# Patient Record
Sex: MALE | Race: WHITE | NOT HISPANIC OR LATINO | Employment: OTHER | URBAN - METROPOLITAN AREA
[De-identification: names, ages, dates, MRNs, and addresses within clinical notes are randomized per-mention and may not be internally consistent; named-entity substitution may affect disease eponyms.]

---

## 2017-01-03 ENCOUNTER — GENERIC CONVERSION - ENCOUNTER (OUTPATIENT)
Dept: FAMILY MEDICINE CLINIC | Facility: CLINIC | Age: 57
End: 2017-01-03

## 2017-02-15 ENCOUNTER — GENERIC CONVERSION - ENCOUNTER (OUTPATIENT)
Dept: OTHER | Facility: OTHER | Age: 57
End: 2017-02-15

## 2017-02-27 ENCOUNTER — ALLSCRIPTS OFFICE VISIT (OUTPATIENT)
Dept: OTHER | Facility: OTHER | Age: 57
End: 2017-02-27

## 2017-03-20 ENCOUNTER — ALLSCRIPTS OFFICE VISIT (OUTPATIENT)
Dept: OTHER | Facility: OTHER | Age: 57
End: 2017-03-20

## 2017-04-07 ENCOUNTER — GENERIC CONVERSION - ENCOUNTER (OUTPATIENT)
Dept: OTHER | Facility: OTHER | Age: 57
End: 2017-04-07

## 2017-05-10 ENCOUNTER — GENERIC CONVERSION - ENCOUNTER (OUTPATIENT)
Dept: OTHER | Facility: OTHER | Age: 57
End: 2017-05-10

## 2017-06-07 ENCOUNTER — GENERIC CONVERSION - ENCOUNTER (OUTPATIENT)
Dept: OTHER | Facility: OTHER | Age: 57
End: 2017-06-07

## 2017-07-24 ENCOUNTER — ALLSCRIPTS OFFICE VISIT (OUTPATIENT)
Dept: OTHER | Facility: OTHER | Age: 57
End: 2017-07-24

## 2017-07-24 ENCOUNTER — GENERIC CONVERSION - ENCOUNTER (OUTPATIENT)
Dept: OTHER | Facility: OTHER | Age: 57
End: 2017-07-24

## 2017-07-27 ENCOUNTER — GENERIC CONVERSION - ENCOUNTER (OUTPATIENT)
Dept: OTHER | Facility: OTHER | Age: 57
End: 2017-07-27

## 2017-08-02 ENCOUNTER — GENERIC CONVERSION - ENCOUNTER (OUTPATIENT)
Dept: OTHER | Facility: OTHER | Age: 57
End: 2017-08-02

## 2017-08-30 ENCOUNTER — GENERIC CONVERSION - ENCOUNTER (OUTPATIENT)
Dept: OTHER | Facility: OTHER | Age: 57
End: 2017-08-30

## 2017-09-29 ENCOUNTER — GENERIC CONVERSION - ENCOUNTER (OUTPATIENT)
Dept: OTHER | Facility: OTHER | Age: 57
End: 2017-09-29

## 2017-10-05 DIAGNOSIS — M10.9 GOUT: ICD-10-CM

## 2017-10-05 DIAGNOSIS — I10 ESSENTIAL (PRIMARY) HYPERTENSION: ICD-10-CM

## 2017-10-12 ENCOUNTER — TRANSCRIBE ORDERS (OUTPATIENT)
Dept: ADMINISTRATIVE | Facility: HOSPITAL | Age: 57
End: 2017-10-12

## 2017-10-12 DIAGNOSIS — D36.10 LHERMITTE-DUCLOS SYNDROME (HCC): Primary | ICD-10-CM

## 2017-10-12 DIAGNOSIS — Q04.8 LHERMITTE-DUCLOS SYNDROME (HCC): Primary | ICD-10-CM

## 2017-10-12 DIAGNOSIS — M77.42 METATARSALGIA OF LEFT FOOT: ICD-10-CM

## 2017-10-18 ENCOUNTER — LAB (OUTPATIENT)
Dept: LAB | Facility: MEDICAL CENTER | Age: 57
End: 2017-10-18
Payer: COMMERCIAL

## 2017-10-18 ENCOUNTER — GENERIC CONVERSION - ENCOUNTER (OUTPATIENT)
Dept: OTHER | Facility: OTHER | Age: 57
End: 2017-10-18

## 2017-10-18 ENCOUNTER — TRANSCRIBE ORDERS (OUTPATIENT)
Dept: ADMINISTRATIVE | Facility: HOSPITAL | Age: 57
End: 2017-10-18

## 2017-10-18 DIAGNOSIS — M10.9 GOUT: ICD-10-CM

## 2017-10-18 DIAGNOSIS — I10 ESSENTIAL (PRIMARY) HYPERTENSION: ICD-10-CM

## 2017-10-18 LAB
ALBUMIN SERPL BCP-MCNC: 3.6 G/DL (ref 3.5–5)
ALP SERPL-CCNC: 88 U/L (ref 46–116)
ALT SERPL W P-5'-P-CCNC: 48 U/L (ref 12–78)
ANION GAP SERPL CALCULATED.3IONS-SCNC: 8 MMOL/L (ref 4–13)
AST SERPL W P-5'-P-CCNC: 29 U/L (ref 5–45)
BASOPHILS # BLD AUTO: 0.02 THOUSANDS/ΜL (ref 0–0.1)
BASOPHILS NFR BLD AUTO: 0 % (ref 0–1)
BILIRUB SERPL-MCNC: 0.74 MG/DL (ref 0.2–1)
BILIRUB UR QL STRIP: NEGATIVE
BUN SERPL-MCNC: 16 MG/DL (ref 5–25)
CALCIUM SERPL-MCNC: 8.9 MG/DL (ref 8.3–10.1)
CHLORIDE SERPL-SCNC: 107 MMOL/L (ref 100–108)
CHOLEST SERPL-MCNC: 168 MG/DL (ref 50–200)
CLARITY UR: CLEAR
CO2 SERPL-SCNC: 26 MMOL/L (ref 21–32)
COLOR UR: YELLOW
CREAT SERPL-MCNC: 1.13 MG/DL (ref 0.6–1.3)
EOSINOPHIL # BLD AUTO: 0.43 THOUSAND/ΜL (ref 0–0.61)
EOSINOPHIL NFR BLD AUTO: 7 % (ref 0–6)
ERYTHROCYTE [DISTWIDTH] IN BLOOD BY AUTOMATED COUNT: 14 % (ref 11.6–15.1)
GFR SERPL CREATININE-BSD FRML MDRD: 71 ML/MIN/1.73SQ M
GLUCOSE P FAST SERPL-MCNC: 106 MG/DL (ref 65–99)
GLUCOSE UR STRIP-MCNC: NEGATIVE MG/DL
HCT VFR BLD AUTO: 44.2 % (ref 36.5–49.3)
HDLC SERPL-MCNC: 72 MG/DL (ref 40–60)
HGB BLD-MCNC: 14.4 G/DL (ref 12–17)
HGB UR QL STRIP.AUTO: NEGATIVE
KETONES UR STRIP-MCNC: NEGATIVE MG/DL
LDLC SERPL CALC-MCNC: 84 MG/DL (ref 0–100)
LEUKOCYTE ESTERASE UR QL STRIP: NEGATIVE
LYMPHOCYTES # BLD AUTO: 1.74 THOUSANDS/ΜL (ref 0.6–4.47)
LYMPHOCYTES NFR BLD AUTO: 30 % (ref 14–44)
MCH RBC QN AUTO: 28.7 PG (ref 26.8–34.3)
MCHC RBC AUTO-ENTMCNC: 32.6 G/DL (ref 31.4–37.4)
MCV RBC AUTO: 88 FL (ref 82–98)
MONOCYTES # BLD AUTO: 0.58 THOUSAND/ΜL (ref 0.17–1.22)
MONOCYTES NFR BLD AUTO: 10 % (ref 4–12)
NEUTROPHILS # BLD AUTO: 3.02 THOUSANDS/ΜL (ref 1.85–7.62)
NEUTS SEG NFR BLD AUTO: 53 % (ref 43–75)
NITRITE UR QL STRIP: NEGATIVE
NRBC BLD AUTO-RTO: 0 /100 WBCS
PH UR STRIP.AUTO: 6 [PH] (ref 4.5–8)
PLATELET # BLD AUTO: 239 THOUSANDS/UL (ref 149–390)
PMV BLD AUTO: 11.4 FL (ref 8.9–12.7)
POTASSIUM SERPL-SCNC: 4.2 MMOL/L (ref 3.5–5.3)
PROT SERPL-MCNC: 7.6 G/DL (ref 6.4–8.2)
PROT UR STRIP-MCNC: NEGATIVE MG/DL
PSA SERPL-MCNC: 8.2 NG/ML (ref 0–4)
RBC # BLD AUTO: 5.02 MILLION/UL (ref 3.88–5.62)
SODIUM SERPL-SCNC: 141 MMOL/L (ref 136–145)
SP GR UR STRIP.AUTO: 1.02 (ref 1–1.03)
TRIGL SERPL-MCNC: 59 MG/DL
TSH SERPL DL<=0.05 MIU/L-ACNC: 2.09 UIU/ML (ref 0.36–3.74)
URATE SERPL-MCNC: 6.8 MG/DL (ref 4.2–8)
UROBILINOGEN UR QL STRIP.AUTO: 0.2 E.U./DL
WBC # BLD AUTO: 5.81 THOUSAND/UL (ref 4.31–10.16)

## 2017-10-18 PROCEDURE — 36415 COLL VENOUS BLD VENIPUNCTURE: CPT

## 2017-10-18 PROCEDURE — 85025 COMPLETE CBC W/AUTO DIFF WBC: CPT

## 2017-10-18 PROCEDURE — G0103 PSA SCREENING: HCPCS

## 2017-10-18 PROCEDURE — 84550 ASSAY OF BLOOD/URIC ACID: CPT

## 2017-10-18 PROCEDURE — 80053 COMPREHEN METABOLIC PANEL: CPT

## 2017-10-18 PROCEDURE — 84443 ASSAY THYROID STIM HORMONE: CPT

## 2017-10-18 PROCEDURE — 80061 LIPID PANEL: CPT

## 2017-10-18 PROCEDURE — 81003 URINALYSIS AUTO W/O SCOPE: CPT

## 2017-10-22 ENCOUNTER — HOSPITAL ENCOUNTER (OUTPATIENT)
Dept: MRI IMAGING | Facility: HOSPITAL | Age: 57
Discharge: HOME | End: 2017-10-22
Attending: PODIATRIST
Payer: COMMERCIAL

## 2017-10-22 DIAGNOSIS — Q04.8 LHERMITTE-DUCLOS SYNDROME (HCC): ICD-10-CM

## 2017-10-22 DIAGNOSIS — M77.42 METATARSALGIA OF LEFT FOOT: ICD-10-CM

## 2017-10-22 DIAGNOSIS — D36.10 LHERMITTE-DUCLOS SYNDROME (HCC): ICD-10-CM

## 2017-10-22 PROCEDURE — A9585 GADOBUTROL INJECTION: HCPCS | Performed by: PODIATRIST

## 2017-10-22 PROCEDURE — 73720 MRI LWR EXTREMITY W/O&W/DYE: CPT

## 2017-10-22 RX ADMIN — GADOBUTROL 9 ML: 604.72 INJECTION INTRAVENOUS at 10:49

## 2017-10-24 ENCOUNTER — TRANSCRIBE ORDERS (OUTPATIENT)
Dept: ADMINISTRATIVE | Facility: HOSPITAL | Age: 57
End: 2017-10-24

## 2017-10-24 ENCOUNTER — LAB (OUTPATIENT)
Dept: LAB | Facility: MEDICAL CENTER | Age: 57
End: 2017-10-24
Payer: COMMERCIAL

## 2017-10-24 DIAGNOSIS — N40.1 ENLARGED PROSTATE WITH URINARY OBSTRUCTION: Primary | ICD-10-CM

## 2017-10-24 DIAGNOSIS — N13.8 ENLARGED PROSTATE WITH URINARY OBSTRUCTION: Primary | ICD-10-CM

## 2017-10-24 DIAGNOSIS — N13.8 ENLARGED PROSTATE WITH URINARY OBSTRUCTION: ICD-10-CM

## 2017-10-24 DIAGNOSIS — N40.1 ENLARGED PROSTATE WITH URINARY OBSTRUCTION: ICD-10-CM

## 2017-10-24 LAB — PSA SERPL-MCNC: 7.1 NG/ML (ref 0–4)

## 2017-10-24 PROCEDURE — 36415 COLL VENOUS BLD VENIPUNCTURE: CPT

## 2017-10-24 PROCEDURE — G0103 PSA SCREENING: HCPCS

## 2017-10-25 ENCOUNTER — ALLSCRIPTS OFFICE VISIT (OUTPATIENT)
Dept: OTHER | Facility: OTHER | Age: 57
End: 2017-10-25

## 2017-10-25 LAB
BILIRUB UR QL STRIP: NORMAL
CLARITY UR: NORMAL
COLOR UR: YELLOW
GLUCOSE (HISTORICAL): NORMAL
HGB UR QL STRIP.AUTO: NORMAL
KETONES UR STRIP-MCNC: NORMAL MG/DL
LEUKOCYTE ESTERASE UR QL STRIP: NORMAL
NITRITE UR QL STRIP: NORMAL
PH UR STRIP.AUTO: 6 [PH]
PROT UR STRIP-MCNC: NORMAL MG/DL
SP GR UR STRIP.AUTO: 1.02
UROBILINOGEN UR QL STRIP.AUTO: 0.2

## 2017-10-26 NOTE — PROGRESS NOTES
Assessment  1  Hypertension (401 9) (I10)    Plan   · EKG/ECG- POC; Status:Complete;   Done: 70GSM2917 09:01AM   Perform: In Office; Last Updated Francine San; 10/25/2017 9:03:48 AM;Ordered;For:Hypertension; Ordered By:Deonte Whitley;   · Follow-up visit in 1 year Evaluation and Treatment  Follow-up  Status: Hold For -  Scheduling  Requested for: 25Oct2017   Ordered; For: Hypertension; Ordered By: Forest Carlisle Performed:  Due: 40GWC2480    Discussion/Summary    Asymptomatic  Functional class I  Normal stress test in 2015  Blood pressure acceptable on lisinopril 2 5 mg daily  Was recently a fasting lipid profile showed a total cholesterol of 168 with HDL of 72 with an LDL of 84  I will see him again in one year  Chief Complaint  New Patient Pt request      History of Present Illness  HPI: Relocated to this area and presents for yearly cardiac eval  Denies chest pain shortness of breath orthopnea paroxysmal nocturnal dyspnea oOr syncope  Tolerates all activity  Review of Systems      Cardiac: No complaints of chest pain, no palpitations, no fainiting  Skin: No complaints of nonhealing sores or skin rash  Genitourinary: No complaints of recurrent urinary tract infections, frequent urination at night, difficult urination, blood in urine, kidney stones, loss of bladder control, no kidney or prostate problems, no erectile dysfunction  Psychological: No complaints of feeling depressed, anxiety, panic attacks, or difficulty concentrating  General: changes in weight lbs-- and-- lack of energy/fatigue, but-- No complaints of trouble sleeping, lack of energy, fatigue, appetite changes, weight changes, fever, frequent infections, or night sweats  Respiratory: No complaints of shortness of breath, cough with sputum, or wheezing  HEENT: serious eye problems, but-- No complaints of serious problems, hearing problems, nose problems, throat problems, or snoring -- cataracts     Gastrointestinal: No complaints of liver problems, nausea, vomiting, heartburn, constipation, bloody stools, diarrhea, problems swallowing, adbominal pain, or rectal bleeding  Neurological: daytime sleepiness, but-- No complaints of numbness, tingling, dizziness, weakness, seizures, headaches, syncope or fainting, AM fatigue, daytime sleepiness, no witnessed apnea episodes  Musculoskeletal: No complaints of arthritis, back pain, or painfull swelling  ROS reviewed  Active Problems  1  Cataract (366 9) (H26 9)   2  Elevated PSA (790 93) (R97 20)   3  Encounter for prostate cancer screening (V76 44) (Z12 5)   4  Encounter for screening colonoscopy (V76 51) (Z12 11)   5  Gout (274 9) (M10 9)   6  Hypertension (401 9) (I10)   7  Need for tuberculosis vaccination (V03 2) (Z23)   8  Needs flu shot (V04 81) (Z23)   9  Right knee pain (719 46) (M25 561)   10  Skin lesion (709 9) (L98 9)    Surgical History   · History of Back Surgery   · History of Eye Surgery   · History of Surgery Vas Deferens Vasectomy   · History of Tonsillectomy    The surgical history was reviewed and updated today  Family History  Mother    · Family history of Hypertension (401 9) (I10)    The family history was reviewed and updated today  Social History   · Alcohol drinker (V49 89) (Z78 9)   · 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   · Part-time employment   ·  (V61 03) (Z63 5)   · Two children  The social history was reviewed and updated today  The social history was reviewed and is unchanged  Current Meds   1  Lisinopril 2 5 MG Oral Tablet; TAKE 1 TABLET DAILY; Therapy: (Recorded:25Oct2017) to Recorded    The medication list was reviewed and updated today  Allergies  1   No Known Drug Allergies    Vitals  Signs   Heart Rate: 76, Apical  Respiration: 16  Systolic: 514, RUE  Diastolic: 88, RUE  Height: 5 ft 3 in  Weight: 214 lb 2 oz  BMI Calculated: 37 93  BSA Calculated: 1 99    Physical Exam    Constitutional - General appearance: No acute distress, well appearing and well nourished  Eyes - Conjunctiva and Sclera examination: Conjunctiva pink, sclera anicteric  Neck - Normal, no JVD   Pulmonary - Respiratory effort: No signs of respiratory distress  -- Auscultation of lungs: Clear to auscultation  Cardiovascular - Auscultation of heart: Normal rate and rhythm, normal S1 and S2, no murmurs  -- Pedal pulses: Normal, 2+ bilaterally  -- Examination of extremities for edema and/or varicosities: Normal     Abdomen - Soft  Musculoskeletal - Gait and station: Normal gait  Skin - Skin: Normal without rashes  Skin is warm and well perfused  Neurologic - Speech normal  No focal deficits     Psychiatric - Orientation to person, place, and time: Normal       Signatures   Electronically signed by : MANGO Vidal ; Oct 25 2017  3:08PM EST                       (Author)

## 2017-11-10 ENCOUNTER — GENERIC CONVERSION - ENCOUNTER (OUTPATIENT)
Dept: OTHER | Facility: OTHER | Age: 57
End: 2017-11-10

## 2017-11-10 DIAGNOSIS — R97.20 ELEVATED PROSTATE SPECIFIC ANTIGEN (PSA): ICD-10-CM

## 2017-11-10 PROCEDURE — G0416 PROSTATE BIOPSY, ANY MTHD: HCPCS | Performed by: UROLOGY

## 2017-11-10 PROCEDURE — 88344 IMHCHEM/IMCYTCHM EA MLT ANTB: CPT | Performed by: UROLOGY

## 2017-11-13 ENCOUNTER — LAB REQUISITION (OUTPATIENT)
Dept: LAB | Facility: HOSPITAL | Age: 57
End: 2017-11-13
Payer: COMMERCIAL

## 2017-11-13 DIAGNOSIS — R97.20 ELEVATED PROSTATE SPECIFIC ANTIGEN (PSA): ICD-10-CM

## 2017-11-20 ENCOUNTER — GENERIC CONVERSION - ENCOUNTER (OUTPATIENT)
Dept: UROLOGY | Facility: MEDICAL CENTER | Age: 57
End: 2017-11-20

## 2017-11-29 ENCOUNTER — GENERIC CONVERSION - ENCOUNTER (OUTPATIENT)
Dept: OTHER | Facility: OTHER | Age: 57
End: 2017-11-29

## 2017-11-29 ENCOUNTER — ALLSCRIPTS OFFICE VISIT (OUTPATIENT)
Dept: OTHER | Facility: OTHER | Age: 57
End: 2017-11-29

## 2018-01-04 LAB — SCAN RESULT: NORMAL

## 2018-01-10 NOTE — RESULT NOTES
Verified Results  (1) LYME ANTIBODY PROFILE [facility] TO [facility] [id number] 10:30AM Huey Hydeor Order Number: [id number]     Test Name Result Flag Reference   LYME IGG 0 18  0 00-0 79   NEGATIVE(0 00-0 79)-Absence of detectable Borrelia IgG Antibodies  A negative result does not exclude the possibility of Borrelia infection  If early Lyme disease is suspected,a second sample should be collected & tested 4 weeks after initial testing  LYME IGM 0 32  0 00-0 79   NEGATIVE (0 00-0 79)-Absence of detectable Borrelia IgM antibodies  A negative result does not exclude the possibility of Borrelia infection  If early lyme disease is suspected, a second sample should be collected & tested 4 weeks after initial testing

## 2018-01-11 NOTE — RESULT NOTES
Verified Results  (1) CBC/PLT/DIFF 18Oct2017 07:30AM Jay Back    Order Number: [id number]     Test Name Result Flag Reference   WBC COUNT 5 81 Thousand/uL  4 31-10 16   RBC COUNT 5 02 Million/uL  3 88-5 62   HEMOGLOBIN 14 4 g/dL  12 0-17 0   HEMATOCRIT 44 2 %  36 5-49 3   MCV 88 fL  82-98   MCH 28 7 pg  26 8-34 3   MCHC 32 6 g/dL  31 4-37 4   RDW 14 0 %  11 6-15 1   MPV 11 4 fL  8 9-12 7   PLATELET COUNT 467 Thousands/uL  149-390   nRBC AUTOMATED 0 /100 WBCs     NEUTROPHILS RELATIVE PERCENT 53 %  43-75   LYMPHOCYTES RELATIVE PERCENT 30 %  14-44   MONOCYTES RELATIVE PERCENT 10 %  4-12   EOSINOPHILS RELATIVE PERCENT 7 % H 0-6   BASOPHILS RELATIVE PERCENT 0 %  0-1   NEUTROPHILS ABSOLUTE COUNT 3 02 Thousands/? ??L  1 85-7 62   LYMPHOCYTES ABSOLUTE COUNT 1 74 Thousands/? ??L  0 60-4 47   MONOCYTES ABSOLUTE COUNT 0 58 Thousand/? ??L  0 17-1 22   EOSINOPHILS ABSOLUTE COUNT 0 43 Thousand/? ??L  0 00-0 61   BASOPHILS ABSOLUTE COUNT 0 02 Thousands/? ??L  0 00-0 10     (1) COMPREHENSIVE METABOLIC PANEL [id number] 16:73HE Jay Back    Order Number: [id number]     Test Name Result Flag Reference   SODIUM 141 mmol/L  136-145   POTASSIUM 4 2 mmol/L  3 5-5 3   CHLORIDE 107 mmol/L  100-108   CARBON DIOXIDE 26 mmol/L  21-32   ANION GAP (CALC) 8 mmol/L  4-13   BLOOD UREA NITROGEN 16 mg/dL  5-25   CREATININE 1 13 mg/dL  0 60-1 30   Standardized to IDMS reference method   CALCIUM 8 9 mg/dL  8 3-10 1   BILI, TOTAL 0 74 mg/dL  0 20-1 00   ALK PHOSPHATAS 88 U/L     ALT (SGPT) 48 U/L  12-78   Specimen collection should occur prior to Sulfasalazine and/or Sulfapyridine administration due to the potential for falsely depressed results  AST(SGOT) 29 U/L  5-45   Specimen collection should occur prior to Sulfasalazine administration due to the potential for falsely depressed results     ALBUMIN 3 6 g/dL  3 5-5 0   TOTAL PROTEIN 7 6 g/dL  6 4-8 2   eGFR 71 ml/min/1 73sq m     National Kidney Disease Education Program recommendations are as follows:  GFR calculation is accurate only with a steady state creatinine  Chronic Kidney disease less than 60 ml/min/1 73 sq  meters  Kidney failure less than 15 ml/min/1 73 sq  meters  GLUCOSE FASTING 106 mg/dL H 65-99   Specimen collection should occur prior to Sulfasalazine administration due to the potential for falsely depressed results  Specimen collection should occur prior to Sulfapyridine administration due to the potential for falsely elevated results  (1) LIPID PANEL FASTING W DIRECT LDL REFLEX 18Oct2017 07:30AM Jaqueline Benitez Order Number: [id number]     Test Name Result Flag Reference   CHOLESTEROL 168 mg/dL     LDL CHOLESTEROL CALCULATED 84 mg/dL  0-100   Triglyceride:        Normal <150 mg/dl   Borderline High 150-199 mg/dl   High 200-499 mg/dl   Very High >499 mg/dl      Cholesterol:       Desirable <200 mg/dl    Borderline High 200-239 mg/dl    High >239 mg/dl      HDL Cholesterol:       High>59 mg/dL    Low <41 mg/dL      HDL Cholesterol:       High>59 mg/dL    Low <41 mg/dL      This screening LDL is a calculated result  It does not have the accuracy of the Direct Measured LDL in the monitoring of patients with hyperlipidemia and/or statin therapy  Direct Measure LDL (BLI545) must be ordered separately in these patients  TRIGLYCERIDES 59 mg/dL  <=150   Specimen collection should occur prior to N-Acetylcysteine or Metamizole administration due to the potential for falsely depressed results  HDL,DIRECT 72 mg/dL H 40-60   Specimen collection should occur prior to Metamizole administration due to the potential for falsley depressed results  (1) TSH 18Oct2017 07:30AM Hany TAM Order Number: [id number]     Test Name Result Flag Reference   TSH 2 090 uIU/mL  0 358-3 740   Patients undergoing fluorescein dye angiography may retain small amounts of fluorescein in the body for 48-72 hours post procedure   Samples containing fluorescein can produce falsely depressed TSH values  If the patient had this procedure,a specimen should be resubmitted post fluorescein clearance  (1) URINALYSIS (will reflex a microscopy if leukocytes, occult blood, protein or nitrites are not within normal limits) 18Oct2017 07:30AM Huey  Order Number: [id number]     Test Name Result Flag Reference   COLOR Yellow     CLARITY Clear     SPECIFIC GRAVITY UA 1 021  1 003-1 030   PH UA 6 0  4 5-8 0   LEUKOCYTE ESTERASE UA Negative  Negative   NITRITE UA Negative  Negative   PROTEIN UA Negative mg/dl  Negative   GLUCOSE UA Negative mg/dl  Negative   KETONES UA Negative mg/dl  Negative   UROBILINOGEN UA 0 2 E U /dl  0 2, 1 0 E U /dl   BILIRUBIN UA Negative  Negative   BLOOD UA Negative  Negative     (1) URIC ACID 18Oct2017 07:30AM Markus Combs    Order Number: [id number]     Test Name Result Flag Reference   URIC ACID 6 8 mg/dL  4 2-8 0   Specimen collection should occur prior to Metamizole administration due to the potential for falsely depressed results  (1) PSA (SCREEN) (Dx V76 44 Screen for Prostate Cancer) [id number] 07:30AM T[employer]or Order Number: [id number]     Test Name Result Flag Reference   PROSTATE SPECIFIC ANTIGEN 8 2 ng/mL H 0 0-4 0   American Urological Association Guidelines define biochemical recurrence of prostate cancer as a detectable or rising PSA value post-radical prostatectomy that is greater than or equal to 0 2 ng/mL with a second confirmatory level of greater than or equal to 0 2 ng/mL         Plan  Elevated PSA    · *1 -  [facility]  *  Status: Active  Requested for:  07XLG3308  Care Summary provided  : Yes

## 2018-01-11 NOTE — RESULT NOTES
Verified Results  (Q) LIPID PANEL WITH DIRECT LDL 33LXN2382 08:14AM [employer]     Test Name Result Flag Reference   CHOLESTEROL, TOTAL 181 mg/dL  125-200   HDL CHOLESTEROL 57 mg/dL  > OR = 40   TRIGLICERIDES 689 mg/dL  <150   DIRECT  mg/dL  <130   Desirable range <100 mg/dL for patients with CHD or  diabetes and <70 mg/dL for diabetic patients with  known heart disease  CHOL/HDLC RATIO 3 2 (calc)  < OR = 5 0   NON HDL CHOLESTEROL 124 mg/dL (calc)     Target for non-HDL cholesterol is 30 mg/dL higher than   LDL cholesterol target  (Q) COMPREHENSIVE METABOLIC PNL W/ADJUSTED CALCIUM 20Jan2016 08:14AM [employer]     Test Name Result Flag Reference   GLUCOSE 94 mg/dL  65-99   Fasting reference interval   UREA NITROGEN (BUN) 15 mg/dL  7-25   CREATININE 1 12 mg/dL  0 70-1 33   For patients >52years of age, the reference limit  for Creatinine is approximately 13% higher for people  identified as -American  eGFR NON-AFR   AMERICAN 73 mL/min/1 73m2  > OR = 60   eGFR AFRICAN AMERICAN 85 mL/min/1 73m2  > OR = 60   BUN/CREATININE RATIO   5-94   NOT APPLICABLE (calc)   SODIUM 138 mmol/L  135-146   POTASSIUM 4 1 mmol/L  3 5-5 3   CHLORIDE 102 mmol/L     CARBON DIOXIDE 25 mmol/L  19-30   CALCIUM 9 4 mg/dL  8 6-10 3   CALCIUM (ADJUSTED FOR$ALBUMIN) 9 3 mg/dL (calc)  8 6-10 2   PROTEIN, TOTAL 7 5 g/dL  6 1-8 1   ALBUMIN 4 5 g/dL  3 6-5 1   GLOBULIN 3 0 g/dL (calc)  1 9-3 7   ALBUMIN/GLOBULIN RATIO 1 5 (calc)  1 0-2 5   BILIRUBIN, TOTAL 0 9 mg/dL  0 2-1 2   ALKALINE PHOSPHATASE 77 U/L     AST 24 U/L  10-35   ALT 28 U/L  9-46     (Q) URINALYSIS MICROSCOPIC 20Jan2016 08:14AM [employer]     Test Name Result Flag Reference   WBC NONE SEEN /HPF  < OR = 5   RBC NONE SEEN /HPF  < OR = 2   SQUAMOUS EPITHELIAL CELLS NONE SEEN /HPF  < OR = 5   BACTERIA NONE SEEN /HPF  NONE SEEN   HYALINE CAST NONE SEEN /LPF  NONE SEEN     (Q) CBC (INCLUDES DIFF/PLT) (REFL) 65LJG2053 08:14AM Kristen Merritt     Test Name Result Flag Reference   WHITE BLOOD CELL COUNT 5 1 Thousand/uL  3 8-10 8   RED BLOOD CELL COUNT 5 28 Million/uL  4 20-5 80   HEMOGLOBIN 14 6 g/dL  13 2-17 1   HEMATOCRIT 46 4 %  38 5-50 0   MCV 87 8 fL  80 0-100 0   MCH 27 6 pg  27 0-33 0   MCHC 31 4 g/dL L 32 0-36 0   RDW 14 3 %  11 0-15 0   PLATELET COUNT 795 Thousand/uL  140-400   MPV 9 7 fL  7 5-11 5   ABSOLUTE NEUTROPHILS 2576 cells/uL  1462-5228   ABSOLUTE LYMPHOCYTES 1811 cells/uL  850-3900   ABSOLUTE MONOCYTES 479 cells/uL  200-950   ABSOLUTE EOSINOPHILS 204 cells/uL     ABSOLUTE BASOPHILS 31 cells/uL  0-200   NEUTROPHILS 50 5 %     LYMPHOCYTES 35 5 %     MONOCYTES 9 4 %     EOSINOPHILS 4 0 %     BASOPHILS 0 6 %       (Q) HEPATITIS B IMMUNITY PANEL 20Jan2016 08:14AM Shaan Diane     Test Name Result Flag Reference   HEPATITIS B CORE AB TOTAL NON-REACTIVE  NON-REACTIVE   HEPATITIS B SURFACE$ANTIBODY QL REACTIVE A NON-REACTIVE     (Q) TSH, 3RD GENERATION 20Jan2016 08:14AM Shaan Diane     Test Name Result Flag Reference   TSH 2 32 mIU/L  0 40-4 50     (1) PSA, DIAGNOSTIC (FOLLOW-UP) 20Jan2016 08:14AM Shaan Diane   REPORT COMMENT:  FASTING:YES     Test Name Result Flag Reference   PSA, TOTAL 1 9 ng/mL  < OR = 4 0   This test was performed using the Siemens  chemiluminescent method  Values obtained from  different assay methods cannot be used  interchangeably  PSA levels, regardless of  value, should not be interpreted as absolute  evidence of the presence or absence of disease

## 2018-01-12 VITALS
HEIGHT: 75 IN | DIASTOLIC BLOOD PRESSURE: 90 MMHG | WEIGHT: 222.6 LBS | HEART RATE: 69 BPM | BODY MASS INDEX: 27.68 KG/M2 | SYSTOLIC BLOOD PRESSURE: 140 MMHG

## 2018-01-12 VITALS
HEIGHT: 63 IN | HEART RATE: 76 BPM | BODY MASS INDEX: 37.94 KG/M2 | SYSTOLIC BLOOD PRESSURE: 138 MMHG | DIASTOLIC BLOOD PRESSURE: 88 MMHG | WEIGHT: 214.13 LBS | RESPIRATION RATE: 16 BRPM

## 2018-01-13 VITALS
WEIGHT: 217 LBS | DIASTOLIC BLOOD PRESSURE: 80 MMHG | HEIGHT: 74 IN | SYSTOLIC BLOOD PRESSURE: 130 MMHG | BODY MASS INDEX: 27.85 KG/M2

## 2018-01-13 VITALS
TEMPERATURE: 97.6 F | HEART RATE: 78 BPM | DIASTOLIC BLOOD PRESSURE: 84 MMHG | HEIGHT: 75 IN | WEIGHT: 224.8 LBS | OXYGEN SATURATION: 95 % | BODY MASS INDEX: 27.95 KG/M2 | SYSTOLIC BLOOD PRESSURE: 120 MMHG

## 2018-01-22 VITALS
SYSTOLIC BLOOD PRESSURE: 120 MMHG | HEIGHT: 74 IN | DIASTOLIC BLOOD PRESSURE: 78 MMHG | TEMPERATURE: 96.9 F | RESPIRATION RATE: 16 BRPM | WEIGHT: 217 LBS | BODY MASS INDEX: 27.85 KG/M2

## 2018-01-22 VITALS — BODY MASS INDEX: 27.85 KG/M2 | HEIGHT: 74 IN | WEIGHT: 217 LBS

## 2018-01-22 VITALS
WEIGHT: 219.01 LBS | SYSTOLIC BLOOD PRESSURE: 138 MMHG | TEMPERATURE: 97.6 F | BODY MASS INDEX: 27.23 KG/M2 | DIASTOLIC BLOOD PRESSURE: 88 MMHG | HEIGHT: 75 IN | HEART RATE: 63 BPM | RESPIRATION RATE: 16 BRPM
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
## 2018-01-23 NOTE — PROGRESS NOTES
Assessment    1  Skin lesion (709 9) (L98 9)    Plan  Skin lesion    · Dermatology Referral Other Co-Management  *  Status: Hold For - Scheduling   Requested for: 58LQF3177  are Referring to a non- Preferred Provider : Services not provided in network  Care Summary provided  : Yes    Discussion/Summary    Will get derm eval    The treatment plan was reviewed with the patient/guardian  The patient/guardian understands and agrees with the treatment plan      Chief Complaint  Pt here for skin moles  Pt states moles get scabby      History of Present Illness  Skin Lesions (Brief): The patient is being seen for an initial evaluation of an existing diagnosis of skin lesion(s)  Symptoms:  single skin lesion and scar from previous lesion  The patient is not currently being treated for this problem  Review of Systems    Constitutional: not feeling poorly  Cardiovascular: no chest pain  Respiratory: no shortness of breath  Integumentary: skin lesion  Neurological: no headache  Active Problems    1  Cataract (366 9) (H26 9)   2  Encounter for prostate cancer screening (V76 44) (Z12 5)   3  Encounter for screening colonoscopy (V76 51) (Z12 11)   4  Gout (274 9) (M10 9)   5  Hypertension (401 9) (I10)   6  Laceration of left hand, subsequent encounter   7  Need for tuberculosis vaccination (V03 2) (Z23)   8  Right knee pain (719 46) (M25 561)    Past Medical History  Active Problems And Past Medical History Reviewed: The active problems and past medical history were reviewed and updated today  Family History  Mother    1  Family history of Hypertension (401 9) (I10)  Family History Reviewed: The family history was reviewed and updated today  Social History    · Alcohol drinker (V49 89) (Z78 9)   · 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   · Part-time employment   ·  (V61 03) (Z63 5)   · Two children  The social history was reviewed and updated today        Surgical History 1  History of Back Surgery   2  History of Eye Surgery   3  History of Surgery Vas Deferens Vasectomy   4  History of Tonsillectomy  Surgical History Reviewed: The surgical history was reviewed and updated today  Current Meds   1  Indomethacin 25 MG Oral Capsule; TAKE 1 CAPSULE BY MOUTH 2 TO 3 TIMES DAILY   WITH FOOD; Therapy: 65HIJ8101 to (Evaluate:21Jun2017)  Requested for: 68SEQ9473; Last   Rx:13Jun2017 Ordered   2  Lisinopril 5 MG Oral Tablet; Take 1 tablet daily Recorded    The medication list was reviewed and updated today  Allergies    1  No Known Drug Allergies    Vitals   Recorded: 63Osz0801 08:11AM   Temperature 97 6 F   Heart Rate 63   Respiration 16   Systolic 113   Diastolic 88   Height 6 ft 3 in   Weight 219 lb 0 16 oz   BMI Calculated 27 37   BSA Calculated 2 29     Physical Exam    Constitutional   General appearance: Abnormal   appears healthy and overweight  Pulmonary   Auscultation of lungs: Clear to auscultation, equal breath sounds bilaterally, no wheezes, no rales, no rhonci  Cardiovascular   Auscultation of heart: Normal rate and rhythm, normal S1 and S2, without murmurs  Carotid pulses: Normal     Skin   Examination of the skin for lesions: Abnormal   A single, variegated, brown papule(s)  on the back  A 1 cm scar was noted  on the back  Signatures   Electronically signed by :  Darian Rojas MD; Jul 24 2017  8:32AM EST                       (Author)

## 2018-01-24 ENCOUNTER — OFFICE VISIT (OUTPATIENT)
Dept: FAMILY MEDICINE CLINIC | Facility: CLINIC | Age: 58
End: 2018-01-24
Payer: COMMERCIAL

## 2018-01-24 VITALS
DIASTOLIC BLOOD PRESSURE: 80 MMHG | BODY MASS INDEX: 29.12 KG/M2 | TEMPERATURE: 98.4 F | HEART RATE: 78 BPM | SYSTOLIC BLOOD PRESSURE: 132 MMHG | WEIGHT: 215 LBS | HEIGHT: 72 IN

## 2018-01-24 DIAGNOSIS — D36.10 NEUROMA: ICD-10-CM

## 2018-01-24 DIAGNOSIS — Z00.00 HEALTH MAINTENANCE EXAMINATION: Primary | ICD-10-CM

## 2018-01-24 PROBLEM — C61 PROSTATE CARCINOMA (HCC): Status: ACTIVE | Noted: 2018-01-24

## 2018-01-24 PROBLEM — I10 ESSENTIAL HYPERTENSION: Status: ACTIVE | Noted: 2018-01-24

## 2018-01-24 PROCEDURE — 99396 PREV VISIT EST AGE 40-64: CPT | Performed by: FAMILY MEDICINE

## 2018-01-24 RX ORDER — IBUPROFEN 600 MG/1
600 TABLET ORAL EVERY 6 HOURS PRN
Qty: 60 TABLET | Refills: 1 | Status: SHIPPED | OUTPATIENT
Start: 2018-01-24 | End: 2018-06-23 | Stop reason: ALTCHOICE

## 2018-01-24 RX ORDER — LISINOPRIL 2.5 MG/1
2.5 TABLET ORAL DAILY
Refills: 0 | COMMUNITY
Start: 2017-12-29 | End: 2018-06-23 | Stop reason: ALTCHOICE

## 2018-01-24 NOTE — PROGRESS NOTES
Assessment/Plan:    Health maintenance examination  utd on lab and health screenings-concerned about elevated PSA-may need 2nd opinion       Diagnoses and all orders for this visit:    Health maintenance examination    Neuroma  -     ibuprofen (MOTRIN) 600 mg tablet; Take 1 tablet by mouth every 6 (six) hours as needed for mild pain    Other orders  -     lisinopril (ZESTRIL) 2 5 mg tablet; Take 2 5 mg by mouth daily          Subjective:      Patient ID: Mary Day is a 61 y o  male  HPI     Assessment/Plan     Healthy male exam  -only concern is prostate    1   2  Patient Counseling:  --Nutrition: Stressed importance of moderation in sodium/caffeine intake, saturated fat and cholesterol, caloric balance, sufficient intake of fresh fruits, vegetables, fiber, calcium  --Discussed the issue of  calcium supplement, and the daily use of baby aspirin  --Exercise: Stressed the importance of regular exercise  --Substance Abuse: Discussed cessation/primary prevention of tobacco, alcohol, or other drug use; driving or other dangerous activities under the influence; availability of treatment for abuse  --Sexuality: Discussed sexually transmitted diseases, partner selection, use of condoms,   --Injury prevention: Discussed safety belts, safety helmets, smoke detector  --Dental health: Discussed importance of regular tooth brushing, flossing, and dental visits  --Immunizations reviewed  --Discussed benefits of screening colonoscopy  --After hours service discussed with patient    3  Discussed the patient's BMI with him  The BMI is above average; BMI management plan is completed  4  Follow up in one year  Radha Tang is a 61 y o  male and is here for a comprehensive physical exam  The patient reports problems - prostate  Do you take any herbs or supplements that were not prescribed by a doctor? no  Are you taking calcium supplements? no  Are you taking aspirin daily?  no     History:  Patient receives prostate care outside our clinic  Date last prostate exam: 2017  Date last PSA: 2017    ros    Review of Systems  Do you have pain that bothers you in your daily life? no  Pertinent items are noted in HPI       Objective     /80 (BP Location: Left arm)   Pulse 78   Temp 98 4 °F (36 9 °C)   Ht 6' (1 829 m)   Wt 97 5 kg (215 lb)   BMI 29 16 kg/m²     General Appearance:    Alert, cooperative, no distress, appears stated age   Head:    Normocephalic, without obvious abnormality, atraumatic   Eyes:    PERRL, conjunctiva/corneas clear, EOM's intact, fundi     benign, both eyes        Ears:    Normal TM's and external ear canals, both ears   Nose:   Nares normal, septum midline, mucosa normal, no drainage    or sinus tenderness   Throat:   Lips, mucosa, and tongue normal; teeth and gums normal   Neck:   Supple, symmetrical, trachea midline, no adenopathy;        thyroid:  No enlargement/tenderness/nodules; no carotid    bruit or JVD   Back:     Symmetric, no curvature, ROM normal, no CVA tenderness   Lungs:     Clear to auscultation bilaterally, respirations unlabored   Chest wall:    No tenderness or deformity   Heart:    Regular rate and rhythm, S1 and S2 normal, no murmur, rub   or gallop   Abdomen:     Soft, non-tender, bowel sounds active all four quadrants,     no masses, no organomegaly   Genitalia:    Normal male without lesion, discharge or tenderness   Rectal:    Normal tone, normal prostate, no masses or tenderness;    guaiac negative stool   Extremities:   Extremities normal, atraumatic, no cyanosis or edema   Pulses:   2+ and symmetric all extremities   Skin:   Skin color, texture, turgor normal, no rashes or lesions   Lymph nodes:   Cervical, supraclavicular, and axillary nodes normal   Neurologic:   CNII-XII intact  Normal strength, sensation and reflexes       throughout           The following portions of the patient's history were reviewed and updated as appropriate: allergies, current medications, past family history, past medical history, past social history, past surgical history and problem list     Review of Systems   Constitutional: Negative for activity change, appetite change and unexpected weight change  HENT: Negative for ear pain and sore throat  Eyes: Positive for visual disturbance  Still blurry in r eye   Respiratory: Negative for shortness of breath  Cardiovascular: Negative for chest pain  Gastrointestinal: Negative for abdominal pain  Umbilical hernia   Genitourinary: Negative for difficulty urinating, dysuria and frequency  Musculoskeletal: Positive for arthralgias  Foot pain   Neurological: Negative for headaches  Objective:     Physical Exam   Constitutional: He is oriented to person, place, and time  He appears well-developed and well-nourished  HENT:   Mouth/Throat: Oropharynx is clear and moist  No oropharyngeal exudate  Eyes: Right eye exhibits no discharge  Cardiovascular: Normal rate and regular rhythm  Pulmonary/Chest: Breath sounds normal  He has no wheezes  Abdominal: Soft  Bowel sounds are normal  He exhibits mass  A hernia is present  Musculoskeletal: He exhibits no edema  Lymphadenopathy:     He has no cervical adenopathy  Neurological: He is alert and oriented to person, place, and time

## 2018-01-29 PROBLEM — Z00.00 HEALTH MAINTENANCE EXAMINATION: Status: ACTIVE | Noted: 2018-01-29

## 2018-02-25 ENCOUNTER — OFFICE VISIT (OUTPATIENT)
Dept: URGENT CARE | Facility: MEDICAL CENTER | Age: 58
End: 2018-02-25
Payer: COMMERCIAL

## 2018-02-25 VITALS
HEART RATE: 96 BPM | TEMPERATURE: 98.7 F | HEIGHT: 74 IN | SYSTOLIC BLOOD PRESSURE: 144 MMHG | OXYGEN SATURATION: 98 % | RESPIRATION RATE: 20 BRPM | BODY MASS INDEX: 27.98 KG/M2 | WEIGHT: 218 LBS | DIASTOLIC BLOOD PRESSURE: 78 MMHG

## 2018-02-25 DIAGNOSIS — L71.0 PERIORAL DERMATITIS: Primary | ICD-10-CM

## 2018-02-25 PROCEDURE — 99214 OFFICE O/P EST MOD 30 MIN: CPT | Performed by: PHYSICIAN ASSISTANT

## 2018-02-25 RX ORDER — CEPHALEXIN 500 MG/1
500 CAPSULE ORAL EVERY 8 HOURS SCHEDULED
Qty: 21 CAPSULE | Refills: 0 | Status: SHIPPED | OUTPATIENT
Start: 2018-02-25 | End: 2018-03-04

## 2018-02-25 NOTE — PROGRESS NOTES
[facility]        NAME: Rigoberto Valdez is a 61 y o  male  : 1960    MRN: [id number]  DATE: 2018  TIME: 2:53 PM    Assessment and Plan   Perioral dermatitis [L71 0]  1  Perioral dermatitis  cephalexin (KEFLEX) 500 mg capsule         Patient Instructions     Follow up with PCP in 3-5 days  Proceed to  ER if symptoms worsen  Chief Complaint     Chief Complaint   Patient presents with    Rash     Patient c/o a rash on his face x 3 days          History of Present Illness   Rigoberto Valdez presents to the clinic c/o      58-year-old male presents for evaluation of a rash on his chin  He states that approximately 2 days ago started to notice red bumps on his chin and on his neck and his cheeks  He denies any fevers, chills, shortness of breath, difficulty breathing, chest pain  He does shave with a razor blade  He denies any allergies to antibiotics  He has been trying a pot on a gold Bond ointment at the site  Denies any rash anywhere else on his body  He denies any drainage from the site  Rash         Review of Systems   Review of Systems   Constitutional: Negative  Respiratory: Negative  Cardiovascular: Negative  Skin: Positive for rash           Current Medications     Long-Term Prescriptions   Medication Sig Dispense Refill    ibuprofen (MOTRIN) 600 mg tablet Take 1 tablet by mouth every 6 (six) hours as needed for mild pain 60 tablet 1    lisinopril (ZESTRIL) 2 5 mg tablet Take 2 5 mg by mouth daily  0       Current Allergies     Allergies as of 2018    (No Known Allergies)            The following portions of the patient's history were reviewed and updated as appropriate: allergies, current medications, past family history, past medical history, past social history, past surgical history and problem list     Objective   /78   Pulse 96   Temp 98 7 °F (37 1 °C)   Resp 20   Ht 6' 2" (1 88 m)   Wt 98 9 kg (218 lb)   SpO2 98%   BMI 27 99 kg/m² Physical Exam     Physical Exam   Constitutional: He appears well-developed and well-nourished  No distress  Cardiovascular: Normal rate, regular rhythm and normal heart sounds  Pulmonary/Chest: Effort normal and breath sounds normal  No respiratory distress  He has no wheezes  He has no rales  Skin: Rash noted  He is not diaphoretic  Nursing note and vitals reviewed

## 2018-02-25 NOTE — PATIENT INSTRUCTIONS
Folliculitis   WHAT YOU NEED TO KNOW:   Folliculitis is inflammation of your hair follicles  A hair follicle is a sac under your skin  Your hair grows out of the follicle  Folliculitis is caused by bacteria or fungus, most commonly a germ called Staph  Folliculitis can occur anywhere you have hair  DISCHARGE INSTRUCTIONS:   Medicines:   · Antibiotics: This medicine is given to fight or prevent an infection caused by bacteria  It may be given as an ointment that you apply to your skin or as a pill  Always take your antibiotics exactly as ordered by your healthcare provider  Never save antibiotics or take leftover antibiotics that were given to you for another illness  · Antifungal medicine: This medicine helps kill fungus that may be causing your folliculitis  It may be given as an cream that you apply to your skin or as a pill  · Steroids: This medicine may be given to decrease inflammation  · NSAIDs , such as ibuprofen, help decrease swelling, pain, and fever  This medicine is available with or without a doctor's order  NSAIDs can cause stomach bleeding or kidney problems in certain people  If you take blood thinner medicine, always ask if NSAIDs are safe for you  Always read the medicine label and follow directions  Do not give these medicines to children under 10months of age without direction from your child's healthcare provider  · Antihistamines: This medicine may be given to help decrease itching  · Take your medicine as directed  Contact your healthcare provider if you think your medicine is not helping or if you have side effects  Tell him or her if you are allergic to any medicine  Keep a list of the medicines, vitamins, and herbs you take  Include the amounts, and when and why you take them  Bring the list or the pill bottles to follow-up visits  Carry your medicine list with you in case of an emergency    Follow up with your healthcare provider or dermatologist as directed:  Write down your questions so you remember to ask them during your visits  Manage folliculitis:   · Use warm compresses:  Wet a washcloth with warm water and apply it to the infected skin area to help decrease pain and swelling  Warm compresses may also help drain pus and improve healing  · Clean the area:  Use antibacterial soap to wash the affected area  Change your washcloths and towels every day  · Avoid shaving the area: If possible, do not shave areas that have folliculitis  If you must shave, use an electric razor or new blade every time you shave  Prevent folliculitis:   · Do not share personal items:  Do not share towels, soap, or any personal items with other people  · Do not wear tight clothing:  Do not wear tight-fitting clothes that rub against and irritate your skin  · Treat skin injuries right away:  Treat injuries such as cuts and scrapes right away  Wash them with warm, soapy water, and cover the area to prevent infection  Contact your healthcare provider or dermatologist if:   · You have a fever  · You have foul-smelling pus coming from the bumps on your skin  · Your rash is spreading  · You have questions or concerns about your condition or care  Seek care immediately if:  · You develop large areas of red, warm, tender skin around the folliculitis  · You develop boils  © 2017 [address] Information is for End User's use only and may not be sold, redistributed or otherwise used for commercial purposes  All illustrations and images included in CareNotes® are the copyrighted property of [employer] M [employer]  or [address]  The above information is an  only  It is not intended as medical advice for individual conditions or treatments  Talk to your doctor, nurse or pharmacist before following any medical regimen to see if it is safe and effective for you

## 2018-05-27 ENCOUNTER — OFFICE VISIT (OUTPATIENT)
Dept: URGENT CARE | Facility: MEDICAL CENTER | Age: 58
End: 2018-05-27
Payer: COMMERCIAL

## 2018-05-27 VITALS
HEART RATE: 68 BPM | HEIGHT: 74 IN | RESPIRATION RATE: 20 BRPM | DIASTOLIC BLOOD PRESSURE: 87 MMHG | OXYGEN SATURATION: 97 % | BODY MASS INDEX: 28.23 KG/M2 | WEIGHT: 220 LBS | SYSTOLIC BLOOD PRESSURE: 152 MMHG | TEMPERATURE: 97.7 F

## 2018-05-27 DIAGNOSIS — S39.012A STRAIN OF MUSCLE, FASCIA AND TENDON OF LOWER BACK, INITIAL ENCOUNTER: Primary | ICD-10-CM

## 2018-05-27 PROCEDURE — 99213 OFFICE O/P EST LOW 20 MIN: CPT | Performed by: PHYSICIAN ASSISTANT

## 2018-05-27 RX ORDER — MELOXICAM 15 MG/1
15 TABLET ORAL DAILY
Qty: 14 TABLET | Refills: 0 | Status: SHIPPED | OUTPATIENT
Start: 2018-05-27 | End: 2018-06-23 | Stop reason: ALTCHOICE

## 2018-05-27 RX ORDER — CYCLOBENZAPRINE HCL 10 MG
10 TABLET ORAL 3 TIMES DAILY PRN
Qty: 30 TABLET | Refills: 0 | Status: SHIPPED | OUTPATIENT
Start: 2018-05-27 | End: 2018-06-23 | Stop reason: ALTCHOICE

## 2018-05-27 NOTE — PROGRESS NOTES
[facility]        NAME: Taima Mcdonnell is a 61 y o  male  : 1960    MRN: [id number]  DATE: May 27, 2018  TIME: 9:25 AM    Assessment and Plan   Strain of muscle, fascia and tendon of lower back, initial encounter [R45 997A]  1  Strain of muscle, fascia and tendon of lower back, initial encounter  cyclobenzaprine (FLEXERIL) 10 mg tablet    meloxicam (MOBIC) 15 mg tablet         Patient Instructions     Patient Instructions     Acute Low Back Pain   AMBULATORY CARE:   Acute low back pain  is sudden discomfort in your lower back area that lasts for up to 6 weeks  The discomfort makes it difficult to tolerate activity  Common symptoms include the following:   · Back stiffness or spasms    · Pain down the back or side of one leg    · Holding yourself in an unusual position or posture to decrease your back pain    · Not being able to find a sitting position that is comfortable    · Slow increase in your pain for 24 to 48 hours after you stress your back    · Tenderness on your lower back or severe pain when you move your back  Seek immediate care for the following symptoms:   · Severe pain    · Sudden stiffness and heaviness in both buttocks down to both legs    · Numbness or weakness in one leg, or pain in both legs    · Numbness in your genital area or across your lower back    · Unable to control your urine or bowel movements  Contact your healthcare provider if:   · You have a fever  · You have pain at night or when you rest     · Your pain does not get better with treatment  · You have pain that worsens when you cough or sneeze  · You suddenly feel something pop or snap in your back  · You have questions or concerns about your condition or care  The goal of treatment for acute low back pain  is to relieve your pain and help you tolerate activity  Most people with acute lower back pain get better within 4 to 6 weeks   You may need any of the following:  · Acetaminophen  decreases pain  It is available without a doctor's order  Ask how much to take and how often to take it  Follow directions  Acetaminophen can cause liver damage if not taken correctly  · NSAIDs  help decrease swelling and pain  This medicine is available with or without a doctor's order  NSAIDs can cause stomach bleeding or kidney problems in certain people  If you take blood thinner medicine, always ask your healthcare provider if NSAIDs are safe for you  Always read the medicine label and follow directions  · Prescription pain medicine  may be given  Ask your healthcare provider how to take this medicine safely  · Muscle relaxers  decrease pain by relaxing the muscles in your lower spine  Manage your symptoms:   · Stay active  as much as you can without causing more pain  Bed rest could make your back pain worse  Start with some light exercises such as walking  Avoid heavy lifting until your pain is gone  Ask for more information about the activities or exercises that are right for you  · Ice  helps decrease swelling, pain, and muscle spams  Put crushed ice in a plastic bag  Cover it with a towel  Place it on your lower back for 20 to 30 minutes every 2 hours  Do this for about 2 to 3 days after your pain starts, or as directed  · Heat  helps decrease pain and muscle spasms  Start to use heat after treatment with ice has stopped  Use a small towel dampened with warm water or a heating pad, or sit in a warm bath  Apply heat on the area for 20 to 30 minutes every 2 hours for as many days as directed  Alternate heat and ice  Prevent acute low back pain:   · Use proper body mechanics  ¨ Bend at the hips and knees when you  objects  Do not bend from the waist  Use your leg muscles as you lift the load  Do not use your back  Keep the object close to your chest as you lift it  Try not to twist or lift anything above your waist     ¨ Change your position often when you stand for long periods of time  Rest one foot on a small box or footrest, and then switch to the other foot often  ¨ Try not to sit for long periods of time  When you do, sit in a straight-backed chair with your feet flat on the floor  Never reach, pull, or push while you are sitting  · Do exercises that strengthen your back muscles  Warm up before you exercise  Ask your healthcare provider the best exercises for you  · Maintain a healthy weight  Ask your healthcare provider how much you should weigh  Ask him to help you create a weight loss plan if you are overweight  Follow up with your healthcare provider as directed:  Return for a follow-up visit if you still have pain after 1 to 3 weeks of treatment  You may need to visit an orthopedist if your back pain lasts more than 12 weeks  Write down your questions so you remember to ask them during your visits  © 2017 [address] Information is for End User's use only and may not be sold, redistributed or otherwise used for commercial purposes  All illustrations and images included in CareNotes® are the copyrighted property of [employer] Inc  or [employer]  The above information is an  only  It is not intended as medical advice for individual conditions or treatments  Talk to your doctor, nurse or pharmacist before following any medical regimen to see if it is safe and effective for you  Chief Complaint     Chief Complaint   Patient presents with    Back Pain     Patient c/o lower back pain x 1 days          History of Present Illness   [facility] presents to the clinic c/o      62-year-old male presents for evaluation of low back pain, that started yesterday  Patient states he was moving on, felt sharp pain in his lower back  He did have a laminectomy, 2008 did not have any symptoms since then  Denies any paresthesias in his lower extremities  Denies any saddle paresthesias, bowel or bladder incontinence, fever    States his back feels very stiff  He denies any history of kidney stones, urinary complaints  Denies any history of diabetes  Denies any history of gastritis, reflux, gastric bleeding episodes  Review of Systems   Review of Systems   Constitutional: Negative  Respiratory: Negative  Cardiovascular: Negative  Musculoskeletal: Positive for back pain  Current Medications     Long-Term Prescriptions   Medication Sig Dispense Refill    cyclobenzaprine (FLEXERIL) 10 mg tablet Take 1 tablet (10 mg total) by mouth 3 (three) times a day as needed for muscle spasms 30 tablet 0    ibuprofen (MOTRIN) 600 mg tablet Take 1 tablet by mouth every 6 (six) hours as needed for mild pain 60 tablet 1    lisinopril (ZESTRIL) 2 5 mg tablet Take 2 5 mg by mouth daily  0    meloxicam (MOBIC) 15 mg tablet Take 1 tablet (15 mg total) by mouth daily 14 tablet 0       Current Allergies     Allergies as of 05/27/2018    (No Known Allergies)            The following portions of the patient's history were reviewed and updated as appropriate: allergies, current medications, past family history, past medical history, past social history, past surgical history and problem list     Objective   /87   Pulse 68   Temp 97 7 °F (36 5 °C)   Resp 20   Ht 6' 2" (1 88 m)   Wt 99 8 kg (220 lb)   SpO2 97%   BMI 28 25 kg/m²        Physical Exam     Physical Exam   Constitutional: He appears well-developed and well-nourished  No distress  Cardiovascular: Normal rate, regular rhythm and normal heart sounds  Exam reveals no gallop and no friction rub  No murmur heard  Pulmonary/Chest: Effort normal and breath sounds normal  No respiratory distress  He has no wheezes  He has no rales  Musculoskeletal:        Lumbar back: He exhibits decreased range of motion and spasm  He exhibits no tenderness, no bony tenderness and normal pulse  No CVA tenderness bilaterally  Skin: He is not diaphoretic     Nursing note and vitals reviewed

## 2018-05-27 NOTE — PATIENT INSTRUCTIONS
Acute Low Back Pain   AMBULATORY CARE:   Acute low back pain  is sudden discomfort in your lower back area that lasts for up to 6 weeks  The discomfort makes it difficult to tolerate activity  Common symptoms include the following:   · Back stiffness or spasms    · Pain down the back or side of one leg    · Holding yourself in an unusual position or posture to decrease your back pain    · Not being able to find a sitting position that is comfortable    · Slow increase in your pain for 24 to 48 hours after you stress your back    · Tenderness on your lower back or severe pain when you move your back  Seek immediate care for the following symptoms:   · Severe pain    · Sudden stiffness and heaviness in both buttocks down to both legs    · Numbness or weakness in one leg, or pain in both legs    · Numbness in your genital area or across your lower back    · Unable to control your urine or bowel movements  Contact your healthcare provider if:   · You have a fever  · You have pain at night or when you rest     · Your pain does not get better with treatment  · You have pain that worsens when you cough or sneeze  · You suddenly feel something pop or snap in your back  · You have questions or concerns about your condition or care  The goal of treatment for acute low back pain  is to relieve your pain and help you tolerate activity  Most people with acute lower back pain get better within 4 to 6 weeks  You may need any of the following:  · Acetaminophen  decreases pain  It is available without a doctor's order  Ask how much to take and how often to take it  Follow directions  Acetaminophen can cause liver damage if not taken correctly  · NSAIDs  help decrease swelling and pain  This medicine is available with or without a doctor's order  NSAIDs can cause stomach bleeding or kidney problems in certain people   If you take blood thinner medicine, always ask your healthcare provider if NSAIDs are safe for you  Always read the medicine label and follow directions  · Prescription pain medicine  may be given  Ask your healthcare provider how to take this medicine safely  · Muscle relaxers  decrease pain by relaxing the muscles in your lower spine  Manage your symptoms:   · Stay active  as much as you can without causing more pain  Bed rest could make your back pain worse  Start with some light exercises such as walking  Avoid heavy lifting until your pain is gone  Ask for more information about the activities or exercises that are right for you  · Ice  helps decrease swelling, pain, and muscle spams  Put crushed ice in a plastic bag  Cover it with a towel  Place it on your lower back for 20 to 30 minutes every 2 hours  Do this for about 2 to 3 days after your pain starts, or as directed  · Heat  helps decrease pain and muscle spasms  Start to use heat after treatment with ice has stopped  Use a small towel dampened with warm water or a heating pad, or sit in a warm bath  Apply heat on the area for 20 to 30 minutes every 2 hours for as many days as directed  Alternate heat and ice  Prevent acute low back pain:   · Use proper body mechanics  ¨ Bend at the hips and knees when you  objects  Do not bend from the waist  Use your leg muscles as you lift the load  Do not use your back  Keep the object close to your chest as you lift it  Try not to twist or lift anything above your waist     ¨ Change your position often when you stand for long periods of time  Rest one foot on a small box or footrest, and then switch to the other foot often  ¨ Try not to sit for long periods of time  When you do, sit in a straight-backed chair with your feet flat on the floor  Never reach, pull, or push while you are sitting  · Do exercises that strengthen your back muscles  Warm up before you exercise  Ask your healthcare provider the best exercises for you  · Maintain a healthy weight    Ask your healthcare provider how much you should weigh  Ask him to help you create a weight loss plan if you are overweight  Follow up with your healthcare provider as directed:  Return for a follow-up visit if you still have pain after 1 to 3 weeks of treatment  You may need to visit an orthopedist if your back pain lasts more than 12 weeks  Write down your questions so you remember to ask them during your visits  © 2017 [address] Information is for End User's use only and may not be sold, redistributed or otherwise used for commercial purposes  All illustrations and images included in CareNotes® are the copyrighted property of [employer]  or [employer]  The above information is an  only  It is not intended as medical advice for individual conditions or treatments  Talk to your doctor, nurse or pharmacist before following any medical regimen to see if it is safe and effective for you

## 2018-06-22 DIAGNOSIS — S39.012A STRAIN OF MUSCLE, FASCIA AND TENDON OF LOWER BACK, INITIAL ENCOUNTER: ICD-10-CM

## 2018-06-23 ENCOUNTER — OFFICE VISIT (OUTPATIENT)
Dept: URGENT CARE | Facility: MEDICAL CENTER | Age: 58
End: 2018-06-23
Payer: COMMERCIAL

## 2018-06-23 VITALS
HEART RATE: 71 BPM | RESPIRATION RATE: 16 BRPM | DIASTOLIC BLOOD PRESSURE: 84 MMHG | TEMPERATURE: 96.7 F | OXYGEN SATURATION: 97 % | SYSTOLIC BLOOD PRESSURE: 148 MMHG

## 2018-06-23 DIAGNOSIS — S86.912A MUSCLE STRAIN OF LEFT LOWER LEG, INITIAL ENCOUNTER: Primary | ICD-10-CM

## 2018-06-23 PROCEDURE — 99202 OFFICE O/P NEW SF 15 MIN: CPT | Performed by: PHYSICIAN ASSISTANT

## 2018-06-23 RX ORDER — METHOCARBAMOL 500 MG/1
500 TABLET, FILM COATED ORAL 4 TIMES DAILY
Qty: 40 TABLET | Refills: 0 | Status: SHIPPED | OUTPATIENT
Start: 2018-06-23 | End: 2018-08-08 | Stop reason: CLARIF

## 2018-06-23 RX ORDER — MELOXICAM 15 MG/1
15 TABLET ORAL DAILY
Qty: 30 TABLET | Refills: 0 | Status: SHIPPED | OUTPATIENT
Start: 2018-06-23 | End: 2018-08-08 | Stop reason: CLARIF

## 2018-06-23 NOTE — PATIENT INSTRUCTIONS
Muscle Strain   WHAT YOU NEED TO KNOW:   A muscle strain is a twist, pull, or tear of a muscle or tendon  A tendon is a strong elastic tissue that connects a muscle to a bone  Signs of a strained muscle include bruising and swelling over the area, pain with movement, and loss of strength  DISCHARGE INSTRUCTIONS:   Return to the emergency department if:   · You suddenly cannot feel or move your injured muscle  Contact your healthcare provider if:   · Your pain and swelling worsen or do not go away  · You have questions or concerns about your condition or care  Medicines:   · NSAIDs  help decrease swelling and pain or fever  This medicine is available with or without a doctor's order  NSAIDs can cause stomach bleeding or kidney problems in certain people  If you take blood thinner medicine, always ask your healthcare provider if NSAIDs are safe for you  Always read the medicine label and follow directions  · Muscle relaxers  help decrease pain and muscle spasms  · Take your medicine as directed  Contact your healthcare provider if you think your medicine is not helping or if you have side effects  Tell him of her if you are allergic to any medicine  Keep a list of the medicines, vitamins, and herbs you take  Include the amounts, and when and why you take them  Bring the list or the pill bottles to follow-up visits  Carry your medicine list with you in case of an emergency  Follow up with your healthcare provider as directed: Your healthcare provider may suggest that you have a follow-up visit before you go back to your usual activity  Write down your questions so you remember to ask them during your visits  Self-care:   · 3 to 7 days after the injury:  Use Rest, Ice, Compression, and Elevation (RICE) to help stop bruising and decrease pain and swelling  ¨ Rest:  Rest your muscle to allow your injury to heal  When the pain decreases, begin normal, slow movements   For mild and moderate muscle strains, you should rest your muscles for about 2 days  However, if you have a severe muscle strain, you should rest for 10 to 14 days  You may need to use crutches to walk if your muscle strain is in your legs or lower body  ¨ Ice:  Put an ice pack on the injured area  Put a towel between the ice pack and your skin  Do not put the ice pack directly on your skin  You can use a package of frozen peas instead of an ice pack  ¨ Compression:  You may need to wrap an elastic bandage around the area to decrease swelling  It should be tight enough for you to feel support  Do not wrap it too tightly  ¨ Elevation:  Keep the injured muscle raised above your heart if possible  For example if you have a strain of your lower leg muscle, lie down and prop your leg up on pillows  This helps decrease pain and swelling  · 3 to 21 days after the injury:  Start to slowly and regularly exercise your muscle  This will help it heal  If you feel pain, decrease how hard you are exercising  · 1 to 6 weeks after the injury:  Stretch the injured muscle  Hold the stretch for about 30 seconds  Do this 4 times a day  You may stretch the muscle until you feel a slight pull  Stop stretching if you feel pain  · 2 weeks to 6 months after the injury:  The goal of this phase is to return to the activity you were doing before the injury happened, without hurting the muscle again  · 3 weeks to 6 months after the injury:  Keep stretching and strengthening your muscles to avoid injury  Slowly increase the time and distance that you exercise  You may have signs and symptoms of muscle strain 6 months after the injury, even if you do things to help it heal  In this case, you may need surgery on the muscle  © 2017 [employer] Information is for End User's use only and may not be sold, redistributed or otherwise used for commercial purposes   All illustrations and images included in CareNotes® are the copyrighted property of [employer]  or [employer]  The above information is an  only  It is not intended as medical advice for individual conditions or treatments  Talk to your doctor, nurse or pharmacist before following any medical regimen to see if it is safe and effective for you

## 2018-06-23 NOTE — PROGRESS NOTES
[facility]        NAME: Cony Fitzgerald is a 61 y o  male  : 1960    MRN: [id number]  DATE: 2018  TIME: 4:47 PM    Assessment and Plan   Muscle strain of left lower leg, initial encounter [S86 912A]  1  Muscle strain of left lower leg, initial encounter  meloxicam (MOBIC) 15 mg tablet    methocarbamol (ROBAXIN) 500 mg tablet         Patient Instructions       Follow up with PCP in 3-5 days  Proceed to  ER if symptoms worsen  Chief Complaint     Chief Complaint   Patient presents with    Leg Pain     Pt states he "pulled muscle right inner thigh" 2 days ago while lifting   Pain right inner thigh, worse with weight bearing   History of Present Illness       Leg Pain    The incident occurred 2 days ago  The incident occurred at work  The injury mechanism was a twisting injury  The pain is present in the right thigh  The quality of the pain is described as aching and stabbing  The pain is at a severity of 5/10  Pertinent negatives include no inability to bear weight, loss of motion, loss of sensation, muscle weakness, numbness or tingling  He reports no foreign bodies present  The symptoms are aggravated by movement, weight bearing and palpation  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 [place]  He twisted his right leg and stated that his right thigh has been bothering him since that  He did have some meloxicam left that he took and this seemed to help though Flexeril did not  Review of Systems   Review of Systems   Neurological: Negative for tingling and numbness  All other systems reviewed and are negative          Current Medications       Current Outpatient Prescriptions:     meloxicam (MOBIC) 15 mg tablet, Take 1 tablet (15 mg total) by mouth daily, Disp: 30 tablet, Rfl: 0    methocarbamol (ROBAXIN) 500 mg tablet, Take 1 tablet (500 mg total) by mouth 4 (four) times a day, Disp: 40 tablet, Rfl: 0    Current Allergies     Allergies as of 06/23/2018    (No Known Allergies)            The following portions of the patient's history were reviewed and updated as appropriate: allergies, current medications, past family history, past medical history, past social history, past surgical history and problem list      Past Medical History:   Diagnosis Date    BPH with urinary obstruction     AND OTHER LOWER URINARY TRACT SYMPTOMS     Detached retina, right 2015    Elevated prostate specific antigen (PSA) 2017    Feeling of incomplete bladder emptying     History of hypertension     History of nocturia     Hypertension     Neuroma of foot 2017    Prostate cancer ([address] ) 2017    Weak urinary stream        Past Surgical History:   Procedure Laterality Date    BACK SURGERY  2008    EYE SURGERY      REPAIR DETACHED RETINA     EYE SURGERY Right     PROSTATE BIOPSY Bilateral 2017    TONSILLECTOMY      VASECTOMY      SURGERY VAS DEFERENS        Family History   Problem Relation Age of Onset    Hypertension Mother     Peripheral vascular disease Mother     Dementia Father          Medications have been verified  Objective   /84 (BP Location: Left arm, Patient Position: Sitting, Cuff Size: Standard)   Pulse 71   Temp (!) 96 7 °F (35 9 °C) (Tympanic)   Resp 16   SpO2 97%        Physical Exam     Physical Exam   Constitutional: He appears well-developed and well-nourished  Cardiovascular: Normal rate and regular rhythm  Pulmonary/Chest: Effort normal    Nursing note and vitals reviewed  Right leg strength motor 5/5, no atrophy no spasm  Mild tenderness along the medial aspect of the thigh

## 2018-06-27 ENCOUNTER — OFFICE VISIT (OUTPATIENT)
Dept: URGENT CARE | Facility: MEDICAL CENTER | Age: 58
End: 2018-06-27
Payer: COMMERCIAL

## 2018-06-27 VITALS
OXYGEN SATURATION: 97 % | HEART RATE: 72 BPM | RESPIRATION RATE: 18 BRPM | TEMPERATURE: 96.9 F | BODY MASS INDEX: 27.59 KG/M2 | SYSTOLIC BLOOD PRESSURE: 152 MMHG | WEIGHT: 215 LBS | DIASTOLIC BLOOD PRESSURE: 104 MMHG | HEIGHT: 74 IN

## 2018-06-27 DIAGNOSIS — S86.911D MUSCLE STRAIN OF RIGHT LOWER EXTREMITY, SUBSEQUENT ENCOUNTER: Primary | ICD-10-CM

## 2018-06-27 PROCEDURE — 99213 OFFICE O/P EST LOW 20 MIN: CPT | Performed by: NURSE PRACTITIONER

## 2018-06-27 RX ORDER — LISINOPRIL 10 MG/1
10 TABLET ORAL DAILY
COMMUNITY
End: 2018-12-05 | Stop reason: SDUPTHER

## 2018-06-27 RX ORDER — NAPROXEN 500 MG/1
500 TABLET ORAL 2 TIMES DAILY WITH MEALS
Qty: 30 TABLET | Refills: 0 | Status: SHIPPED | OUTPATIENT
Start: 2018-06-27 | End: 2018-08-08 | Stop reason: CLARIF

## 2018-06-27 RX ORDER — LIDOCAINE 50 MG/G
1 PATCH TOPICAL DAILY
Qty: 30 PATCH | Refills: 0 | Status: SHIPPED | OUTPATIENT
Start: 2018-06-27 | End: 2018-08-08 | Stop reason: CLARIF

## 2018-06-27 RX ORDER — PREDNISONE 10 MG/1
10 TABLET ORAL DAILY
Qty: 21 TABLET | Refills: 0 | Status: SHIPPED | OUTPATIENT
Start: 2018-06-27 | End: 2018-08-08 | Stop reason: CLARIF

## 2018-06-27 NOTE — PATIENT INSTRUCTIONS
1  Prednisone tape as directed-   2  While on prednisone do not take Mobic or Naprosyn   3  Once prednisone is complete you can start Naprosyn   4  Heat to inner leg  5  Lidoderm patch as directed  6  Follow up with your PCP if pain does not resolve  Groin Strain   WHAT YOU NEED TO KNOW:   A groin strain occurs when a muscle or tendon is stretched or torn  The groin is the area where your abdomen meets your upper leg  Tendons are cords of tissue that attach muscle to bone  DISCHARGE INSTRUCTIONS:   Rest your groin area: You will need to rest your groin area from activities that may cause you pain  This will help decrease the risk of more damage to your groin  Use crutches or a cane as directed  Ice your groin area: Ice your groin area to help decrease swelling and pain  Put crushed ice in a plastic bag and cover it with a towel  Put the ice on your groin area for 15 to 20 minutes every hour  Do this for as many days as directed  Wrap your groin:  Your healthcare provider will instruct you on how to wrap your groin with an elastic bandage or tape  When you wrap your groin, it becomes more stable  Wrapping your groin can help decrease your pain  Elevate the injured area:  Keep the leg on your injured side raised to help decrease pain and swelling in your groin area  Use pillows, blankets, or rolled towels to elevate your leg as often as you can  Medicine:   · Pain medicine: You may be given medicine such as aspirin or ibuprofen to decrease or take away pain  Do not wait until the pain is severe before you take your medicine  · Take your medicine as directed:  Call your healthcare provider if you think your medicine is not helping or if you have side effects  Tell him if you take any vitamins, herbs, or other medicines  Keep a list of the medicines you take  Include the amounts, and when and why you take them  Bring the list or the pill bottles to follow-up visits    Activity:  You may need to exercise the injured area after your pain and swelling have decreased  Exercises will help prevent stiffness in the injured area and increase strength  Return to your normal activities slowly  You could injure yourself again if you try to return to normal activity too soon  Return to your normal level of activity when:  · You do not have pain when you walk, run, or jump  · Your injured leg moves like your uninjured leg  · Your injured leg feels as strong as your uninjured leg  Prevent another injury:   · Warm up and stretch before you exercise  · Wear shoes that fit well  · Wear equipment to protect yourself when you play sports  · Do exercises as directed to build muscle strength  Stop if you feel pain or tightness in your groin  Follow up with your healthcare provider as directed:  Write down any questions you have so you remember to ask them in your follow-up visits  Contact your healthcare provider if:   · You have increased swelling and pain in your injured area  · You have increased groin pain when standing or with movement  · You have questions or concerns about your injury or treatment  © 2017 [facility] [facility] Information is for End User's use only and may not be sold, redistributed or otherwise used for commercial purposes  All illustrations and images included in CareNotes® are the copyrighted property of [employer]  or [employer]ill[employer]  The above information is an  only  It is not intended as medical advice for individual conditions or treatments  Talk to your doctor, nurse or pharmacist before following any medical regimen to see if it is safe and effective for you

## 2018-06-28 NOTE — PROGRESS NOTES
[facility]        NAME: Cecil Romero is a 61 y o  male  : 1960    MRN: [id number]  DATE: 2018  TIME: 8:34 PM    Assessment and Plan   Muscle strain of right lower extremity, subsequent encounter [G15 [id number]]  1  Muscle strain of right lower extremity, subsequent encounter  predniSONE 10 mg tablet    naproxen (NAPROSYN) 500 mg tablet    lidocaine (LIDODERM) 5 %         Patient Instructions     1  Prednisone tape as directed-   2  While on prednisone do not take Mobic or Naprosyn   3  Once prednisone is complete you can start Naprosyn   4  Heat to inner leg  5  Lidoderm patch as directed  6  Follow up with your PCP if pain does not resolve  Chief Complaint     Chief Complaint   Patient presents with    Leg Pain     Seen and Tx'd at [facility] on 18 for LLE pain: "not better," leaving for a cruise  History of Present Illness       60 y/o male complains of right inner thigh pain  He was seen here 4 days ago for the same  He explain that he works for a  home and 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 when he felt a pain in his right inner thigh  He was prescribed Mobic and Robaxin that he has been taking without relief  He reports that he has been unable to rest it due to travel  He has applied heat without relief  He is concerned because he is leaving for a cruise on Friday and is unable to see his PCP  He denies weakness, numbness, or tingling  He is having difficulty sleeping due to the pain  Leg Pain    Pertinent negatives include no numbness  Review of Systems   Review of Systems   Constitutional: Negative for activity change, chills and fever  Musculoskeletal: Positive for gait problem and myalgias  Negative for back pain  Skin: Negative for color change  Neurological: Negative for weakness and numbness           Current Medications       Current Outpatient Prescriptions:     lisinopril (ZESTRIL) 10 mg tablet, Take 10 mg by mouth daily, Disp: , Rfl:     lidocaine (LIDODERM) 5 %, Place 1 patch on the skin daily Remove & Discard patch within 12 hours or as directed by MD, Disp: 30 patch, Rfl: 0    meloxicam (MOBIC) 15 mg tablet, Take 1 tablet (15 mg total) by mouth daily, Disp: 30 tablet, Rfl: 0    methocarbamol (ROBAXIN) 500 mg tablet, Take 1 tablet (500 mg total) by mouth 4 (four) times a day, Disp: 40 tablet, Rfl: 0    naproxen (NAPROSYN) 500 mg tablet, Take 1 tablet (500 mg total) by mouth 2 (two) times a day with meals, Disp: 30 tablet, Rfl: 0    predniSONE 10 mg tablet, Take 1 tablet (10 mg total) by mouth daily 60mg days 1, 50mg day 2, 40mg day 3, 30mg day 4, 20 mg day 5, 10mg day 6 , Disp: 21 tablet, Rfl: 0    Current Allergies     Allergies as of 06/27/2018    (No Known Allergies)            The following portions of the patient's history were reviewed and updated as appropriate: allergies, current medications, past family history, past medical history, past social history, past surgical history and problem list      Past Medical History:   Diagnosis Date    BPH with urinary obstruction     AND OTHER LOWER URINARY TRACT SYMPTOMS     Detached retina, right 2015    Elevated prostate specific antigen (PSA) 2017    Feeling of incomplete bladder emptying     History of hypertension     History of nocturia     Hypertension     Neuroma of foot 2017    Prostate cancer ([facility] [address] ) 2017    Weak urinary stream        Past Surgical History:   Procedure Laterality Date    BACK SURGERY  2008    EYE SURGERY      REPAIR DETACHED RETINA     EYE SURGERY Right     PROSTATE BIOPSY Bilateral 2017    TONSILLECTOMY      VASECTOMY      SURGERY VAS DEFERENS        Family History   Problem Relation Age of Onset    Hypertension Mother     Peripheral vascular disease Mother     Dementia Father          Medications have been verified          Objective   BP (!) 152/104 (BP Location: Right arm, Patient Position: Sitting, Cuff Size: Large)   Pulse 72   Temp (!) 96 9 °F (36 1 °C)   Resp 18   Ht 6' 2" (1 88 m)   Wt 97 5 kg (215 lb)   SpO2 97%   BMI 27 60 kg/m²        Physical Exam     Physical Exam   Constitutional: He is oriented to person, place, and time  He appears well-developed and well-nourished  No distress  HENT:   Head: Normocephalic  Cardiovascular: Normal rate, regular rhythm, S1 normal, S2 normal and normal heart sounds  Pulmonary/Chest: Effort normal and breath sounds normal  No respiratory distress  He has no wheezes  He has no rales  Musculoskeletal: He exhibits tenderness  He exhibits no edema or deformity  Legs:  Neurological: He is alert and oriented to person, place, and time  He has normal strength  No sensory deficit  GCS eye subscore is 4  GCS verbal subscore is 5  GCS motor subscore is 6

## 2018-06-29 RX ORDER — MELOXICAM 15 MG/1
TABLET ORAL
Qty: 14 TABLET | Refills: 0 | OUTPATIENT
Start: 2018-06-29

## 2018-08-08 ENCOUNTER — OFFICE VISIT (OUTPATIENT)
Dept: FAMILY MEDICINE CLINIC | Facility: CLINIC | Age: 58
End: 2018-08-08
Payer: COMMERCIAL

## 2018-08-08 VITALS
RESPIRATION RATE: 18 BRPM | HEIGHT: 74 IN | DIASTOLIC BLOOD PRESSURE: 80 MMHG | SYSTOLIC BLOOD PRESSURE: 136 MMHG | WEIGHT: 219.25 LBS | BODY MASS INDEX: 28.14 KG/M2 | HEART RATE: 64 BPM

## 2018-08-08 DIAGNOSIS — R73.02 IMPAIRED GLUCOSE TOLERANCE: ICD-10-CM

## 2018-08-08 DIAGNOSIS — K42.9 UMBILICAL HERNIA WITHOUT OBSTRUCTION AND WITHOUT GANGRENE: Primary | ICD-10-CM

## 2018-08-08 DIAGNOSIS — M25.50 ARTHRALGIA, UNSPECIFIED JOINT: Primary | ICD-10-CM

## 2018-08-08 DIAGNOSIS — I10 ESSENTIAL HYPERTENSION: ICD-10-CM

## 2018-08-08 PROBLEM — M10.9 GOUT: Status: ACTIVE | Noted: 2017-02-27

## 2018-08-08 PROBLEM — R97.20 ELEVATED PSA: Status: ACTIVE | Noted: 2017-10-18

## 2018-08-08 LAB — SL AMB POCT HEMOGLOBIN AIC: 6.1

## 2018-08-08 PROCEDURE — 3075F SYST BP GE 130 - 139MM HG: CPT | Performed by: FAMILY MEDICINE

## 2018-08-08 PROCEDURE — 3008F BODY MASS INDEX DOCD: CPT | Performed by: FAMILY MEDICINE

## 2018-08-08 PROCEDURE — 83036 HEMOGLOBIN GLYCOSYLATED A1C: CPT | Performed by: FAMILY MEDICINE

## 2018-08-08 PROCEDURE — 99214 OFFICE O/P EST MOD 30 MIN: CPT | Performed by: FAMILY MEDICINE

## 2018-08-08 PROCEDURE — 1036F TOBACCO NON-USER: CPT | Performed by: FAMILY MEDICINE

## 2018-08-08 PROCEDURE — 3079F DIAST BP 80-89 MM HG: CPT | Performed by: FAMILY MEDICINE

## 2018-08-08 RX ORDER — IBUPROFEN 800 MG/1
800 TABLET ORAL EVERY 8 HOURS PRN
Qty: 30 TABLET | Refills: 2 | Status: SHIPPED | OUTPATIENT
Start: 2018-08-08 | End: 2018-12-05 | Stop reason: SDUPTHER

## 2018-08-08 NOTE — PROGRESS NOTES
Subjective:     Moises Davis is a 60 y o  male who presents to the office today for a preoperative consultation at the request of surgeon Dr Bhavana Jung who plans on performing umbilical hernia repair on August 16  Planned anesthesia: general  The patient has the following known anesthesia issues: none  Patients bleeding risk: no recent abnormal bleeding and no remote history of abnormal bleeding  Patient does not have objections to receiving blood products if needed  The following portions of the patient's history were reviewed and updated as appropriate: allergies, current medications, past family history, past medical history, past social history, past surgical history and problem list       Exercise Capacity:   able to walk 4 blocks without symptoms and able to walk two flights of stairs without symptoms  Alcohol use: Yes Minimal   Tobacco use: The patient denies current or previous tobacco use    Illicit drugs: no history of illicit drug use    Past Medical History:   Diagnosis Date    BPH with urinary obstruction     AND OTHER LOWER URINARY TRACT SYMPTOMS     Detached retina, right 2015    Elevated prostate specific antigen (PSA) 2017    Feeling of incomplete bladder emptying     History of hypertension     History of nocturia     Hypertension     Neuroma of foot 2017    Prostate cancer ([facility] [address] ) 2017    Weak urinary stream      Past Surgical History:   Procedure Laterality Date    BACK SURGERY  2008    EYE SURGERY      REPAIR DETACHED RETINA     EYE SURGERY Right     PROSTATE BIOPSY Bilateral 2017    TONSILLECTOMY      VASECTOMY      SURGERY VAS DEFERENS      Family History   Problem Relation Age of Onset    Hypertension Mother     Peripheral vascular disease Mother     Dementia Father      Current Outpatient Prescriptions on File Prior to Visit   Medication Sig Dispense Refill    lisinopril (ZESTRIL) 10 mg tablet Take 10 mg by mouth daily      lidocaine (LIDODERM) 5 % Place 1 patch on the skin daily Remove & Discard patch within 12 hours or as directed by MD 30 patch 0    meloxicam (MOBIC) 15 mg tablet Take 1 tablet (15 mg total) by mouth daily 30 tablet 0    methocarbamol (ROBAXIN) 500 mg tablet Take 1 tablet (500 mg total) by mouth 4 (four) times a day 40 tablet 0    naproxen (NAPROSYN) 500 mg tablet Take 1 tablet (500 mg total) by mouth 2 (two) times a day with meals 30 tablet 0    predniSONE 10 mg tablet Take 1 tablet (10 mg total) by mouth daily 60mg days 1, 50mg day 2, 40mg day 3, 30mg day 4, 20 mg day 5, 10mg day 6  21 tablet 0     No current facility-administered medications on file prior to visit  Patient has no known allergies        Review of Systems    Gastrointestinal: positive for occ pain with palpation of umbilical hernia     Objective:     /96 (BP Location: Left arm, Patient Position: Sitting, Cuff Size: Standard)   Pulse 64   Resp 18   Ht 6' 2" (1 88 m)   Wt 99 5 kg (219 lb 4 oz)   BMI 28 15 kg/m²     General Appearance:    Alert, cooperative, no distress, appears stated age   Head:    Normocephalic, without obvious abnormality, atraumatic   Eyes:    PERRL, conjunctiva/corneas clear, EOM's intact, fundi     benign, both eyes        Ears:    Normal TM's and external ear canals, both ears   Nose:   Nares normal, septum midline, mucosa normal, no drainage    or sinus tenderness   Throat:   Lips, mucosa, and tongue normal; teeth and gums normal   Neck:   Supple, symmetrical, trachea midline, no adenopathy;        thyroid:  No enlargement/tenderness/nodules; no carotid    bruit or JVD   Back:     Symmetric, no curvature, ROM normal, no CVA tenderness   Lungs:     Clear to auscultation bilaterally, respirations unlabored   Chest wall:    No tenderness or deformity   Heart:    Regular rate and rhythm, S1 and S2 normal, no murmur, rub   or gallop   Abdomen:     Soft, non-tender, bowel sounds active all four quadrants,     no masses, no organomegaly   Genitalia: Rectal:     Extremities:   Extremities normal, atraumatic, no cyanosis or edema   Pulses:   2+ and symmetric all extremities   Skin:   Skin color, texture, turgor normal, no rashes or lesions   Lymph nodes:   Cervical, supraclavicular, and axillary nodes normal   Neurologic:   CNII-XII intact  Normal strength, sensation and reflexes       throughout         Cardiographics  ECG: no change since previous ECG dated 2015  Echocardiogram: not done      Lab Review   No visits with results within 2 Month(s) from this visit     Latest known visit with results is:   Lab Requisition on 11/10/2017   Component Date Value    Case Report 11/10/2017                      Value:Surgical Pathology Report                         Case: [id number]                                   Authorizing Provider:  Madelin Rodas MD        Collected:           11/10/2017                   Pathologist:           Chas Lynch MD      Received:            11/13/2017 0932              Specimens:   A) - Prostate, Left lateral base                                                                    B) - Prostate, Left base                                                                            C) - Prostate, Right base                                                                           D) - Prostate, Right lateral base                                                                   E) - Prostate, Left lateral medium                                                                  F) - Prostate, Left medium                                                                          G) - Prostate, Right medium                                                                                                   H) - Prostate, Right lateral medium                                                                 I) - Prostate, Left lateral apex                                                                    J) - Prostate, Left apex K) - Prostate, Right apex                                                                           L) - Prostate, Right lateral apex                                                          Addendum 11/10/2017                      Value: This result contains rich text formatting which cannot be displayed here   Final Diagnosis 11/10/2017                      Value: This result contains rich text formatting which cannot be displayed here   Note 11/10/2017                      Value: This result contains rich text formatting which cannot be displayed here   Additional Information 11/10/2017                      Value: This result contains rich text formatting which cannot be displayed here  [place] Gross Description 11/10/2017                      Value: This result contains rich text formatting which cannot be displayed here   Scan Result 11/10/2017 PROLARIS         Assessment:     60 y o  male with planned surgery as above  Known risk factors for perioperative complications: None        Cardiac Risk Estimation: low      Plan:    No diagnosis found  1  Preoperative workup as follows HBA1C   2  Change in medication regimen before surgery: none, continue medication regimen including morning of surgery, with sip of water

## 2018-10-17 DIAGNOSIS — I10 ESSENTIAL HYPERTENSION: Primary | ICD-10-CM

## 2018-10-18 ENCOUNTER — TELEPHONE (OUTPATIENT)
Dept: FAMILY MEDICINE CLINIC | Facility: CLINIC | Age: 58
End: 2018-10-18

## 2018-10-19 DIAGNOSIS — I10 ESSENTIAL HYPERTENSION: Primary | ICD-10-CM

## 2018-10-21 RX ORDER — LISINOPRIL 2.5 MG/1
TABLET ORAL
Qty: 90 TABLET | Refills: 2 | Status: SHIPPED | OUTPATIENT
Start: 2018-10-21 | End: 2019-02-13 | Stop reason: SINTOL

## 2018-10-25 ENCOUNTER — APPOINTMENT (OUTPATIENT)
Dept: LAB | Facility: MEDICAL CENTER | Age: 58
End: 2018-10-25
Payer: COMMERCIAL

## 2018-10-25 ENCOUNTER — TELEPHONE (OUTPATIENT)
Dept: FAMILY MEDICINE CLINIC | Facility: CLINIC | Age: 58
End: 2018-10-25

## 2018-10-25 ENCOUNTER — TRANSCRIBE ORDERS (OUTPATIENT)
Dept: ADMINISTRATIVE | Facility: HOSPITAL | Age: 58
End: 2018-10-25

## 2018-10-25 DIAGNOSIS — I10 ESSENTIAL HYPERTENSION: ICD-10-CM

## 2018-10-25 DIAGNOSIS — C61 PROSTATIC CANCER (HCC): ICD-10-CM

## 2018-10-25 DIAGNOSIS — C61 PROSTATIC CANCER (HCC): Primary | ICD-10-CM

## 2018-10-25 LAB
ALBUMIN SERPL BCP-MCNC: 3.7 G/DL (ref 3.5–5)
ALP SERPL-CCNC: 102 U/L (ref 46–116)
ALT SERPL W P-5'-P-CCNC: 54 U/L (ref 12–78)
ANION GAP SERPL CALCULATED.3IONS-SCNC: 6 MMOL/L (ref 4–13)
AST SERPL W P-5'-P-CCNC: 35 U/L (ref 5–45)
BASOPHILS # BLD AUTO: 0.07 THOUSANDS/ΜL (ref 0–0.1)
BASOPHILS NFR BLD AUTO: 1 % (ref 0–1)
BILIRUB SERPL-MCNC: 0.5 MG/DL (ref 0.2–1)
BUN SERPL-MCNC: 23 MG/DL (ref 5–25)
CALCIUM SERPL-MCNC: 9.2 MG/DL (ref 8.3–10.1)
CHLORIDE SERPL-SCNC: 109 MMOL/L (ref 100–108)
CHOLEST SERPL-MCNC: 176 MG/DL (ref 50–200)
CO2 SERPL-SCNC: 26 MMOL/L (ref 21–32)
CREAT SERPL-MCNC: 1.13 MG/DL (ref 0.6–1.3)
EOSINOPHIL # BLD AUTO: 0.25 THOUSAND/ΜL (ref 0–0.61)
EOSINOPHIL NFR BLD AUTO: 4 % (ref 0–6)
ERYTHROCYTE [DISTWIDTH] IN BLOOD BY AUTOMATED COUNT: 13.1 % (ref 11.6–15.1)
GFR SERPL CREATININE-BSD FRML MDRD: 71 ML/MIN/1.73SQ M
GLUCOSE P FAST SERPL-MCNC: 100 MG/DL (ref 65–99)
HCT VFR BLD AUTO: 45.4 % (ref 36.5–49.3)
HDLC SERPL-MCNC: 57 MG/DL (ref 40–60)
HGB BLD-MCNC: 14.2 G/DL (ref 12–17)
IMM GRANULOCYTES # BLD AUTO: 0.02 THOUSAND/UL (ref 0–0.2)
IMM GRANULOCYTES NFR BLD AUTO: 0 % (ref 0–2)
LDLC SERPL CALC-MCNC: 106 MG/DL (ref 0–100)
LYMPHOCYTES # BLD AUTO: 1.85 THOUSANDS/ΜL (ref 0.6–4.47)
LYMPHOCYTES NFR BLD AUTO: 32 % (ref 14–44)
MCH RBC QN AUTO: 28.3 PG (ref 26.8–34.3)
MCHC RBC AUTO-ENTMCNC: 31.3 G/DL (ref 31.4–37.4)
MCV RBC AUTO: 91 FL (ref 82–98)
MONOCYTES # BLD AUTO: 0.55 THOUSAND/ΜL (ref 0.17–1.22)
MONOCYTES NFR BLD AUTO: 9 % (ref 4–12)
NEUTROPHILS # BLD AUTO: 3.11 THOUSANDS/ΜL (ref 1.85–7.62)
NEUTS SEG NFR BLD AUTO: 54 % (ref 43–75)
NRBC BLD AUTO-RTO: 0 /100 WBCS
PLATELET # BLD AUTO: 240 THOUSANDS/UL (ref 149–390)
PMV BLD AUTO: 11.5 FL (ref 8.9–12.7)
POTASSIUM SERPL-SCNC: 4.2 MMOL/L (ref 3.5–5.3)
PROT SERPL-MCNC: 7.4 G/DL (ref 6.4–8.2)
PSA SERPL-MCNC: 1.4 NG/ML (ref 0–4)
RBC # BLD AUTO: 5.01 MILLION/UL (ref 3.88–5.62)
SODIUM SERPL-SCNC: 141 MMOL/L (ref 136–145)
TRIGL SERPL-MCNC: 64 MG/DL
TSH SERPL DL<=0.05 MIU/L-ACNC: 2.63 UIU/ML (ref 0.36–3.74)
WBC # BLD AUTO: 5.85 THOUSAND/UL (ref 4.31–10.16)

## 2018-10-25 PROCEDURE — 84153 ASSAY OF PSA TOTAL: CPT

## 2018-10-25 PROCEDURE — 80061 LIPID PANEL: CPT

## 2018-10-25 PROCEDURE — 84443 ASSAY THYROID STIM HORMONE: CPT

## 2018-10-25 PROCEDURE — 85025 COMPLETE CBC W/AUTO DIFF WBC: CPT

## 2018-10-25 PROCEDURE — 36415 COLL VENOUS BLD VENIPUNCTURE: CPT

## 2018-10-25 PROCEDURE — 80053 COMPREHEN METABOLIC PANEL: CPT

## 2018-12-05 ENCOUNTER — OFFICE VISIT (OUTPATIENT)
Dept: CARDIOLOGY CLINIC | Facility: CLINIC | Age: 58
End: 2018-12-05
Payer: COMMERCIAL

## 2018-12-05 VITALS
DIASTOLIC BLOOD PRESSURE: 84 MMHG | HEIGHT: 74 IN | BODY MASS INDEX: 28.23 KG/M2 | HEART RATE: 72 BPM | SYSTOLIC BLOOD PRESSURE: 136 MMHG | WEIGHT: 220 LBS

## 2018-12-05 DIAGNOSIS — I10 HYPERTENSION, UNSPECIFIED TYPE: Primary | ICD-10-CM

## 2018-12-05 PROCEDURE — 93000 ELECTROCARDIOGRAM COMPLETE: CPT | Performed by: INTERNAL MEDICINE

## 2018-12-05 PROCEDURE — 99213 OFFICE O/P EST LOW 20 MIN: CPT | Performed by: INTERNAL MEDICINE

## 2018-12-05 NOTE — PROGRESS NOTES
Cardiology Follow Up    Laura Osei  1960  [id number]  [address] [facility]  [address] [place] [address] [address] [place] [address]  [phone]    1  Hypertension, unspecified type  POCT ECG       Interval History:  No complaints  Tolerates all activity  Denies chest pain shortness of breath orthopnea paroxysmal nocturnal dyspnea or syncope  Patient Active Problem List   Diagnosis    Hypertension    Prostate carcinoma ([facility] [address] )    Health maintenance examination    Elevated PSA    Gout    Umbilical hernia without obstruction and without gangrene    Impaired glucose tolerance     Past Medical History:   Diagnosis Date    BPH with urinary obstruction     AND OTHER LOWER URINARY TRACT SYMPTOMS     Detached retina, right 2015    Elevated prostate specific antigen (PSA) 2017    Feeling of incomplete bladder emptying     History of hypertension     History of nocturia     Hypertension     Neuroma of foot 2017    Prostate cancer ([facility] [address] ) 2017    Weak urinary stream      Social History     Social History    Marital status:      Spouse name: N/A    Number of children: 2    Years of education: N/A     Occupational History    Not on file       Social History Main Topics    Smoking status: Never Smoker    Smokeless tobacco: Never Used    Alcohol use 1 8 oz/week     3 Cans of beer per week    Drug use: No    Sexual activity: Yes     Partners: Female     Birth control/ protection: Male Sterilization     Other Topics Concern    Not on file     Social History Narrative    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          TWO CHILDREN       Family History   Problem Relation Age of Onset    Hypertension Mother     Peripheral vascular disease Mother     Dementia Father      Past Surgical History:   Procedure Laterality Date    BACK SURGERY  2008    EYE SURGERY      REPAIR DETACHED RETINA     EYE SURGERY Right     PROSTATE BIOPSY Bilateral 2017    TONSILLECTOMY      VASECTOMY      SURGERY VAS DEFERENS        Current Outpatient Prescriptions:     lisinopril (ZESTRIL) 2 5 mg tablet, take 1 tablet by mouth daily, Disp: 90 tablet, Rfl: 2  No Known Allergies    Labs:  Appointment on 10/25/2018   Component Date Value    Sodium 10/25/2018 141     Potassium 10/25/2018 4 2     Chloride 10/25/2018 109*    CO2 10/25/2018 26     ANION GAP 10/25/2018 6     BUN 10/25/2018 23     Creatinine 10/25/2018 1 13     Glucose, Fasting 10/25/2018 100*    Calcium 10/25/2018 9 2     AST 10/25/2018 35     ALT 10/25/2018 54     Alkaline Phosphatase 10/25/2018 102     Total Protein 10/25/2018 7 4     Albumin 10/25/2018 3 7     Total Bilirubin 10/25/2018 0 50     eGFR 10/25/2018 71     Cholesterol 10/25/2018 176     Triglycerides 10/25/2018 64     HDL, Direct 10/25/2018 57     LDL Calculated 10/25/2018 106*    TSH 3RD GENERATON 10/25/2018 2 630     WBC 10/25/2018 5 85     RBC 10/25/2018 5 01     Hemoglobin 10/25/2018 14 2     Hematocrit 10/25/2018 45 4     MCV 10/25/2018 91     MCH 10/25/2018 28 3     MCHC 10/25/2018 31 3*    RDW 10/25/2018 13 1     MPV 10/25/2018 11 5     Platelets 70/63/8771 240     nRBC 10/25/2018 0     Neutrophils Relative 10/25/2018 54     Immat GRANS % 10/25/2018 0     Lymphocytes Relative 10/25/2018 32     Monocytes Relative 10/25/2018 9     Eosinophils Relative 10/25/2018 4     Basophils Relative 10/25/2018 1     Neutrophils Absolute 10/25/2018 3 11     Immature Grans Absolute 10/25/2018 0 02     Lymphocytes Absolute 10/25/2018 1 85     Monocytes Absolute 10/25/2018 0 55     Eosinophils Absolute 10/25/2018 0 25     Basophils Absolute 10/25/2018 0 07     PSA, Diagnostic 10/25/2018 1 4      Imaging: No results found  Review of Systems:  Review of Systems   Constitutional: Negative for fatigue  HENT: Negative for nosebleeds  Eyes: Negative for redness  Respiratory: Negative for chest tightness and shortness of breath  Cardiovascular: Negative for chest pain, palpitations and leg swelling  Gastrointestinal: Negative for abdominal pain  Endocrine: Negative for polyuria  Genitourinary: Negative for urgency  Musculoskeletal: Negative for arthralgias  Skin: Negative for rash  Neurological: Negative for dizziness and syncope  Psychiatric/Behavioral: Negative for confusion and sleep disturbance  The patient is not nervous/anxious  Physical Exam:  Physical Exam   Constitutional: He is oriented to person, place, and time  He appears well-developed and well-nourished  HENT:   Head: Normocephalic and atraumatic  Nose: Nose normal    Mouth/Throat: Oropharynx is clear and moist    Eyes: Pupils are equal, round, and reactive to light  Neck: Neck supple  Cardiovascular: Normal rate, regular rhythm, normal heart sounds and intact distal pulses  Pulmonary/Chest: Effort normal and breath sounds normal    Abdominal: Soft  Bowel sounds are normal    Musculoskeletal: Normal range of motion  Neurological: He is alert and oriented to person, place, and time  Skin: Skin is warm and dry  Psychiatric: He has a normal mood and affect  His behavior is normal  Judgment and thought content normal        Discussion/Summary:  He is asymptomatic in functional class 1  Twelve lead EKG today shows normal sinus rhythm leftward axis otherwise unremarkable  Blood pressure is controlled on a small dose of lisinopril  Recent fasting lipid profile shows a total cholesterol of 176 with an HDL of 57 and LDL of 106  I have made no changes and I will see him again in 1 year

## 2019-01-28 ENCOUNTER — APPOINTMENT (OUTPATIENT)
Dept: LAB | Facility: MEDICAL CENTER | Age: 59
End: 2019-01-28
Payer: COMMERCIAL

## 2019-01-28 ENCOUNTER — TRANSCRIBE ORDERS (OUTPATIENT)
Dept: ADMINISTRATIVE | Facility: HOSPITAL | Age: 59
End: 2019-01-28

## 2019-01-28 DIAGNOSIS — C61 PROSTATIC CANCER (HCC): Primary | ICD-10-CM

## 2019-01-28 DIAGNOSIS — C61 PROSTATIC CANCER (HCC): ICD-10-CM

## 2019-01-28 LAB — PSA SERPL-MCNC: 1.6 NG/ML (ref 0–4)

## 2019-01-28 PROCEDURE — 84153 ASSAY OF PSA TOTAL: CPT

## 2019-02-13 ENCOUNTER — TELEPHONE (OUTPATIENT)
Dept: FAMILY MEDICINE CLINIC | Facility: CLINIC | Age: 59
End: 2019-02-13

## 2019-02-13 ENCOUNTER — OFFICE VISIT (OUTPATIENT)
Dept: FAMILY MEDICINE CLINIC | Facility: CLINIC | Age: 59
End: 2019-02-13
Payer: COMMERCIAL

## 2019-02-13 VITALS
BODY MASS INDEX: 29.52 KG/M2 | OXYGEN SATURATION: 98 % | HEART RATE: 75 BPM | HEIGHT: 74 IN | WEIGHT: 230 LBS | TEMPERATURE: 97.3 F | SYSTOLIC BLOOD PRESSURE: 150 MMHG | DIASTOLIC BLOOD PRESSURE: 90 MMHG

## 2019-02-13 DIAGNOSIS — M25.562 CHRONIC PAIN OF BOTH KNEES: ICD-10-CM

## 2019-02-13 DIAGNOSIS — E66.3 OVERWEIGHT (BMI 25.0-29.9): ICD-10-CM

## 2019-02-13 DIAGNOSIS — M25.561 CHRONIC PAIN OF BOTH KNEES: ICD-10-CM

## 2019-02-13 DIAGNOSIS — G89.29 CHRONIC PAIN OF BOTH KNEES: ICD-10-CM

## 2019-02-13 DIAGNOSIS — I10 ESSENTIAL HYPERTENSION: ICD-10-CM

## 2019-02-13 DIAGNOSIS — Z00.00 HEALTH MAINTENANCE EXAMINATION: Primary | ICD-10-CM

## 2019-02-13 DIAGNOSIS — M65.30 TRIGGER FINGER OF LEFT HAND, UNSPECIFIED FINGER: Primary | ICD-10-CM

## 2019-02-13 DIAGNOSIS — I83.893 VARICOSE VEINS OF BILATERAL LOWER EXTREMITIES WITH OTHER COMPLICATIONS: ICD-10-CM

## 2019-02-13 PROBLEM — R73.02 IMPAIRED GLUCOSE TOLERANCE: Status: RESOLVED | Noted: 2018-08-08 | Resolved: 2019-02-13

## 2019-02-13 PROBLEM — M17.10 OSTEOARTHRITIS OF KNEE: Status: ACTIVE | Noted: 2019-02-13

## 2019-02-13 PROBLEM — M17.9 OSTEOARTHRITIS OF KNEE: Status: ACTIVE | Noted: 2019-02-13

## 2019-02-13 PROCEDURE — 99214 OFFICE O/P EST MOD 30 MIN: CPT | Performed by: FAMILY MEDICINE

## 2019-02-13 RX ORDER — AMLODIPINE BESYLATE 2.5 MG/1
2.5 TABLET ORAL DAILY
Qty: 30 TABLET | Refills: 5 | Status: SHIPPED | OUTPATIENT
Start: 2019-02-13 | End: 2019-05-08 | Stop reason: DRUGHIGH

## 2019-02-13 NOTE — TELEPHONE ENCOUNTER
Pt was in for an appt today would like the information of the hand doctor you suggested for his L index finger

## 2019-02-13 NOTE — PROGRESS NOTES
Assessment/Plan     Healthy male exam      1 here for physical  2  Patient Counseling:  --Nutrition: Stressed importance of moderation in sodium/caffeine intake, saturated fat and cholesterol, caloric balance, sufficient intake of fresh fruits, vegetables, fiber  --  --Exercise: Stressed the importance of regular exercise  --no concerns   --Sexuality: no concerns  --Injury prevention: Discussed safety belts, safety helmets, smoke detector  --Dental health: Discussed importance of regular tooth brushing, flossing, and dental visits  --Immunizations reviewed  --Discussed benefits of screening colonoscopy  --After hours service discussed with patient    3  Discussed the patient's BMI with him  The BMI is above average; BMI management plan is completed  4  Follow up in one year  Baltazar Bull is a 60 y o  male and is here for a comprehensive physical exam  The patient reports problems - fatigue, l index trigger finger,salt cravings,itchy veins r leg, knee pain  Do you take any herbs or supplements that were not prescribed by a doctor? no  Are you taking calcium supplements? no  Are you taking aspirin daily? no     History:  Patient receives prostate care outside our clinic  Date last prostate exam: 2018  Date last PSA: 2018    The following portions of the patient's history were reviewed and updated as appropriate: allergies, current medications, past family history, past medical history, past social history, past surgical history and problem list   no concerns    Review of Systems  Do you have pain that bothers you in your daily life? yes  Musculoskeletal:positive for knee pain      Objective     /90   Pulse 75   Temp (!) 97 3 °F (36 3 °C) (Temporal)   Ht 6' 2" (1 88 m)   Wt 104 kg (230 lb)   SpO2 98%   BMI 29 53 kg/m²     General Appearance:    Alert, cooperative, no distress, appears stated age   Head:    Normocephalic, without obvious abnormality, atraumatic   Eyes: PERRL, conjunctiva/corneas clear, EOM's intact, fundi     benign, both eyes        Ears:    Normal TM's and external ear canals, both ears   Nose:   Nares normal, septum midline, mucosa normal, no drainage    or sinus tenderness   Throat:   Lips, mucosa, and tongue normal; teeth and gums normal   Neck:   Supple, symmetrical, trachea midline, no adenopathy;        thyroid:  No enlargement/tenderness/nodules; no carotid    bruit or JVD   Back:     Symmetric, no curvature, ROM normal, no CVA tenderness   Lungs:     Clear to auscultation bilaterally, respirations unlabored   Chest wall:    No tenderness or deformity   Heart:    Regular rate and rhythm, S1 and S2 normal, no murmur, rub   or gallop   Abdomen:     Soft, non-tender, bowel sounds active all four quadrants,     no masses, no organomegaly   Genitalia:     Rectal:     Extremities:   Extremities normal, atraumatic, no cyanosis or edema   Pulses:   2+ and symmetric all extremities   Skin:   Skin color, texture, turgor normal, no rashes or lesions   Lymph nodes:   Cervical, supraclavicular, and axillary nodes normal   Neurologic:   Bilateral varicose veins  Knees with crepitus bilat     BMI Counseling: Body mass index is 29 53 kg/m²  Discussed the patient's BMI with him  The BMI is above average  BMI counseling and education was provided to the patient  Nutrition recommendations include reducing portion sizes, decreasing overall calorie intake, 3-5 servings of fruits/vegetables daily, reducing fast food intake and consuming healthier snacks  Exercise recommendations include exercising 3-5 times per week

## 2019-02-13 NOTE — PATIENT INSTRUCTIONS
See how fatigue is with change of BP med, last lab ok, await xrays  Obesity   AMBULATORY CARE:   Obesity  is when your body mass index (BMI) is greater than 30  Your healthcare provider will use your height and weight to measure your BMI  The risks of obesity include  many health problems, such as injuries or physical disability  You may need tests to check for the following:  · Diabetes     · High blood pressure or high cholesterol     · Heart disease     · Gallbladder or liver disease     · Cancer of the colon, breast, prostate, liver, or kidney     · Sleep apnea     · Arthritis or gout  Seek care immediately if:   · You have a severe headache, confusion, or difficulty speaking  · You have weakness on one side of your body  · You have chest pain, sweating, or shortness of breath  Contact your healthcare provider if:   · You have symptoms of gallbladder or liver disease, such as pain in your upper abdomen  · You have knee or hip pain and discomfort while walking  · You have symptoms of diabetes, such as intense hunger and thirst, and frequent urination  · You have symptoms of sleep apnea, such as snoring or daytime sleepiness  · You have questions or concerns about your condition or care  Treatment for obesity  focuses on helping you lose weight to improve your health  Even a small decrease in BMI can reduce the risk for many health problems  Your healthcare provider will help you set a weight-loss goal   · Lifestyle changes  are the first step in treating obesity  These include making healthy food choices and getting regular physical activity  Your healthcare provider may suggest a weight-loss program that involves coaching, education, and therapy  · Medicine  may help you lose weight when it is used with a healthy diet and physical activity  · Surgery  can help you lose weight if you are very obese and have other health problems  There are several types of weight-loss surgery  Ask your healthcare provider for more information  Be successful losing weight:   · Set small, realistic goals  An example of a small goal is to walk for 20 minutes 5 days a week  Anther goal is to lose 5% of your body weight  · Tell friends, family members, and coworkers about your goals  and ask for their support  Ask a friend to lose weight with you, or join a weight-loss support group  · Identify foods or triggers that may cause you to overeat , and find ways to avoid them  Remove tempting high-calorie foods from your home and workplace  Place a bowl of fresh fruit on your kitchen counter  If stress causes you to eat, then find other ways to cope with stress  · Keep a diary to track what you eat and drink  Also write down how many minutes of physical activity you do each day  Weigh yourself once a week and record it in your diary  Eating changes: You will need to eat 500 to 1,000 fewer calories each day than you currently eat to lose 1 to 2 pounds a week  The following changes will help you cut calories:  · Eat smaller portions  Use small plates, no larger than 9 inches in diameter  Fill your plate half full of fruits and vegetables  Measure your food using measuring cups until you know what a serving size looks like  · Eat 3 meals and 1 or 2 snacks each day  Plan your meals in advance  Knute [place] and eat at home most of the time  Eat slowly  · Eat fruits and vegetables at every meal   They are low in calories and high in fiber, which makes you feel full  Do not add butter, margarine, or cream sauce to vegetables  Use herbs to season steamed vegetables  · Eat less fat and fewer fried foods  Eat more baked or grilled chicken and fish  These protein sources are lower in calories and fat than red meat  Limit fast food  Dress your salads with olive oil and vinegar instead of bottled dressing  · Limit the amount of sugar you eat  Do not drink sugary beverages  Limit alcohol    Activity changes:  Physical activity is good for your body in many ways  It helps you burn calories and build strong muscles  It decreases stress and depression, and improves your mood  It can also help you sleep better  Talk to your healthcare provider before you begin an exercise program   · Exercise for at least 30 minutes 5 days a week  Start slowly  Set aside time each day for physical activity that you enjoy and that is convenient for you  It is best to do both weight training and an activity that increases your heart rate, such as walking, bicycling, or swimming  · Find ways to be more active  Do yard work and housecleaning  Walk up the stairs instead of using elevators  Spend your leisure time going to events that require walking, such as outdoor festivals or fairs  This extra physical activity can help you lose weight and keep it off  Follow up with your healthcare provider as directed: You may need to meet with a dietitian  Write down your questions so you remember to ask them during your visits  © 2017 [address] Information is for End User's use only and may not be sold, redistributed or otherwise used for commercial purposes  All illustrations and images included in CareNotes® are the copyrighted property of [employer] M [employer]  or [employer]  The above information is an  only  It is not intended as medical advice for individual conditions or treatments  Talk to your doctor, nurse or pharmacist before following any medical regimen to see if it is safe and effective for you  Weight Management   AMBULATORY CARE:   Why it is important to manage your weight:  Being overweight increases your risk of health conditions such as heart disease, high blood pressure, type 2 diabetes, and certain types of cancer  It can also increase your risk for osteoarthritis, sleep apnea, and other respiratory problems  Aim for a slow, steady weight loss   Even a small amount of weight loss can lower your risk of health problems  How to lose weight safely:  A safe and healthy way to lose weight is to eat fewer calories and get regular exercise  You can lose up about 1 pound a week by decreasing the number of calories you eat by 500 calories each day  You can decrease calories by eating smaller portion sizes or by cutting out high-calorie foods  Read labels to find out how many calories are in the foods you eat  You can also burn calories with exercise such as walking, swimming, or biking  You will be more likely to keep weight off if you make these changes part of your lifestyle  Healthy meal plan for weight management:  A healthy meal plan includes a variety of foods, contains fewer calories, and helps you stay healthy  A healthy meal plan includes the following:  · Eat whole-grain foods more often  A healthy meal plan should contain fiber  Fiber is the part of grains, fruits, and vegetables that is not broken down by your body  Whole-grain foods are healthy and provide extra fiber in your diet  Some examples of whole-grain foods are whole-wheat breads and pastas, oatmeal, brown rice, and bulgur  · Eat a variety of vegetables every day  Include dark, leafy greens such as spinach, kale, dax greens, and mustard greens  Eat yellow and orange vegetables such as carrots, sweet potatoes, and winter squash  · Eat a variety of fruits every day  Choose fresh or canned fruit (canned in its own juice or light syrup) instead of juice  Fruit juice has very little or no fiber  · Eat low-fat dairy foods  Drink fat-free (skim) milk or 1% milk  Eat fat-free yogurt and low-fat cottage cheese  Try low-fat cheeses such as mozzarella and other reduced-fat cheeses  · Choose meat and other protein foods that are low in fat  Choose beans or other legumes such as split peas or lentils  Choose fish, skinless poultry (chicken or turkey), or lean cuts of red meat (beef or pork)   Before you cook meat or poultry, cut off any visible fat  · Use less fat and oil  Try baking foods instead of frying them  Add less fat, such as margarine, sour cream, regular salad dressing and mayonnaise to foods  Eat fewer high-fat foods  Some examples of high-fat foods include french fries, doughnuts, ice cream, and cakes  · Eat fewer sweets  Limit foods and drinks that are high in sugar  This includes candy, cookies, regular soda, and sweetened drinks  Ways to decrease calories:   · Eat smaller portions  ¨ Use a small plate with smaller servings  ¨ Do not eat second helpings  ¨ When you eat at a restaurant, ask for a box and place half of your meal in the box before you eat  ¨ Share an entrée with someone else  · Replace high-calorie snacks with healthy, low-calorie snacks  ¨ Choose fresh fruit, vegetables, fat-free rice cakes, or air-popped popcorn instead of potato chips, nuts, or chocolate  ¨ Choose water or calorie-free drinks instead of soda or sweetened drinks  · Eat regular meals  Skipping meals can lead to overeating later in the day  Eat a healthy snack in place of a meal if you do not have time to eat a regular meal      · Do not shop for groceries when you are hungry  You may be more likely to make unhealthy food choices  Take a grocery list of healthy foods and shop after you have eaten  Exercise:  Exercise at least 30 minutes per day on most days of the week  Some examples of exercise include walking, biking, dancing, and swimming  You can also fit in more physical activity by taking the stairs instead of the elevator or parking farther away from stores  Ask your healthcare provider about the best exercise plan for you  Other things to consider as you try to lose weight:   · Be aware of situations that may give you the urge to overeat, such as eating while watching television  Find ways to avoid these situations  For example, read a book, go for a walk, or do crafts      · Meet with a weight loss support group or friends who are also trying to lose weight  This may help you stay motivated to continue working on your weight loss goals  © 2017 [address] Information is for End User's use only and may not be sold, redistributed or otherwise used for commercial purposes  All illustrations and images included in CareNotes® are the copyrighted property of [employer]  or [employer]  The above information is an  only  It is not intended as medical advice for individual conditions or treatments  Talk to your doctor, nurse or pharmacist before following any medical regimen to see if it is safe and effective for you

## 2019-02-14 ENCOUNTER — APPOINTMENT (OUTPATIENT)
Dept: RADIOLOGY | Facility: MEDICAL CENTER | Age: 59
End: 2019-02-14
Payer: COMMERCIAL

## 2019-02-14 DIAGNOSIS — M25.562 CHRONIC PAIN OF BOTH KNEES: ICD-10-CM

## 2019-02-14 DIAGNOSIS — M25.561 CHRONIC PAIN OF BOTH KNEES: ICD-10-CM

## 2019-02-14 DIAGNOSIS — G89.29 CHRONIC PAIN OF BOTH KNEES: ICD-10-CM

## 2019-02-14 PROCEDURE — 73562 X-RAY EXAM OF KNEE 3: CPT

## 2019-02-18 ENCOUNTER — TELEPHONE (OUTPATIENT)
Dept: FAMILY MEDICINE CLINIC | Facility: CLINIC | Age: 59
End: 2019-02-18

## 2019-02-18 DIAGNOSIS — M25.562 PAIN IN BOTH KNEES, UNSPECIFIED CHRONICITY: Primary | ICD-10-CM

## 2019-02-18 DIAGNOSIS — M25.561 PAIN IN BOTH KNEES, UNSPECIFIED CHRONICITY: Primary | ICD-10-CM

## 2019-03-04 ENCOUNTER — OFFICE VISIT (OUTPATIENT)
Dept: VASCULAR SURGERY | Facility: CLINIC | Age: 59
End: 2019-03-04
Payer: COMMERCIAL

## 2019-03-04 VITALS
HEIGHT: 74 IN | WEIGHT: 228 LBS | DIASTOLIC BLOOD PRESSURE: 92 MMHG | SYSTOLIC BLOOD PRESSURE: 138 MMHG | HEART RATE: 74 BPM | TEMPERATURE: 97.2 F | BODY MASS INDEX: 29.26 KG/M2

## 2019-03-04 DIAGNOSIS — I83.893 VARICOSE VEINS OF BILATERAL LOWER EXTREMITIES WITH OTHER COMPLICATIONS: ICD-10-CM

## 2019-03-04 DIAGNOSIS — I83.891 BLEEDING FROM VARICOSE VEINS OF LOWER EXTREMITY, RIGHT: ICD-10-CM

## 2019-03-04 DIAGNOSIS — I83.893 VARICOSE VEINS OF LEG WITH SWELLING, BILATERAL: Primary | ICD-10-CM

## 2019-03-04 PROCEDURE — 99243 OFF/OP CNSLTJ NEW/EST LOW 30: CPT | Performed by: NURSE PRACTITIONER

## 2019-03-04 NOTE — PROGRESS NOTES
Assessment/Plan:    Varicose veins of leg with swelling, bilateral  Symptomatic varicose veins with intermittent edema  Patient denies any pain, fatigue or heaviness  Trace edema present on exam; patient denies any worsening edema  Isolated bleeding episode to varicose vein at proximal calf in December 2018  Advised daily use of compression stockings and periodic elevation  Due to bleeding episode discussed further workup for venous insufficiency and possible surgical treatment  Due to minimal symptoms patient would like to pursue conservative therapy at this time  Should he develop any recurrent bleeding episodes or worsening edema he will notify the office, otherwise we will be happy to see him on an as needed basis  Diagnoses and all orders for this visit:    Varicose veins of leg with swelling, bilateral    Varicose veins of bilateral lower extremities with other complications  -     Ambulatory referral to Vascular Surgery  -     Compression Stocking    Bleeding from varicose veins of lower extremity, right          Subjective:      Patient ID: Peggy Hagan is a 60 y o  male  Patient seen for evaluation of varicose veins  He has bilateral lower extremity varicose and spider veins  He presents to office to be checked after an isolated bleeding episode in December 2018  He states that he was scratching his knee/calf when he developed bleeding from varicose vein at proximal knee  He states that he controlled the bleeding with his finger and denies any further bleeding episodes  He denies any pain to the legs  Denies chronic swelling, sock narda present on exam in mid afternoon, but he denies any worsening edema throughout day  Reports bilateral knee pain/stiffness, but denies overall leg fatigue, heaviness, pain  No currently wearing compression  States he elevates in recliner in evening time        Patient is new to the practice and presents today for evaluation of bilateral varicose veins  Patient complains of bulging veins and ruptured vein of the right leg in December  He has not had any prior surgery and has not had any testing done  The following portions of the patient's history were reviewed and updated as appropriate: allergies, current medications, past family history, past medical history, past social history, past surgical history and problem list     Review of Systems   Constitutional: Negative  HENT: Negative  Eyes: Negative  Respiratory: Negative  Cardiovascular: Negative  Gastrointestinal: Negative  Endocrine: Negative  Genitourinary: Negative  Musculoskeletal: Negative  Skin: Negative  Allergic/Immunologic: Negative  Neurological: Negative  Hematological: Negative  Psychiatric/Behavioral: Negative  Objective:       Physical Exam   Constitutional: He is oriented to person, place, and time  No distress  HENT:   Head: Normocephalic and atraumatic  Eyes: No scleral icterus  Neck: Neck supple  No JVD present  Cardiovascular: Normal rate and regular rhythm  Pulses:       Dorsalis pedis pulses are 2+ on the right side, and 2+ on the left side  Bilateral lower extremity spider and varicose veins   Pulmonary/Chest: Effort normal  No respiratory distress  Abdominal: Soft  He exhibits no distension  There is no tenderness  Musculoskeletal: He exhibits edema (Trace edema BLE)  Neurological: He is alert and oriented to person, place, and time  Skin: Skin is warm and dry  Psychiatric: He has a normal mood and affect  I have reviewed and made appropriate changes to the review of systems input by the medical assistant      Vitals:    03/04/19 1407   BP: 138/92   Pulse: 74   Temp: (!) 97 2 °F (36 2 °C)   TempSrc: Tympanic   Weight: 103 kg (228 lb)   Height: 6' 2" (1 88 m)       Patient Active Problem List   Diagnosis    Hypertension    Prostate carcinoma ([address] )    Health maintenance examination    Elevated PSA  Gout    Umbilical hernia without obstruction and without gangrene    Osteoarthritis of knee    Varicose veins of leg with swelling, bilateral    Bleeding from varicose veins of lower extremity, right       Past Surgical History:   Procedure Laterality Date    BACK SURGERY  2008    EYE SURGERY      REPAIR DETACHED RETINA     EYE SURGERY Right     PROSTATE BIOPSY Bilateral 2017    TONSILLECTOMY      VASECTOMY      SURGERY VAS DEFERENS        Family History   Problem Relation Age of Onset    Hypertension Mother     Peripheral vascular disease Mother     Dementia Father        Social History     Socioeconomic History    Marital status: /Civil Union     Spouse name: Not on file    Number of children: 2    Years of education: Not on file    Highest education level: Not on file   Occupational History    Not on file   Social Needs    Financial resource strain: Not on file    Food insecurity:     Worry: Not on file     Inability: Not on file    Transportation needs:     Medical: Not on file     Non-medical: Not on file   Tobacco Use    Smoking status: Never Smoker    Smokeless tobacco: Never Used   Substance and Sexual Activity    Alcohol use:  Yes     Alcohol/week: 1 8 oz     Types: 3 Cans of beer per week    Drug use: No    Sexual activity: Yes     Partners: Female     Birth control/protection: Male Sterilization   Lifestyle    Physical activity:     Days per week: Not on file     Minutes per session: Not on file    Stress: Not on file   Relationships    Social connections:     Talks on phone: Not on file     Gets together: Not on file     Attends Orthodox service: Not on file     Active member of club or organization: Not on file     Attends meetings of clubs or organizations: Not on file     Relationship status: Not on file    Intimate partner violence:     Fear of current or ex partner: Not on file     Emotionally abused: Not on file     Physically abused: Not on file     Forced sexual activity: Not on file   Other Topics Concern    Not on file   Social History Narrative    COLLEGE STUDENT -- [address]        No Known Allergies      Current Outpatient Medications:     amLODIPine (NORVASC) 2 5 mg tablet, Take 1 tablet (2 5 mg total) by mouth daily, Disp: 30 tablet, Rfl: 5

## 2019-03-04 NOTE — ASSESSMENT & PLAN NOTE
Symptomatic varicose veins with intermittent edema  Patient denies any pain, fatigue or heaviness  Trace edema present on exam; patient denies any worsening edema  Isolated bleeding episode to varicose vein at proximal calf in December 2018  Advised daily use of compression stockings and periodic elevation  Due to bleeding episode discussed further workup for venous insufficiency and possible surgical treatment  Due to minimal symptoms patient would like to pursue conservative therapy at this time  Should he develop any recurrent bleeding episodes or worsening edema he will notify the office, otherwise we will be happy to see him on an as needed basis

## 2019-03-04 NOTE — PATIENT INSTRUCTIONS
Chronic venous insufficiency to bilateral lower extremities with intermittent edema, history of bleeding episode x1 to the right proximal calf in December 2018  -at this time you are largely asymptomatic in regards to your varicose veins/venous insufficiency, only with intermittent mild edema   -I would recommend the daily use of compression stockings  At this time being that you have minimal symptoms you can start with over-the-counter compression    If you were to develop any chronic pain or worsening swelling start wearing 20-30 mmHg knee-high compression stockings (Rx given today)  -other things we discussed that can help manage venous insufficiency:  Periodic elevation, regular physical activity and maintenance of a healthy weight  -if you have any recurrent bleeding episodes or develop any chronic daily symptoms notify the office, otherwise will be happy to see you on as needed basis

## 2019-03-13 ENCOUNTER — HOSPITAL ENCOUNTER (OUTPATIENT)
Dept: RADIOLOGY | Facility: HOSPITAL | Age: 59
Discharge: HOME | End: 2019-03-13
Attending: SURGERY
Payer: COMMERCIAL

## 2019-03-13 ENCOUNTER — OFFICE VISIT (OUTPATIENT)
Dept: OBGYN CLINIC | Facility: HOSPITAL | Age: 59
End: 2019-03-13
Payer: COMMERCIAL

## 2019-03-13 VITALS
DIASTOLIC BLOOD PRESSURE: 103 MMHG | BODY MASS INDEX: 28.23 KG/M2 | WEIGHT: 220 LBS | SYSTOLIC BLOOD PRESSURE: 175 MMHG | HEIGHT: 74 IN | HEART RATE: 71 BPM

## 2019-03-13 DIAGNOSIS — M79.641 PAIN IN BOTH HANDS: ICD-10-CM

## 2019-03-13 DIAGNOSIS — M18.11 ARTHRITIS OF CARPOMETACARPAL (CMC) JOINT OF RIGHT THUMB: ICD-10-CM

## 2019-03-13 DIAGNOSIS — M65.30 TRIGGER FINGER OF LEFT HAND, UNSPECIFIED FINGER: Primary | ICD-10-CM

## 2019-03-13 DIAGNOSIS — M79.642 PAIN IN BOTH HANDS: ICD-10-CM

## 2019-03-13 PROCEDURE — 20550 NJX 1 TENDON SHEATH/LIGAMENT: CPT | Performed by: SURGERY

## 2019-03-13 PROCEDURE — 73130 X-RAY EXAM OF HAND: CPT

## 2019-03-13 PROCEDURE — 99243 OFF/OP CNSLTJ NEW/EST LOW 30: CPT | Performed by: SURGERY

## 2019-03-13 RX ORDER — LIDOCAINE HYDROCHLORIDE 20 MG/ML
1 INJECTION, SOLUTION EPIDURAL; INFILTRATION; INTRACAUDAL; PERINEURAL
Status: COMPLETED | OUTPATIENT
Start: 2019-03-13 | End: 2019-03-13

## 2019-03-13 RX ORDER — DEXAMETHASONE SODIUM PHOSPHATE 100 MG/10ML
40 INJECTION INTRAMUSCULAR; INTRAVENOUS
Status: COMPLETED | OUTPATIENT
Start: 2019-03-13 | End: 2019-03-13

## 2019-03-13 RX ADMIN — DEXAMETHASONE SODIUM PHOSPHATE 40 MG: 100 INJECTION INTRAMUSCULAR; INTRAVENOUS at 16:46

## 2019-03-13 RX ADMIN — LIDOCAINE HYDROCHLORIDE 1 ML: 20 INJECTION, SOLUTION EPIDURAL; INFILTRATION; INTRACAUDAL; PERINEURAL at 16:46

## 2019-03-13 NOTE — PROGRESS NOTES
Sonia RAYA  Attending, Orthopaedic Surgery  Hand, Wrist, and Elbow Surgery  [facility]      ORTHOPAEDIC HAND, WRIST, AND ELBOW OFFICE  VISIT       ASSESSMENT/PLAN:      60 y o  male with left index trigger finger and right thumb CMC joint arthritis  Left index finger CSI was performed in the office today, without complication  If her benefits from the 1st CSI a second CSI may be performed in 6 weeks time    If he gets no relief following the 1st CSI a 2nd injection is not warranted and surgical intervention will be discussed at that time  Fidel Ray was fit with a comfort cool brace for his right thumb CMC joint arthritis, to be worn during the day  OT was ordered for a rigid thumb spica brace, to be worn at night  Patient declined bracing today due to insurance coverage  A right thumb CMC joint CSI may be performed in the future if bracing does not relieve his symptoms  Follow up in 6 weeks time  The patient verbalized understanding of exam findings and treatment plan  We engaged in the shared decision-making process and treatment options were discussed at length with the patient  Surgical and conservative management discussed today along with risks and benefits  Diagnoses and all orders for this visit:    Trigger finger of left hand, unspecified finger  -     Ambulatory referral to Orthopedic Surgery  -     Hand/upper extremity injection: L index A1    Pain in both hands  -     XR hand 3+ vw right; Future    Arthritis of carpometacarpal (CMC) joint of right thumb  -     Ambulatory referral to PT/OT hand therapy; Future        Follow Up:  Return in about 6 weeks (around 4/24/2019)  To Do Next Visit:  Re-evaluation of current issue      General Discussions:  [facility] Arthritis: The anatomy and physiology of carpometacarpal joint arthritis was discussed with the patient today in the office    Deterioration of the articular cartilage eventually leads to hypermobility at the thumb [facility] joint, resulting in joint subluxation, osteophyte formation, cystic changes within the trapezium and base of the first metacarpal, as well as subchondral sclerosis  Eventually, pain, limited mobility, and compensatory hyperextension at the metacarpophalangeal joint may develop  While normal activity and usage of the thumb joint may provide a painful experience to the patient, this typically does not result in damage to the thumb or hand  Treatment options include resting thumb spica splints to decreased joint edema, pain, and inflammation  Therapy exercises to strengthen the thenar musculature may relieve pain, but do not alter the overall continued development of osteoarthritis  Oral medications, topical medications, corticosteroid injections may decrease pain and increase overall function  Eventually, approximately 5% of patients may require surgical intervention  Trigger Finger: The anatomy and physiology of trigger finger was discussed with the patient today in the office  Edema and increased contact pressure within the flexor tendons at the A1 pulley can cause pain, crepitation, and triggering or locking of the digit resulting in limitation of function  Symptoms can occur at anytime but are typically worse in the morning or after a brief rest from repetitive activity  Treatment options include resting/nighttime MP blocking splints to decrease edema, oral anti-inflammatory medications, home or formal therapy exercises, up to 2 steroid injections within the tendon sheath, or surgical release  While majority of patients do respond to conservative treatment, up to 20% may require surgical release  Operative Discussions:  Trigger Finger Release:  The anatomy and physiology of trigger finger was discussed with the patient today in the office  Edema and increased contact pressure within the flexor tendons at the A1 pulley can cause pain, crepitation, and limitation of function  Treatment options include resting MP blocking splints to decrease edema, oral anti-inflammatory medications, home or formal therapy exercises, up to 2 steroid injections or surgical release  While majority of patients do respond to conservative treatment, up to 20% may require surgical release  The patient has elected release of the trigger finger  The patient has elected to undergo a release of the A1 pulley (trigger finger)  A small incision will be made over the palmar aspect of the hand, the tendon sheath holding the flexor tendons will be released  In the postoperative period, light activities are allowed immediately, driving is allowed when narcotic medication has stopped, and the incision may get wet after 2 days  Heavy activities (lifting more than approximately 10 pounds) will be allowed after the follow up appointment in 1-2 weeks  While the pain and discomfort within the wrist typically improves rapidly, some residual discomfort may be present for up to 6 weeks  The nodule that is typically palpable in the palmar aspect of the hand will not be removed, as this would necessitate removal of a portion of the flexor tendon, however the catching, clicking, and locking should resolve  Approximate success rate is 98%  The risks and benefits of the procedure were explained to the patient, which include, but are not limited to: Bleeding, infection, recurrence, pain, scar, damage to tendons, damage to nerves, and damage to blood vessels, need for future surgery and complications related to anesthesia  If bony work is done, risks also include malunion and nonunion  These risks, along with alternative conservative treatment options, and postoperative protocols were voiced back and understood by the patient    All questions were answered to the patient's satisfaction  The patient agrees to comply with a standard postoperative protocol, and is willing to proceed  Education was provided via written and auditory forms  There were no barriers to learning  Written handouts regarding wound care, incision and scar care, and general preoperative information, as well as risks and benefits were provided to the patient       ____________________________________________________________________________________________________________________________________________      CHIEF COMPLAINT:  Chief Complaint   Patient presents with    Left Hand - Pain, Locking    Right Hand - Pain, Locking       SUBJECTIVE:  Julieta Lanza is a 60y o  year old RHD male who presents to the office today for left long finger clicking catching and locking and right thumb pain  He was referred to the office by his PCP  Olimpia Grimm states that his left long finger started clicking catching and locking in November  Olimpia Grimm states that he bent his left long finger backwards which seemed to improved the clicking catching and locking but has increased his pain  He states that he is unsure how long he has been experiencing base of the thumb pain on the right side  Olimpia Grimm states that he has difficulty opening jars as well as pinching objects  He is taking ibuprofen as needed for pain control            Pain/symptom timing:  Worse during the day when active  Pain/symptom context:  Worse with activites and work  Pain/symptom modifying factors:  Rest makes better, activities make worse  Pain/symptom associated signs/symptoms: none    Prior treatment   · NSAIDsYes   · Injections No   · Bracing/Orthotics No    Physical Therapy No     I have personally reviewed all the relevant PMH, PSH, SH, FH, Medications and allergies      PAST MEDICAL HISTORY:  Past Medical History:   Diagnosis Date    BPH with urinary obstruction     AND OTHER LOWER URINARY TRACT SYMPTOMS     Detached retina, right 2015    Elevated prostate specific antigen (PSA) 2017    Feeling of incomplete bladder emptying     History of hypertension     History of nocturia     Hypertension     Neuroma of foot 2017    Prostate cancer ([facility] [address] ) 2017    Weak urinary stream        PAST SURGICAL HISTORY:  Past Surgical History:   Procedure Laterality Date    BACK SURGERY  2008    EYE SURGERY      REPAIR DETACHED RETINA     EYE SURGERY Right     PROSTATE BIOPSY Bilateral 2017    TONSILLECTOMY      VASECTOMY      SURGERY VAS DEFERENS        FAMILY HISTORY:  Family History   Problem Relation Age of Onset    Hypertension Mother     Peripheral vascular disease Mother     Dementia Father        SOCIAL HISTORY:  Social History     Tobacco Use    Smoking status: Never Smoker    Smokeless tobacco: Never Used   Substance Use Topics    Alcohol use: Yes     Alcohol/week: 1 8 oz     Types: 3 Cans of beer per week    Drug use: No       MEDICATIONS:    Current Outpatient Medications:     amLODIPine (NORVASC) 2 5 mg tablet, Take 1 tablet (2 5 mg total) by mouth daily, Disp: 30 tablet, Rfl: 5    ALLERGIES:  No Known Allergies        REVIEW OF SYSTEMS:  Review of Systems   Constitutional: Negative for chills, fever and unexpected weight change  HENT: Negative for hearing loss, nosebleeds and sore throat  Eyes: Negative for pain, redness and visual disturbance  Respiratory: Negative for cough, shortness of breath and wheezing  Cardiovascular: Negative for chest pain, palpitations and leg swelling  Gastrointestinal: Negative for abdominal pain, nausea and vomiting  Endocrine: Negative for polydipsia and polyuria  Genitourinary: Negative for difficulty urinating and hematuria  Musculoskeletal: Negative for arthralgias, joint swelling and myalgias  Skin: Negative for rash and wound  Neurological: Negative for dizziness, numbness and headaches  Psychiatric/Behavioral: Negative for decreased concentration, dysphoric mood and suicidal ideas   The patient is not nervous/anxious  VITALS:  Vitals:    03/13/19 1551   BP: (!) 175/103   Pulse: 71       LABS:  HgA1c:   Lab Results   Component Value Date    HGBA1C 6 1 08/08/2018     BMP:   Lab Results   Component Value Date    CALCIUM 9 2 10/25/2018     01/20/2016    K 4 2 10/25/2018    CO2 26 10/25/2018     (H) 10/25/2018    BUN 23 10/25/2018    CREATININE 1 13 10/25/2018       _____________________________________________________  PHYSICAL EXAMINATION:  General: well developed and well nourished, alert, oriented times 3 and appears comfortable  Psychiatric: Normal  HEENT: Normocephalic, Atraumatic Trachea Midline, No torticollis  Pulmonary: No audible wheezing or respiratory distress   Cardiovascular: No pitting edema, 2+ radial pulse   Skin: No masses, erythema, lacerations, fluctation, ulcerations  Neurovascular: Sensation Intact to the Median, Ulnar, Radial Nerve, Motor Intact to the Median, Ulnar, Radial Nerve and Pulses Intact  Musculoskeletal: Normal, except as noted in detailed exam and in HPI  MUSCULOSKELETAL EXAMINATION:    Right CMC Exam:  No adduction contracture  No hyperextension deformity of MCP joint  Positive localized tenderness over radial and dorsal aspect of thumb (CMC joint)  Grind test is Positive for pain and Negative for crepitus  Metacarpal load shift test   No triggering or tenderness over the A1 pulley    left index finger:  Positive palpable nodule over the A1 pulley  Positive tenderness to palpation over A1 pulley  Negative catching  Negative clicking       Pucker located on the left hand in line with long finger      ___________________________________________________  STUDIES REVIEWED:  I have personally reviewed AP lateral and oblique radiographs of right thumb demonstrates a stage II [place] [facility] arthrosis          PROCEDURES PERFORMED:  Hand/upper extremity injection: L index A1  Date/Time: 3/13/2019 4:46 PM  Consent given by: patient  Site marked: site marked  Timeout: Immediately prior to procedure a time out was called to verify the correct patient, procedure, equipment, support staff and site/side marked as required   Supporting Documentation  Indications: pain   Procedure Details  Condition:trigger finger Location: index finger - L index A1   Preparation: Patient was prepped and draped in the usual sterile fashion  Needle size: 25 G  Ultrasound guidance: no  Medications administered: 1 mL lidocaine (PF) 2 %; 40 mg dexamethasone 100 mg/10 mL    Patient tolerance: patient tolerated the procedure well with no immediate complications  Dressing:  Sterile dressing applied            _____________________________________________________      Scribe Attestation    I,:   Silke Newby am acting as a scribe while in the presence of the attending physician :        I,:   Lio Adair MD personally performed the services described in this documentation    as scribed in my presence :

## 2019-03-28 ENCOUNTER — OFFICE VISIT (OUTPATIENT)
Dept: OBGYN CLINIC | Facility: HOSPITAL | Age: 59
End: 2019-03-28
Payer: COMMERCIAL

## 2019-03-28 VITALS
DIASTOLIC BLOOD PRESSURE: 96 MMHG | WEIGHT: 220 LBS | HEIGHT: 74 IN | BODY MASS INDEX: 28.23 KG/M2 | SYSTOLIC BLOOD PRESSURE: 158 MMHG | HEART RATE: 84 BPM

## 2019-03-28 DIAGNOSIS — M17.0 PRIMARY OSTEOARTHRITIS OF BOTH KNEES: Primary | ICD-10-CM

## 2019-03-28 DIAGNOSIS — M11.20 CHONDROCALCINOSIS: ICD-10-CM

## 2019-03-28 DIAGNOSIS — M25.561 PAIN IN BOTH KNEES, UNSPECIFIED CHRONICITY: ICD-10-CM

## 2019-03-28 DIAGNOSIS — M25.562 PAIN IN BOTH KNEES, UNSPECIFIED CHRONICITY: ICD-10-CM

## 2019-03-28 PROCEDURE — 20610 DRAIN/INJ JOINT/BURSA W/O US: CPT | Performed by: ORTHOPAEDIC SURGERY

## 2019-03-28 PROCEDURE — 99213 OFFICE O/P EST LOW 20 MIN: CPT | Performed by: ORTHOPAEDIC SURGERY

## 2019-03-28 RX ORDER — LIDOCAINE HYDROCHLORIDE 10 MG/ML
2 INJECTION, SOLUTION INFILTRATION; PERINEURAL
Status: COMPLETED | OUTPATIENT
Start: 2019-03-28 | End: 2019-03-28

## 2019-03-28 RX ORDER — BETAMETHASONE SODIUM PHOSPHATE AND BETAMETHASONE ACETATE 3; 3 MG/ML; MG/ML
12 INJECTION, SUSPENSION INTRA-ARTICULAR; INTRALESIONAL; INTRAMUSCULAR; SOFT TISSUE
Status: COMPLETED | OUTPATIENT
Start: 2019-03-28 | End: 2019-03-28

## 2019-03-28 RX ORDER — BUPIVACAINE HYDROCHLORIDE 2.5 MG/ML
2 INJECTION, SOLUTION INFILTRATION; PERINEURAL
Status: COMPLETED | OUTPATIENT
Start: 2019-03-28 | End: 2019-03-28

## 2019-03-28 RX ADMIN — BUPIVACAINE HYDROCHLORIDE 2 ML: 2.5 INJECTION, SOLUTION INFILTRATION; PERINEURAL at 15:30

## 2019-03-28 RX ADMIN — BETAMETHASONE SODIUM PHOSPHATE AND BETAMETHASONE ACETATE 12 MG: 3; 3 INJECTION, SUSPENSION INTRA-ARTICULAR; INTRALESIONAL; INTRAMUSCULAR; SOFT TISSUE at 15:30

## 2019-03-28 RX ADMIN — LIDOCAINE HYDROCHLORIDE 2 ML: 10 INJECTION, SOLUTION INFILTRATION; PERINEURAL at 15:30

## 2019-03-28 NOTE — PROGRESS NOTES
Subjective; 69-year-old male who presents secondary to bilateral knee discomfort  He is employed by a  home, and finds that in his personal life as well as vocational E he has difficulty with propulsion from the seated position to standing pointing to the anterior aspect of his knees in the patellofemoral joints  The symptoms occur and an individual though who has no difficulty with prolonged standing no difficulty with walking  He denies catching he denies locking he denies buckling or falling  Does have a past history of a right knee hamstring and possibly collateral injury which he [employer] did self treated with a hinged knee brace and it resolved  Self treatment has consisted of altering his ADLs, of the administration of p o  analgesics and anti-inflammatories, and the use of that knee brace as previously described    X-rays of the knees are available for review  Past Medical History:   Diagnosis Date    BPH with urinary obstruction     AND OTHER LOWER URINARY TRACT SYMPTOMS     Detached retina, right     Elevated prostate specific antigen (PSA) 2017    Feeling of incomplete bladder emptying     History of hypertension     History of nocturia     Hypertension     Neuroma of foot 2017    Prostate cancer ([facility] [address] ) 2017    Weak urinary stream        Past Surgical History:   Procedure Laterality Date    BACK SURGERY  2008    EYE SURGERY      REPAIR DETACHED RETINA     EYE SURGERY Right     PROSTATE BIOPSY Bilateral 2017    TONSILLECTOMY      VASECTOMY      SURGERY VAS DEFERENS        Family History   Problem Relation Age of Onset    Hypertension Mother     Peripheral vascular disease Mother     Dementia Father        Social History     Tobacco Use    Smoking status: Never Smoker    Smokeless tobacco: Never Used   Substance Use Topics    Alcohol use:  Yes     Alcohol/week: 1 8 oz     Types: 3 Cans of beer per week    Drug use: No     Exam;    His left knee exhibits genu varum his right knee has suspicion of slight valgus inclination  Also the right knee medially has significant varicosities and enlargement in the area of the medial hamstring posteriorly and medially  He is able to extend both knees fully he can flex the knees greater than 95°  He has a negative Lachman's and negative drawers test  Has a negative Daryn's and negative Apley's grind test of both knees  He has no significant fluid collection or effusion of either knee    X-rays;   bilateral knee series shows evidence of preserved medial and lateral compartmental space  He has chondrocalcinosis by the appearance of the menisci  He has changes in the patellofemoral joint that are arthritic in nature  Large joint arthrocentesis: R knee  Date/Time: 3/28/2019 3:30 PM  Consent given by: patient  Supporting Documentation  Indications: pain   Procedure Details  Location: knee - R knee  Needle size: 22 G  Ultrasound guidance: no  Medications administered: 2 mL bupivacaine 0 25 %; 2 mL lidocaine 1 %; 12 mg betamethasone acetate-betamethasone sodium phosphate 6 (3-3) mg/mL      Large joint arthrocentesis: L knee  Date/Time: 3/28/2019 3:30 PM  Consent given by: patient  Supporting Documentation  Indications: pain   Procedure Details  Location: knee - L knee  Needle size: 22 G  Medications administered: 2 mL bupivacaine 0 25 %; 2 mL lidocaine 1 %; 12 mg betamethasone acetate-betamethasone sodium phosphate 6 (3-3) mg/mL         Impression;    Bilateral knee pain  Chondrocalcinosis  Bilateral knee primary osteoarthritis    Plan;     He was offered and received 2 well placed injections 1 for each knee  Treatment algorithm regarding his condition was reviewed with the patient  He may continue to practice alteration in his ADLs in hopes for non operative treatment of his knee pain  He would like to see us p r n  in this will be respected as well  His exam was supervised by and plan formulated by the attending surgeon it was my privilege to assist him in its delivery

## 2019-04-24 ENCOUNTER — APPOINTMENT (OUTPATIENT)
Dept: LAB | Facility: HOSPITAL | Age: 59
End: 2019-04-24

## 2019-04-24 ENCOUNTER — TRANSCRIBE ORDERS (OUTPATIENT)
Dept: LAB | Facility: HOSPITAL | Age: 59
End: 2019-04-24

## 2019-04-24 ENCOUNTER — OFFICE VISIT (OUTPATIENT)
Dept: OBGYN CLINIC | Facility: HOSPITAL | Age: 59
End: 2019-04-24
Payer: COMMERCIAL

## 2019-04-24 ENCOUNTER — TELEPHONE (OUTPATIENT)
Dept: OBGYN CLINIC | Facility: HOSPITAL | Age: 59
End: 2019-04-24

## 2019-04-24 VITALS
BODY MASS INDEX: 26.95 KG/M2 | WEIGHT: 210 LBS | HEART RATE: 74 BPM | DIASTOLIC BLOOD PRESSURE: 99 MMHG | HEIGHT: 74 IN | SYSTOLIC BLOOD PRESSURE: 158 MMHG

## 2019-04-24 DIAGNOSIS — Z20.828 EXPOSURE TO SARS-ASSOCIATED CORONAVIRUS: Primary | ICD-10-CM

## 2019-04-24 DIAGNOSIS — M65.322 TRIGGER FINGER, LEFT INDEX FINGER: Primary | ICD-10-CM

## 2019-04-24 DIAGNOSIS — Z20.828 EXPOSURE TO SARS-ASSOCIATED CORONAVIRUS: ICD-10-CM

## 2019-04-24 DIAGNOSIS — M18.11 ARTHRITIS OF CARPOMETACARPAL (CMC) JOINT OF RIGHT THUMB: ICD-10-CM

## 2019-04-24 DIAGNOSIS — C61 PROSTATE CANCER (HCC): ICD-10-CM

## 2019-04-24 LAB
HBV SURFACE AG SER QL: NORMAL
HCV AB SER QL: NORMAL

## 2019-04-24 PROCEDURE — 87389 HIV-1 AG W/HIV-1&-2 AB AG IA: CPT

## 2019-04-24 PROCEDURE — 87340 HEPATITIS B SURFACE AG IA: CPT

## 2019-04-24 PROCEDURE — 20550 NJX 1 TENDON SHEATH/LIGAMENT: CPT | Performed by: SURGERY

## 2019-04-24 PROCEDURE — 20600 DRAIN/INJ JOINT/BURSA W/O US: CPT | Performed by: SURGERY

## 2019-04-24 PROCEDURE — 99213 OFFICE O/P EST LOW 20 MIN: CPT | Performed by: SURGERY

## 2019-04-24 PROCEDURE — 36415 COLL VENOUS BLD VENIPUNCTURE: CPT

## 2019-04-24 PROCEDURE — 86803 HEPATITIS C AB TEST: CPT

## 2019-04-24 RX ORDER — BUPIVACAINE HYDROCHLORIDE 2.5 MG/ML
0.5 INJECTION, SOLUTION INFILTRATION; PERINEURAL
Status: COMPLETED | OUTPATIENT
Start: 2019-04-24 | End: 2019-04-24

## 2019-04-24 RX ORDER — LIDOCAINE HYDROCHLORIDE 10 MG/ML
1 INJECTION, SOLUTION INFILTRATION; PERINEURAL
Status: COMPLETED | OUTPATIENT
Start: 2019-04-24 | End: 2019-04-24

## 2019-04-24 RX ORDER — TRIAMCINOLONE ACETONIDE 40 MG/ML
40 INJECTION, SUSPENSION INTRA-ARTICULAR; INTRAMUSCULAR
Status: COMPLETED | OUTPATIENT
Start: 2019-04-24 | End: 2019-04-24

## 2019-04-24 RX ORDER — DEXAMETHASONE SODIUM PHOSPHATE 100 MG/10ML
40 INJECTION INTRAMUSCULAR; INTRAVENOUS
Status: COMPLETED | OUTPATIENT
Start: 2019-04-24 | End: 2019-04-24

## 2019-04-24 RX ADMIN — BUPIVACAINE HYDROCHLORIDE 0.5 ML: 2.5 INJECTION, SOLUTION INFILTRATION; PERINEURAL at 10:02

## 2019-04-24 RX ADMIN — LIDOCAINE HYDROCHLORIDE 1 ML: 10 INJECTION, SOLUTION INFILTRATION; PERINEURAL at 10:02

## 2019-04-24 RX ADMIN — TRIAMCINOLONE ACETONIDE 40 MG: 40 INJECTION, SUSPENSION INTRA-ARTICULAR; INTRAMUSCULAR at 10:02

## 2019-04-24 RX ADMIN — DEXAMETHASONE SODIUM PHOSPHATE 40 MG: 100 INJECTION INTRAMUSCULAR; INTRAVENOUS at 10:02

## 2019-04-26 LAB — HIV 1+2 AB+HIV1 P24 AG SERPL QL IA: NORMAL

## 2019-04-29 ENCOUNTER — APPOINTMENT (OUTPATIENT)
Dept: LAB | Facility: MEDICAL CENTER | Age: 59
End: 2019-04-29
Payer: COMMERCIAL

## 2019-04-29 DIAGNOSIS — C61 PROSTATE CANCER (HCC): ICD-10-CM

## 2019-04-29 DIAGNOSIS — Z20.828 EXPOSURE TO SARS-ASSOCIATED CORONAVIRUS: ICD-10-CM

## 2019-04-29 LAB — PSA SERPL-MCNC: 2.4 NG/ML (ref 0–4)

## 2019-04-29 PROCEDURE — 84153 ASSAY OF PSA TOTAL: CPT

## 2019-05-08 ENCOUNTER — OFFICE VISIT (OUTPATIENT)
Dept: FAMILY MEDICINE CLINIC | Facility: CLINIC | Age: 59
End: 2019-05-08
Payer: COMMERCIAL

## 2019-05-08 VITALS
WEIGHT: 227.4 LBS | DIASTOLIC BLOOD PRESSURE: 102 MMHG | TEMPERATURE: 98.1 F | RESPIRATION RATE: 16 BRPM | BODY MASS INDEX: 29.18 KG/M2 | OXYGEN SATURATION: 97 % | HEIGHT: 74 IN | HEART RATE: 74 BPM | SYSTOLIC BLOOD PRESSURE: 176 MMHG

## 2019-05-08 DIAGNOSIS — I10 ESSENTIAL HYPERTENSION: Primary | ICD-10-CM

## 2019-05-08 DIAGNOSIS — E66.3 OVERWEIGHT (BMI 25.0-29.9): ICD-10-CM

## 2019-05-08 PROCEDURE — 99213 OFFICE O/P EST LOW 20 MIN: CPT | Performed by: FAMILY MEDICINE

## 2019-05-08 RX ORDER — AMLODIPINE BESYLATE 5 MG/1
5 TABLET ORAL DAILY
Qty: 30 TABLET | Refills: 5 | Status: SHIPPED | OUTPATIENT
Start: 2019-05-08 | End: 2019-07-03 | Stop reason: CLARIF

## 2019-05-15 ENCOUNTER — TELEPHONE (OUTPATIENT)
Dept: FAMILY MEDICINE CLINIC | Facility: CLINIC | Age: 59
End: 2019-05-15

## 2019-07-03 ENCOUNTER — OFFICE VISIT (OUTPATIENT)
Dept: FAMILY MEDICINE CLINIC | Facility: CLINIC | Age: 59
End: 2019-07-03
Payer: COMMERCIAL

## 2019-07-03 VITALS
HEART RATE: 76 BPM | TEMPERATURE: 97.9 F | RESPIRATION RATE: 16 BRPM | SYSTOLIC BLOOD PRESSURE: 146 MMHG | DIASTOLIC BLOOD PRESSURE: 90 MMHG | BODY MASS INDEX: 28.93 KG/M2 | WEIGHT: 225.4 LBS | HEIGHT: 74 IN

## 2019-07-03 DIAGNOSIS — J40 BRONCHITIS: ICD-10-CM

## 2019-07-03 DIAGNOSIS — I10 ESSENTIAL HYPERTENSION: Primary | ICD-10-CM

## 2019-07-03 PROCEDURE — 1036F TOBACCO NON-USER: CPT | Performed by: FAMILY MEDICINE

## 2019-07-03 PROCEDURE — 3008F BODY MASS INDEX DOCD: CPT | Performed by: FAMILY MEDICINE

## 2019-07-03 PROCEDURE — 99213 OFFICE O/P EST LOW 20 MIN: CPT | Performed by: FAMILY MEDICINE

## 2019-07-03 RX ORDER — VALSARTAN 160 MG/1
160 TABLET ORAL DAILY
Qty: 30 TABLET | Refills: 5 | Status: SHIPPED | OUTPATIENT
Start: 2019-07-03 | End: 2020-01-05

## 2019-07-03 RX ORDER — AZITHROMYCIN 250 MG/1
TABLET, FILM COATED ORAL
Qty: 6 TABLET | Refills: 0 | Status: SHIPPED | OUTPATIENT
Start: 2019-07-03 | End: 2019-07-07

## 2019-07-03 NOTE — PROGRESS NOTES
Assessment/Plan:    Hypertension  Having edema on amlodipine, will change to valsartan       Diagnoses and all orders for this visit:    Essential hypertension  -     valsartan (DIOVAN) 160 mg tablet; Take 1 tablet (160 mg total) by mouth daily          Subjective:      Patient ID: Cony Fitzgerald is a 60 y o  male  Hypertension   This is a chronic problem  The current episode started more than 1 year ago  The problem is unchanged  The problem is controlled  Associated symptoms include peripheral edema and shortness of breath  Pertinent negatives include no chest pain, headaches or palpitations  (With exertion) There are no associated agents to hypertension  Risk factors for coronary artery disease include male gender  Past treatments include ACE inhibitors and calcium channel blockers  The current treatment provides mild improvement  There are no compliance problems  The following portions of the patient's history were reviewed and updated as appropriate: allergies, current medications, past family history, past medical history, past social history, past surgical history and problem list       Review of Systems   Constitutional: Negative for activity change, appetite change and unexpected weight change  Respiratory: Positive for cough, shortness of breath and wheezing  Cardiovascular: Negative for chest pain and palpitations  Neurological: Negative for headaches  Psychiatric/Behavioral: The patient is not nervous/anxious  Objective:      /90 (BP Location: Left arm, Patient Position: Sitting, Cuff Size: Adult)   Pulse 76   Temp 97 9 °F (36 6 °C) (Temporal)   Resp 16   Ht 6' 2" (1 88 m)   Wt 102 kg (225 lb 6 4 oz)   BMI 28 94 kg/m²          Physical Exam   Constitutional: He appears well-developed and well-nourished  HENT:   Right Ear: Tympanic membrane normal    Left Ear: Tympanic membrane normal    Nose: No mucosal edema or rhinorrhea   Right sinus exhibits no maxillary sinus tenderness  Left sinus exhibits no maxillary sinus tenderness  Mouth/Throat: No oropharyngeal exudate, posterior oropharyngeal edema or posterior oropharyngeal erythema  Cardiovascular: Normal rate, regular rhythm and normal heart sounds  Pulmonary/Chest: Effort normal  He has wheezes  Musculoskeletal: He exhibits edema  Trace edema   Lymphadenopathy:     He has no cervical adenopathy  Vitals reviewed

## 2019-07-31 ENCOUNTER — OFFICE VISIT (OUTPATIENT)
Dept: FAMILY MEDICINE CLINIC | Facility: CLINIC | Age: 59
End: 2019-07-31
Payer: COMMERCIAL

## 2019-07-31 VITALS
SYSTOLIC BLOOD PRESSURE: 124 MMHG | BODY MASS INDEX: 28.98 KG/M2 | WEIGHT: 225.8 LBS | HEART RATE: 100 BPM | DIASTOLIC BLOOD PRESSURE: 84 MMHG | HEIGHT: 74 IN | RESPIRATION RATE: 16 BRPM | TEMPERATURE: 98.3 F

## 2019-07-31 DIAGNOSIS — I10 ESSENTIAL HYPERTENSION: Primary | ICD-10-CM

## 2019-07-31 PROCEDURE — 99213 OFFICE O/P EST LOW 20 MIN: CPT | Performed by: FAMILY MEDICINE

## 2019-07-31 PROCEDURE — 1036F TOBACCO NON-USER: CPT | Performed by: FAMILY MEDICINE

## 2019-07-31 PROCEDURE — 3008F BODY MASS INDEX DOCD: CPT | Performed by: FAMILY MEDICINE

## 2019-07-31 PROCEDURE — 3074F SYST BP LT 130 MM HG: CPT | Performed by: FAMILY MEDICINE

## 2019-07-31 NOTE — PROGRESS NOTES
Assessment/Plan:    No problem-specific Assessment & Plan notes found for this encounter  There are no diagnoses linked to this encounter  Subjective:      Patient ID: Rayray Sánchez is a 60 y o  male  Hypertension   This is a chronic problem  The current episode started more than 1 year ago  The problem has been waxing and waning since onset  The problem is controlled  Pertinent negatives include no anxiety, blurred vision, chest pain, headaches, peripheral edema or shortness of breath  There are no associated agents to hypertension  Risk factors for coronary artery disease include male gender  Past treatments include angiotensin blockers  The current treatment provides moderate improvement  There are no compliance problems  The following portions of the patient's history were reviewed and updated as appropriate: allergies, current medications, past family history, past medical history, past social history, past surgical history and problem list       Review of Systems   Constitutional: Negative for activity change, appetite change and unexpected weight change  Eyes: Negative for blurred vision  Respiratory: Negative for shortness of breath  Cardiovascular: Negative for chest pain  Neurological: Negative for headaches  Psychiatric/Behavioral: The patient is not nervous/anxious  Objective:      /84 (BP Location: Right arm, Patient Position: Sitting, Cuff Size: Adult)   Pulse 100   Temp 98 3 °F (36 8 °C) (Temporal)   Resp 16   Ht 6' 2" (1 88 m)   Wt 102 kg (225 lb 12 8 oz)   BMI 28 99 kg/m²          Physical Exam   Constitutional: He appears well-developed and well-nourished  Neck: No thyromegaly present  Cardiovascular: Normal rate, regular rhythm and normal heart sounds  Pulmonary/Chest: Effort normal and breath sounds normal    Musculoskeletal: He exhibits no edema  Lymphadenopathy:     He has no cervical adenopathy  Vitals reviewed

## 2019-09-25 ENCOUNTER — APPOINTMENT (OUTPATIENT)
Dept: RADIOLOGY | Facility: AMBULARY SURGERY CENTER | Age: 59
End: 2019-09-25
Attending: SURGERY
Payer: COMMERCIAL

## 2019-09-25 ENCOUNTER — OFFICE VISIT (OUTPATIENT)
Dept: OBGYN CLINIC | Facility: CLINIC | Age: 59
End: 2019-09-25
Payer: COMMERCIAL

## 2019-09-25 VITALS
DIASTOLIC BLOOD PRESSURE: 85 MMHG | SYSTOLIC BLOOD PRESSURE: 136 MMHG | WEIGHT: 229 LBS | HEART RATE: 66 BPM | HEIGHT: 74 IN | BODY MASS INDEX: 29.39 KG/M2

## 2019-09-25 DIAGNOSIS — M18.12 ARTHRITIS OF CARPOMETACARPAL (CMC) JOINT OF LEFT THUMB: ICD-10-CM

## 2019-09-25 DIAGNOSIS — M79.642 LEFT HAND PAIN: ICD-10-CM

## 2019-09-25 DIAGNOSIS — M18.11 ARTHRITIS OF CARPOMETACARPAL (CMC) JOINT OF RIGHT THUMB: Primary | ICD-10-CM

## 2019-09-25 PROCEDURE — 20600 DRAIN/INJ JOINT/BURSA W/O US: CPT | Performed by: SURGERY

## 2019-09-25 PROCEDURE — 73130 X-RAY EXAM OF HAND: CPT

## 2019-09-25 PROCEDURE — 99213 OFFICE O/P EST LOW 20 MIN: CPT | Performed by: SURGERY

## 2019-09-25 RX ORDER — BUPIVACAINE HYDROCHLORIDE 2.5 MG/ML
1 INJECTION, SOLUTION EPIDURAL; INFILTRATION; INTRACAUDAL
Status: COMPLETED | OUTPATIENT
Start: 2019-09-25 | End: 2019-09-25

## 2019-09-25 RX ORDER — TRIAMCINOLONE ACETONIDE 40 MG/ML
40 INJECTION, SUSPENSION INTRA-ARTICULAR; INTRAMUSCULAR
Status: COMPLETED | OUTPATIENT
Start: 2019-09-25 | End: 2019-09-25

## 2019-09-25 RX ADMIN — TRIAMCINOLONE ACETONIDE 40 MG: 40 INJECTION, SUSPENSION INTRA-ARTICULAR; INTRAMUSCULAR at 16:10

## 2019-09-25 RX ADMIN — BUPIVACAINE HYDROCHLORIDE 1 ML: 2.5 INJECTION, SOLUTION EPIDURAL; INFILTRATION; INTRACAUDAL at 16:10

## 2019-09-25 NOTE — PROGRESS NOTES
ASSESSMENT/PLAN:      61 yo male with BL cmc arthritis and resolved left index TF    BL CMC CSI given today, the right had been injected previously and he got several months of relief, the left is newly bothersome  TF has resolved after 2 injections, if it begins bothering him again we may need to discuss surgical intervention  Regarding the intra-articular injections we may continue doing this every 3 months as needed  We will see him back as needed for further injections  The patient verbalized understanding of exam findings and treatment plan  We engaged in the shared decision-making process and treatment options were discussed at length with the patient  Surgical and conservative management discussed today along with risks and benefits  Diagnoses and all orders for this visit:    Arthritis of carpometacarpal [place]) joint of right thumb  -     Small joint arthrocentesis    Arthritis of carpometacarpal (CMC) joint of left thumb  -     Small joint arthrocentesis    Left hand pain  -     XR hand 3+ vw left; Future        Follow Up:  Return in about 3 months (around 12/25/2019), or if symptoms worsen or fail to improve  To Do Next Visit:  Re-evaluation of current issue    ____________________________________________________________________________________________________________________________________________      CHIEF COMPLAINT:  Chief Complaint   Patient presents with    Right Hand - Follow-up    Left Hand - Pain       SUBJECTIVE:  Jody Morris is a 60y o  year old RHD male who presents for follow-up evaluation of right CMC arthritis and left index trigger finger  Patient notes that his right [facility] joint is beginning to bother him again particularly with putting on as high and doing any grasping activities  He notes the trigger finger is resolved and not bothering him anymore  He also notes pain on the left [facility] joint that is just beginning    He has tried wearing his braces but states they are very tight and not comfortable  At his last visit we injected his right [facility] joint which gave him several months of relief  Today he would like to discuss bilateral CMC injections  I have personally reviewed all the relevant PMH, PSH, SH, FH, Medications and allergies  PAST MEDICAL HISTORY:  Past Medical History:   Diagnosis Date    BPH with urinary obstruction     AND OTHER LOWER URINARY TRACT SYMPTOMS     Detached retina, right 2015    Elevated prostate specific antigen (PSA) 2017    Feeling of incomplete bladder emptying     History of hypertension     History of nocturia     Hypertension     Neuroma of foot 2017    Prostate cancer ([facility] [address] ) 2017    Weak urinary stream        PAST SURGICAL HISTORY:  Past Surgical History:   Procedure Laterality Date    BACK SURGERY  2008    EYE SURGERY      REPAIR DETACHED RETINA     EYE SURGERY Right     PROSTATE BIOPSY Bilateral 2017    TONSILLECTOMY      VASECTOMY      SURGERY VAS DEFERENS        FAMILY HISTORY:  Family History   Problem Relation Age of Onset    Hypertension Mother     Peripheral vascular disease Mother     Dementia Father        SOCIAL HISTORY:  Social History     Tobacco Use    Smoking status: Never Smoker    Smokeless tobacco: Never Used   Substance Use Topics    Alcohol use: Yes     Alcohol/week: 3 0 standard drinks     Types: 3 Cans of beer per week    Drug use: No       MEDICATIONS:    Current Outpatient Medications:     valsartan (DIOVAN) 160 mg tablet, Take 1 tablet (160 mg total) by mouth daily, Disp: 30 tablet, Rfl: 5    ALLERGIES:  No Known Allergies    REVIEW OF SYSTEMS:  Review of Systems   Constitutional: Negative for chills, fatigue, fever and unexpected weight change  HENT: Negative for congestion, dental problem, facial swelling, hearing loss, sneezing, sore throat, trouble swallowing and voice change  Respiratory: Negative for chest tightness, shortness of breath, wheezing and stridor      Cardiovascular: Negative for chest pain, palpitations and leg swelling  Gastrointestinal: Negative for abdominal pain, diarrhea, nausea and vomiting  Endocrine: Negative for cold intolerance and heat intolerance  Musculoskeletal: Positive for arthralgias  Negative for joint swelling, myalgias and neck pain  Skin: Negative for color change, pallor, rash and wound  Neurological: Negative for tremors, facial asymmetry, speech difficulty, weakness and numbness  VITALS:  Vitals:    09/25/19 1547   BP: 136/85   Pulse: 66       LABS:  HgA1c:   Lab Results   Component Value Date    HGBA1C 6 1 08/08/2018     BMP:   Lab Results   Component Value Date    CALCIUM 9 2 10/25/2018     01/20/2016    K 4 2 10/25/2018    CO2 26 10/25/2018     (H) 10/25/2018    BUN 23 10/25/2018    CREATININE 1 13 10/25/2018       _____________________________________________________  PHYSICAL EXAMINATION:  General: well developed and well nourished, alert, oriented times 3 and appears comfortable  Psychiatric: Normal  HEENT: Normocephalic, Atraumatic Trachea Midline, No torticollis  Pulmonary: No audible wheezing or respiratory distress   Cardiovascular: No pitting edema, 2+ radial pulse   Skin: No masses, erythema, lacerations, fluctation, ulcerations  Neurovascular: Sensation Intact to the Median, Ulnar, Radial Nerve, Motor Intact to the Median, Ulnar, Radial Nerve and Pulses Intact  Musculoskeletal: Normal, except as noted in detailed exam and in HPI  MUSCULOSKELETAL EXAMINATION:  left index finger:  Negative palpable nodule over the A1 pulley  Negative tenderness to palpation over A1 pulley  Negative catching  Negative clicking        left CMC Exam:  No adduction contracture  No hyperextension deformity of MCP joint  Positive localized tenderness over radial and dorsal aspect of thumb (CMC joint)  Grind test is Negative for pain and Negative for crepitus  Metacarpal load shift test positive for mild pain  No triggering or tenderness over the A1 pulley  No pain with Finkelsteins maneuver     right CMC Exam:  No adduction contracture  No hyperextension deformity of MCP joint  Positive localized tenderness over radial and dorsal aspect of thumb (CMC joint)  Grind test is Positive for pain and Negative for crepitus  Metacarpal load shift test Positive   No triggering or tenderness over the A1 pulley  No pain with Finkelsteins maneuver       ___________________________________________________  STUDIES REVIEWED:  I have personally reviewed AP lateral and oblique radiographs of left hand   which demonstrate cmc arthritis            PROCEDURES PERFORMED:  Small joint arthrocentesis: bilateral thumb CMC  Date/Time: 9/25/2019 4:10 PM  Consent given by: patient  Site marked: site marked  Timeout: Immediately prior to procedure a time out was called to verify the correct patient, procedure, equipment, support staff and site/side marked as required   Supporting Documentation  Indications: pain   Procedure Details  Location: thumb - bilateral thumb CMC  Needle size: 25 G  Ultrasound guidance: no  Approach: dorsal    Medications (Right): 1 mL bupivacaine (PF) 0 25 %; 40 mg triamcinolone acetonide 40 mg/mLMedications (Left): 1 mL bupivacaine (PF) 0 25 %; 40 mg triamcinolone acetonide 40 mg/mL   Patient tolerance: patient tolerated the procedure well with no immediate complications  Dressing:  Sterile dressing applied             _____________________________________________________      NIC Ho Cosign: I supervised the physician assistant and discussed the case with them  I personally performed history and physical exam  I agree with the assessment and plan of care as documented by the physician assistant

## 2019-10-01 ENCOUNTER — APPOINTMENT (OUTPATIENT)
Dept: LAB | Facility: MEDICAL CENTER | Age: 59
End: 2019-10-01
Payer: COMMERCIAL

## 2019-10-01 ENCOUNTER — TRANSCRIBE ORDERS (OUTPATIENT)
Dept: ADMINISTRATIVE | Facility: HOSPITAL | Age: 59
End: 2019-10-01

## 2019-10-01 DIAGNOSIS — C61 PROSTATIC CANCER (HCC): Primary | ICD-10-CM

## 2019-10-01 DIAGNOSIS — I10 ESSENTIAL HYPERTENSION: ICD-10-CM

## 2019-10-01 DIAGNOSIS — C61 PROSTATIC CANCER (HCC): ICD-10-CM

## 2019-10-01 LAB
ALBUMIN SERPL BCP-MCNC: 4 G/DL (ref 3.5–5)
ALP SERPL-CCNC: 84 U/L (ref 46–116)
ALT SERPL W P-5'-P-CCNC: 47 U/L (ref 12–78)
ANION GAP SERPL CALCULATED.3IONS-SCNC: 5 MMOL/L (ref 4–13)
AST SERPL W P-5'-P-CCNC: 26 U/L (ref 5–45)
BACTERIA UR QL AUTO: NORMAL /HPF
BASOPHILS # BLD AUTO: 0.06 THOUSANDS/ΜL (ref 0–0.1)
BASOPHILS NFR BLD AUTO: 1 % (ref 0–1)
BILIRUB SERPL-MCNC: 0.74 MG/DL (ref 0.2–1)
BILIRUB UR QL STRIP: NEGATIVE
BUN SERPL-MCNC: 20 MG/DL (ref 5–25)
CALCIUM SERPL-MCNC: 9.1 MG/DL (ref 8.3–10.1)
CHLORIDE SERPL-SCNC: 108 MMOL/L (ref 100–108)
CHOLEST SERPL-MCNC: 174 MG/DL (ref 50–200)
CLARITY UR: CLEAR
CO2 SERPL-SCNC: 25 MMOL/L (ref 21–32)
COLOR UR: YELLOW
CREAT SERPL-MCNC: 1.26 MG/DL (ref 0.6–1.3)
EOSINOPHIL # BLD AUTO: 0.19 THOUSAND/ΜL (ref 0–0.61)
EOSINOPHIL NFR BLD AUTO: 2 % (ref 0–6)
ERYTHROCYTE [DISTWIDTH] IN BLOOD BY AUTOMATED COUNT: 13.7 % (ref 11.6–15.1)
GFR SERPL CREATININE-BSD FRML MDRD: 62 ML/MIN/1.73SQ M
GLUCOSE P FAST SERPL-MCNC: 112 MG/DL (ref 65–99)
GLUCOSE UR STRIP-MCNC: NEGATIVE MG/DL
HCT VFR BLD AUTO: 47 % (ref 36.5–49.3)
HDLC SERPL-MCNC: 62 MG/DL (ref 40–60)
HGB BLD-MCNC: 14.6 G/DL (ref 12–17)
HGB UR QL STRIP.AUTO: NEGATIVE
HYALINE CASTS #/AREA URNS LPF: NORMAL /LPF
IMM GRANULOCYTES # BLD AUTO: 0.06 THOUSAND/UL (ref 0–0.2)
IMM GRANULOCYTES NFR BLD AUTO: 1 % (ref 0–2)
KETONES UR STRIP-MCNC: NEGATIVE MG/DL
LDLC SERPL CALC-MCNC: 94 MG/DL (ref 0–100)
LEUKOCYTE ESTERASE UR QL STRIP: NEGATIVE
LYMPHOCYTES # BLD AUTO: 2.76 THOUSANDS/ΜL (ref 0.6–4.47)
LYMPHOCYTES NFR BLD AUTO: 34 % (ref 14–44)
MCH RBC QN AUTO: 28.2 PG (ref 26.8–34.3)
MCHC RBC AUTO-ENTMCNC: 31.1 G/DL (ref 31.4–37.4)
MCV RBC AUTO: 91 FL (ref 82–98)
MONOCYTES # BLD AUTO: 0.76 THOUSAND/ΜL (ref 0.17–1.22)
MONOCYTES NFR BLD AUTO: 9 % (ref 4–12)
NEUTROPHILS # BLD AUTO: 4.32 THOUSANDS/ΜL (ref 1.85–7.62)
NEUTS SEG NFR BLD AUTO: 53 % (ref 43–75)
NITRITE UR QL STRIP: NEGATIVE
NON-SQ EPI CELLS URNS QL MICRO: NORMAL /HPF
NRBC BLD AUTO-RTO: 0 /100 WBCS
PH UR STRIP.AUTO: 6 [PH]
PLATELET # BLD AUTO: 256 THOUSANDS/UL (ref 149–390)
PMV BLD AUTO: 11 FL (ref 8.9–12.7)
POTASSIUM SERPL-SCNC: 4.1 MMOL/L (ref 3.5–5.3)
PROT SERPL-MCNC: 7.6 G/DL (ref 6.4–8.2)
PROT UR STRIP-MCNC: NEGATIVE MG/DL
PSA SERPL-MCNC: 1.2 NG/ML (ref 0–4)
RBC # BLD AUTO: 5.18 MILLION/UL (ref 3.88–5.62)
RBC #/AREA URNS AUTO: NORMAL /HPF
SODIUM SERPL-SCNC: 138 MMOL/L (ref 136–145)
SP GR UR STRIP.AUTO: 1.02 (ref 1–1.03)
TRIGL SERPL-MCNC: 91 MG/DL
TSH SERPL DL<=0.05 MIU/L-ACNC: 5.01 UIU/ML (ref 0.36–3.74)
UROBILINOGEN UR QL STRIP.AUTO: 0.2 E.U./DL
WBC # BLD AUTO: 8.15 THOUSAND/UL (ref 4.31–10.16)
WBC #/AREA URNS AUTO: NORMAL /HPF

## 2019-10-01 PROCEDURE — 80053 COMPREHEN METABOLIC PANEL: CPT

## 2019-10-01 PROCEDURE — 85025 COMPLETE CBC W/AUTO DIFF WBC: CPT

## 2019-10-01 PROCEDURE — 80061 LIPID PANEL: CPT

## 2019-10-01 PROCEDURE — G0103 PSA SCREENING: HCPCS

## 2019-10-01 PROCEDURE — 81001 URINALYSIS AUTO W/SCOPE: CPT

## 2019-10-01 PROCEDURE — 84443 ASSAY THYROID STIM HORMONE: CPT

## 2019-10-01 PROCEDURE — 36415 COLL VENOUS BLD VENIPUNCTURE: CPT

## 2019-10-02 ENCOUNTER — TELEPHONE (OUTPATIENT)
Dept: FAMILY MEDICINE CLINIC | Facility: CLINIC | Age: 59
End: 2019-10-02

## 2019-10-02 DIAGNOSIS — R73.02 IMPAIRED GLUCOSE TOLERANCE: Primary | ICD-10-CM

## 2019-10-02 DIAGNOSIS — E03.8 SUBCLINICAL HYPOTHYROIDISM: ICD-10-CM

## 2019-10-02 NOTE — TELEPHONE ENCOUNTER
DR Veneta Litten, PT RECEIVED A PHONE CALL TO MAKE OV WITH YOU TO REVIEW B/W   YOU HAVE NOTHING THIS WEEK  PT ASKED THAT YOU PLEASE CALL HIM AS HE REALLY WANTED TO COME IN TOMORROW TO DISCUSS THE RESULTS WITH YOU  HE IS OUT OF TOWN ON Friday  CAN YOU POSSIBLY PLEASE CALL HIM BACK  THANK YOU VERY MUCH

## 2019-10-16 ENCOUNTER — TRANSCRIBE ORDERS (OUTPATIENT)
Dept: ADMINISTRATIVE | Facility: HOSPITAL | Age: 59
End: 2019-10-16

## 2019-10-16 ENCOUNTER — APPOINTMENT (OUTPATIENT)
Dept: LAB | Facility: MEDICAL CENTER | Age: 59
End: 2019-10-16
Payer: COMMERCIAL

## 2019-10-16 DIAGNOSIS — I10 ESSENTIAL HYPERTENSION, MALIGNANT: ICD-10-CM

## 2019-10-16 DIAGNOSIS — I10 ESSENTIAL HYPERTENSION, MALIGNANT: Primary | ICD-10-CM

## 2019-10-16 LAB
ALBUMIN SERPL BCP-MCNC: 3.8 G/DL (ref 3.5–5)
ALP SERPL-CCNC: 72 U/L (ref 46–116)
ALT SERPL W P-5'-P-CCNC: 60 U/L (ref 12–78)
ANION GAP SERPL CALCULATED.3IONS-SCNC: 4 MMOL/L (ref 4–13)
AST SERPL W P-5'-P-CCNC: 35 U/L (ref 5–45)
BASOPHILS # BLD AUTO: 0.04 THOUSANDS/ΜL (ref 0–0.1)
BASOPHILS NFR BLD AUTO: 1 % (ref 0–1)
BILIRUB SERPL-MCNC: 0.83 MG/DL (ref 0.2–1)
BUN SERPL-MCNC: 21 MG/DL (ref 5–25)
CALCIUM SERPL-MCNC: 9.2 MG/DL (ref 8.3–10.1)
CHLORIDE SERPL-SCNC: 110 MMOL/L (ref 100–108)
CHOLEST SERPL-MCNC: 188 MG/DL (ref 50–200)
CO2 SERPL-SCNC: 27 MMOL/L (ref 21–32)
CREAT SERPL-MCNC: 1.24 MG/DL (ref 0.6–1.3)
EOSINOPHIL # BLD AUTO: 0.25 THOUSAND/ΜL (ref 0–0.61)
EOSINOPHIL NFR BLD AUTO: 4 % (ref 0–6)
ERYTHROCYTE [DISTWIDTH] IN BLOOD BY AUTOMATED COUNT: 13.9 % (ref 11.6–15.1)
GFR SERPL CREATININE-BSD FRML MDRD: 63 ML/MIN/1.73SQ M
GLUCOSE P FAST SERPL-MCNC: 98 MG/DL (ref 65–99)
HCT VFR BLD AUTO: 46.3 % (ref 36.5–49.3)
HDLC SERPL-MCNC: 64 MG/DL (ref 40–60)
HGB BLD-MCNC: 14.4 G/DL (ref 12–17)
IMM GRANULOCYTES # BLD AUTO: 0.02 THOUSAND/UL (ref 0–0.2)
IMM GRANULOCYTES NFR BLD AUTO: 0 % (ref 0–2)
LDLC SERPL CALC-MCNC: 104 MG/DL (ref 0–100)
LYMPHOCYTES # BLD AUTO: 1.87 THOUSANDS/ΜL (ref 0.6–4.47)
LYMPHOCYTES NFR BLD AUTO: 33 % (ref 14–44)
MCH RBC QN AUTO: 28.6 PG (ref 26.8–34.3)
MCHC RBC AUTO-ENTMCNC: 31.1 G/DL (ref 31.4–37.4)
MCV RBC AUTO: 92 FL (ref 82–98)
MONOCYTES # BLD AUTO: 0.51 THOUSAND/ΜL (ref 0.17–1.22)
MONOCYTES NFR BLD AUTO: 9 % (ref 4–12)
NEUTROPHILS # BLD AUTO: 3.06 THOUSANDS/ΜL (ref 1.85–7.62)
NEUTS SEG NFR BLD AUTO: 53 % (ref 43–75)
NRBC BLD AUTO-RTO: 0 /100 WBCS
PLATELET # BLD AUTO: 210 THOUSANDS/UL (ref 149–390)
PMV BLD AUTO: 11.1 FL (ref 8.9–12.7)
POTASSIUM SERPL-SCNC: 4.3 MMOL/L (ref 3.5–5.3)
PROT SERPL-MCNC: 7.2 G/DL (ref 6.4–8.2)
RBC # BLD AUTO: 5.03 MILLION/UL (ref 3.88–5.62)
SODIUM SERPL-SCNC: 141 MMOL/L (ref 136–145)
TRIGL SERPL-MCNC: 99 MG/DL
TSH SERPL DL<=0.05 MIU/L-ACNC: 3.24 UIU/ML (ref 0.36–3.74)
WBC # BLD AUTO: 5.75 THOUSAND/UL (ref 4.31–10.16)

## 2019-10-16 PROCEDURE — 80061 LIPID PANEL: CPT

## 2019-10-16 PROCEDURE — 84443 ASSAY THYROID STIM HORMONE: CPT

## 2019-10-16 PROCEDURE — 80053 COMPREHEN METABOLIC PANEL: CPT

## 2019-10-16 PROCEDURE — 85025 COMPLETE CBC W/AUTO DIFF WBC: CPT

## 2019-10-16 PROCEDURE — 36415 COLL VENOUS BLD VENIPUNCTURE: CPT

## 2019-12-04 ENCOUNTER — OFFICE VISIT (OUTPATIENT)
Dept: FAMILY MEDICINE CLINIC | Facility: CLINIC | Age: 59
End: 2019-12-04
Payer: COMMERCIAL

## 2019-12-04 VITALS
DIASTOLIC BLOOD PRESSURE: 100 MMHG | SYSTOLIC BLOOD PRESSURE: 140 MMHG | WEIGHT: 225.8 LBS | HEIGHT: 74 IN | BODY MASS INDEX: 28.98 KG/M2 | TEMPERATURE: 97.3 F | HEART RATE: 80 BPM

## 2019-12-04 DIAGNOSIS — K21.9 GASTROESOPHAGEAL REFLUX DISEASE, ESOPHAGITIS PRESENCE NOT SPECIFIED: Primary | ICD-10-CM

## 2019-12-04 DIAGNOSIS — G89.29 CHRONIC RIGHT SHOULDER PAIN: ICD-10-CM

## 2019-12-04 DIAGNOSIS — I10 ESSENTIAL HYPERTENSION: ICD-10-CM

## 2019-12-04 DIAGNOSIS — M25.511 CHRONIC RIGHT SHOULDER PAIN: ICD-10-CM

## 2019-12-04 DIAGNOSIS — L72.3 SEBACEOUS CYST: ICD-10-CM

## 2019-12-04 PROBLEM — C61 PROSTATE CANCER (HCC): Status: ACTIVE | Noted: 2019-11-05

## 2019-12-04 PROBLEM — C61 PROSTATE CANCER (HCC): Status: RESOLVED | Noted: 2019-11-05 | Resolved: 2019-12-04

## 2019-12-04 PROBLEM — R97.20 ELEVATED PSA: Status: RESOLVED | Noted: 2017-10-18 | Resolved: 2019-12-04

## 2019-12-04 PROBLEM — I83.891 BLEEDING FROM VARICOSE VEINS OF LOWER EXTREMITY, RIGHT: Status: RESOLVED | Noted: 2019-03-04 | Resolved: 2019-12-04

## 2019-12-04 PROCEDURE — 99214 OFFICE O/P EST MOD 30 MIN: CPT | Performed by: FAMILY MEDICINE

## 2019-12-04 PROCEDURE — 3008F BODY MASS INDEX DOCD: CPT | Performed by: FAMILY MEDICINE

## 2019-12-04 RX ORDER — FAMOTIDINE 20 MG/1
20 TABLET, FILM COATED ORAL 2 TIMES DAILY PRN
Qty: 60 TABLET | Refills: 5 | Status: SHIPPED | OUTPATIENT
Start: 2019-12-04 | End: 2020-05-09 | Stop reason: ALTCHOICE

## 2019-12-04 NOTE — PROGRESS NOTES
Assessment/Plan:    Hypertension  bp up -double up on valsartan       Diagnoses and all orders for this visit:    Gastroesophageal reflux disease, esophagitis presence not specified  -     famotidine (PEPCID) 20 mg tablet; Take 1 tablet (20 mg total) by mouth 2 (two) times a day as needed for indigestion or heartburn    Essential hypertension    Chronic right shoulder pain  -     XR shoulder 2+ vw right; Future          Subjective:      Patient ID: Harlan Reyna is a 60 y o  male  Here for f/u BP-slightly up  Having more heartburn, r shoulder pain, check fingernails      The following portions of the patient's history were reviewed and updated as appropriate: allergies, current medications, past family history, past medical history, past social history, past surgical history and problem list       Review of Systems   Constitutional: Negative for activity change, appetite change and unexpected weight change  Respiratory: Negative for shortness of breath  Cardiovascular: Negative for chest pain  Gastrointestinal:        Acid reflux   Musculoskeletal: Positive for arthralgias  Shoulder pain   Skin:        Lump on scalp         Objective:      /100 (BP Location: Right arm, Patient Position: Sitting, Cuff Size: Standard)   Pulse 80   Temp (!) 97 3 °F (36 3 °C) (Tympanic)   Ht 6' 2" (1 88 m)   Wt 102 kg (225 lb 12 8 oz)   BMI 28 99 kg/m²          Physical Exam   Constitutional: He appears well-developed and well-nourished  Cardiovascular: Normal rate, regular rhythm and normal heart sounds  Pulmonary/Chest: Effort normal and breath sounds normal    Musculoskeletal: He exhibits tenderness  Right shoulder: He exhibits decreased range of motion, tenderness and spasm  Skin:   Small sebaceous cyst scalp   Vitals reviewed

## 2019-12-04 NOTE — PATIENT INSTRUCTIONS
If BP  above 135/85, take 2nd Valsartan  Exercises for Shoulder Flexion and Extension   WHAT YOU NEED TO KNOW:   Shoulder flexion and extension exercises work the muscles in your upper back  DISCHARGE INSTRUCTIONS:   Contact your healthcare provider if:   · You have sharp or worsening pain during exercise or at rest     · You have questions or concerns about your shoulder exercises  Before you exercise:  Warm up and stretch before you exercise  Walk or ride a stationary bike for 5 to 10 minutes to help you warm up  Stretching helps increase range of motion  It may also decrease muscle soreness and help prevent another injury  Your healthcare provider will tell you which of the following stretches to do:  · Crossover arm stretch:  Relax your shoulders  Hold your upper arm with the opposite hand  Pull your arm across your chest until you feel a stretch  Hold the stretch for 30 seconds  Return to the starting position  · Shoulder flexion stretch:  Stand facing a wall  Slowly walk your fingers up the wall until you feel a stretch  Hold for 30 seconds  Return to the starting position  · Sleeper stretch:  Lie on your side on a firm, flat surface with your injured arm tucked under you  Place your head on a pillow for comfort  Bend the elbow of your injured arm 90°  Use your arm that is not injured to slowly push your injured arm down  Stop when you feel a stretch at the back of your injured shoulder  Hold for 30 seconds  Slowly return to the starting position  How to exercise with a weight:  Your healthcare provider will tell you how much weight to exercise with  · Shoulder extension:  Lie on a hard table on your stomach  Let your arms hang off the side  Hold a weight in both hands with your palms facing toward your body  Keep your arms straight and slowly raise your arms parallel to the floor in a "Y" shape  Stop when your arms are level with your body  Hold for as long as directed   Slowly return to the starting position  · Shoulder flexion:  Stand and hold a weight in the hand of your injured shoulder  Keep your arm straight and slowly raise your arm over your head as far as you can without pain  Do not raise your arm over your head unless your healthcare provider says it is okay  Do not let your shoulder shrug  Hold this position for as many seconds as directed  Slowly return to the starting position  How to exercise with an exercise band:   · Shoulder extension:  Wrap the exercise band around a heavy, stable object  The band should be level with your chest  Stand and hold each end of the band in both hands  Step back and extend your arms straight  Squeeze your shoulder blades together and pull your arms back and down  Hold for as long as directed  Slowly return to the starting position  · Shoulder flexion:  Wrap the exercise band around a heavy, stable object near your foot  Grab the band with the hand of your injured shoulder  Keep your arm straight  Slowly pull the band up and past your head as far as you can without pain  Do not raise your arm over your head unless your healthcare provider says it is okay  Do not let your shoulder shrug  Hold this position for as many seconds as directed  Slowly return to the starting position  Follow up with your physical therapist as directed:  Write down your questions so you remember to ask them during your visits  © 2017 [facility] [facility] Information is for End User's use only and may not be sold, redistributed or otherwise used for commercial purposes  All illustrations and images included in CareNotes® are the copyrighted property of [employer]  or Guillermo Kruger  The above information is an  only  It is not intended as medical advice for individual conditions or treatments  Talk to your doctor, nurse or pharmacist before following any medical regimen to see if it is safe and effective for you    Exercises for Shoulder Abduction and Adduction   AMBULATORY CARE:   Shoulder abduction and adduction exercises  work the muscles at the back of your shoulder and your upper back  Before you exercise:  Warm up and stretch before you exercise  Walk or ride a stationary bike for 5 to 10 minutes to help you warm up  Stretching helps increase range of motion  It may also decrease muscle soreness and help prevent another injury  Your healthcare provider will tell you which of the following stretches to do:  · Crossover arm stretch:  Relax your shoulders  Hold your upper arm with the opposite hand  Pull your arm across your chest until you feel a stretch  Hold the stretch for 30 seconds  Return to the starting position  · Shoulder flexion stretch:  Stand facing a wall  Slowly walk your fingers up the wall until you feel a stretch  Hold the stretch for 30 seconds  Return to the starting position  · Sleeper stretch:  Lie on your injured side on a firm, flat surface  Bend the elbow of your injured arm 90° with your hand facing up  Use your arm that is not injured to slowly push your injured arm down  Stop when you feel a stretch at the back of your injured shoulder  Hold the stretch for 30 seconds  Slowly return to the starting position  Contact your healthcare provider if:   · You have sharp or worsening pain during exercise or at rest     · You have questions or concerns about your shoulder exercises  How to exercise with a weight:  Your healthcare provider will tell you how much weight to use  · Shoulder abduction:  Stand and hold a weight in your hand with your palm facing your body  Slowly raise your arm to the side with your thumb pointing up  Then raise your arm over your head as far as you can without pain  Hold this position for as long as directed  Do not raise your arm over your head unless your healthcare provider says it is okay  · Shoulder adduction:  Lie on your back on a firm surface   Extend your arm out to a "T " Bend your elbow so your forearm in the air  Hold a weight in your hand  Slowly raise your arm toward the ceiling and straighten your elbow  Hold this position for as long as directed  Slowly return to the starting position  How to exercise with an exercise band:   · Shoulder abduction:  Wrap the exercise band around a heavy, stable object near your foot  Grab the band with the hand of your injured shoulder  Keep your arm straight  Slowly raise your arm to the side with your thumb pointing up  Then, slowly pull the band over your head as far as you can without pain  Do not raise your arm over your head unless your healthcare provider says it is okay  Do not let your shoulder shrug  Hold this position for as long as directed  Slowly return to the starting position  · Shoulder adduction:  Wrap the exercise band around a heavy, stable object  Stand and face away from where the band is anchored  Hold each end of the band in both hands with your elbows bent  Your elbows should not be behind your body  Keep your arms parallel to the floor and slowly straighten your elbows  Hold this position for as long as directed  Slowly return to the starting position  Follow up with your physical therapist as directed:  Write down your questions so you remember to ask them at your visits  © 2017 [address] [employer] Information is for End User's use only and may not be sold, redistributed or otherwise used for commercial purposes  All illustrations and images included in CareNotes® are the copyrighted property of [employer]  or Guillermo Kruger  The above information is an  only  It is not intended as medical advice for individual conditions or treatments  Talk to your doctor, nurse or pharmacist before following any medical regimen to see if it is safe and effective for you

## 2019-12-07 ENCOUNTER — APPOINTMENT (OUTPATIENT)
Dept: RADIOLOGY | Facility: MEDICAL CENTER | Age: 59
End: 2019-12-07
Payer: COMMERCIAL

## 2019-12-07 ENCOUNTER — OFFICE VISIT (OUTPATIENT)
Dept: URGENT CARE | Facility: MEDICAL CENTER | Age: 59
End: 2019-12-07
Payer: COMMERCIAL

## 2019-12-07 VITALS
SYSTOLIC BLOOD PRESSURE: 140 MMHG | HEART RATE: 95 BPM | BODY MASS INDEX: 28.88 KG/M2 | HEIGHT: 74 IN | TEMPERATURE: 97.5 F | RESPIRATION RATE: 18 BRPM | DIASTOLIC BLOOD PRESSURE: 70 MMHG | WEIGHT: 225 LBS | OXYGEN SATURATION: 95 %

## 2019-12-07 DIAGNOSIS — G89.29 CHRONIC RIGHT SHOULDER PAIN: ICD-10-CM

## 2019-12-07 DIAGNOSIS — M25.511 CHRONIC RIGHT SHOULDER PAIN: ICD-10-CM

## 2019-12-07 DIAGNOSIS — S39.012A STRAIN OF LUMBAR REGION, INITIAL ENCOUNTER: Primary | ICD-10-CM

## 2019-12-07 PROCEDURE — 99213 OFFICE O/P EST LOW 20 MIN: CPT | Performed by: FAMILY MEDICINE

## 2019-12-07 PROCEDURE — 73030 X-RAY EXAM OF SHOULDER: CPT

## 2019-12-07 RX ORDER — BLOOD PRESSURE TEST KIT-LARGE
KIT MISCELLANEOUS
COMMUNITY
Start: 2019-12-05 | End: 2020-12-02

## 2019-12-07 RX ORDER — METHOCARBAMOL 500 MG/1
500 TABLET, FILM COATED ORAL
Qty: 20 TABLET | Refills: 0 | Status: SHIPPED | OUTPATIENT
Start: 2019-12-07 | End: 2020-05-09 | Stop reason: ALTCHOICE

## 2019-12-07 RX ORDER — NAPROXEN SODIUM 550 MG/1
550 TABLET ORAL 2 TIMES DAILY WITH MEALS
Qty: 20 TABLET | Refills: 0 | Status: SHIPPED | OUTPATIENT
Start: 2019-12-07 | End: 2020-05-09 | Stop reason: ALTCHOICE

## 2019-12-07 NOTE — PATIENT INSTRUCTIONS
I prescribed naproxen sodium 550 mg b i d  As needed for back pain  Robaxin 500 mg q h s     Warned about somnolence  Advised to alternate between heat and ice to affected area, perform light stretching activities  Follow up with primary care provider symptoms persist or worsen  Acute Low Back Pain, Ambulatory Care   GENERAL INFORMATION:   Acute low back pain  is discomfort in your lower back area that lasts for less than 12 weeks  The word acute is used to describe pain that starts suddenly, worsens quickly, and lasts for a short time  Common symptoms include the following:   · Back stiffness or spasms    · Pain down the back or side of one leg    · Holding yourself in an unusual position or posture to decrease your back pain    · Not being able to find a sitting position that is comfortable    · Slow increase in your pain for 24 to 48 hours after you stress your back    · Tenderness on your lower back or severe pain when you move your back  Seek immediate care for the following symptoms:   · Severe pain    · Sudden stiffness and heaviness in both buttocks down to both legs    · Numbness or weakness in one leg, or pain in both legs    · Numbness in your genital area or across your lower back    · Unable to control your urine or bowel movements  Treatment for acute low back pain  may include any of the following:  · Medicines:      ¨ NSAIDs  help decrease swelling and pain or fever  This medicine is available with or without a doctor's order  NSAIDs can cause stomach bleeding or kidney problems in certain people  If you take blood thinner medicine, always ask your healthcare provider if NSAIDs are safe for you  Always read the medicine label and follow directions  ¨ Muscle relaxers  help decrease muscle spasms pain  ¨ Prescription pain medicine  may be given  Ask how to take this medicine safely  · Surgery  may be needed if your pain is severe and other treatments do not work   Surgery may be needed for conditions of the lumbar spine, such as herniated disc or spinal stenosis  Manage your symptoms:   · Sleep on a firm mattress  If you do not have a firm mattress, have someone move your mattress to the floor for a few days  A piece of plywood under your mattress can also help make it firmer  · Apply ice  on your lower back for 15 to 20 minutes every hour or as directed  Use an ice pack, or put crushed ice in a plastic bag  Cover it with a towel  Ice helps prevent tissue damage and decreases swelling and pain  You can alternate ice and heat  · Apply heat  on your lower back for 20 to 30 minutes every 2 hours for as many days as directed  Heat helps decrease pain and muscle spasms  · Go to physical therapy  A physical therapist teaches you exercises to help improve movement and strength, and to decrease pain  Prevent acute low back pain:   · Use proper body mechanics  ¨ Bend at the hips and knees when you  objects  Do not bend from the waist  Use your leg muscles as you lift the load  Do not use your back  Keep the object close to your chest as you lift it  Try not to twist or lift anything above your waist     ¨ Change your position often when you stand for long periods of time  Rest one foot on a small box or footrest, and then switch to the other foot often  ¨ Try not to sit for long periods of time  When you do, sit in a straight-backed chair with your feet flat on the floor  Never reach, pull, or push while you are sitting  · Exercise regularly  Warm up before you exercise  Do exercises that strengthen your back muscles  Ask about the best exercise plan for you  · Maintain a healthy weight  Ask your healthcare provider how much you should weigh  Ask him to help you create a weight loss plan if you are overweight  Follow up with your healthcare provider as directed:  Return for a follow-up visit if you still have pain after 1 to 3 weeks of treatment   You may need to visit an orthopedist if your back pain lasts more than 6 to 12 weeks  Write down your questions so you remember to ask them during your visits  CARE AGREEMENT:   You have the right to help plan your care  Learn about your health condition and how it may be treated  Discuss treatment options with your caregivers to decide what care you want to receive  You always have the right to refuse treatment  The above information is an  only  It is not intended as medical advice for individual conditions or treatments  Talk to your doctor, nurse or pharmacist before following any medical regimen to see if it is safe and effective for you  © 2014 [address] is for End User's use only and may not be sold, redistributed or otherwise used for commercial purposes  All illustrations and images included in CareNotes® are the copyrighted property of [employer]  or Guillermo Kruger

## 2019-12-07 NOTE — PROGRESS NOTES
Subjective:    Pt is a 59 y/o male with significant past medical history for L4-L5 herniated disc corrected through discectomy in  presenting today for lower back pain beginning two days ago  Pt works at a  home and bent over to  a rolling machine a coffin was sitting on when he experienced sudden onset, sharp lower back pain  He rates the pain a 5/10 and it does not radiate down the legs  He has been taking Advil and ASA for the pain with little to no relief  The back pain worsens after sitting for prolonged periods of time and then having to stand  Pt had sciatica in the past and states, "this is not as bad as my previous bouts of sciatica "  He has associated sx of inner right thigh pain consistent with previous muscle strains  The pain is not radiating from the back and came on one day after the back pain  He is not limited in his ROM and has full strength to the lower extremity  He denies any problems with his bowel or bladder, numbness, or tingling  He denies any further complaints at this time  Objective:    Vitals:    19 0948   BP: 140/70   Pulse: 95   Resp: 18   Temp: 97 5 °F (36 4 °C)   SpO2: 95%       Gen Appearance: WDWN, NAD, alert and oriented, appears stated age, normal body habitus           MSK: Normal gait  Mild tenderness to palpation diffusely over the lower back primarily around L4 L5 region  No point tenderness to the spine and tenderness primarily focused to the lower back muscles  FROM of the back with no deformities, ecchymosis or edema  FROM and 5/5 strength diffusely to the LE bilaterally  Sensation intact diffusely to the LE bilaterally  2+ patellar reflex  Negative straight leg raise  Negative Riley's sign  Assessment:    1  Low back strain     Plan:     Pt was educated regarding the diagnosis and treatment plan  He will be given naproxen and a muscle relaxant for his sx    He was instructed to not be bedridden and instead stay active and moving and apply heating pad PRN for the pain  He was instructed to follow up with his PCP within the next few days if sx do not improve  He should report to the ED immediately if he experiences any bowel or bladder incontinence or muscles weakness, numbness or tingling  Pt is agreeable and understanding of this plan        Radha KIRKLAND

## 2019-12-07 NOTE — PROGRESS NOTES
[facility]        NAME: Sary Sandoval is a 60 y o  male  : 1960    MRN: [id number]  DATE: 2019  TIME: 12:16 PM    Assessment and Plan   Strain of lumbar region, initial encounter [S39 012A]  1  Strain of lumbar region, initial encounter  naproxen sodium (ANAPROX) 550 mg tablet    methocarbamol (ROBAXIN) 500 mg tablet         Patient Instructions       Follow up with PCP in 3-5 days  Proceed to  ER if symptoms worsen  Chief Complaint     Chief Complaint   Patient presents with    Back Pain     Picked up a heavy object pain in lower back that started Thursday         History of Present Illness       68-year-old male here today with 2 day history of low back pain  Patient was lifting heavy objects at a  home  Describes the pain is dull, nonradiating  Not accompanied by any numbness or weakness  Complaining of right inner thigh pain however  Patient has taken over-the-counter aspirin with no noticeable improvement  He informs us that he had disc herniation at L4-L5 and underwent laminectomy in   Review of Systems   Review of Systems   Constitutional: Negative  Musculoskeletal: Positive for back pain           Current Medications       Current Outpatient Medications:     Blood Pressure Monitoring (5 SERIES BP MONITOR) KATE, , Disp: , Rfl:     famotidine (PEPCID) 20 mg tablet, Take 1 tablet (20 mg total) by mouth 2 (two) times a day as needed for indigestion or heartburn, Disp: 60 tablet, Rfl: 5    influenza vaccine, quadrivalent (FLUARIX) 0 5 ML Dave BARNETT Quad [id number] (PF) 60 mcg (15 mcg x 4)/0 5 mL IM syringe, Disp: , Rfl:     methocarbamol (ROBAXIN) 500 mg tablet, Take 1 tablet (500 mg total) by mouth daily at bedtime as needed for muscle spasms, Disp: 20 tablet, Rfl: 0    naproxen sodium (ANAPROX) 550 mg tablet, Take 1 tablet (550 mg total) by mouth 2 (two) times a day with meals for 10 days, Disp: 20 tablet, Rfl: 0    valsartan (DIOVAN) 160 mg tablet, Take 1 tablet (160 mg total) by mouth daily, Disp: 30 tablet, Rfl: 5    Current Allergies     Allergies as of 12/07/2019    (No Known Allergies)            The following portions of the patient's history were reviewed and updated as appropriate: allergies, current medications, past family history, past medical history, past social history, past surgical history and problem list      Past Medical History:   Diagnosis Date    BPH with urinary obstruction     AND OTHER LOWER URINARY TRACT SYMPTOMS     Detached retina, right 2015    Elevated prostate specific antigen (PSA) 2017    Feeling of incomplete bladder emptying     History of hypertension     History of nocturia     Hypertension     Neuroma of foot 2017    Prostate cancer ([facility] [address] ) 2017    Weak urinary stream        Past Surgical History:   Procedure Laterality Date    BACK SURGERY  2008    EYE SURGERY      REPAIR DETACHED RETINA     EYE SURGERY Right     PROSTATE BIOPSY Bilateral 2017    TONSILLECTOMY      VASECTOMY      SURGERY VAS DEFERENS        Family History   Problem Relation Age of Onset    Hypertension Mother     Peripheral vascular disease Mother     Dementia Father          Medications have been verified  Objective   /70   Pulse 95   Temp 97 5 °F (36 4 °C)   Resp 18   Ht 6' 2" (1 88 m)   Wt 102 kg (225 lb)   SpO2 95%   BMI 28 89 kg/m²        Physical Exam     Physical Exam   Musculoskeletal: Normal range of motion  Vitals reviewed

## 2019-12-17 ENCOUNTER — TELEPHONE (OUTPATIENT)
Dept: FAMILY MEDICINE CLINIC | Facility: CLINIC | Age: 59
End: 2019-12-17

## 2019-12-17 NOTE — TELEPHONE ENCOUNTER
PATIENT HAD AN XRAY ON 12/7/19  HE NEVER RECEIVED A CALL  XRAY DOES NOT LOOK LIKE IT WAS REVIEWED BY ANY EITHER  CAN WE PLEASE CALL PATIENT WITH RESULTS ASAP, HE'S WORRIED SOMETHING IS WRONG SINCE IT TOOK THIS LONG  THANK YOU!

## 2020-01-05 DIAGNOSIS — I10 ESSENTIAL HYPERTENSION: ICD-10-CM

## 2020-01-05 RX ORDER — VALSARTAN 160 MG/1
TABLET ORAL
Qty: 30 TABLET | Refills: 0 | Status: SHIPPED | OUTPATIENT
Start: 2020-01-05 | End: 2020-05-09 | Stop reason: ALTCHOICE

## 2020-01-16 ENCOUNTER — OFFICE VISIT (OUTPATIENT)
Dept: OBGYN CLINIC | Facility: MEDICAL CENTER | Age: 60
End: 2020-01-16
Payer: COMMERCIAL

## 2020-01-16 VITALS
DIASTOLIC BLOOD PRESSURE: 83 MMHG | SYSTOLIC BLOOD PRESSURE: 127 MMHG | WEIGHT: 223 LBS | HEART RATE: 80 BPM | BODY MASS INDEX: 28.62 KG/M2 | HEIGHT: 74 IN

## 2020-01-16 DIAGNOSIS — M75.81 ROTATOR CUFF TENDINITIS, RIGHT: Primary | ICD-10-CM

## 2020-01-16 PROCEDURE — 99213 OFFICE O/P EST LOW 20 MIN: CPT | Performed by: ORTHOPAEDIC SURGERY

## 2020-01-16 PROCEDURE — 20610 DRAIN/INJ JOINT/BURSA W/O US: CPT | Performed by: ORTHOPAEDIC SURGERY

## 2020-01-16 RX ORDER — MULTIVIT-MIN/IRON FUM/FOLIC AC 7.5 MG-4
1 TABLET ORAL DAILY
COMMUNITY
End: 2020-12-02

## 2020-01-16 RX ORDER — LIDOCAINE HYDROCHLORIDE 10 MG/ML
3 INJECTION, SOLUTION EPIDURAL; INFILTRATION; INTRACAUDAL; PERINEURAL
Status: COMPLETED | OUTPATIENT
Start: 2020-01-16 | End: 2020-01-16

## 2020-01-16 RX ORDER — METHYLPREDNISOLONE ACETATE 40 MG/ML
1 INJECTION, SUSPENSION INTRA-ARTICULAR; INTRALESIONAL; INTRAMUSCULAR; SOFT TISSUE
Status: COMPLETED | OUTPATIENT
Start: 2020-01-16 | End: 2020-01-16

## 2020-01-16 RX ADMIN — METHYLPREDNISOLONE ACETATE 1 ML: 40 INJECTION, SUSPENSION INTRA-ARTICULAR; INTRALESIONAL; INTRAMUSCULAR; SOFT TISSUE at 11:00

## 2020-01-16 RX ADMIN — LIDOCAINE HYDROCHLORIDE 3 ML: 10 INJECTION, SOLUTION EPIDURAL; INFILTRATION; INTRACAUDAL; PERINEURAL at 11:00

## 2020-01-16 NOTE — PROGRESS NOTES
Ortho Sports Medicine Shoulder Visit     Assesment:   right shoulder rotator cuff strain versus possible small rotator cuff tear    Plan:    Conservative treatment:    PT for ROM and strengthening to shoulder, rotator cuff, scapular stabilizers  Imaging: All imaging from today was reviewed by myself and explained to the patient  Injection:    The risks and benefits of the injection (which include but are not limited to: infection, bleeding,damage to nerve/artery, need for further intervention), as well as the risks and benefits of all alternative treatments were explained and understood  The patient elected to proceed with injection  The procedure was done with aseptic technique, and the patient tolerated the procedure well with no complications  A corticosteroid injection of the subacromial space was performed  The patient should take 1-2 days off of activity, with gradual return to activity as tolerated  Ice to the shoulder 1-2 times daily for 20 minutes, for next 24-48 hrs  Large joint arthrocentesis: R glenohumeral  Date/Time: 2020 11:00 AM  Consent given by: patient  Site marked: site marked  Supporting Documentation  Indications: pain   Procedure Details  Location: shoulder - R glenohumeral  Preparation: Patient was prepped and draped in the usual sterile fashion  Needle size: 22 G  Ultrasound guidance: no  Approach: anterolateral  Medications administered: 3 mL lidocaine (PF) 1 %; 1 mL methylPREDNISolone acetate 40 mg/mL    Patient tolerance: patient tolerated the procedure well with no immediate complications  Dressing:  Sterile dressing applied            Surgery:     No surgery is recommended at this point, continue with conservative treatment plan as noted  History of Present Illness: The patient is a 60 y o , right hand dominant 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, referred to me by himself, seen in clinic for evaluation of right shoulder pain        The patient denies a history of diabetes  The patient denies a history of thyroid disorder  Pain is located anterior, posterior  The patient rates the pain as a 7/10  The pain has been present for 3 months  The patient denies any specific injury  The mechanism of injury was unknown  The pain is characterized as dull, achy  The pain is present at all times, but is more significant in the evening  Pain is improved by rest, ice and NSAIDS  Pain is aggravated by overhead activity, reaching back and lifting   Symptoms include occasional clicking  The patient has weakness  The patient denies numbness and tingling  The patient has tried rest and NSAIDS            Shoulder Surgical History:  None    Past Medical, Social and Family History:  Past Medical History:   Diagnosis Date    BPH with urinary obstruction     AND OTHER LOWER URINARY TRACT SYMPTOMS     Detached retina, right 2015    Elevated prostate specific antigen (PSA) 2017    Feeling of incomplete bladder emptying     History of hypertension     History of nocturia     Hypertension     Neuroma of foot 2017    Prostate cancer ([facility] [address] ) 2017    Weak urinary stream      Past Surgical History:   Procedure Laterality Date    BACK SURGERY  2008    EYE SURGERY      REPAIR DETACHED RETINA     EYE SURGERY Right     PROSTATE BIOPSY Bilateral 2017    TONSILLECTOMY      VASECTOMY      SURGERY VAS DEFERENS      No Known Allergies  Current Outpatient Medications on File Prior to Visit   Medication Sig Dispense Refill    Blood Pressure Monitoring (5 SERIES BP MONITOR) KATE       co-enzyme Q-10 30 MG capsule Take 30 mg by mouth 3 (three) times a day      Multiple Vitamins-Minerals (MULTIVITAMIN WITH MINERALS) tablet Take 1 tablet by mouth daily      valsartan (DIOVAN) 160 mg tablet take 1 tablet by mouth once daily 30 tablet 0    famotidine (PEPCID) 20 mg tablet Take 1 tablet (20 mg total) by mouth 2 (two) times a day as needed for indigestion or heartburn (Patient not taking: Reported on 1/16/2020) 60 tablet 5    influenza vaccine, quadrivalent (FLUARIX) 0 5 ML ANIBAL Acosta Quad [id number] (PF) 60 mcg (15 mcg x 4)/0 5 mL IM syringe      methocarbamol (ROBAXIN) 500 mg tablet Take 1 tablet (500 mg total) by mouth daily at bedtime as needed for muscle spasms (Patient not taking: Reported on 1/16/2020) 20 tablet 0    naproxen sodium (ANAPROX) 550 mg tablet Take 1 tablet (550 mg total) by mouth 2 (two) times a day with meals for 10 days 20 tablet 0     No current facility-administered medications on file prior to visit  Social History     Socioeconomic History    Marital status: /Civil Union     Spouse name: Not on file    Number of children: 2    Years of education: Not on file    Highest education level: Not on file   Occupational History    Not on file   Social Needs    Financial resource strain: Not on file    Food insecurity:     Worry: Not on file     Inability: Not on file    Transportation needs:     Medical: Not on file     Non-medical: Not on file   Tobacco Use    Smoking status: Never Smoker    Smokeless tobacco: Never Used   Substance and Sexual Activity    Alcohol use:  Yes     Alcohol/week: 3 0 standard drinks     Types: 3 Cans of beer per week    Drug use: No    Sexual activity: Yes     Partners: Female     Birth control/protection: Male Sterilization   Lifestyle    Physical activity:     Days per week: Not on file     Minutes per session: Not on file    Stress: Not on file   Relationships    Social connections:     Talks on phone: Not on file     Gets together: Not on file     Attends Advent service: Not on file     Active member of club or organization: Not on file     Attends meetings of clubs or organizations: Not on file     Relationship status: Not on file    Intimate partner violence:     Fear of current or ex partner: Not on file     Emotionally abused: Not on file     Physically abused: Not on file     Forced sexual activity: Not on file   Other Topics Concern    Not on file   Social History Narrative    [address]          I have reviewed the past medical, surgical, social and family history, medications and allergies as documented in the EMR  Review of systems: ROS is negative other than that noted in the HPI  Constitutional: Negative for fatigue and fever  HENT: Negative for sore throat  Respiratory: Negative for shortness of breath  Cardiovascular: Negative for chest pain  Gastrointestinal: Negative for abdominal pain  Endocrine: Negative for cold intolerance and heat intolerance  Genitourinary: Negative for flank pain  Musculoskeletal: Negative for back pain  Skin: Negative for rash  Allergic/Immunologic: Negative for immunocompromised state  Neurological: Negative for dizziness  Psychiatric/Behavioral: Negative for agitation  Physical Exam:    Blood pressure 127/83, pulse 80, height 6' 2" (1 88 m), weight 101 kg (223 lb)  General/Constitutional: NAD, well developed, well nourished  HENT: Normocephalic, atraumatic  CV: Intact distal pulses, regular rate  Resp: No respiratory distress or labored breathing  Lymphatic: No lymphadenopathy palpated  Neuro: Alert and Oriented x 3, no focal deficits  Psych: Normal mood, normal affect, normal judgement, normal behavior  Skin: Warm, dry, no rashes, no erythema     Shoulder Exam (focused):     Shoulder focused exam:       RIGHT LEFT    Scapula Atrophy Negative Negative     Winging Negative Negative     Protraction Negative Negative    Rotator cuff SS 4+/5 5/5     IS 5/5 5/5     SubS 4/5 5/5    ROM  170     ER0 60 60     ER90 90 90     IR90 40 40     IRb T6 T6    TTP: AC Negative Negative     Biceps Positive Negative     Coracoid Negative Negative    Special Tests: O'Briens Positive Negative     [place]-shear Negative Negative     Cross body Adduction Positive Negative     Speeds  Positive Negative     Zoila's Negative Negative     Whipple Positive Negative       Neer Positive Negative     Eldridge Positive Negative    Instability: Apprehension & relocation negative negative     Load & shift not tested not tested    Other: Crank Negative Negative               UE NV Exam: +2 Radial pulses bilaterally  Sensation intact to light touch C5-T1 bilaterally, Radial/median/ulnar nerve motor intact      Bilateral elbow, wrist, and and forearm ROM full, painless with passive ROM, no ttp or crepitance throughout extremities below shoulder joint    Cervical ROM is full without pain, numbness or tingling      Shoulder Imaging    Attending Physician has personally reviewed pertinent imaging and/or reports in PACS, impression is as follows:    X-rays of the right shoulder were reviewed, which demonstrates mild glenohumeral and AC joint osteoarthritis  He has reviewed the radiology report and agree with their impression      Scribe Attestation    I,:   Jonny Edwards am acting as a scribe while in the presence of the attending physician :        I,:   Jimmy Ibarra, DO personally performed the services described in this documentation    as scribed in my presence :

## 2020-01-24 ENCOUNTER — OFFICE VISIT (OUTPATIENT)
Dept: OBGYN CLINIC | Facility: HOSPITAL | Age: 60
End: 2020-01-24
Payer: COMMERCIAL

## 2020-01-24 VITALS
DIASTOLIC BLOOD PRESSURE: 83 MMHG | HEIGHT: 74 IN | SYSTOLIC BLOOD PRESSURE: 119 MMHG | WEIGHT: 223 LBS | BODY MASS INDEX: 28.62 KG/M2 | HEART RATE: 97 BPM

## 2020-01-24 DIAGNOSIS — M18.12 ARTHRITIS OF CARPOMETACARPAL (CMC) JOINT OF LEFT THUMB: ICD-10-CM

## 2020-01-24 DIAGNOSIS — M18.11 ARTHRITIS OF CARPOMETACARPAL (CMC) JOINT OF RIGHT THUMB: Primary | ICD-10-CM

## 2020-01-24 PROCEDURE — 99213 OFFICE O/P EST LOW 20 MIN: CPT | Performed by: SURGERY

## 2020-01-24 PROCEDURE — 20600 DRAIN/INJ JOINT/BURSA W/O US: CPT | Performed by: SURGERY

## 2020-01-24 RX ORDER — LIDOCAINE HYDROCHLORIDE 10 MG/ML
1 INJECTION, SOLUTION INFILTRATION; PERINEURAL
Status: COMPLETED | OUTPATIENT
Start: 2020-01-24 | End: 2020-01-24

## 2020-01-24 RX ORDER — BUPIVACAINE HYDROCHLORIDE 2.5 MG/ML
0.5 INJECTION, SOLUTION INFILTRATION; PERINEURAL
Status: COMPLETED | OUTPATIENT
Start: 2020-01-24 | End: 2020-01-24

## 2020-01-24 RX ORDER — TRIAMCINOLONE ACETONIDE 40 MG/ML
20 INJECTION, SUSPENSION INTRA-ARTICULAR; INTRAMUSCULAR
Status: COMPLETED | OUTPATIENT
Start: 2020-01-24 | End: 2020-01-24

## 2020-01-24 RX ADMIN — BUPIVACAINE HYDROCHLORIDE 0.5 ML: 2.5 INJECTION, SOLUTION INFILTRATION; PERINEURAL at 13:25

## 2020-01-24 RX ADMIN — LIDOCAINE HYDROCHLORIDE 1 ML: 10 INJECTION, SOLUTION INFILTRATION; PERINEURAL at 13:25

## 2020-01-24 RX ADMIN — TRIAMCINOLONE ACETONIDE 20 MG: 40 INJECTION, SUSPENSION INTRA-ARTICULAR; INTRAMUSCULAR at 13:25

## 2020-01-24 NOTE — PROGRESS NOTES
ASSESSMENT/PLAN:      60 y o  male with bilateral CMC arthritis  Overall Kai De Paz is doing well  He has began to experience increased pain to his right [facility] joint for the past 2-3 weeks  A right CMC CSI was performed in the office today, without complication  Voltaren Gel was prescribed and sent to his pharmacy electronically, to be used up to 4x a day over his [facility] joint  Follow up in the office as needed if symptoms worsen or fail to improve  The patient verbalized understanding of exam findings and treatment plan  We engaged in the shared decision-making process and treatment options were discussed at length with the patient  Surgical and conservative management discussed today along with risks and benefits  Diagnoses and all orders for this visit:    Arthritis of carpometacarpal [place]) joint of right thumb  -     Small joint arthrocentesis: R thumb CMC    Arthritis of carpometacarpal (CMC) joint of left thumb    Other orders  -     diclofenac sodium (VOLTAREN) 1 %; Apply 2 g topically 4 (four) times a day      Follow Up:  Return if symptoms worsen or fail to improve  To Do Next Visit:  Re-evaluation of current issue    ____________________________________________________________________________________________________________________________________________      CHIEF COMPLAINT:  Chief Complaint   Patient presents with    Right Thumb - Follow-up       SUBJECTIVE:  Lissette Edward is a 60y o  year old RHD male who presents to the office today for a follow up regarding bilateral CMC arthritis  Kai De Paz underwent bilateral [facility] CSI in September  He notes great improvement following the CSI, until aprox  2-3 weeks ago  He notes increased pain to his right thumb CMC joint  He notes increased pain at night  He is not taking anything for pain control at this time  He denies associated numbness and tingling  I have personally reviewed all the relevant PMH, PSH, SH, FH, Medications and allergies       PAST MEDICAL HISTORY:  Past Medical History:   Diagnosis Date    BPH with urinary obstruction     AND OTHER LOWER URINARY TRACT SYMPTOMS     Detached retina, right 2015    Elevated prostate specific antigen (PSA) 2017    Feeling of incomplete bladder emptying     History of hypertension     History of nocturia     Hypertension     Neuroma of foot 2017    Prostate cancer ([address] ) 2017    Weak urinary stream        PAST SURGICAL HISTORY:  Past Surgical History:   Procedure Laterality Date    BACK SURGERY  2008    EYE SURGERY      REPAIR DETACHED RETINA     EYE SURGERY Right     PROSTATE BIOPSY Bilateral 2017    TONSILLECTOMY      VASECTOMY      SURGERY VAS DEFERENS        FAMILY HISTORY:  Family History   Problem Relation Age of Onset    Hypertension Mother     Peripheral vascular disease Mother     Dementia Father        SOCIAL HISTORY:  Social History     Tobacco Use    Smoking status: Never Smoker    Smokeless tobacco: Never Used   Substance Use Topics    Alcohol use:  Yes     Alcohol/week: 3 0 standard drinks     Types: 3 Cans of beer per week    Drug use: No       MEDICATIONS:    Current Outpatient Medications:     Blood Pressure Monitoring (5 SERIES BP MONITOR) KATE, , Disp: , Rfl:     influenza vaccine, quadrivalent (FLUARIX) 0 5 ML Dave BARNETT Quad [id number] (PF) 60 mcg (15 mcg x 4)/0 5 mL IM syringe, Disp: , Rfl:     Multiple Vitamins-Minerals (MULTIVITAMIN WITH MINERALS) tablet, Take 1 tablet by mouth daily, Disp: , Rfl:     valsartan (DIOVAN) 160 mg tablet, take 1 tablet by mouth once daily, Disp: 30 tablet, Rfl: 0    co-enzyme Q-10 30 MG capsule, Take 30 mg by mouth 3 (three) times a day, Disp: , Rfl:     famotidine (PEPCID) 20 mg tablet, Take 1 tablet (20 mg total) by mouth 2 (two) times a day as needed for indigestion or heartburn (Patient not taking: Reported on 1/24/2020), Disp: 60 tablet, Rfl: 5    methocarbamol (ROBAXIN) 500 mg tablet, Take 1 tablet (500 mg total) by mouth daily at bedtime as needed for muscle spasms (Patient not taking: Reported on 1/24/2020), Disp: 20 tablet, Rfl: 0    naproxen sodium (ANAPROX) 550 mg tablet, Take 1 tablet (550 mg total) by mouth 2 (two) times a day with meals for 10 days, Disp: 20 tablet, Rfl: 0    ALLERGIES:  No Known Allergies    REVIEW OF SYSTEMS:  Review of Systems   Constitutional: Negative for chills, fever and unexpected weight change  HENT: Negative for hearing loss, nosebleeds and sore throat  Eyes: Negative for pain, redness and visual disturbance  Respiratory: Negative for cough, shortness of breath and wheezing  Cardiovascular: Negative for chest pain, palpitations and leg swelling  Gastrointestinal: Negative for abdominal pain, nausea and vomiting  Endocrine: Negative for polydipsia and polyuria  Genitourinary: Negative for difficulty urinating and hematuria  Musculoskeletal: Positive for arthralgias  Negative for joint swelling and myalgias  Skin: Negative for rash and wound  Neurological: Negative for dizziness, numbness and headaches  Psychiatric/Behavioral: Negative for decreased concentration, dysphoric mood and suicidal ideas  The patient is not nervous/anxious          VITALS:  Vitals:    01/24/20 1300   BP: 119/83   Pulse: 97       LABS:  HgA1c:   Lab Results   Component Value Date    HGBA1C 6 1 08/08/2018     BMP:   Lab Results   Component Value Date    CALCIUM 9 2 10/16/2019     01/20/2016    K 4 3 10/16/2019    CO2 27 10/16/2019     (H) 10/16/2019    BUN 21 10/16/2019    CREATININE 1 24 10/16/2019       _____________________________________________________  PHYSICAL EXAMINATION:  General: well developed and well nourished, alert, oriented times 3 and appears comfortable  Psychiatric: Normal  HEENT: Normocephalic, Atraumatic Trachea Midline, No torticollis  Pulmonary: No audible wheezing or respiratory distress   Cardiovascular: No pitting edema, 2+ radial pulse   Skin: No masses, erythema, lacerations, fluctation, ulcerations  Neurovascular: Sensation Intact to the Median, Ulnar, Radial Nerve, Motor Intact to the Median, Ulnar, Radial Nerve and Pulses Intact  Musculoskeletal: Normal, except as noted in detailed exam and in HPI        MUSCULOSKELETAL EXAMINATION:    Right CMC Exam:  No adduction contracture  No hyperextension deformity of MCP joint  Positive localized tenderness over radial and dorsal aspect of thumb (CMC joint)  Grind test is Positive for pain and Negative for crepitus  Metacarpal load shift test positive   No triggering or tenderness over the A1 pulley  No pain with Finkelsteins maneuver     ___________________________________________________  STUDIES REVIEWED:  No new imaging to review           PROCEDURES PERFORMED:  Small joint arthrocentesis: R thumb CMC  Date/Time: 1/24/2020 1:25 PM  Consent given by: patient  Site marked: site marked  Timeout: Immediately prior to procedure a time out was called to verify the correct patient, procedure, equipment, support staff and site/side marked as required   Supporting Documentation  Indications: pain   Procedure Details  Location: thumb - R thumb CMC  Preparation: Patient was prepped and draped in the usual sterile fashion  Needle size: 25 G  Ultrasound guidance: no  Medications administered: 0 5 mL bupivacaine 0 25 %; 1 mL lidocaine 1 %; 20 mg triamcinolone acetonide 40 mg/mL    Patient tolerance: patient tolerated the procedure well with no immediate complications  Dressing:  Sterile dressing applied           _____________________________________________________      Scribe Attestation    I,:   Bin Newby am acting as a scribe while in the presence of the attending physician :        I,:   Mike Lloyd MD personally performed the services described in this documentation    as scribed in my presence :

## 2020-02-11 DIAGNOSIS — I10 ESSENTIAL HYPERTENSION: Primary | ICD-10-CM

## 2020-02-11 RX ORDER — VALSARTAN 80 MG/1
TABLET ORAL
Qty: 60 TABLET | Refills: 3 | Status: SHIPPED | OUTPATIENT
Start: 2020-02-11 | End: 2020-06-17

## 2020-03-05 ENCOUNTER — OFFICE VISIT (OUTPATIENT)
Dept: OBGYN CLINIC | Facility: MEDICAL CENTER | Age: 60
End: 2020-03-05
Payer: COMMERCIAL

## 2020-03-05 VITALS
BODY MASS INDEX: 28.23 KG/M2 | WEIGHT: 220 LBS | SYSTOLIC BLOOD PRESSURE: 131 MMHG | HEIGHT: 74 IN | DIASTOLIC BLOOD PRESSURE: 80 MMHG | HEART RATE: 74 BPM

## 2020-03-05 DIAGNOSIS — M75.81 ROTATOR CUFF TENDINITIS, RIGHT: Primary | ICD-10-CM

## 2020-03-05 PROCEDURE — 3075F SYST BP GE 130 - 139MM HG: CPT | Performed by: ORTHOPAEDIC SURGERY

## 2020-03-05 PROCEDURE — 1036F TOBACCO NON-USER: CPT | Performed by: ORTHOPAEDIC SURGERY

## 2020-03-05 PROCEDURE — 3079F DIAST BP 80-89 MM HG: CPT | Performed by: ORTHOPAEDIC SURGERY

## 2020-03-05 PROCEDURE — 3008F BODY MASS INDEX DOCD: CPT | Performed by: ORTHOPAEDIC SURGERY

## 2020-03-05 PROCEDURE — 99213 OFFICE O/P EST LOW 20 MIN: CPT | Performed by: ORTHOPAEDIC SURGERY

## 2020-03-05 NOTE — PROGRESS NOTES
Ortho Sports Medicine Shoulder Visit     Assesment:   right shoulder rotator cuff strain vs possible small rotator cuff tear    Plan:    Conservative treatment:    Ice to shoulder 1-2 times daily, for 20 minutes at a time  PT for ROM and strengthening to shoulder, rotator cuff, scapular stabilizers  MRI was ordered today to assess for small tear  Repeat injection may be offered at a later date      Imaging:      No imaging was available for review today  Injection:    No Injection planned at this time  Surgery:     No surgery is recommended at this point, continue with conservative treatment plan as noted  History of Present Illness: The patient is returns for follow up of right rotator cuff strain  Since the prior visit, He reports minimal improvement  Pain is improved by rest   Pain is aggravated by overhead activity  The patient denies weakness  The patient has tried rest and injection  I have reviewed the past medical, surgical, social and family history, medications and allergies as documented in the EMR  Review of systems: ROS is negative other than that noted in the HPI  Constitutional: Negative for fatigue and fever     Cardiovascular: Negative for chest pain  Pulmonary: negative for shortness of breath    PMH/PSH:  Past Medical History:   Diagnosis Date    BPH with urinary obstruction     AND OTHER LOWER URINARY TRACT SYMPTOMS     Detached retina, right 2015    Elevated prostate specific antigen (PSA) 2017    Feeling of incomplete bladder emptying     History of hypertension     History of nocturia     Hypertension     Neuroma of foot 2017    Prostate cancer ([facility] [address] ) 2017    Weak urinary stream      Past Surgical History:   Procedure Laterality Date    BACK SURGERY  2008    EYE SURGERY      REPAIR DETACHED RETINA     EYE SURGERY Right     PROSTATE BIOPSY Bilateral 2017    TONSILLECTOMY      VASECTOMY      SURGERY VAS DEFERENS         Physical Exam:    Blood pressure 131/80, pulse 74, height 6' 2" (1 88 m), weight 99 8 kg (220 lb)  General/Constitutional: NAD, well developed, well nourished  HENT: Normocephalic, atraumatic  CV: Intact distal pulses, regular rate  Resp: No respiratory distress or labored breathing  Lymphatic: No lymphadenopathy palpated  Neuro: Alert and Oriented x 3, no focal deficits  Psych: Normal mood, normal affect, normal judgement, normal behavior  Skin: Warm, dry, no rashes, no erythema     Shoulder Exam (focused):     Shoulder focused exam:       RIGHT LEFT    Scapula Atrophy Negative Negative     Winging Negative Negative     Protraction Negative Negative    Rotator cuff SS 5/5/5 5/5/5     IS 5/5/5 5/5/5     SubS 5/5/5 5/5/5    ROM  170     ER0 60 60     ER90 90 90     IR90 40 40     IRb T6 T6    TTP: AC Negative Negative     Biceps Negative Negative     Sub Bursa Negative Negative     Coracoid Negative Negative    Special Tests: O'Briens Negative Negative     [place]-shear Negative Negative     Cross body Adduction Negative Negative     Speeds  Negative Negative     Zoila's Negative Negative     Whipple Positive Negative       Neer Negative Negative     Eldridge Negative Negative    Instability: Apprehension & relocation not tested not tested     Load & shift not tested not tested    Other: Crank Negative Negative               UE NV Exam: +2 Radial pulses bilaterally  Sensation intact to light touch C5-T1 bilaterally, Radial/median/ulnar nerve motor intact    Cervical ROM is full without pain, numbness or tingling    Shoulder Imaging    None today        Scribe Attestation    I,:   Bety Conner MA am acting as a scribe while in the presence of the attending physician :        I,:   Neha Schulz DO personally performed the services described in this documentation    as scribed in my presence :

## 2020-03-08 ENCOUNTER — HOSPITAL ENCOUNTER (OUTPATIENT)
Dept: MRI IMAGING | Facility: HOSPITAL | Age: 60
Discharge: HOME | End: 2020-03-08
Payer: COMMERCIAL

## 2020-03-08 DIAGNOSIS — M75.81 ROTATOR CUFF TENDINITIS, RIGHT: ICD-10-CM

## 2020-03-08 PROCEDURE — 73221 MRI JOINT UPR EXTREM W/O DYE: CPT

## 2020-03-12 ENCOUNTER — OFFICE VISIT (OUTPATIENT)
Dept: OBGYN CLINIC | Facility: MEDICAL CENTER | Age: 60
End: 2020-03-12
Payer: COMMERCIAL

## 2020-03-12 VITALS
HEIGHT: 74 IN | SYSTOLIC BLOOD PRESSURE: 128 MMHG | HEART RATE: 87 BPM | WEIGHT: 217 LBS | DIASTOLIC BLOOD PRESSURE: 83 MMHG | BODY MASS INDEX: 27.85 KG/M2

## 2020-03-12 DIAGNOSIS — M75.81 ROTATOR CUFF TENDINITIS, RIGHT: ICD-10-CM

## 2020-03-12 DIAGNOSIS — M75.111 NONTRAUMATIC INCOMPLETE TEAR OF RIGHT ROTATOR CUFF: Primary | ICD-10-CM

## 2020-03-12 PROCEDURE — 3008F BODY MASS INDEX DOCD: CPT | Performed by: ORTHOPAEDIC SURGERY

## 2020-03-12 PROCEDURE — 3074F SYST BP LT 130 MM HG: CPT | Performed by: ORTHOPAEDIC SURGERY

## 2020-03-12 PROCEDURE — 99214 OFFICE O/P EST MOD 30 MIN: CPT | Performed by: ORTHOPAEDIC SURGERY

## 2020-03-12 PROCEDURE — 20610 DRAIN/INJ JOINT/BURSA W/O US: CPT | Performed by: ORTHOPAEDIC SURGERY

## 2020-03-12 PROCEDURE — 1036F TOBACCO NON-USER: CPT | Performed by: ORTHOPAEDIC SURGERY

## 2020-03-12 PROCEDURE — 3079F DIAST BP 80-89 MM HG: CPT | Performed by: ORTHOPAEDIC SURGERY

## 2020-03-12 RX ORDER — LIDOCAINE HYDROCHLORIDE 10 MG/ML
3 INJECTION, SOLUTION EPIDURAL; INFILTRATION; INTRACAUDAL; PERINEURAL
Status: COMPLETED | OUTPATIENT
Start: 2020-03-12 | End: 2020-03-12

## 2020-03-12 RX ORDER — METHYLPREDNISOLONE ACETATE 40 MG/ML
1 INJECTION, SUSPENSION INTRA-ARTICULAR; INTRALESIONAL; INTRAMUSCULAR; SOFT TISSUE
Status: COMPLETED | OUTPATIENT
Start: 2020-03-12 | End: 2020-03-12

## 2020-03-12 RX ADMIN — METHYLPREDNISOLONE ACETATE 1 ML: 40 INJECTION, SUSPENSION INTRA-ARTICULAR; INTRALESIONAL; INTRAMUSCULAR; SOFT TISSUE at 15:04

## 2020-03-12 RX ADMIN — LIDOCAINE HYDROCHLORIDE 3 ML: 10 INJECTION, SOLUTION EPIDURAL; INFILTRATION; INTRACAUDAL; PERINEURAL at 15:04

## 2020-03-12 NOTE — PROGRESS NOTES
Ortho Sports Medicine Shoulder Follow Up Visit     Assesment:   60 y o  male right shoulder small rotator cuff tear    Plan:    Conservative treatment:    Ice to shoulder 1-2 times daily, for 20 minutes at a time  PT for ROM and strengthening to shoulder, rotator cuff, scapular stabilizers  Imaging: All imaging from today was reviewed by myself and explained to the patient in the MRI report was reviewed  Injection:    The risks and benefits of the injection (which include but are not limited to: infection, bleeding,damage to nerve/artery, need for further intervention), as well as the risks and benefits of all alternative treatments were explained and understood  The patient elected to proceed with injection  The procedure was done with aseptic technique, and the patient tolerated the procedure well with no complications  A corticosteroid injection of the subacromial space was performed  The patient should take 1-2 days off of activity, with gradual return to activity as tolerated  Ice to the shoulder 1-2 times daily for 20 minutes, for next 24-48 hrs  Surgery:     No surgery is recommended at this point, continue with conservative treatment plan as noted  Follow up:    Return in about 6 weeks (around 4/23/2020)  Chief Complaint   Patient presents with    Right Shoulder - Follow-up     History of Present Illness: The patient is returns for follow up of right shoulder  Since the prior visit, He reports minimal improvement  Patient states that he has been having a lot of pain at night recently  Pain is improved by rest and ice  Pain is aggravated by overhead activity  Symptoms include clicking and catching  The patient denies weakness  The patient has tried rest, ice and injection          Shoulder Surgical History:  None    Past Medical, Social and Family History:  Past Medical History:   Diagnosis Date    BPH with urinary obstruction     AND OTHER LOWER URINARY TRACT SYMPTOMS     Detached retina, right 2015    Elevated prostate specific antigen (PSA) 2017    Feeling of incomplete bladder emptying     History of hypertension     History of nocturia     Hypertension     Neuroma of foot 2017    Prostate cancer ([facility] [address] ) 2017    Weak urinary stream      Past Surgical History:   Procedure Laterality Date    BACK SURGERY  2008    EYE SURGERY      REPAIR DETACHED RETINA     EYE SURGERY Right     PROSTATE BIOPSY Bilateral 2017    TONSILLECTOMY      VASECTOMY      SURGERY VAS DEFERENS      No Known Allergies  Current Outpatient Medications on File Prior to Visit   Medication Sig Dispense Refill    Blood Pressure Monitoring (5 SERIES BP MONITOR) KATE       co-enzyme Q-10 30 MG capsule Take 30 mg by mouth 3 (three) times a day      diclofenac sodium (VOLTAREN) 1 % Apply 2 g topically 4 (four) times a day 1 Tube 3    famotidine (PEPCID) 20 mg tablet Take 1 tablet (20 mg total) by mouth 2 (two) times a day as needed for indigestion or heartburn 60 tablet 5    influenza vaccine, quadrivalent (FLUARIX) 0 5 ML ANIBAL Afluria Quad [id number] (PF) 60 mcg (15 mcg x 4)/0 5 mL IM syringe      methocarbamol (ROBAXIN) 500 mg tablet Take 1 tablet (500 mg total) by mouth daily at bedtime as needed for muscle spasms 20 tablet 0    Multiple Vitamins-Minerals (MULTIVITAMIN WITH MINERALS) tablet Take 1 tablet by mouth daily      valsartan (DIOVAN) 160 mg tablet take 1 tablet by mouth once daily 30 tablet 0    valsartan (DIOVAN) 80 mg tablet take 2 tablets once daily 60 tablet 3    naproxen sodium (ANAPROX) 550 mg tablet Take 1 tablet (550 mg total) by mouth 2 (two) times a day with meals for 10 days 20 tablet 0     No current facility-administered medications on file prior to visit        Social History     Socioeconomic History    Marital status: /Civil Union     Spouse name: Not on file    Number of children: 2    Years of education: Not on file    Highest education level: Not on file   Occupational History    Not on file   Social Needs    Financial resource strain: Not on file    Food insecurity:     Worry: Not on file     Inability: Not on file    Transportation needs:     Medical: Not on file     Non-medical: Not on file   Tobacco Use    Smoking status: Never Smoker    Smokeless tobacco: Never Used   Substance and Sexual Activity    Alcohol use: Yes     Alcohol/week: 3 0 standard drinks     Types: 3 Cans of beer per week    Drug use: No    Sexual activity: Yes     Partners: Female     Birth control/protection: Male Sterilization   Lifestyle    Physical activity:     Days per week: Not on file     Minutes per session: Not on file    Stress: Not on file   Relationships    Social connections:     Talks on phone: Not on file     Gets together: Not on file     Attends Anabaptist service: Not on file     Active member of club or organization: Not on file     Attends meetings of clubs or organizations: Not on file     Relationship status: Not on file    Intimate partner violence:     Fear of current or ex partner: Not on file     Emotionally abused: Not on file     Physically abused: Not on file     Forced sexual activity: Not on file   Other Topics Concern    Not on file   Social History Narrative    COLLEGE STUDENT -- [address]        I have reviewed the past medical, surgical, social and family history, medications and allergies as documented in the EMR  Review of systems: ROS is negative other than that noted in the HPI  Constitutional: Negative for fatigue and fever  Physical Exam:    Blood pressure 128/83, pulse 87, height 6' 2" (1 88 m), weight 98 4 kg (217 lb)      General/Constitutional: NAD, well developed, well nourished  HENT: Normocephalic, atraumatic  CV: Intact distal pulses, regular rate  Resp: No respiratory distress or labored breathing  Lymphatic: No lymphadenopathy palpated  Neuro: Alert and Oriented x 3, no focal deficits  Psych: Normal mood, normal affect, normal judgement, normal behavior  Skin: Warm, dry, no rashes, no erythema      Shoulder focused exam:       RIGHT LEFT    Scapula Atrophy Negative Negative     Winging Negative Negative     Protraction Negative Negative    Rotator cuff SS 5/5 5/5     IS 5/5 5/5     SubS 5/5 5/5    ROM     170     ER0 60 60     ER90 90    90     IR90 T6    T6     IRb T6    T6    TTP: AC Negative Negative     Biceps Negative Negative     Coracoid Negative Negative    Special Tests: O'Briens Negative Negative     [place]-shear Negative Negative     Cross body Adduction Negative Negative     Speeds  Negative Negative     Zoila's Negative Negative     Whipple Negative Negative       Neer Negative Negative     Eldridge Negative Negative    Instability: Apprehension & relocation not tested not tested     Load & shift not tested not tested    Other: Crank Negative Negative               UE NV Exam: +2 Radial pulses bilaterally  Sensation intact to light touch C5-T1 bilaterally, Radial/median/ulnar nerve motor intact    Cervical ROM is full without pain, numbness or tingling      Shoulder Imaging    MRI of the right shoulder demonstrate full-thickness insertional tear of the supraspinatus with 11 mm of retraction  There is mild AC joint osteoarthritis  I reviewed the radiology report agree with impression      Scribe Attestation    I,:   Kevin Geller am acting as a scribe while in the presence of the attending physician :        I,:   Sincere Jorge, DO personally performed the services described in this documentation    as scribed in my presence :          Large joint arthrocentesis: R subacromial bursa  Date/Time: 3/12/2020 3:04 PM  Consent given by: patient  Site marked: site marked  Timeout: Immediately prior to procedure a time out was called to verify the correct patient, procedure, equipment, support staff and site/side marked as required   Supporting Documentation  Indications: pain and joint swelling   Procedure Details  Location: shoulder - R subacromial bursa  Preparation: Patient was prepped and draped in the usual sterile fashion  Needle size: 22 G  Ultrasound guidance: no  Approach: posterior  Medications administered: 3 mL lidocaine (PF) 1 %; 1 mL methylPREDNISolone acetate 40 mg/mL    Patient tolerance: patient tolerated the procedure well with no immediate complications  Dressing:  Sterile dressing applied

## 2020-04-23 ENCOUNTER — APPOINTMENT (OUTPATIENT)
Dept: LAB | Facility: MEDICAL CENTER | Age: 60
End: 2020-04-23
Payer: COMMERCIAL

## 2020-04-23 ENCOUNTER — TRANSCRIBE ORDERS (OUTPATIENT)
Dept: ADMINISTRATIVE | Facility: HOSPITAL | Age: 60
End: 2020-04-23

## 2020-04-23 DIAGNOSIS — C61 PROSTATE CANCER (HCC): ICD-10-CM

## 2020-04-23 DIAGNOSIS — C61 PROSTATE CANCER (HCC): Primary | ICD-10-CM

## 2020-04-23 LAB — PSA SERPL-MCNC: 1.6 NG/ML (ref 0–4)

## 2020-04-23 PROCEDURE — 84153 ASSAY OF PSA TOTAL: CPT

## 2020-05-09 ENCOUNTER — APPOINTMENT (OUTPATIENT)
Dept: RADIOLOGY | Facility: MEDICAL CENTER | Age: 60
End: 2020-05-09
Payer: COMMERCIAL

## 2020-05-09 ENCOUNTER — OFFICE VISIT (OUTPATIENT)
Dept: URGENT CARE | Facility: MEDICAL CENTER | Age: 60
End: 2020-05-09
Payer: COMMERCIAL

## 2020-05-09 VITALS
RESPIRATION RATE: 16 BRPM | HEART RATE: 74 BPM | SYSTOLIC BLOOD PRESSURE: 152 MMHG | DIASTOLIC BLOOD PRESSURE: 98 MMHG | HEIGHT: 74 IN | TEMPERATURE: 96 F | BODY MASS INDEX: 28.36 KG/M2 | OXYGEN SATURATION: 96 % | WEIGHT: 221 LBS

## 2020-05-09 DIAGNOSIS — M54.9 ACUTE UPPER BACK PAIN: Primary | ICD-10-CM

## 2020-05-09 DIAGNOSIS — M54.9 ACUTE UPPER BACK PAIN: ICD-10-CM

## 2020-05-09 LAB
ATRIAL RATE: 65 BPM
PR INTERVAL: 122 MS
QRS AXIS: -34 DEGREES
QRSD INTERVAL: 96 MS
QT INTERVAL: 426 MS
QTC INTERVAL: 443 MS
T WAVE AXIS: 41 DEGREES
VENTRICULAR RATE: 65 BPM

## 2020-05-09 PROCEDURE — 99213 OFFICE O/P EST LOW 20 MIN: CPT | Performed by: PHYSICIAN ASSISTANT

## 2020-05-09 PROCEDURE — 71046 X-RAY EXAM CHEST 2 VIEWS: CPT

## 2020-05-09 PROCEDURE — 93010 ELECTROCARDIOGRAM REPORT: CPT | Performed by: INTERNAL MEDICINE

## 2020-05-09 PROCEDURE — 93005 ELECTROCARDIOGRAM TRACING: CPT | Performed by: PHYSICIAN ASSISTANT

## 2020-05-09 RX ORDER — PREDNISONE 50 MG/1
50 TABLET ORAL DAILY
Qty: 5 TABLET | Refills: 0 | Status: SHIPPED | OUTPATIENT
Start: 2020-05-09 | End: 2020-05-14

## 2020-05-12 ENCOUNTER — OFFICE VISIT (OUTPATIENT)
Dept: FAMILY MEDICINE CLINIC | Facility: CLINIC | Age: 60
End: 2020-05-12
Payer: COMMERCIAL

## 2020-05-12 VITALS
BODY MASS INDEX: 28.36 KG/M2 | DIASTOLIC BLOOD PRESSURE: 82 MMHG | SYSTOLIC BLOOD PRESSURE: 142 MMHG | WEIGHT: 221 LBS | HEART RATE: 76 BPM | HEIGHT: 74 IN | RESPIRATION RATE: 18 BRPM | TEMPERATURE: 97.3 F

## 2020-05-12 DIAGNOSIS — J84.10 GRANULOMATOUS LUNG DISEASE (HCC): ICD-10-CM

## 2020-05-12 DIAGNOSIS — M25.512 ACUTE PAIN OF LEFT SHOULDER: Primary | ICD-10-CM

## 2020-05-12 PROCEDURE — 99213 OFFICE O/P EST LOW 20 MIN: CPT | Performed by: FAMILY MEDICINE

## 2020-05-12 PROCEDURE — 3008F BODY MASS INDEX DOCD: CPT | Performed by: FAMILY MEDICINE

## 2020-05-12 PROCEDURE — 1036F TOBACCO NON-USER: CPT | Performed by: FAMILY MEDICINE

## 2020-05-12 PROCEDURE — 3079F DIAST BP 80-89 MM HG: CPT | Performed by: FAMILY MEDICINE

## 2020-05-12 PROCEDURE — 3077F SYST BP >= 140 MM HG: CPT | Performed by: FAMILY MEDICINE

## 2020-05-12 RX ORDER — CYCLOBENZAPRINE HCL 10 MG
10 TABLET ORAL 3 TIMES DAILY PRN
Qty: 60 TABLET | Refills: 1 | Status: SHIPPED | OUTPATIENT
Start: 2020-05-12 | End: 2020-06-05

## 2020-05-13 ENCOUNTER — APPOINTMENT (OUTPATIENT)
Dept: LAB | Facility: MEDICAL CENTER | Age: 60
End: 2020-05-13
Payer: COMMERCIAL

## 2020-05-13 ENCOUNTER — APPOINTMENT (OUTPATIENT)
Dept: RADIOLOGY | Facility: MEDICAL CENTER | Age: 60
End: 2020-05-13
Payer: COMMERCIAL

## 2020-05-13 DIAGNOSIS — M25.512 ACUTE PAIN OF LEFT SHOULDER: ICD-10-CM

## 2020-05-13 DIAGNOSIS — J84.10 GRANULOMATOUS LUNG DISEASE (HCC): ICD-10-CM

## 2020-05-13 PROCEDURE — 86480 TB TEST CELL IMMUN MEASURE: CPT

## 2020-05-13 PROCEDURE — 36415 COLL VENOUS BLD VENIPUNCTURE: CPT

## 2020-05-13 PROCEDURE — 73030 X-RAY EXAM OF SHOULDER: CPT

## 2020-05-14 ENCOUNTER — TELEPHONE (OUTPATIENT)
Dept: FAMILY MEDICINE CLINIC | Facility: CLINIC | Age: 60
End: 2020-05-14

## 2020-05-14 DIAGNOSIS — R20.0 ARM NUMBNESS LEFT: Primary | ICD-10-CM

## 2020-05-14 DIAGNOSIS — M54.2 NECK PAIN: ICD-10-CM

## 2020-05-15 LAB
GAMMA INTERFERON BACKGROUND BLD IA-ACNC: 0.04 IU/ML
M TB IFN-G BLD-IMP: NEGATIVE
M TB IFN-G CD4+ BCKGRND COR BLD-ACNC: 0 IU/ML
M TB IFN-G CD4+ BCKGRND COR BLD-ACNC: 0 IU/ML
MITOGEN IGNF BCKGRD COR BLD-ACNC: 1.71 IU/ML

## 2020-05-18 DIAGNOSIS — M54.2 NECK PAIN: ICD-10-CM

## 2020-05-18 DIAGNOSIS — M25.512 ACUTE PAIN OF LEFT SHOULDER: ICD-10-CM

## 2020-05-18 DIAGNOSIS — R20.0 ARM NUMBNESS LEFT: Primary | ICD-10-CM

## 2020-05-18 RX ORDER — CELECOXIB 200 MG/1
200 CAPSULE ORAL 2 TIMES DAILY
Qty: 60 CAPSULE | Refills: 3 | Status: SHIPPED | OUTPATIENT
Start: 2020-05-18 | End: 2020-09-15

## 2020-05-28 ENCOUNTER — HOSPITAL ENCOUNTER (OUTPATIENT)
Dept: MRI IMAGING | Facility: HOSPITAL | Age: 60
Discharge: HOME | End: 2020-05-28
Payer: COMMERCIAL

## 2020-05-28 DIAGNOSIS — M54.2 NECK PAIN: Primary | ICD-10-CM

## 2020-05-28 DIAGNOSIS — M48.02 DEGENERATIVE CERVICAL SPINAL STENOSIS: ICD-10-CM

## 2020-05-28 DIAGNOSIS — R20.0 ARM NUMBNESS LEFT: ICD-10-CM

## 2020-05-28 DIAGNOSIS — M54.2 NECK PAIN: ICD-10-CM

## 2020-05-28 PROCEDURE — 72141 MRI NECK SPINE W/O DYE: CPT

## 2020-05-28 RX ORDER — TRAMADOL HYDROCHLORIDE 50 MG/1
50 TABLET ORAL EVERY 6 HOURS PRN
Qty: 30 TABLET | Refills: 0 | Status: SHIPPED | OUTPATIENT
Start: 2020-05-28 | End: 2020-09-15

## 2020-06-05 ENCOUNTER — CONSULT (OUTPATIENT)
Dept: PAIN MEDICINE | Facility: MEDICAL CENTER | Age: 60
End: 2020-06-05
Payer: COMMERCIAL

## 2020-06-05 VITALS
RESPIRATION RATE: 16 BRPM | WEIGHT: 221 LBS | SYSTOLIC BLOOD PRESSURE: 144 MMHG | BODY MASS INDEX: 28.36 KG/M2 | HEART RATE: 94 BPM | DIASTOLIC BLOOD PRESSURE: 91 MMHG | TEMPERATURE: 98.1 F | HEIGHT: 74 IN

## 2020-06-05 DIAGNOSIS — M48.02 DEGENERATIVE CERVICAL SPINAL STENOSIS: ICD-10-CM

## 2020-06-05 DIAGNOSIS — M62.838 MUSCLE SPASM: ICD-10-CM

## 2020-06-05 DIAGNOSIS — M47.812 CERVICAL SPONDYLOSIS: Primary | ICD-10-CM

## 2020-06-05 DIAGNOSIS — M54.2 NECK PAIN: ICD-10-CM

## 2020-06-05 PROCEDURE — 99244 OFF/OP CNSLTJ NEW/EST MOD 40: CPT | Performed by: PHYSICAL MEDICINE & REHABILITATION

## 2020-06-05 RX ORDER — GABAPENTIN 300 MG/1
300 CAPSULE ORAL
Qty: 30 CAPSULE | Refills: 1 | Status: SHIPPED | OUTPATIENT
Start: 2020-06-05 | End: 2020-12-02

## 2020-06-05 RX ORDER — TIZANIDINE 4 MG/1
4 TABLET ORAL EVERY 8 HOURS PRN
Qty: 45 TABLET | Refills: 0 | Status: SHIPPED | OUTPATIENT
Start: 2020-06-05 | End: 2020-09-15

## 2020-06-05 RX ORDER — IBUPROFEN 200 MG
400 TABLET ORAL EVERY 8 HOURS PRN
COMMUNITY
End: 2020-12-02

## 2020-06-11 ENCOUNTER — OFFICE VISIT (OUTPATIENT)
Dept: OBGYN CLINIC | Facility: CLINIC | Age: 60
End: 2020-06-11
Payer: COMMERCIAL

## 2020-06-11 VITALS
HEART RATE: 90 BPM | HEIGHT: 74 IN | WEIGHT: 221 LBS | BODY MASS INDEX: 28.36 KG/M2 | SYSTOLIC BLOOD PRESSURE: 143 MMHG | DIASTOLIC BLOOD PRESSURE: 88 MMHG

## 2020-06-11 DIAGNOSIS — M18.11 ARTHRITIS OF CARPOMETACARPAL (CMC) JOINT OF RIGHT THUMB: Primary | ICD-10-CM

## 2020-06-11 DIAGNOSIS — M18.12 ARTHRITIS OF CARPOMETACARPAL (CMC) JOINT OF LEFT THUMB: ICD-10-CM

## 2020-06-11 PROCEDURE — 3079F DIAST BP 80-89 MM HG: CPT | Performed by: SURGERY

## 2020-06-11 PROCEDURE — 3008F BODY MASS INDEX DOCD: CPT | Performed by: SURGERY

## 2020-06-11 PROCEDURE — 3077F SYST BP >= 140 MM HG: CPT | Performed by: SURGERY

## 2020-06-11 PROCEDURE — 99213 OFFICE O/P EST LOW 20 MIN: CPT | Performed by: SURGERY

## 2020-06-11 PROCEDURE — 20600 DRAIN/INJ JOINT/BURSA W/O US: CPT | Performed by: SURGERY

## 2020-06-11 PROCEDURE — 1036F TOBACCO NON-USER: CPT | Performed by: SURGERY

## 2020-06-11 RX ORDER — BUPIVACAINE HYDROCHLORIDE 2.5 MG/ML
0.5 INJECTION, SOLUTION INFILTRATION; PERINEURAL
Status: COMPLETED | OUTPATIENT
Start: 2020-06-11 | End: 2020-06-11

## 2020-06-11 RX ORDER — TRIAMCINOLONE ACETONIDE 40 MG/ML
20 INJECTION, SUSPENSION INTRA-ARTICULAR; INTRAMUSCULAR
Status: COMPLETED | OUTPATIENT
Start: 2020-06-11 | End: 2020-06-11

## 2020-06-11 RX ORDER — LIDOCAINE HYDROCHLORIDE 10 MG/ML
1 INJECTION, SOLUTION INFILTRATION; PERINEURAL
Status: COMPLETED | OUTPATIENT
Start: 2020-06-11 | End: 2020-06-11

## 2020-06-11 RX ADMIN — BUPIVACAINE HYDROCHLORIDE 0.5 ML: 2.5 INJECTION, SOLUTION INFILTRATION; PERINEURAL at 10:10

## 2020-06-11 RX ADMIN — LIDOCAINE HYDROCHLORIDE 1 ML: 10 INJECTION, SOLUTION INFILTRATION; PERINEURAL at 10:10

## 2020-06-11 RX ADMIN — TRIAMCINOLONE ACETONIDE 20 MG: 40 INJECTION, SUSPENSION INTRA-ARTICULAR; INTRAMUSCULAR at 10:10

## 2020-06-16 DIAGNOSIS — I10 ESSENTIAL HYPERTENSION: ICD-10-CM

## 2020-06-17 RX ORDER — VALSARTAN 80 MG/1
TABLET ORAL
Qty: 60 TABLET | Refills: 3 | Status: SHIPPED | OUTPATIENT
Start: 2020-06-17 | End: 2020-10-26

## 2020-06-29 ENCOUNTER — OFFICE VISIT (OUTPATIENT)
Dept: PAIN MEDICINE | Facility: MEDICAL CENTER | Age: 60
End: 2020-06-29
Payer: COMMERCIAL

## 2020-06-29 VITALS
SYSTOLIC BLOOD PRESSURE: 149 MMHG | TEMPERATURE: 97.6 F | HEART RATE: 71 BPM | DIASTOLIC BLOOD PRESSURE: 91 MMHG | HEIGHT: 74 IN | BODY MASS INDEX: 28.36 KG/M2 | WEIGHT: 221 LBS

## 2020-06-29 DIAGNOSIS — M47.812 CERVICAL SPONDYLOSIS: Primary | ICD-10-CM

## 2020-06-29 PROCEDURE — 99214 OFFICE O/P EST MOD 30 MIN: CPT | Performed by: PHYSICAL MEDICINE & REHABILITATION

## 2020-06-29 PROCEDURE — 3080F DIAST BP >= 90 MM HG: CPT | Performed by: PHYSICAL MEDICINE & REHABILITATION

## 2020-06-29 PROCEDURE — 3008F BODY MASS INDEX DOCD: CPT | Performed by: PHYSICAL MEDICINE & REHABILITATION

## 2020-06-29 PROCEDURE — 1036F TOBACCO NON-USER: CPT | Performed by: PHYSICAL MEDICINE & REHABILITATION

## 2020-06-29 PROCEDURE — 3077F SYST BP >= 140 MM HG: CPT | Performed by: PHYSICAL MEDICINE & REHABILITATION

## 2020-09-10 ENCOUNTER — APPOINTMENT (OUTPATIENT)
Dept: LAB | Facility: MEDICAL CENTER | Age: 60
End: 2020-09-10
Payer: COMMERCIAL

## 2020-09-10 ENCOUNTER — TRANSCRIBE ORDERS (OUTPATIENT)
Dept: LAB | Facility: MEDICAL CENTER | Age: 60
End: 2020-09-10

## 2020-09-10 DIAGNOSIS — C61 PROSTATE CANCER (HCC): Primary | ICD-10-CM

## 2020-09-10 DIAGNOSIS — C61 PROSTATE CANCER (HCC): ICD-10-CM

## 2020-09-10 LAB — PSA SERPL-MCNC: 1.7 NG/ML (ref 0–4)

## 2020-09-10 PROCEDURE — 84153 ASSAY OF PSA TOTAL: CPT

## 2020-09-15 ENCOUNTER — OFFICE VISIT (OUTPATIENT)
Dept: OBGYN CLINIC | Facility: HOSPITAL | Age: 60
End: 2020-09-15
Payer: COMMERCIAL

## 2020-09-15 ENCOUNTER — HOSPITAL ENCOUNTER (OUTPATIENT)
Dept: RADIOLOGY | Facility: HOSPITAL | Age: 60
Discharge: HOME | End: 2020-09-15
Attending: ORTHOPAEDIC SURGERY
Payer: COMMERCIAL

## 2020-09-15 VITALS
DIASTOLIC BLOOD PRESSURE: 89 MMHG | SYSTOLIC BLOOD PRESSURE: 144 MMHG | HEART RATE: 64 BPM | HEIGHT: 74 IN | BODY MASS INDEX: 28.37 KG/M2

## 2020-09-15 DIAGNOSIS — R52 PAIN: ICD-10-CM

## 2020-09-15 DIAGNOSIS — R52 PAIN: Primary | ICD-10-CM

## 2020-09-15 DIAGNOSIS — M25.562 PAIN IN BOTH KNEES, UNSPECIFIED CHRONICITY: ICD-10-CM

## 2020-09-15 DIAGNOSIS — M25.561 PAIN IN BOTH KNEES, UNSPECIFIED CHRONICITY: ICD-10-CM

## 2020-09-15 DIAGNOSIS — M16.12 PRIMARY OSTEOARTHRITIS OF ONE HIP, LEFT: ICD-10-CM

## 2020-09-15 DIAGNOSIS — M17.0 PRIMARY OSTEOARTHRITIS OF BOTH KNEES: ICD-10-CM

## 2020-09-15 PROCEDURE — 3079F DIAST BP 80-89 MM HG: CPT | Performed by: ORTHOPAEDIC SURGERY

## 2020-09-15 PROCEDURE — 20610 DRAIN/INJ JOINT/BURSA W/O US: CPT | Performed by: ORTHOPAEDIC SURGERY

## 2020-09-15 PROCEDURE — 73502 X-RAY EXAM HIP UNI 2-3 VIEWS: CPT

## 2020-09-15 PROCEDURE — 1036F TOBACCO NON-USER: CPT | Performed by: ORTHOPAEDIC SURGERY

## 2020-09-15 PROCEDURE — 99214 OFFICE O/P EST MOD 30 MIN: CPT | Performed by: ORTHOPAEDIC SURGERY

## 2020-09-15 RX ADMIN — BUPIVACAINE HYDROCHLORIDE 2 ML: 2.5 INJECTION, SOLUTION INFILTRATION; PERINEURAL at 15:41

## 2020-09-15 RX ADMIN — LIDOCAINE HYDROCHLORIDE 2 ML: 10 INJECTION, SOLUTION INFILTRATION; PERINEURAL at 15:41

## 2020-09-15 RX ADMIN — BETAMETHASONE SODIUM PHOSPHATE AND BETAMETHASONE ACETATE 12 MG: 3; 3 INJECTION, SUSPENSION INTRA-ARTICULAR; INTRALESIONAL; INTRAMUSCULAR; SOFT TISSUE at 15:41

## 2020-09-15 NOTE — PROGRESS NOTES
63 y o male with bilateral knee OA and left hip pain  Patient states that the bilateral knee pain he had in 3/2019 completely resolved with the bilateral knee CSI, but that his knee pain has recurred in the last several months  At the end of July of this year, the patient had a twisting episode and has had left groin pain which has not improved and is worsened by periods of sititng  He has had no fevers or chills      Review of Systems  Review of systems negative unless otherwise specified in HPI    Past Medical History  Past Medical History:   Diagnosis Date    BPH with urinary obstruction     AND OTHER LOWER URINARY TRACT SYMPTOMS     Chronic pain disorder     Detached retina, right 2015    Elevated prostate specific antigen (PSA) 2017    Feeling of incomplete bladder emptying     History of hypertension     History of nocturia     Hypertension     Low back pain     Neuroma of foot 2017    Prostate cancer ([facility] [address] ) 2017    Weak urinary stream        Past Surgical History  Past Surgical History:   Procedure Laterality Date    BACK SURGERY  2008    EYE SURGERY      REPAIR DETACHED RETINA     EYE SURGERY Right     HERNIA REPAIR      LAMINECTOMY      LUMBAR LAMINECTOMY Bilateral     L4-5    PROSTATE BIOPSY Bilateral 2017    SPINE SURGERY      TONSILLECTOMY      VASECTOMY      SURGERY VAS DEFERENS        Current Medications  Current Outpatient Medications on File Prior to Visit   Medication Sig Dispense Refill    Blood Pressure Monitoring (5 SERIES BP MONITOR) KATE       diclofenac sodium (VOLTAREN) 1 % Apply 2 g topically 4 (four) times a day (Patient not taking: Reported on 6/5/2020) 100 g 1    gabapentin (NEURONTIN) 300 mg capsule Take 1 capsule (300 mg total) by mouth daily at bedtime 30 capsule 1    ibuprofen (MOTRIN) 200 mg tablet Take 400 mg by mouth every 8 (eight) hours as needed for mild pain      Multiple Vitamins-Minerals (MULTIVITAMIN WITH MINERALS) tablet Take 1 tablet by mouth daily      valsartan (DIOVAN) 80 mg tablet take 2 tablets by mouth once daily 60 tablet 3    [DISCONTINUED] celecoxib (CeleBREX) 200 mg capsule Take 1 capsule (200 mg total) by mouth 2 (two) times a day 60 capsule 3    [DISCONTINUED] tiZANidine (ZANAFLEX) 4 mg tablet Take 1 tablet (4 mg total) by mouth every 8 (eight) hours as needed for muscle spasms 45 tablet 0    [DISCONTINUED] traMADol (ULTRAM) 50 mg tablet Take 1 tablet (50 mg total) by mouth every 6 (six) hours as needed for moderate pain 30 tablet 0     No current facility-administered medications on file prior to visit          Recent Labs [facility])  0   Lab Value Date/Time    HCT 46 3 10/16/2019 0814    HCT 46 4 01/20/2016 0814    HGB 14 4 10/16/2019 0814    HGB 14 6 01/20/2016 0814    WBC 5 75 10/16/2019 0814    WBC NONE SEEN 01/20/2016 0814    WBC 5 1 01/20/2016 0814    HGBA1C 6 1 08/08/2018 1717         Physical exam  · General: Awake, Alert, Oriented  · Eyes: Pupils equal, round and reactive to light  · Heart: regular rate and rhythm  · Lungs: No audible wheezing  · Abdomen: soft  LLE  - Skin intact  - NTTP medial and lateral knee joint line  - Pain with straight leg raise in the groin  - Limited internal/external rotation of the hip  - Knee ROM 0-130 deg flexion/extension  - Motor and sensation intact L3-S1  - Extremity warm and adequately perfused    RLE  - Skin intact  - NTTP medial and lateral knee joint line  - Knee ROM 0-130 deg flexion/extension  - Motor and sensation intact L3-S1  - Extremity warm and adequately perfused        Imaging  X-ray left hip: mild osteoarthritis present    Procedure  Large joint arthrocentesis: bilateral knee  Date/Time: 9/15/2020 3:41 PM  Consent given by: patient  Supporting Documentation  Indications: pain   Procedure Details  Location: knee - bilateral knee  Needle size: 22 G  Approach: anterolateral    Medications (Right): 2 mL bupivacaine 0 25 %; 2 mL lidocaine 1 %; 12 mg betamethasone acetate-betamethasone sodium phosphate 6 (3-3) mg/mLMedications (Left): 2 mL bupivacaine 0 25 %; 2 mL lidocaine 1 %; 12 mg betamethasone acetate-betamethasone sodium phosphate 6 (3-3) mg/mL   Patient tolerance: patient tolerated the procedure well with no immediate complications  Dressing:  Sterile dressing applied          Assessment/Plan:   60 y o male with bilateral knee and left hip osteoarthritis  Patient received bilateral knee steroid injections today and is doing well  Script written for left hip CSI      WBAT B/L LE  Return to clinic in 3 months

## 2020-09-16 RX ORDER — BUPIVACAINE HYDROCHLORIDE 2.5 MG/ML
2 INJECTION, SOLUTION INFILTRATION; PERINEURAL
Status: COMPLETED | OUTPATIENT
Start: 2020-09-15 | End: 2020-09-15

## 2020-09-16 RX ORDER — BETAMETHASONE SODIUM PHOSPHATE AND BETAMETHASONE ACETATE 3; 3 MG/ML; MG/ML
12 INJECTION, SUSPENSION INTRA-ARTICULAR; INTRALESIONAL; INTRAMUSCULAR; SOFT TISSUE
Status: COMPLETED | OUTPATIENT
Start: 2020-09-15 | End: 2020-09-15

## 2020-09-16 RX ORDER — LIDOCAINE HYDROCHLORIDE 10 MG/ML
2 INJECTION, SOLUTION INFILTRATION; PERINEURAL
Status: COMPLETED | OUTPATIENT
Start: 2020-09-15 | End: 2020-09-15

## 2020-09-30 ENCOUNTER — HOSPITAL ENCOUNTER (OUTPATIENT)
Dept: RADIOLOGY | Facility: HOSPITAL | Age: 60
Discharge: HOME | End: 2020-09-30

## 2020-10-26 DIAGNOSIS — I10 ESSENTIAL HYPERTENSION: ICD-10-CM

## 2020-10-26 RX ORDER — VALSARTAN 80 MG/1
TABLET ORAL
Qty: 60 TABLET | Refills: 3 | Status: SHIPPED | OUTPATIENT
Start: 2020-10-26 | End: 2021-03-08

## 2020-12-02 ENCOUNTER — OFFICE VISIT (OUTPATIENT)
Dept: URGENT CARE | Facility: MEDICAL CENTER | Age: 60
End: 2020-12-02
Payer: COMMERCIAL

## 2020-12-02 VITALS
OXYGEN SATURATION: 98 % | WEIGHT: 212 LBS | TEMPERATURE: 96 F | HEART RATE: 76 BPM | SYSTOLIC BLOOD PRESSURE: 122 MMHG | RESPIRATION RATE: 16 BRPM | BODY MASS INDEX: 27.21 KG/M2 | DIASTOLIC BLOOD PRESSURE: 80 MMHG | HEIGHT: 74 IN

## 2020-12-02 DIAGNOSIS — Z20.822 ENCOUNTER FOR LABORATORY TESTING FOR COVID-19 VIRUS: Primary | ICD-10-CM

## 2020-12-02 DIAGNOSIS — B34.9 VIRAL SYNDROME: ICD-10-CM

## 2020-12-02 PROCEDURE — 99213 OFFICE O/P EST LOW 20 MIN: CPT | Performed by: PHYSICIAN ASSISTANT

## 2020-12-02 PROCEDURE — U0003 INFECTIOUS AGENT DETECTION BY NUCLEIC ACID (DNA OR RNA); SEVERE ACUTE RESPIRATORY SYNDROME CORONAVIRUS 2 (SARS-COV-2) (CORONAVIRUS DISEASE [COVID-19]), AMPLIFIED PROBE TECHNIQUE, MAKING USE OF HIGH THROUGHPUT TECHNOLOGIES AS DESCRIBED BY CMS-2020-01-R: HCPCS | Performed by: PHYSICIAN ASSISTANT

## 2020-12-03 LAB — SARS-COV-2 RNA SPEC QL NAA+PROBE: NOT DETECTED

## 2020-12-11 ENCOUNTER — TELEMEDICINE (OUTPATIENT)
Dept: FAMILY MEDICINE CLINIC | Facility: CLINIC | Age: 60
End: 2020-12-11
Payer: COMMERCIAL

## 2020-12-11 VITALS — TEMPERATURE: 101 F

## 2020-12-11 DIAGNOSIS — Z20.822 EXPOSURE TO COVID-19 VIRUS: Primary | ICD-10-CM

## 2020-12-11 DIAGNOSIS — Z20.822 EXPOSURE TO COVID-19 VIRUS: ICD-10-CM

## 2020-12-11 DIAGNOSIS — R50.9 FEVER, UNSPECIFIED FEVER CAUSE: ICD-10-CM

## 2020-12-11 DIAGNOSIS — Z20.822 CLOSE EXPOSURE TO COVID-19 VIRUS: Primary | ICD-10-CM

## 2020-12-11 PROCEDURE — 99213 OFFICE O/P EST LOW 20 MIN: CPT | Performed by: FAMILY MEDICINE

## 2020-12-11 PROCEDURE — 87637 SARSCOV2&INF A&B&RSV AMP PRB: CPT | Performed by: FAMILY MEDICINE

## 2020-12-12 ENCOUNTER — TELEMEDICINE (OUTPATIENT)
Dept: FAMILY MEDICINE CLINIC | Facility: CLINIC | Age: 60
End: 2020-12-12
Payer: COMMERCIAL

## 2020-12-12 VITALS — TEMPERATURE: 99.5 F

## 2020-12-12 DIAGNOSIS — B34.9 VIRAL SYNDROME: ICD-10-CM

## 2020-12-12 DIAGNOSIS — Z20.822 CLOSE EXPOSURE TO COVID-19 VIRUS: Primary | ICD-10-CM

## 2020-12-12 PROCEDURE — 99213 OFFICE O/P EST LOW 20 MIN: CPT | Performed by: FAMILY MEDICINE

## 2020-12-12 RX ORDER — OSELTAMIVIR PHOSPHATE 75 MG/1
75 CAPSULE ORAL 2 TIMES DAILY
Qty: 10 CAPSULE | Refills: 0 | Status: SHIPPED | OUTPATIENT
Start: 2020-12-12 | End: 2020-12-17

## 2020-12-13 ENCOUNTER — TELEMEDICINE (OUTPATIENT)
Dept: FAMILY MEDICINE CLINIC | Facility: CLINIC | Age: 60
End: 2020-12-13
Payer: COMMERCIAL

## 2020-12-13 VITALS — TEMPERATURE: 97.6 F

## 2020-12-13 DIAGNOSIS — Z20.822 CLOSE EXPOSURE TO COVID-19 VIRUS: Primary | ICD-10-CM

## 2020-12-13 PROCEDURE — 99213 OFFICE O/P EST LOW 20 MIN: CPT | Performed by: FAMILY MEDICINE

## 2020-12-14 ENCOUNTER — TELEMEDICINE (OUTPATIENT)
Dept: FAMILY MEDICINE CLINIC | Facility: CLINIC | Age: 60
End: 2020-12-14
Payer: COMMERCIAL

## 2020-12-14 VITALS — TEMPERATURE: 97.3 F

## 2020-12-14 DIAGNOSIS — Z20.822 CLOSE EXPOSURE TO COVID-19 VIRUS: Primary | ICD-10-CM

## 2020-12-14 LAB
FLUAV RNA NPH QL NAA+PROBE: NOT DETECTED
FLUBV RNA NPH QL NAA+PROBE: NOT DETECTED
RSV RNA NPH QL NAA+PROBE: NOT DETECTED
SARS-COV-2 RNA NPH QL NAA+PROBE: DETECTED

## 2020-12-14 PROCEDURE — 99213 OFFICE O/P EST LOW 20 MIN: CPT | Performed by: FAMILY MEDICINE

## 2020-12-15 ENCOUNTER — TELEMEDICINE (OUTPATIENT)
Dept: FAMILY MEDICINE CLINIC | Facility: CLINIC | Age: 60
End: 2020-12-15
Payer: COMMERCIAL

## 2020-12-15 ENCOUNTER — TELEPHONE (OUTPATIENT)
Dept: FAMILY MEDICINE CLINIC | Facility: CLINIC | Age: 60
End: 2020-12-15

## 2020-12-15 VITALS — TEMPERATURE: 98 F

## 2020-12-15 DIAGNOSIS — U07.1 COVID-19 VIRUS INFECTION: Primary | ICD-10-CM

## 2020-12-15 PROBLEM — Z20.822 CLOSE EXPOSURE TO COVID-19 VIRUS: Status: RESOLVED | Noted: 2020-12-11 | Resolved: 2020-12-15

## 2020-12-15 PROCEDURE — 1036F TOBACCO NON-USER: CPT | Performed by: FAMILY MEDICINE

## 2020-12-15 PROCEDURE — 99213 OFFICE O/P EST LOW 20 MIN: CPT | Performed by: FAMILY MEDICINE

## 2021-01-28 ENCOUNTER — OFFICE VISIT (OUTPATIENT)
Dept: OBGYN CLINIC | Facility: CLINIC | Age: 61
End: 2021-01-28
Payer: COMMERCIAL

## 2021-01-28 ENCOUNTER — OFFICE VISIT (OUTPATIENT)
Dept: OBGYN CLINIC | Facility: MEDICAL CENTER | Age: 61
End: 2021-01-28
Payer: COMMERCIAL

## 2021-01-28 VITALS
SYSTOLIC BLOOD PRESSURE: 134 MMHG | HEIGHT: 74 IN | WEIGHT: 212 LBS | BODY MASS INDEX: 27.21 KG/M2 | DIASTOLIC BLOOD PRESSURE: 81 MMHG | HEART RATE: 77 BPM

## 2021-01-28 VITALS
BODY MASS INDEX: 26.56 KG/M2 | SYSTOLIC BLOOD PRESSURE: 128 MMHG | HEART RATE: 93 BPM | HEIGHT: 74 IN | DIASTOLIC BLOOD PRESSURE: 79 MMHG | WEIGHT: 207 LBS

## 2021-01-28 DIAGNOSIS — M18.11 ARTHRITIS OF CARPOMETACARPAL (CMC) JOINT OF RIGHT THUMB: Primary | ICD-10-CM

## 2021-01-28 DIAGNOSIS — M18.12 ARTHRITIS OF CARPOMETACARPAL (CMC) JOINT OF LEFT THUMB: ICD-10-CM

## 2021-01-28 DIAGNOSIS — M75.111 NONTRAUMATIC INCOMPLETE TEAR OF RIGHT ROTATOR CUFF: Primary | ICD-10-CM

## 2021-01-28 DIAGNOSIS — R20.0 NUMBNESS OF RIGHT HAND: ICD-10-CM

## 2021-01-28 PROCEDURE — 3078F DIAST BP <80 MM HG: CPT | Performed by: SURGERY

## 2021-01-28 PROCEDURE — 3074F SYST BP LT 130 MM HG: CPT | Performed by: SURGERY

## 2021-01-28 PROCEDURE — 99213 OFFICE O/P EST LOW 20 MIN: CPT | Performed by: SURGERY

## 2021-01-28 PROCEDURE — 3008F BODY MASS INDEX DOCD: CPT | Performed by: SURGERY

## 2021-01-28 PROCEDURE — 99213 OFFICE O/P EST LOW 20 MIN: CPT | Performed by: ORTHOPAEDIC SURGERY

## 2021-01-28 PROCEDURE — 20610 DRAIN/INJ JOINT/BURSA W/O US: CPT | Performed by: ORTHOPAEDIC SURGERY

## 2021-01-28 PROCEDURE — 1036F TOBACCO NON-USER: CPT | Performed by: SURGERY

## 2021-01-28 PROCEDURE — 20600 DRAIN/INJ JOINT/BURSA W/O US: CPT | Performed by: SURGERY

## 2021-01-28 RX ORDER — TRIAMCINOLONE ACETONIDE 40 MG/ML
20 INJECTION, SUSPENSION INTRA-ARTICULAR; INTRAMUSCULAR
Status: COMPLETED | OUTPATIENT
Start: 2021-01-28 | End: 2021-01-28

## 2021-01-28 RX ORDER — BUPIVACAINE HYDROCHLORIDE 2.5 MG/ML
0.5 INJECTION, SOLUTION INFILTRATION; PERINEURAL
Status: COMPLETED | OUTPATIENT
Start: 2021-01-28 | End: 2021-01-28

## 2021-01-28 RX ORDER — LIDOCAINE HYDROCHLORIDE 10 MG/ML
3 INJECTION, SOLUTION INFILTRATION; PERINEURAL
Status: COMPLETED | OUTPATIENT
Start: 2021-01-28 | End: 2021-01-28

## 2021-01-28 RX ORDER — METHYLPREDNISOLONE ACETATE 40 MG/ML
1 INJECTION, SUSPENSION INTRA-ARTICULAR; INTRALESIONAL; INTRAMUSCULAR; SOFT TISSUE
Status: COMPLETED | OUTPATIENT
Start: 2021-01-28 | End: 2021-01-28

## 2021-01-28 RX ORDER — LIDOCAINE HYDROCHLORIDE 10 MG/ML
1 INJECTION, SOLUTION INFILTRATION; PERINEURAL
Status: COMPLETED | OUTPATIENT
Start: 2021-01-28 | End: 2021-01-28

## 2021-01-28 RX ADMIN — BUPIVACAINE HYDROCHLORIDE 0.5 ML: 2.5 INJECTION, SOLUTION INFILTRATION; PERINEURAL at 13:43

## 2021-01-28 RX ADMIN — TRIAMCINOLONE ACETONIDE 20 MG: 40 INJECTION, SUSPENSION INTRA-ARTICULAR; INTRAMUSCULAR at 13:43

## 2021-01-28 RX ADMIN — LIDOCAINE HYDROCHLORIDE 3 ML: 10 INJECTION, SOLUTION INFILTRATION; PERINEURAL at 10:55

## 2021-01-28 RX ADMIN — METHYLPREDNISOLONE ACETATE 1 ML: 40 INJECTION, SUSPENSION INTRA-ARTICULAR; INTRALESIONAL; INTRAMUSCULAR; SOFT TISSUE at 10:55

## 2021-01-28 RX ADMIN — LIDOCAINE HYDROCHLORIDE 1 ML: 10 INJECTION, SOLUTION INFILTRATION; PERINEURAL at 13:43

## 2021-01-28 NOTE — PROGRESS NOTES
ASSESSMENT/PLAN:      63 y o  male with bilateral thumb CMC arthritis and right hand fullness, likely carpal tunnel syndrome  Becka Garcia will start bracing at night regarding the fullness feeling to his right hand  The decision was made to proceed with a repeat right thumb CMC CSI and a initial left thumb CMC CSI  The CSI's were performed in the office without complication  Post injection protocol was discussed  The CSI's can be repeated in 3 months time, as long as they continue to be beneficial for him  Continue the comfort cool brace on the right, as needed  Follow up in the office as needed if symptoms worsen or fail to improve  The patient verbalized understanding of exam findings and treatment plan  We engaged in the shared decision-making process and treatment options were discussed at length with the patient  Surgical and conservative management discussed today along with risks and benefits  Diagnoses and all orders for this visit:    Arthritis of carpometacarpal [place]) joint of right thumb  -     Small joint arthrocentesis: bilateral thumb CMC    Arthritis of carpometacarpal (CMC) joint of left thumb  -     Small joint arthrocentesis: bilateral thumb CMC    Numbness of right hand      Follow Up:  Return if symptoms worsen or fail to improve  To Do Next Visit:  Re-evaluation of current issue    ____________________________________________________________________________________________________________________________________________      CHIEF COMPLAINT:  No chief complaint on file  SUBJECTIVE:  Brad Garcia is a 63y o  year old RHD male who presents to the office today for a follow up regarding bilateral thumb CMC arthritis  Becka Garcia was last seen in the office on 06/11/2020, at which time a repeat right thumb [facility] CSI was performed  He was also provided with a right sided comfort cool brace, to be worn with activities   He states that the right [facility] CSI provided him with pain relief until recently  He also states that he is having pain to the base of his left thumb and is requesting CSI's for both thumbs  Brittni Clifford notes a fullness sensation to his right hand  He may get mild numbness and times  I have personally reviewed all the relevant PMH, PSH, SH, FH, Medications and allergies  PAST MEDICAL HISTORY:  Past Medical History:   Diagnosis Date    BPH with urinary obstruction     AND OTHER LOWER URINARY TRACT SYMPTOMS     Chronic pain disorder     Detached retina, right 2015    Elevated prostate specific antigen (PSA) 2017    Feeling of incomplete bladder emptying     History of hypertension     History of nocturia     Hypertension     Low back pain     Neuroma of foot 2017    Prostate cancer ([facility] [address] ) 2017    Weak urinary stream        PAST SURGICAL HISTORY:  Past Surgical History:   Procedure Laterality Date    BACK SURGERY  2008    EYE SURGERY      REPAIR DETACHED RETINA     EYE SURGERY Right     HERNIA REPAIR      LAMINECTOMY      LUMBAR LAMINECTOMY Bilateral     L4-5    PROSTATE BIOPSY Bilateral 2017    SPINE SURGERY      TONSILLECTOMY      VASECTOMY      SURGERY VAS DEFERENS        FAMILY HISTORY:  Family History   Problem Relation Age of Onset    Hypertension Mother     Peripheral vascular disease Mother     Dementia Father        SOCIAL HISTORY:  Social History     Tobacco Use    Smoking status: Never Smoker    Smokeless tobacco: Never Used   Substance Use Topics    Alcohol use: Not Currently     Alcohol/week: 3 0 standard drinks     Types: 3 Cans of beer per week     Frequency: Never    Drug use: No       MEDICATIONS:    Current Outpatient Medications:     valsartan (DIOVAN) 80 mg tablet, take 2 tablets by mouth once daily, Disp: 60 tablet, Rfl: 3  No current facility-administered medications for this visit  ALLERGIES:  No Known Allergies    REVIEW OF SYSTEMS:  Review of Systems   Constitutional: Negative for chills, fever and unexpected weight change  HENT: Negative for hearing loss, nosebleeds and sore throat  Eyes: Negative for pain, redness and visual disturbance  Respiratory: Negative for cough, shortness of breath and wheezing  Cardiovascular: Negative for chest pain, palpitations and leg swelling  Gastrointestinal: Negative for abdominal pain, nausea and vomiting  Endocrine: Negative for polydipsia and polyuria  Genitourinary: Negative for difficulty urinating and hematuria  Musculoskeletal: Positive for arthralgias  Negative for joint swelling and myalgias  Skin: Negative for rash and wound  Neurological: Positive for numbness  Negative for dizziness and headaches  Psychiatric/Behavioral: Negative for decreased concentration, dysphoric mood and suicidal ideas  The patient is not nervous/anxious  VITALS:  Vitals:    01/28/21 1324   BP: 128/79   Pulse: 93       LABS:  HgA1c:   Lab Results   Component Value Date    HGBA1C 6 1 08/08/2018     BMP:   Lab Results   Component Value Date    CALCIUM 9 2 10/16/2019     01/20/2016    K 4 3 10/16/2019    CO2 27 10/16/2019     (H) 10/16/2019    BUN 21 10/16/2019    CREATININE 1 24 10/16/2019       _____________________________________________________  PHYSICAL EXAMINATION:  General: well developed and well nourished, alert, oriented times 3 and appears comfortable  Psychiatric: Normal  HEENT: Normocephalic, Atraumatic Trachea Midline, No torticollis  Pulmonary: No audible wheezing or respiratory distress   Cardiovascular: No pitting edema, 2+ radial pulse   Skin: No masses, erythema, lacerations, fluctation, ulcerations  Neurovascular: Sensation Intact to the Median, Ulnar, Radial Nerve, Motor Intact to the Median, Ulnar, Radial Nerve and Pulses Intact  Musculoskeletal: Normal, except as noted in detailed exam and in HPI        MUSCULOSKELETAL EXAMINATION:    Bilateral CMC Exam:  No adduction contracture  No hyperextension deformity of MCP joint  Positive localized tenderness over radial and dorsal aspect of thumb (CMC joint)  Grind test is Positive for pain and Negative for crepitus  Metacarpal load shift test positive   No triggering or tenderness over the A1 pulley    Right Carpal Tunnel Exam:  Negative thenar atrophy  Negative phalen's test  Negative carpal tunnel compression  Negative tinels over median nerve at the wrist       ___________________________________________________  STUDIES REVIEWED:  No new imaging to review           PROCEDURES PERFORMED:  Small joint arthrocentesis: bilateral thumb CMC  Universal Protocol:  Consent: Verbal consent obtained  Written consent not obtained  Risks and benefits: risks, benefits and alternatives were discussed  Consent given by: patient  Time out: Immediately prior to procedure a "time out" was called to verify the correct patient, procedure, equipment, support staff and site/side marked as required    Site marked: the operative site was marked  Patient identity confirmed: verbally with patient    Supporting Documentation  Indications: pain   Procedure Details  Location: thumb - bilateral thumb CMC  Preparation: Patient was prepped and draped in the usual sterile fashion  Needle size: 25 G  Ultrasound guidance: no    Medications (Right): 0 5 mL bupivacaine 0 25 %; 1 mL lidocaine 1 %; 20 mg triamcinolone acetonide 40 mg/mLMedications (Left): 0 5 mL bupivacaine 0 25 %; 1 mL lidocaine 1 %; 20 mg triamcinolone acetonide 40 mg/mL   Patient tolerance: patient tolerated the procedure well with no immediate complications  Dressing:  Sterile dressing applied           _____________________________________________________      Scribe Attestation    I,:  Turner Miss Newby am acting as a scribe while in the presence of the attending physician :       I,:  Jim Paniagua MD personally performed the services described in this documentation    as scribed in my presence :

## 2021-01-28 NOTE — PROGRESS NOTES
Ortho Sports Medicine Shoulder Follow Up Visit     Assesment:   63 y o  male right shoulder small rotator cuff tear    Plan:    Conservative treatment:    Ice to shoulder 1-2 times daily, for 20 minutes at a time  Let pain guide return to activities  Imaging: All imaging from today was reviewed by myself and explained to the patient  Injection:    The risks and benefits of the injection (which include but are not limited to: infection, bleeding,damage to nerve/artery, need for further intervention), as well as the risks and benefits of all alternative treatments were explained and understood  The patient elected to proceed with injection  The procedure was done with aseptic technique, and the patient tolerated the procedure well with no complications  A corticosteroid injection of the subacromial space was performed  The patient should take 1-2 days off of activity, with gradual return to activity as tolerated  Ice to the shoulder 1-2 times daily for 20 minutes, for next 24-48 hrs  Surgery:     No surgery is recommended at this point, continue with conservative treatment plan as noted  Follow up:    Return if symptoms worsen or fail to improve  Chief Complaint   Patient presents with    Right Shoulder - Follow-up         History of Present Illness: The patient is returns for follow up of right shoulder small rotator cuff tear  Since the prior visit, He reports significant improvement with the last cortisone injection from March  Pain is improved by rest, ice, NSAIDS and injection  Pain is aggravated by overhead activity, reaching back, rotation and lifting   Symptoms include clicking, catching and popping  The patient denies weakness  The patient has tried rest, ice, NSAIDS and injection          Shoulder Surgical History:  None    Past Medical, Social and Family History:  Past Medical History:   Diagnosis Date    BPH with urinary obstruction     AND OTHER LOWER URINARY TRACT SYMPTOMS     Chronic pain disorder     Detached retina, right 2015    Elevated prostate specific antigen (PSA) 2017    Feeling of incomplete bladder emptying     History of hypertension     History of nocturia     Hypertension     Low back pain     Neuroma of foot 2017    Prostate cancer ([facility] [address] ) 2017    Weak urinary stream      Past Surgical History:   Procedure Laterality Date    BACK SURGERY  2008    EYE SURGERY      REPAIR DETACHED RETINA     EYE SURGERY Right     HERNIA REPAIR      LAMINECTOMY      LUMBAR LAMINECTOMY Bilateral     L4-5    PROSTATE BIOPSY Bilateral 2017    SPINE SURGERY      TONSILLECTOMY      VASECTOMY      SURGERY VAS DEFERENS      No Known Allergies  Current Outpatient Medications on File Prior to Visit   Medication Sig Dispense Refill    valsartan (DIOVAN) 80 mg tablet take 2 tablets by mouth once daily 60 tablet 3     No current facility-administered medications on file prior to visit        Social History     Socioeconomic History    Marital status: /Civil Union     Spouse name: Not on file    Number of children: 2    Years of education: Not on file    Highest education level: Not on file   Occupational History    Not on file   Social Needs    Financial resource strain: Not on file    Food insecurity     Worry: Not on file     Inability: Not on file   [employer] needs     Medical: Not on file     Non-medical: Not on file   Tobacco Use    Smoking status: Never Smoker    Smokeless tobacco: Never Used   Substance and Sexual Activity    Alcohol use: Not Currently     Alcohol/week: 3 0 standard drinks     Types: 3 Cans of beer per week     Frequency: Never    Drug use: No    Sexual activity: Yes     Partners: Female     Birth control/protection: Male Sterilization   Lifestyle    Physical activity     Days per week: Not on file     Minutes per session: Not on file    Stress: Not on file   Relationships    Social connections     Talks on phone: Not on file     Gets together: Not on file     Attends Hinduism service: Not on file     Active member of club or organization: Not on file     Attends meetings of clubs or organizations: Not on file     Relationship status: Not on file    Intimate partner violence     Fear of current or ex partner: Not on file     Emotionally abused: Not on file     Physically abused: Not on file     Forced sexual activity: Not on file   Other Topics Concern    Not on file   Social History Narrative    [address]        I have reviewed the past medical, surgical, social and family history, medications and allergies as documented in the EMR  Review of systems: ROS is negative other than that noted in the HPI  Constitutional: Negative for fatigue and fever  Physical Exam:    Blood pressure 134/81, pulse 77, height 6' 2" (1 88 m), weight 96 2 kg (212 lb)      General/Constitutional: NAD, well developed, well nourished  HENT: Normocephalic, atraumatic  CV: Intact distal pulses, regular rate  Resp: No respiratory distress or labored breathing  Lymphatic: No lymphadenopathy palpated  Neuro: Alert and Oriented x 3, no focal deficits  Psych: Normal mood, normal affect, normal judgement, normal behavior  Skin: Warm, dry, no rashes, no erythema      Shoulder focused exam:       RIGHT LEFT    Scapula Atrophy Negative Negative     Winging Negative Negative     Protraction Negative Negative    Rotator cuff SS 5/5 5/5     IS 5/5 5/5     SubS 5/5 5/5    ROM     170     ER0 60 60     ER90 90    90     IR90 T6    T6     IRb T6    T6    TTP: AC Negative Negative     Biceps Negative Negative     Coracoid Negative Negative    Special Tests: O'Briens Negative Negative     [place]-shear Negative Negative     Cross body Adduction Negative Negative     Speeds  Negative Negative     Zoila's Negative Negative     Whipple Negative Negative       Neer Negative Negative Jazmyn Negative Negative    Instability: Apprehension & relocation not tested not tested     Load & shift not tested not tested    Other: Crank Negative Negative           Large joint arthrocentesis: R subacromial bursa  Universal Protocol:  Consent: Verbal consent obtained  Risks and benefits: risks, benefits and alternatives were discussed  Consent given by: patient and parent  Time out: Immediately prior to procedure a "time out" was called to verify the correct patient, procedure, equipment, support staff and site/side marked as required  Patient understanding: patient states understanding of the procedure being performed  Patient consent: the patient's understanding of the procedure matches consent given  Procedure consent: procedure consent matches procedure scheduled  Relevant documents: relevant documents present and verified  Test results: test results available and properly labeled  Site marked: the operative site was marked  Radiology Images displayed and confirmed   If images not available, report reviewed: imaging studies available  Patient identity confirmed: verbally with patient    Supporting Documentation  Indications: pain and joint swelling   Procedure Details  Location: shoulder - R subacromial bursa  Needle size: 22 G  Ultrasound guidance: no  Approach: posterolateral  Medications administered: 3 mL lidocaine 1 %; 1 mL methylPREDNISolone acetate 40 mg/mL    Patient tolerance: patient tolerated the procedure well with no immediate complications  Dressing:  Sterile dressing applied            UE NV Exam: +2 Radial pulses bilaterally  Sensation intact to light touch C5-T1 bilaterally, Radial/median/ulnar nerve motor intact    Cervical ROM is full without pain, numbness or tingling      Shoulder Imaging    No imaging was performed today      Scribe Attestation    I,:  Rina Mackenzie am acting as a scribe while in the presence of the attending physician :       I,:  Marissa Rodriguez DO personally performed the services described in this documentation    as scribed in my presence :

## 2021-02-23 ENCOUNTER — TRANSCRIBE ORDERS (OUTPATIENT)
Dept: ADMINISTRATIVE | Facility: HOSPITAL | Age: 61
End: 2021-02-23

## 2021-02-23 ENCOUNTER — LAB (OUTPATIENT)
Dept: LAB | Facility: MEDICAL CENTER | Age: 61
End: 2021-02-23
Payer: COMMERCIAL

## 2021-02-23 DIAGNOSIS — C61 MALIGNANT NEOPLASM OF PROSTATE (HCC): ICD-10-CM

## 2021-02-23 DIAGNOSIS — C61 MALIGNANT NEOPLASM OF PROSTATE (HCC): Primary | ICD-10-CM

## 2021-02-23 LAB — PSA SERPL-MCNC: 1.4 NG/ML (ref 0–4)

## 2021-02-23 PROCEDURE — 84153 ASSAY OF PSA TOTAL: CPT

## 2021-03-07 DIAGNOSIS — I10 ESSENTIAL HYPERTENSION: ICD-10-CM

## 2021-03-08 ENCOUNTER — TELEPHONE (OUTPATIENT)
Dept: FAMILY MEDICINE CLINIC | Facility: CLINIC | Age: 61
End: 2021-03-08

## 2021-03-08 DIAGNOSIS — I10 ESSENTIAL HYPERTENSION: Primary | ICD-10-CM

## 2021-03-08 RX ORDER — VALSARTAN 80 MG/1
TABLET ORAL
Qty: 60 TABLET | Refills: 3 | Status: SHIPPED | OUTPATIENT
Start: 2021-03-08 | End: 2021-07-18

## 2021-03-10 ENCOUNTER — APPOINTMENT (OUTPATIENT)
Dept: LAB | Facility: MEDICAL CENTER | Age: 61
End: 2021-03-10
Payer: COMMERCIAL

## 2021-03-10 DIAGNOSIS — I10 ESSENTIAL HYPERTENSION: ICD-10-CM

## 2021-03-10 LAB
ALBUMIN SERPL BCP-MCNC: 4.1 G/DL (ref 3.5–5)
ALP SERPL-CCNC: 86 U/L (ref 46–116)
ALT SERPL W P-5'-P-CCNC: 48 U/L (ref 12–78)
ANION GAP SERPL CALCULATED.3IONS-SCNC: 6 MMOL/L (ref 4–13)
AST SERPL W P-5'-P-CCNC: 32 U/L (ref 5–45)
BASOPHILS # BLD AUTO: 0.08 THOUSANDS/ΜL (ref 0–0.1)
BASOPHILS NFR BLD AUTO: 1 % (ref 0–1)
BILIRUB SERPL-MCNC: 0.73 MG/DL (ref 0.2–1)
BILIRUB UR QL STRIP: NEGATIVE
BUN SERPL-MCNC: 18 MG/DL (ref 5–25)
CALCIUM SERPL-MCNC: 9.2 MG/DL (ref 8.3–10.1)
CHLORIDE SERPL-SCNC: 109 MMOL/L (ref 100–108)
CHOLEST SERPL-MCNC: 188 MG/DL (ref 50–200)
CLARITY UR: CLEAR
CO2 SERPL-SCNC: 25 MMOL/L (ref 21–32)
COLOR UR: YELLOW
CREAT SERPL-MCNC: 1.35 MG/DL (ref 0.6–1.3)
EOSINOPHIL # BLD AUTO: 0.2 THOUSAND/ΜL (ref 0–0.61)
EOSINOPHIL NFR BLD AUTO: 4 % (ref 0–6)
ERYTHROCYTE [DISTWIDTH] IN BLOOD BY AUTOMATED COUNT: 14.2 % (ref 11.6–15.1)
GFR SERPL CREATININE-BSD FRML MDRD: 56 ML/MIN/1.73SQ M
GLUCOSE P FAST SERPL-MCNC: 112 MG/DL (ref 65–99)
GLUCOSE UR STRIP-MCNC: NEGATIVE MG/DL
HCT VFR BLD AUTO: 44.9 % (ref 36.5–49.3)
HDLC SERPL-MCNC: 62 MG/DL
HGB BLD-MCNC: 14 G/DL (ref 12–17)
HGB UR QL STRIP.AUTO: NEGATIVE
IMM GRANULOCYTES # BLD AUTO: 0.02 THOUSAND/UL (ref 0–0.2)
IMM GRANULOCYTES NFR BLD AUTO: 0 % (ref 0–2)
KETONES UR STRIP-MCNC: NEGATIVE MG/DL
LDLC SERPL CALC-MCNC: 101 MG/DL (ref 0–100)
LEUKOCYTE ESTERASE UR QL STRIP: NEGATIVE
LYMPHOCYTES # BLD AUTO: 2.27 THOUSANDS/ΜL (ref 0.6–4.47)
LYMPHOCYTES NFR BLD AUTO: 41 % (ref 14–44)
MCH RBC QN AUTO: 27.9 PG (ref 26.8–34.3)
MCHC RBC AUTO-ENTMCNC: 31.2 G/DL (ref 31.4–37.4)
MCV RBC AUTO: 89 FL (ref 82–98)
MONOCYTES # BLD AUTO: 0.58 THOUSAND/ΜL (ref 0.17–1.22)
MONOCYTES NFR BLD AUTO: 10 % (ref 4–12)
NEUTROPHILS # BLD AUTO: 2.42 THOUSANDS/ΜL (ref 1.85–7.62)
NEUTS SEG NFR BLD AUTO: 44 % (ref 43–75)
NITRITE UR QL STRIP: NEGATIVE
NONHDLC SERPL-MCNC: 126 MG/DL
NRBC BLD AUTO-RTO: 0 /100 WBCS
PH UR STRIP.AUTO: 6 [PH]
PLATELET # BLD AUTO: 225 THOUSANDS/UL (ref 149–390)
PMV BLD AUTO: 11.5 FL (ref 8.9–12.7)
POTASSIUM SERPL-SCNC: 4.2 MMOL/L (ref 3.5–5.3)
PROT SERPL-MCNC: 7.5 G/DL (ref 6.4–8.2)
PROT UR STRIP-MCNC: NEGATIVE MG/DL
RBC # BLD AUTO: 5.02 MILLION/UL (ref 3.88–5.62)
SODIUM SERPL-SCNC: 140 MMOL/L (ref 136–145)
SP GR UR STRIP.AUTO: 1.02 (ref 1–1.03)
TRIGL SERPL-MCNC: 127 MG/DL
TSH SERPL DL<=0.05 MIU/L-ACNC: 2.27 UIU/ML (ref 0.36–3.74)
UROBILINOGEN UR QL STRIP.AUTO: 0.2 E.U./DL
WBC # BLD AUTO: 5.57 THOUSAND/UL (ref 4.31–10.16)

## 2021-03-10 PROCEDURE — 84443 ASSAY THYROID STIM HORMONE: CPT

## 2021-03-10 PROCEDURE — 81003 URINALYSIS AUTO W/O SCOPE: CPT | Performed by: FAMILY MEDICINE

## 2021-03-10 PROCEDURE — 85025 COMPLETE CBC W/AUTO DIFF WBC: CPT

## 2021-03-10 PROCEDURE — 80061 LIPID PANEL: CPT

## 2021-03-10 PROCEDURE — 36415 COLL VENOUS BLD VENIPUNCTURE: CPT

## 2021-03-10 PROCEDURE — 80053 COMPREHEN METABOLIC PANEL: CPT

## 2021-03-29 ENCOUNTER — OFFICE VISIT (OUTPATIENT)
Dept: FAMILY MEDICINE CLINIC | Facility: CLINIC | Age: 61
End: 2021-03-29
Payer: COMMERCIAL

## 2021-03-29 VITALS
HEIGHT: 74 IN | WEIGHT: 218 LBS | HEART RATE: 72 BPM | RESPIRATION RATE: 16 BRPM | SYSTOLIC BLOOD PRESSURE: 138 MMHG | BODY MASS INDEX: 27.98 KG/M2 | DIASTOLIC BLOOD PRESSURE: 76 MMHG | OXYGEN SATURATION: 98 %

## 2021-03-29 DIAGNOSIS — I10 ESSENTIAL HYPERTENSION: Primary | ICD-10-CM

## 2021-03-29 DIAGNOSIS — Z00.00 WELL ADULT EXAM: ICD-10-CM

## 2021-03-29 DIAGNOSIS — R73.02 IMPAIRED GLUCOSE TOLERANCE: ICD-10-CM

## 2021-03-29 PROBLEM — U07.1 COVID-19 VIRUS INFECTION: Status: RESOLVED | Noted: 2020-12-15 | Resolved: 2021-03-29

## 2021-03-29 PROCEDURE — 99396 PREV VISIT EST AGE 40-64: CPT | Performed by: FAMILY MEDICINE

## 2021-03-29 PROCEDURE — 3008F BODY MASS INDEX DOCD: CPT | Performed by: FAMILY MEDICINE

## 2021-03-29 PROCEDURE — 3725F SCREEN DEPRESSION PERFORMED: CPT | Performed by: FAMILY MEDICINE

## 2021-03-29 PROCEDURE — 1036F TOBACCO NON-USER: CPT | Performed by: FAMILY MEDICINE

## 2021-03-29 NOTE — PROGRESS NOTES
Assessment/Plan     Healthy male exam     1  Here for physical/ review lab-did gain a few pounds and has eaten more carbs  2  Patient Counseling:  --Nutrition: Stressed importance of moderation in sodium/caffeine intake, saturated fat and cholesterol, caloric balance, sufficient intake of fresh fruits, vegetables, fiber  --Exercise: Stressed the importance of regular exercise  --Substance Abuse: no concerns  --Sexuality: no concerns, going for repeat prostate biopsy in June    --Injury prevention: Discussed safety belts, safety helmets, smoke detector  --Dental health: Discussed importance of regular tooth brushing, flossing, and dental visits  [facility]-had broken filling  --Immunizations reviewed  -needs updated tetanus shot after 2nd covid shot  --Discussed benefits of screening colonoscopy  -due 10/22  --After hours service discussed with patient    3  Discussed the patient's BMI with him  The BMI is above average; BMI management plan is completed  4  Follow up as needed for acute illness  Corrinne Held is a 63 y o  male and is here for a comprehensive physical exam  The patient reports no problems  Do you take any herbs or supplements that were not prescribed by a doctor? no  Are you taking calcium supplements? no  Are you taking aspirin daily? no     History:  Patient receives prostate care outside our clinic  Date last prostate exam: 2020  Date last PSA: 2020    The following portions of the patient's history were reviewed and updated as appropriate: allergies, current medications, past family history, past medical history, past social history, past surgical history and problem list   no concerns    Review of Systems  Do you have pain that bothers you in your daily life? yes, general aches and pains-relieved by 2 OTC Advil twice per day  Musculoskeletal:positive for arthralgias    Excessive flatus  Objective     /76 (BP Location: Left arm, Patient Position: Sitting, Cuff Size: Standard)   Pulse 72   Resp 16   Ht 6' 2" (1 88 m)   Wt 98 9 kg (218 lb)   SpO2 98%   BMI 27 99 kg/m²     General Appearance:    Alert, cooperative, no distress, appears stated age   Head:    Normocephalic, without obvious abnormality, atraumatic   Eyes:    PERRL, conjunctiva/corneas clear, EOM's intact, fundi     benign, both eyes        Ears:    Normal TM's and external ear canals, both ears   Nose:   Nares normal, septum midline, mucosa normal, no drainage    or sinus tenderness   Throat:   Lips, mucosa, and tongue normal; teeth and gums normal   Neck:   Supple, symmetrical, trachea midline, no adenopathy;        thyroid:  No enlargement/tenderness/nodules; no carotid    bruit or JVD   Back:     Symmetric, no curvature, ROM normal, no CVA tenderness   Lungs:     Clear to auscultation bilaterally, respirations unlabored   Chest wall:    No tenderness or deformity   Heart:    Regular rate and rhythm, S1 and S2 normal, no murmur, rub   or gallop   Abdomen:     Soft, non-tender, bowel sounds active all four quadrants,     no masses, no organomegaly           Extremities:   Extremities normal, atraumatic, no cyanosis or edema   Pulses:   2+ and symmetric all extremities   Skin:   Skin color, texture, turgor normal, no rashes or lesions   Lymph nodes:   Cervical, supraclavicular, and axillary nodes normal          Assessment and Plan:    Problem List Items Addressed This Visit        Cardiovascular and Mediastinum    Hypertension - Primary     BP stable           Other Visit Diagnoses     Well adult exam        Impaired glucose tolerance        Relevant Orders    Basic metabolic panel    Hemoglobin A1C                 Diagnoses and all orders for this visit:    Essential hypertension    Well adult exam    Impaired glucose tolerance  -     Basic metabolic panel; Future  -     Hemoglobin A1C; Future              Subjective:      Patient ID: Sary Sandoval is a 63 y o  male      CC:    Chief Complaint Patient presents with    Physical Exam     Pt here for a yearly physical  R Ted       HPI:    HPI    The following portions of the patient's history were reviewed and updated as appropriate: allergies, current medications, past family history, past medical history, past social history, past surgical history and problem list       Review of Systems      Data to review:       Objective:    Vitals:    03/29/21 0816   BP: 138/76   BP Location: Left arm   Patient Position: Sitting   Cuff Size: Standard   Pulse: 72   Resp: 16   SpO2: 98%   Weight: 98 9 kg (218 lb)   Height: 6' 2" (1 88 m)        Physical Exam

## 2021-03-30 ENCOUNTER — APPOINTMENT (OUTPATIENT)
Dept: LAB | Facility: MEDICAL CENTER | Age: 61
End: 2021-03-30
Payer: COMMERCIAL

## 2021-03-30 DIAGNOSIS — R73.02 IMPAIRED GLUCOSE TOLERANCE: Primary | ICD-10-CM

## 2021-03-30 DIAGNOSIS — R73.02 IMPAIRED GLUCOSE TOLERANCE: ICD-10-CM

## 2021-03-30 LAB
ANION GAP SERPL CALCULATED.3IONS-SCNC: 6 MMOL/L (ref 4–13)
BUN SERPL-MCNC: 13 MG/DL (ref 5–25)
CALCIUM SERPL-MCNC: 8.9 MG/DL (ref 8.3–10.1)
CHLORIDE SERPL-SCNC: 111 MMOL/L (ref 100–108)
CO2 SERPL-SCNC: 25 MMOL/L (ref 21–32)
CREAT SERPL-MCNC: 1.25 MG/DL (ref 0.6–1.3)
EST. AVERAGE GLUCOSE BLD GHB EST-MCNC: 134 MG/DL
GFR SERPL CREATININE-BSD FRML MDRD: 62 ML/MIN/1.73SQ M
GLUCOSE P FAST SERPL-MCNC: 108 MG/DL (ref 65–99)
HBA1C MFR BLD: 6.3 %
POTASSIUM SERPL-SCNC: 4.3 MMOL/L (ref 3.5–5.3)
SODIUM SERPL-SCNC: 142 MMOL/L (ref 136–145)

## 2021-03-30 PROCEDURE — 80048 BASIC METABOLIC PNL TOTAL CA: CPT

## 2021-03-30 PROCEDURE — 36415 COLL VENOUS BLD VENIPUNCTURE: CPT

## 2021-03-30 PROCEDURE — 83036 HEMOGLOBIN GLYCOSYLATED A1C: CPT

## 2021-05-03 ENCOUNTER — OFFICE VISIT (OUTPATIENT)
Dept: OBGYN CLINIC | Facility: MEDICAL CENTER | Age: 61
End: 2021-05-03
Payer: COMMERCIAL

## 2021-05-03 VITALS
HEART RATE: 78 BPM | SYSTOLIC BLOOD PRESSURE: 131 MMHG | DIASTOLIC BLOOD PRESSURE: 76 MMHG | BODY MASS INDEX: 27.34 KG/M2 | WEIGHT: 213 LBS | HEIGHT: 74 IN | TEMPERATURE: 97.4 F

## 2021-05-03 DIAGNOSIS — M75.81 ROTATOR CUFF TENDINITIS, RIGHT: Primary | ICD-10-CM

## 2021-05-03 DIAGNOSIS — M75.111 NONTRAUMATIC INCOMPLETE TEAR OF RIGHT ROTATOR CUFF: ICD-10-CM

## 2021-05-03 PROCEDURE — 3008F BODY MASS INDEX DOCD: CPT | Performed by: ORTHOPAEDIC SURGERY

## 2021-05-03 PROCEDURE — 1036F TOBACCO NON-USER: CPT | Performed by: ORTHOPAEDIC SURGERY

## 2021-05-03 PROCEDURE — 99213 OFFICE O/P EST LOW 20 MIN: CPT | Performed by: ORTHOPAEDIC SURGERY

## 2021-05-03 PROCEDURE — 20610 DRAIN/INJ JOINT/BURSA W/O US: CPT | Performed by: ORTHOPAEDIC SURGERY

## 2021-05-03 RX ORDER — LIDOCAINE HYDROCHLORIDE 10 MG/ML
3 INJECTION, SOLUTION INFILTRATION; PERINEURAL
Status: COMPLETED | OUTPATIENT
Start: 2021-05-03 | End: 2021-05-03

## 2021-05-03 RX ORDER — METHYLPREDNISOLONE ACETATE 40 MG/ML
1 INJECTION, SUSPENSION INTRA-ARTICULAR; INTRALESIONAL; INTRAMUSCULAR; SOFT TISSUE
Status: COMPLETED | OUTPATIENT
Start: 2021-05-03 | End: 2021-05-03

## 2021-05-03 RX ADMIN — METHYLPREDNISOLONE ACETATE 1 ML: 40 INJECTION, SUSPENSION INTRA-ARTICULAR; INTRALESIONAL; INTRAMUSCULAR; SOFT TISSUE at 17:18

## 2021-05-03 RX ADMIN — LIDOCAINE HYDROCHLORIDE 3 ML: 10 INJECTION, SOLUTION INFILTRATION; PERINEURAL at 17:18

## 2021-05-03 NOTE — PROGRESS NOTES
Ortho Sports Medicine Shoulder Visit     Assesment:   right shoulder small rotator cuff tear    Plan:    Conservative treatment:    Ice to shoulder 1-2 times daily, for 20 minutes at a time  Home exercise program for shoulder, including ROM and strenthening  Instructions given to patient of what exercises to perform  Let pain guide return to activities  PRN      Imaging:    No imaging was available for review today  Injection:    The risks and benefits of the injection (which include but are not limited to: infection, bleeding,damage to nerve/artery, need for further intervention), as well as the risks and benefits of all alternative treatments were explained and understood  The patient elected to proceed with injection  The procedure was done with aseptic technique, and the patient tolerated the procedure well with no complications  A corticosteroid injection of the right subacromial space was performed  The patient should take 1-2 days off of activity, with gradual return to activity as tolerated  Ice to the shoulder 1-2 times daily for 20 minutes, for next 24-48 hrs  Surgery:     No surgery is recommended at this point, continue with conservative treatment plan as noted  History of Present Illness: The patient is returns for follow up of his right shoulder  MRI reviewed at last appt small rotator cuff tear  The injection helped up until recently  Past week not been able to sleep on right side  Pain is improved by rest, ice and NSAIDS  Pain is aggravated by overhead activity, reaching back, rotation and lifting   Pain lateral shoulder region when he gets it  Symptoms include clicking  The patient denies weakness  The patient has tried rest, ice, NSAIDS and injection  He is requesting CSI today  I have reviewed the past medical, surgical, social and family history, medications and allergies as documented in the EMR      Review of systems: ROS is negative other than that noted in the HPI  Constitutional: Negative for fatigue and fever  Cardiovascular: Negative for chest pain  Pulmonary: negative for shortness of breath    PMH/PSH:  Past Medical History:   Diagnosis Date    BPH with urinary obstruction     AND OTHER LOWER URINARY TRACT SYMPTOMS     Chronic pain disorder     Detached retina, right 2015    Elevated prostate specific antigen (PSA) 2017    Feeling of incomplete bladder emptying     History of hypertension     History of nocturia     Hypertension     Low back pain     Neuroma of foot 2017    Prostate cancer ([address] ) 2017    Weak urinary stream      Past Surgical History:   Procedure Laterality Date    BACK SURGERY  2008    EYE SURGERY      REPAIR DETACHED RETINA     EYE SURGERY Right     HERNIA REPAIR      LAMINECTOMY      LUMBAR LAMINECTOMY Bilateral     L4-5    PROSTATE BIOPSY Bilateral 2017    SPINE SURGERY      TONSILLECTOMY      VASECTOMY      SURGERY VAS DEFERENS         Physical Exam:    Blood pressure 131/76, pulse 78, temperature (!) 97 4 °F (36 3 °C), height 6' 2" (1 88 m), weight 96 6 kg (213 lb)  General/Constitutional: NAD, well developed, well nourished  HENT: Normocephalic, atraumatic  CV: Intact distal pulses, regular rate  Resp: No respiratory distress or labored breathing  Lymphatic: No lymphadenopathy palpated  Neuro: Alert and Oriented x 3, no focal deficits  Psych: Normal mood, normal affect, normal judgement, normal behavior  Skin: Warm, dry, no rashes, no erythema     Shoulder Exam (focused):     Shoulder focused exam:       RIGHT LEFT    Scapula Atrophy Negative Negative     Winging Negative Negative     Protraction Negative Negative    Rotator cuff SS 5/5/5 5/5/5     IS 5/5/5 5/5/5     SubS 5/5/5 5/5/5    ROM  170     ER0 60 60     ER90 90 90     IR90 40 40     IRb T6 T6    TTP: AC Negative Negative     Biceps Negative Negative     Coracoid Negative Negative    Special Tests: O'Briens Negative Negative [place]-shear Negative Negative     Cross body Adduction Negative Negative     Speeds  Negative Negative     Zoila's Negative Negative     Whipple Negative Negative       Neer Negative Negative     Eldridge Negative Negative    Instability: Apprehension & relocation not tested not tested     Load & shift not tested not tested    Other: Crank Negative Negative               UE NV Exam: +2 Radial pulses bilaterally  Sensation intact to light touch C5-T1 bilaterally, Radial/median/ulnar nerve motor intact    Cervical ROM is full without pain, numbness or tingling      Shoulder Imaging      No new imaging to review     Large joint arthrocentesis: R subacromial bursa  Universal Protocol:  Consent: Verbal consent obtained    Risks and benefits: risks, benefits and alternatives were discussed  Consent given by: patient  Patient identity confirmed: verbally with patient    Supporting Documentation  Indications: pain   Procedure Details  Location: shoulder - R subacromial bursa  Preparation: Patient was prepped and draped in the usual sterile fashion  Needle size: 22 G  Ultrasound guidance: no  Approach: posterolateral  Medications administered: 3 mL lidocaine 1 %; 1 mL methylPREDNISolone acetate 40 mg/mL    Patient tolerance: patient tolerated the procedure well with no immediate complications  Dressing:  Sterile dressing applied            Scribe Attestation    I,:  Gali Carr am acting as a scribe while in the presence of the attending physician :       I,:  45 Luci Rodriguez DO personally performed the services described in this documentation    as scribed in my presence :

## 2021-05-07 ENCOUNTER — OFFICE VISIT (OUTPATIENT)
Dept: OBGYN CLINIC | Facility: HOSPITAL | Age: 61
End: 2021-05-07
Payer: COMMERCIAL

## 2021-05-07 VITALS
SYSTOLIC BLOOD PRESSURE: 129 MMHG | BODY MASS INDEX: 27.8 KG/M2 | HEART RATE: 88 BPM | HEIGHT: 74 IN | DIASTOLIC BLOOD PRESSURE: 84 MMHG | WEIGHT: 216.6 LBS

## 2021-05-07 DIAGNOSIS — M18.12 ARTHRITIS OF CARPOMETACARPAL (CMC) JOINT OF LEFT THUMB: ICD-10-CM

## 2021-05-07 DIAGNOSIS — M18.11 ARTHRITIS OF CARPOMETACARPAL (CMC) JOINT OF RIGHT THUMB: Primary | ICD-10-CM

## 2021-05-07 PROCEDURE — 20600 DRAIN/INJ JOINT/BURSA W/O US: CPT | Performed by: SURGERY

## 2021-05-07 PROCEDURE — 3008F BODY MASS INDEX DOCD: CPT | Performed by: SURGERY

## 2021-05-07 PROCEDURE — 1036F TOBACCO NON-USER: CPT | Performed by: SURGERY

## 2021-05-07 PROCEDURE — 99214 OFFICE O/P EST MOD 30 MIN: CPT | Performed by: SURGERY

## 2021-05-07 RX ORDER — TRIAMCINOLONE ACETONIDE 40 MG/ML
20 INJECTION, SUSPENSION INTRA-ARTICULAR; INTRAMUSCULAR
Status: COMPLETED | OUTPATIENT
Start: 2021-05-07 | End: 2021-05-07

## 2021-05-07 RX ORDER — BUPIVACAINE HYDROCHLORIDE 2.5 MG/ML
0.5 INJECTION, SOLUTION INFILTRATION; PERINEURAL
Status: COMPLETED | OUTPATIENT
Start: 2021-05-07 | End: 2021-05-07

## 2021-05-07 RX ADMIN — TRIAMCINOLONE ACETONIDE 20 MG: 40 INJECTION, SUSPENSION INTRA-ARTICULAR; INTRAMUSCULAR at 13:07

## 2021-05-07 RX ADMIN — BUPIVACAINE HYDROCHLORIDE 0.5 ML: 2.5 INJECTION, SOLUTION INFILTRATION; PERINEURAL at 13:07

## 2021-05-07 NOTE — PROGRESS NOTES
ASSESSMENT/PLAN:      64 yo male with bilateralAcute exacerbation of chronic CMC arthritis   Repeat injections were done today into bilateral CMC joints  Patient tolerated well, post injection protocol was discussed  Continue comfort cool brace as needed  Follow up in 3 weeks or as needed for repeat injections  The patient verbalized understanding of exam findings and treatment plan  We engaged in the shared decision-making process and treatment options were discussed at length with the patient  Surgical and conservative management discussed today along with risks and benefits  Diagnoses and all orders for this visit:    Arthritis of carpometacarpal [place]) joint of right thumb  -     Small joint arthrocentesis    Arthritis of carpometacarpal (CMC) joint of left thumb  -     Small joint arthrocentesis        Follow Up:  Return in about 3 months (around 8/7/2021) for Recheck  To Do Next Visit:  Re-evaluation of current issue    ____________________________________________________________________________________________________________________________________________      CHIEF COMPLAINT:  Chief Complaint   Patient presents with    Right Thumb - Follow-up     CMC     Left Thumb - Follow-up       SUBJECTIVE:  Darell Lennox is a 63y o  year old RHD male who presents fro follow up evaluation of bilateral thumbs  He received BL CMC injections at his last visit which helped significantly for several months  Currently his right is very painful and does wake him up at night  Left is less bothersome but still sore  He does wear a comfort cool brace on the right  No paresthesias or other complaints at this time  I have personally reviewed all the relevant PMH, PSH, SH, FH, Medications and allergies       PAST MEDICAL HISTORY:  Past Medical History:   Diagnosis Date    BPH with urinary obstruction     AND OTHER LOWER URINARY TRACT SYMPTOMS     Chronic pain disorder     Detached retina, right 2015    Elevated prostate specific antigen (PSA) 2017    Feeling of incomplete bladder emptying     History of hypertension     History of nocturia     Hypertension     Low back pain     Neuroma of foot 2017    Prostate cancer ([address] ) 2017    Weak urinary stream        PAST SURGICAL HISTORY:  Past Surgical History:   Procedure Laterality Date    BACK SURGERY  2008    EYE SURGERY      REPAIR DETACHED RETINA     EYE SURGERY Right     HERNIA REPAIR      LAMINECTOMY      LUMBAR LAMINECTOMY Bilateral     L4-5    PROSTATE BIOPSY Bilateral 2017    SPINE SURGERY      TONSILLECTOMY      VASECTOMY      SURGERY VAS DEFERENS        FAMILY HISTORY:  Family History   Problem Relation Age of Onset    Hypertension Mother     Peripheral vascular disease Mother     Dementia Father        SOCIAL HISTORY:  Social History     Tobacco Use    Smoking status: Never Smoker    Smokeless tobacco: Never Used   Substance Use Topics    Alcohol use: Not Currently     Alcohol/week: 3 0 standard drinks     Types: 3 Cans of beer per week     Frequency: Never    Drug use: No       MEDICATIONS:    Current Outpatient Medications:     valsartan (DIOVAN) 80 mg tablet, take 2 tablets by mouth once daily, Disp: 60 tablet, Rfl: 3    ALLERGIES:  No Known Allergies    REVIEW OF SYSTEMS:  Review of Systems   Constitutional: Negative for chills, fatigue, fever and unexpected weight change  HENT: Negative for hearing loss, nosebleeds and sore throat  Eyes: Negative for pain, redness and visual disturbance  Respiratory: Negative for cough, shortness of breath and wheezing  Cardiovascular: Negative for chest pain, palpitations and leg swelling  Gastrointestinal: Negative for abdominal pain, nausea and vomiting  Endocrine: Negative for polydipsia and polyuria  Genitourinary: Negative for difficulty urinating and hematuria  Musculoskeletal: Positive for arthralgias  Negative for joint swelling and myalgias     Skin: Negative for rash and wound  Neurological: Negative for dizziness, numbness and headaches  Psychiatric/Behavioral: Negative for decreased concentration, dysphoric mood and suicidal ideas  The patient is not nervous/anxious  VITALS:  Vitals:    05/07/21 1257   BP: 129/84   Pulse: 88       LABS:  HgA1c:   Lab Results   Component Value Date    HGBA1C 6 3 (H) 03/30/2021     BMP:   Lab Results   Component Value Date    CALCIUM 8 9 03/30/2021     01/20/2016    K 4 3 03/30/2021    CO2 25 03/30/2021     (H) 03/30/2021    BUN 13 03/30/2021    CREATININE 1 25 03/30/2021       _____________________________________________________  PHYSICAL EXAMINATION:  General: well developed and well nourished, alert, oriented times 3 and appears comfortable  Psychiatric: Normal  HEENT: Normocephalic, Atraumatic Trachea Midline, No torticollis  Pulmonary: No audible wheezing or respiratory distress   Cardiovascular: No pitting edema, 2+ radial pulse   Skin: No masses, erythema, lacerations, fluctation, ulcerations  Neurovascular: Sensation Intact to the Median, Ulnar, Radial Nerve, Motor Intact to the Median, Ulnar, Radial Nerve and Pulses Intact  Musculoskeletal: Normal, except as noted in detailed exam and in HPI        MUSCULOSKELETAL EXAMINATION:  right CMC Exam:  No adduction contracture  No hyperextension deformity of MCP joint  Positive localized tenderness over radial and dorsal aspect of thumb (CMC joint)  Grind test is Positive for pain and Negative for crepitus  Metacarpal load shift test positive for pain   No triggering or tenderness over the A1 pulley  No pain with Finkelsteins maneuver       left CMC Exam:  No adduction contracture  No hyperextension deformity of MCP joint  Positive localized tenderness over radial and dorsal aspect of thumb (CMC joint)  Grind test is Negative for pain and Negative for crepitus  Metacarpal load shift test positive for pain   No triggering or tenderness over the A1 pulley  No pain with Finkelsteins maneuver       ___________________________________________________  STUDIES REVIEWED:  No new studies to review       PROCEDURES PERFORMED:  Small joint arthrocentesis: bilateral thumb CMC  Universal Protocol:  Consent: Verbal consent obtained  Risks and benefits: risks, benefits and alternatives were discussed  Consent given by: patient  Time out: Immediately prior to procedure a "time out" was called to verify the correct patient, procedure, equipment, support staff and site/side marked as required  Patient understanding: patient states understanding of the procedure being performed  Site marked: the operative site was marked  Radiology Images displayed and confirmed   If images not available, report reviewed: imaging studies available  Required items: required blood products, implants, devices, and special equipment available  Patient identity confirmed: verbally with patient    Supporting Documentation  Indications: pain   Procedure Details  Location: thumb - bilateral thumb CMC  Needle size: 25 G  Ultrasound guidance: no  Approach: dorsal    Medications (Right): 0 5 mL bupivacaine 0 25 %; 20 mg triamcinolone acetonide 40 mg/mLMedications (Left): 0 5 mL bupivacaine 0 25 %; 20 mg triamcinolone acetonide 40 mg/mL   Patient tolerance: patient tolerated the procedure well with no immediate complications  Dressing:  Sterile dressing applied             _____________________________________________________    Scribe Attestation    I,:  Dominique Abreu PA-C am acting as a scribe while in the presence of the attending physician :       I,:  Radha Mota MD personally performed the services described in this documentation    as scribed in my presence :

## 2021-05-08 ENCOUNTER — TELEPHONE (OUTPATIENT)
Dept: OBGYN CLINIC | Facility: HOSPITAL | Age: 61
End: 2021-05-08

## 2021-05-08 NOTE — TELEPHONE ENCOUNTER
Patient is calling in wanting to know if the Dr has any appointments available for next week to be seen with Dr Doretha Armstrong for both of his knees

## 2021-06-15 ENCOUNTER — APPOINTMENT (OUTPATIENT)
Dept: LAB | Facility: MEDICAL CENTER | Age: 61
End: 2021-06-15
Payer: COMMERCIAL

## 2021-06-15 DIAGNOSIS — R73.02 IMPAIRED GLUCOSE TOLERANCE: ICD-10-CM

## 2021-06-15 LAB
ALBUMIN SERPL BCP-MCNC: 3.9 G/DL (ref 3.5–5)
ALP SERPL-CCNC: 83 U/L (ref 46–116)
ALT SERPL W P-5'-P-CCNC: 39 U/L (ref 12–78)
ANION GAP SERPL CALCULATED.3IONS-SCNC: 5 MMOL/L (ref 4–13)
AST SERPL W P-5'-P-CCNC: 28 U/L (ref 5–45)
BILIRUB SERPL-MCNC: 0.56 MG/DL (ref 0.2–1)
BUN SERPL-MCNC: 15 MG/DL (ref 5–25)
CALCIUM SERPL-MCNC: 9.1 MG/DL (ref 8.3–10.1)
CHLORIDE SERPL-SCNC: 112 MMOL/L (ref 100–108)
CO2 SERPL-SCNC: 26 MMOL/L (ref 21–32)
CREAT SERPL-MCNC: 1.08 MG/DL (ref 0.6–1.3)
EST. AVERAGE GLUCOSE BLD GHB EST-MCNC: 134 MG/DL
GFR SERPL CREATININE-BSD FRML MDRD: 74 ML/MIN/1.73SQ M
GLUCOSE P FAST SERPL-MCNC: 94 MG/DL (ref 65–99)
HBA1C MFR BLD: 6.3 %
POTASSIUM SERPL-SCNC: 4.1 MMOL/L (ref 3.5–5.3)
PROT SERPL-MCNC: 7.1 G/DL (ref 6.4–8.2)
SODIUM SERPL-SCNC: 143 MMOL/L (ref 136–145)

## 2021-06-15 PROCEDURE — 36415 COLL VENOUS BLD VENIPUNCTURE: CPT

## 2021-06-15 PROCEDURE — 80053 COMPREHEN METABOLIC PANEL: CPT

## 2021-06-15 PROCEDURE — 83036 HEMOGLOBIN GLYCOSYLATED A1C: CPT

## 2021-07-17 DIAGNOSIS — I10 ESSENTIAL HYPERTENSION: ICD-10-CM

## 2021-07-18 RX ORDER — VALSARTAN 80 MG/1
TABLET ORAL
Qty: 60 TABLET | Refills: 3 | Status: SHIPPED | OUTPATIENT
Start: 2021-07-18 | End: 2021-11-22

## 2021-08-13 ENCOUNTER — TELEPHONE (OUTPATIENT)
Dept: OBGYN CLINIC | Facility: HOSPITAL | Age: 61
End: 2021-08-13

## 2021-08-13 NOTE — TELEPHONE ENCOUNTER
Dr Britton Miguel    [phone]    Patient is states that he is having 4/10 pain in both knees  He is requesting cortisone injections ASAP  Your next available is not until 9/22  Please advise

## 2021-08-17 ENCOUNTER — OFFICE VISIT (OUTPATIENT)
Dept: URGENT CARE | Facility: CLINIC | Age: 61
End: 2021-08-17
Payer: COMMERCIAL

## 2021-08-17 VITALS
HEART RATE: 72 BPM | TEMPERATURE: 96.9 F | OXYGEN SATURATION: 96 % | DIASTOLIC BLOOD PRESSURE: 84 MMHG | RESPIRATION RATE: 18 BRPM | SYSTOLIC BLOOD PRESSURE: 132 MMHG

## 2021-08-17 DIAGNOSIS — M54.6 ACUTE RIGHT-SIDED THORACIC BACK PAIN: Primary | ICD-10-CM

## 2021-08-17 PROCEDURE — 99213 OFFICE O/P EST LOW 20 MIN: CPT | Performed by: FAMILY MEDICINE

## 2021-08-17 RX ORDER — METHOCARBAMOL 750 MG/1
TABLET, FILM COATED ORAL
Qty: 24 TABLET | Refills: 0 | Status: SHIPPED | OUTPATIENT
Start: 2021-08-17 | End: 2021-10-25

## 2021-08-17 RX ORDER — MELOXICAM 15 MG/1
15 TABLET ORAL DAILY
Qty: 30 TABLET | Refills: 0 | Status: SHIPPED | OUTPATIENT
Start: 2021-08-17 | End: 2021-10-25

## 2021-08-17 NOTE — PROGRESS NOTES
[facility]    NAME: Lana Sanchez is a 57 y o  male  : 1960    MRN: [id number]  DATE: 2021  TIME: 2:57 PM    Assessment and Plan   Acute right-sided thoracic back pain [M54 6]  1  Acute right-sided thoracic back pain  meloxicam (MOBIC) 15 mg tablet    methocarbamol (ROBAXIN) 750 mg tablet       Patient Instructions   Patient Instructions   Stop ibuprofen  Start meloxicam   Take daily with food  May use over-the-counter lidocaine patch for local comfort if needed  Muscle relaxant for home use only  Do not take with any other potential sedating products  Follow-up with your orthopedist this next week at scheduled appointment  Chief Complaint     Chief Complaint   Patient presents with    Shoulder Pain     Chronic right shoulder pain worse x2 days, unable to see ortho until monday  History of Present Illness   Lana Sanchez presents to the clinic c/o    26-year-old male comes in with right posterior shoulder pain that started a couple weeks ago  Says that he was moving a ladder and thinks that that precipitated his pain  Does interfere with his sleep  He has been taking some ibuprofen without much relief  Also tried a leftover gabapentin and a muscle relaxant pill without any relief  Says pain is worse if he moves his head up and down  States he had similar pain a few years ago on the left side that seem to resolve  Sees Dr John Montiel before knee problems  Has seen Dr Vandana Pendleton for shoulder problems and evidently has an appointment coming up this next Monday for possible injection in his shoulder  He states that he tried to call to get an appointment and was not able to get in until next week  Review of Systems   Review of Systems   Constitutional: Negative for chills and fever  Respiratory: Negative  Cardiovascular: Negative  Musculoskeletal: Positive for arthralgias and myalgias         Current Medications     Long-Term Medications Medication Sig Dispense Refill    meloxicam (MOBIC) 15 mg tablet Take 1 tablet (15 mg total) by mouth daily 30 tablet 0    methocarbamol (ROBAXIN) 750 mg tablet 1/2 to 1 tablet every 6-8 hours or hs prn for muscle pain, spasms  Home use only  24 tablet 0    valsartan (DIOVAN) 80 mg tablet take 2 tablet by mouth once daily 60 tablet 3       Current Allergies     Allergies as of 08/17/2021    (No Known Allergies)          The following portions of the patient's history were reviewed and updated as appropriate: allergies, current medications, past family history, past medical history, past social history, past surgical history and problem list   Past Medical History:   Diagnosis Date    BPH with urinary obstruction     AND OTHER LOWER URINARY TRACT SYMPTOMS     Chronic pain disorder     Detached retina, right 2015    Elevated prostate specific antigen (PSA) 2017    Feeling of incomplete bladder emptying     History of hypertension     History of nocturia     Hypertension     Low back pain     Neuroma of foot 2017    Prostate cancer ([address] ) 2017    Weak urinary stream      Past Surgical History:   Procedure Laterality Date    BACK SURGERY  2008    EYE SURGERY      REPAIR DETACHED RETINA     EYE SURGERY Right     HERNIA REPAIR      LAMINECTOMY      LUMBAR LAMINECTOMY Bilateral     L4-5    PROSTATE BIOPSY Bilateral 2017    SPINE SURGERY      TONSILLECTOMY      VASECTOMY      SURGERY VAS DEFERENS      Family History   Problem Relation Age of Onset    Hypertension Mother     Peripheral vascular disease Mother     Dementia Father        Objective   /84   Pulse 72   Temp (!) 96 9 °F (36 1 °C)   Resp 18   SpO2 96%   No LMP for male patient  Physical Exam     Physical Exam  Vitals and nursing note reviewed  Constitutional:       General: He is not in acute distress  Appearance: Normal appearance  He is well-developed  He is not ill-appearing, toxic-appearing or diaphoretic  Comments: Slightly kyphotic    HENT:      Head: Normocephalic and atraumatic  Neck:      Comments: Right upper rhomboid pain with flexion of neck and extension of neck  Cardiovascular:      Rate and Rhythm: Normal rate and regular rhythm  Pulmonary:      Effort: Pulmonary effort is normal    Musculoskeletal:         General: Tenderness present  No swelling  Cervical back: No rigidity or tenderness  Back:       Comments: Good range of motion shoulders without pain  Lymphadenopathy:      Cervical: No cervical adenopathy  Skin:     General: Skin is warm and dry  Neurological:      Mental Status: He is alert and oriented to person, place, and time        Comments: Negative Spurling's   Psychiatric:         Mood and Affect: Mood normal          Behavior: Behavior normal

## 2021-08-17 NOTE — PATIENT INSTRUCTIONS
Stop ibuprofen  Start meloxicam   Take daily with food  May use over-the-counter lidocaine patch for local comfort if needed  Muscle relaxant for home use only  Do not take with any other potential sedating products  Follow-up with your orthopedist this next week at scheduled appointment

## 2021-08-23 ENCOUNTER — OFFICE VISIT (OUTPATIENT)
Dept: OBGYN CLINIC | Facility: MEDICAL CENTER | Age: 61
End: 2021-08-23
Payer: COMMERCIAL

## 2021-08-23 VITALS
HEART RATE: 77 BPM | BODY MASS INDEX: 27.72 KG/M2 | HEIGHT: 74 IN | SYSTOLIC BLOOD PRESSURE: 149 MMHG | WEIGHT: 216 LBS | DIASTOLIC BLOOD PRESSURE: 80 MMHG

## 2021-08-23 DIAGNOSIS — M75.111 NONTRAUMATIC INCOMPLETE TEAR OF RIGHT ROTATOR CUFF: ICD-10-CM

## 2021-08-23 DIAGNOSIS — M17.0 PRIMARY OSTEOARTHRITIS OF BOTH KNEES: Primary | ICD-10-CM

## 2021-08-23 PROCEDURE — 1036F TOBACCO NON-USER: CPT | Performed by: ORTHOPAEDIC SURGERY

## 2021-08-23 PROCEDURE — 20610 DRAIN/INJ JOINT/BURSA W/O US: CPT | Performed by: ORTHOPAEDIC SURGERY

## 2021-08-23 PROCEDURE — 3008F BODY MASS INDEX DOCD: CPT | Performed by: ORTHOPAEDIC SURGERY

## 2021-08-23 PROCEDURE — 99214 OFFICE O/P EST MOD 30 MIN: CPT | Performed by: ORTHOPAEDIC SURGERY

## 2021-08-23 RX ORDER — LIDOCAINE HYDROCHLORIDE 10 MG/ML
4 INJECTION, SOLUTION INFILTRATION; PERINEURAL
Status: COMPLETED | OUTPATIENT
Start: 2021-08-23 | End: 2021-08-23

## 2021-08-23 RX ORDER — METHYLPREDNISOLONE ACETATE 40 MG/ML
1 INJECTION, SUSPENSION INTRA-ARTICULAR; INTRALESIONAL; INTRAMUSCULAR; SOFT TISSUE
Status: COMPLETED | OUTPATIENT
Start: 2021-08-23 | End: 2021-08-23

## 2021-08-23 RX ADMIN — LIDOCAINE HYDROCHLORIDE 4 ML: 10 INJECTION, SOLUTION INFILTRATION; PERINEURAL at 17:18

## 2021-08-23 RX ADMIN — METHYLPREDNISOLONE ACETATE 1 ML: 40 INJECTION, SUSPENSION INTRA-ARTICULAR; INTRALESIONAL; INTRAMUSCULAR; SOFT TISSUE at 17:20

## 2021-08-23 RX ADMIN — LIDOCAINE HYDROCHLORIDE 4 ML: 10 INJECTION, SOLUTION INFILTRATION; PERINEURAL at 17:20

## 2021-08-23 RX ADMIN — METHYLPREDNISOLONE ACETATE 1 ML: 40 INJECTION, SUSPENSION INTRA-ARTICULAR; INTRALESIONAL; INTRAMUSCULAR; SOFT TISSUE at 17:18

## 2021-08-23 NOTE — PROGRESS NOTES
Ortho Sports Medicine Shoulder Visit     Assesment:     right shoulder rotator cuff tear  Right knee OA    Plan:    Conservative treatment:    Ice to shoulder 1-2 times daily, for 20 minutes at a time  PT for ROM and strengthening to shoulder, rotator cuff, scapular stabilizers  Let pain guide return to activities  Imaging:    No imaging was available for review today  Injection:    The risks and benefits of the injection (which include but are not limited to: infection, bleeding,damage to nerve/artery, need for further intervention), as well as the risks and benefits of all alternative treatments were explained and understood  The patient elected to proceed with injection  The procedure was done with aseptic technique, and the patient tolerated the procedure well with no complications  A corticosteroid injection of the subacromial space was performed as well as aspiration and injection of right knee      Surgery:     No surgery is recommended at this point, continue with conservative treatment plan as noted  History of Present Illness: The patient is returns for follow up of right shoulder  Since the prior visit, He reports an aggravation of his symptoms  He was carrying a ladder last week which exacerbated his pain  He was seen in Urgent care for this  Pain is improved by rest and NSAIDS  Pain is aggravated by overhead activity, reaching back and lifting   The patient denies weakness  The patient has tried rest and NSAIDS  He also notes right knee pain  This has been injected in the past by Dr Tyrone Fraire but he has been unable to get an appointment with him  I have reviewed the past medical, surgical, social and family history, medications and allergies as documented in the EMR  Review of systems: ROS is negative other than that noted in the HPI  Constitutional: Negative for fatigue and fever     Cardiovascular: Negative for chest pain  Pulmonary: negative for shortness of breath    PMH/PSH:  Past Medical History:   Diagnosis Date    BPH with urinary obstruction     AND OTHER LOWER URINARY TRACT SYMPTOMS     Chronic pain disorder     Detached retina, right 2015    Elevated prostate specific antigen (PSA) 2017    Feeling of incomplete bladder emptying     History of hypertension     History of nocturia     Hypertension     Low back pain     Neuroma of foot 2017    Prostate cancer ([address] ) 2017    Weak urinary stream      Past Surgical History:   Procedure Laterality Date    BACK SURGERY  2008    EYE SURGERY      REPAIR DETACHED RETINA     EYE SURGERY Right     HERNIA REPAIR      LAMINECTOMY      LUMBAR LAMINECTOMY Bilateral     L4-5    PROSTATE BIOPSY Bilateral 2017    SPINE SURGERY      TONSILLECTOMY      VASECTOMY      SURGERY VAS DEFERENS         Physical Exam:    Blood pressure 149/80, pulse 77, height 6' 2" (1 88 m), weight 98 kg (216 lb)  General/Constitutional: NAD, well developed, well nourished  HENT: Normocephalic, atraumatic  CV: Intact distal pulses, regular rate  Resp: No respiratory distress or labored breathing  Lymphatic: No lymphadenopathy palpated  Neuro: Alert and Oriented x 3, no focal deficits  Psych: Normal mood, normal affect, normal judgement, normal behavior  Skin: Warm, dry, no rashes, no erythema     Shoulder Exam (focused):     Shoulder focused exam:       RIGHT LEFT    Scapula Atrophy Negative Negative     Winging Negative Negative     Protraction Negative Negative    Rotator cuff SS 5/5/5 5/5/5     IS 5/5/5 5/5/5     SubS 5/5/5 5/5/5    ROM  170     ER0 60 60     ER90 90 90     IR90 40 40     IRb T6 T6    TTP: AC Negative Negative     Biceps Negative Negative     Coracoid Negative Negative    Special Tests: O'Briens Negative Negative     [place]-shear Negative Negative     Cross body Adduction Negative Negative     Speeds  Negative Negative     Zoila's Negative Negative     Whipple Negative Negative       Neer Negative Negative     Eldridge Positive Negative    Instability: Apprehension & relocation not tested not tested     Load & shift not tested not tested    Other: Crank Negative Negative             Right knee exam:  Medial and lateral joint line tenderness  0-90 degrees ROM  Mild effusion noted  Knee stable to varus/valgus stress  Negative Lachman's     UE NV Exam: +2 Radial pulses bilaterally  Sensation intact to light touch C5-T1 bilaterally, Radial/median/ulnar nerve motor intact    Cervical ROM is full without pain, numbness or tingling      Shoulder Imaging      None today    Large joint arthrocentesis: R knee  Universal Protocol:  Risks and benefits: risks, benefits and alternatives were discussed  Consent given by: patient  Timeout called at: 8/23/2021 5:13 PM   Supporting Documentation  Indications: pain   Procedure Details  Location: knee - R knee  Preparation: Patient was prepped and draped in the usual sterile fashion  Ultrasound guidance: no  Medications administered: 1 mL methylPREDNISolone acetate 40 mg/mL; 4 mL lidocaine 1 %    Aspirate amount: 20 mL  Aspirate: serous    Patient tolerance: patient tolerated the procedure well with no immediate complications  Dressing:  Sterile dressing applied    Large joint arthrocentesis: R subacromial bursa  Universal Protocol:  Risks and benefits: risks, benefits and alternatives were discussed  Consent given by: patient  Timeout called at: 8/23/2021 5:18 PM   Supporting Documentation  Indications: pain   Procedure Details  Location: shoulder - R subacromial bursa  Preparation: Patient was prepped and draped in the usual sterile fashion  Ultrasound guidance: no  Medications administered: 4 mL lidocaine 1 %; 1 mL methylPREDNISolone acetate 40 mg/mL    Patient tolerance: patient tolerated the procedure well with no immediate complications  Dressing:  Sterile dressing applied            Scribe Attestation    I,:  Vandana Jason MA am acting as a scribe while in the presence of the attending physician :       I,:  Giovanna Neely DO personally performed the services described in this documentation    as scribed in my presence :

## 2021-09-13 ENCOUNTER — OFFICE VISIT (OUTPATIENT)
Dept: OBGYN CLINIC | Facility: MEDICAL CENTER | Age: 61
End: 2021-09-13
Payer: COMMERCIAL

## 2021-09-13 VITALS
BODY MASS INDEX: 27.85 KG/M2 | DIASTOLIC BLOOD PRESSURE: 82 MMHG | SYSTOLIC BLOOD PRESSURE: 138 MMHG | WEIGHT: 217 LBS | HEIGHT: 74 IN

## 2021-09-13 DIAGNOSIS — M75.111 NONTRAUMATIC INCOMPLETE TEAR OF RIGHT ROTATOR CUFF: Primary | ICD-10-CM

## 2021-09-13 PROCEDURE — 20610 DRAIN/INJ JOINT/BURSA W/O US: CPT | Performed by: ORTHOPAEDIC SURGERY

## 2021-09-13 PROCEDURE — 99213 OFFICE O/P EST LOW 20 MIN: CPT | Performed by: ORTHOPAEDIC SURGERY

## 2021-09-13 PROCEDURE — 1036F TOBACCO NON-USER: CPT | Performed by: ORTHOPAEDIC SURGERY

## 2021-09-13 PROCEDURE — 3008F BODY MASS INDEX DOCD: CPT | Performed by: ORTHOPAEDIC SURGERY

## 2021-09-13 RX ORDER — METHYLPREDNISOLONE ACETATE 40 MG/ML
1 INJECTION, SUSPENSION INTRA-ARTICULAR; INTRALESIONAL; INTRAMUSCULAR; SOFT TISSUE
Status: COMPLETED | OUTPATIENT
Start: 2021-09-13 | End: 2021-09-13

## 2021-09-13 RX ORDER — LIDOCAINE HYDROCHLORIDE 10 MG/ML
3 INJECTION, SOLUTION INFILTRATION; PERINEURAL
Status: COMPLETED | OUTPATIENT
Start: 2021-09-13 | End: 2021-09-13

## 2021-09-13 RX ADMIN — METHYLPREDNISOLONE ACETATE 1 ML: 40 INJECTION, SUSPENSION INTRA-ARTICULAR; INTRALESIONAL; INTRAMUSCULAR; SOFT TISSUE at 16:02

## 2021-09-13 RX ADMIN — LIDOCAINE HYDROCHLORIDE 3 ML: 10 INJECTION, SOLUTION INFILTRATION; PERINEURAL at 16:02

## 2021-09-13 NOTE — PROGRESS NOTES
Ortho Sports Medicine Shoulder Follow Up Visit     Assesment:   57 y o  male right shoulder rotator cuff tear with symptomatic AC joint OA    Plan:    Conservative treatment:    Ice to shoulder 1-2 times daily, for 20 minutes at a time  Home exercise program for shoulder, including ROM and strenthening  Instructions given to patient of what exercises to perform  Imaging: All imaging from today was reviewed by myself and explained to the patient  Injection:      Discussed with the patient the risks and benefits to another cortisone injection given his recent injection  Recommended that he does not have an injection to the risk of infection or continued damage to the rotator cuff and lower rate of future rotator cuff repair healing  He voiced understanding of this but states he is in so much pain he would prefer to have an injection and take this risk  He notes the last injection did not help much  I feel the risk is relatively minor and although I would not recommend this I was in agreement to perform the injection noted  The risks and benefits of the injection (which include but are not limited to: infection, bleeding,damage to nerve/artery, need for further intervention), as well as the risks and benefits of all alternative treatments were explained and understood  The patient elected to proceed with injection  The procedure was done with aseptic technique, and the patient tolerated the procedure well with no complications  A corticosteroid injection of the subacromial space was performed  Ice to the shoulder 1-2 times daily for 20 minutes, for next 24-48 hrs  Surgery:     No surgery is recommended at this point, continue with conservative treatment plan as noted  Follow up:    No follow-ups on file  Chief Complaint   Patient presents with    Right Shoulder - Pain         History of Present Illness: The patient is returns for follow up of continued right shoulder pain  Since the prior visit, He reports no improvement  The patient's last appointment he received corticosteroid injection of the shoulder but states that he did not have any improvement whatsoever  Patient was shocked because before provided him with great improvement  Patient states that it is the night pain that he can not relieved  He is trying over-the-counter medications without improvement  He states that it is a constant pain that prevents him from sleeping  Pain is improved by rest   Pain is aggravated by sleeping  Symptoms include clicking  The patient has weakness  The patient has tried rest, ice, NSAIDS, physical therapy and injection  Shoulder Surgical History:  None    Past Medical, Social and Family History:  Past Medical History:   Diagnosis Date    BPH with urinary obstruction     AND OTHER LOWER URINARY TRACT SYMPTOMS     Chronic pain disorder     Detached retina, right 2015    Elevated prostate specific antigen (PSA) 2017    Feeling of incomplete bladder emptying     History of hypertension     History of nocturia     Hypertension     Low back pain     Neuroma of foot 2017    Prostate cancer ([facility] [address] ) 2017    Weak urinary stream      Past Surgical History:   Procedure Laterality Date    BACK SURGERY  2008    EYE SURGERY      REPAIR DETACHED RETINA     EYE SURGERY Right     HERNIA REPAIR      LAMINECTOMY      LUMBAR LAMINECTOMY Bilateral     L4-5    PROSTATE BIOPSY Bilateral 2017    SPINE SURGERY      TONSILLECTOMY      VASECTOMY      SURGERY VAS DEFERENS      No Known Allergies  Current Outpatient Medications on File Prior to Visit   Medication Sig Dispense Refill    meloxicam (MOBIC) 15 mg tablet Take 1 tablet (15 mg total) by mouth daily (Patient not taking: Reported on 8/23/2021) 30 tablet 0    methocarbamol (ROBAXIN) 750 mg tablet 1/2 to 1 tablet every 6-8 hours or hs prn for muscle pain, spasms  Home use only   (Patient not taking: Reported on 8/23/2021) 24 tablet 0    valsartan (DIOVAN) 80 mg tablet take 2 tablet by mouth once daily 60 tablet 3     No current facility-administered medications on file prior to visit  Social History     Socioeconomic History    Marital status: /Civil Union     Spouse name: Not on file    Number of children: 2    Years of education: Not on file    Highest education level: Not on file   Occupational History    Not on file   Tobacco Use    Smoking status: Never Smoker    Smokeless tobacco: Never Used   Vaping Use    Vaping Use: Never used   Substance and Sexual Activity    Alcohol use: Not Currently     Alcohol/week: 3 0 standard drinks     Types: 3 Cans of beer per week    Drug use: No    Sexual activity: Yes     Partners: Female     Birth control/protection: Male Sterilization   Other Topics Concern    Not on file   Social History Narrative    [address]      Social Determinants of Health     Financial Resource Strain:     Difficulty of Paying Living Expenses:    Food Insecurity:     Worried About Running Out of Food in the Last Year:     Giovanna of Food in the Last Year:    Transportation Needs:     Lack of Transportation (Medical):      Lack of Transportation (Non-Medical):    Physical Activity:     Days of Exercise per Week:     Minutes of Exercise per Session:    Stress:     Feeling of Stress :    Social Connections:     Frequency of Communication with Friends and Family:     Frequency of Social Gatherings with Friends and Family:     Attends Taoism Services:     Active Member of Clubs or Organizations:     Attends Club or Organization Meetings:     Marital Status:    Intimate Partner Violence:     Fear of Current or Ex-Partner:     Emotionally Abused:     Physically Abused:     Sexually Abused:        I have reviewed the past medical, surgical, social and family history, medications and allergies as documented in the EMR  Review of systems: ROS is negative other than that noted in the HPI  Constitutional: Negative for fatigue and fever  Physical Exam:    Blood pressure 138/82, height 6' 2" (1 88 m), weight 98 4 kg (217 lb)  General/Constitutional: NAD, well developed, well nourished  HENT: Normocephalic, atraumatic  CV: Intact distal pulses, regular rate  Resp: No respiratory distress or labored breathing  Lymphatic: No lymphadenopathy palpated  Neuro: Alert and Oriented x 3, no focal deficits  Psych: Normal mood, normal affect, normal judgement, normal behavior  Skin: Warm, dry, no rashes, no erythema      Shoulder focused exam:       RIGHT LEFT    Scapula Atrophy Negative Negative     Winging Negative Negative     Protraction Negative Negative    Rotator cuff SS 5/5 5/5     IS 5/5 5/5     SubS 5/5 5/5    ROM     170     ER0 60 60                   IRb T6    T6    TTP: AC Negative Negative     Biceps Negative Negative     Coracoid Negative Negative    Special Tests: O'Briens Negative Negative     [place]-shear Negative Negative     Cross body Adduction Negative Negative     Speeds  Negative Negative     Zoila's Negative Negative     Whipple Negative Negative       Neer Negative Negative     Eldridge Positive Negative    Instability: Apprehension & relocation not tested not tested     Load & shift not tested not tested    Other: Crank Negative Negative               UE NV Exam: +2 Radial pulses bilaterally  Sensation intact to light touch C5-T1 bilaterally, Radial/median/ulnar nerve motor intact    Cervical ROM is full without pain, numbness or tingling      Shoulder Imaging    No imaging was performed today    Large joint arthrocentesis: R subacromial bursa  Universal Protocol:  Consent given by: patient  Time out: Immediately prior to procedure a "time out" was called to verify the correct patient, procedure, equipment, support staff and site/side marked as required    Timeout called at: 9/13/2021 4:02 PM   Patient understanding: patient states understanding of the procedure being performed  Site marked: the operative site was marked  Patient identity confirmed: verbally with patient    Supporting Documentation  Indications: pain   Procedure Details  Location: shoulder - R subacromial bursa  Preparation: Patient was prepped and draped in the usual sterile fashion  Needle size: 22 G  Ultrasound guidance: no  Approach: posterior  Medications administered: 3 mL lidocaine 1 %; 1 mL methylPREDNISolone acetate 40 mg/mL    Patient tolerance: patient tolerated the procedure well with no immediate complications  Dressing:  Sterile dressing applied

## 2021-10-25 ENCOUNTER — TELEMEDICINE (OUTPATIENT)
Dept: FAMILY MEDICINE CLINIC | Facility: CLINIC | Age: 61
End: 2021-10-25
Payer: COMMERCIAL

## 2021-10-25 VITALS — TEMPERATURE: 98.6 F

## 2021-10-25 DIAGNOSIS — Z20.822 EXPOSURE TO COVID-19 VIRUS: Primary | ICD-10-CM

## 2021-10-25 PROCEDURE — U0005 INFEC AGEN DETEC AMPLI PROBE: HCPCS | Performed by: FAMILY MEDICINE

## 2021-10-25 PROCEDURE — 99213 OFFICE O/P EST LOW 20 MIN: CPT | Performed by: FAMILY MEDICINE

## 2021-10-25 PROCEDURE — U0003 INFECTIOUS AGENT DETECTION BY NUCLEIC ACID (DNA OR RNA); SEVERE ACUTE RESPIRATORY SYNDROME CORONAVIRUS 2 (SARS-COV-2) (CORONAVIRUS DISEASE [COVID-19]), AMPLIFIED PROBE TECHNIQUE, MAKING USE OF HIGH THROUGHPUT TECHNOLOGIES AS DESCRIBED BY CMS-2020-01-R: HCPCS | Performed by: FAMILY MEDICINE

## 2021-10-25 PROCEDURE — 1036F TOBACCO NON-USER: CPT | Performed by: FAMILY MEDICINE

## 2021-10-27 ENCOUNTER — IMMUNIZATIONS (OUTPATIENT)
Dept: FAMILY MEDICINE CLINIC | Facility: MEDICAL CENTER | Age: 61
End: 2021-10-27

## 2021-10-27 DIAGNOSIS — Z23 ENCOUNTER FOR IMMUNIZATION: Primary | ICD-10-CM

## 2021-10-27 PROCEDURE — 91300 SARSCOV2 VAC 30MCG/0.3ML IM: CPT

## 2021-11-01 ENCOUNTER — HOSPITAL ENCOUNTER (OUTPATIENT)
Dept: NON INVASIVE DIAGNOSTICS | Facility: HOSPITAL | Age: 61
Discharge: HOME | End: 2021-11-01
Payer: COMMERCIAL

## 2021-11-01 ENCOUNTER — APPOINTMENT (OUTPATIENT)
Dept: RADIOLOGY | Facility: MEDICAL CENTER | Age: 61
End: 2021-11-01
Payer: COMMERCIAL

## 2021-11-01 ENCOUNTER — OFFICE VISIT (OUTPATIENT)
Dept: OBGYN CLINIC | Facility: MEDICAL CENTER | Age: 61
End: 2021-11-01
Payer: COMMERCIAL

## 2021-11-01 VITALS
DIASTOLIC BLOOD PRESSURE: 82 MMHG | WEIGHT: 216 LBS | BODY MASS INDEX: 28.63 KG/M2 | SYSTOLIC BLOOD PRESSURE: 148 MMHG | HEIGHT: 73 IN

## 2021-11-01 DIAGNOSIS — M79.661 PAIN OF RIGHT CALF: ICD-10-CM

## 2021-11-01 DIAGNOSIS — M25.562 LEFT KNEE PAIN, UNSPECIFIED CHRONICITY: ICD-10-CM

## 2021-11-01 DIAGNOSIS — M25.561 RIGHT KNEE PAIN, UNSPECIFIED CHRONICITY: ICD-10-CM

## 2021-11-01 DIAGNOSIS — M25.50 MULTIPLE JOINT PAIN: Primary | ICD-10-CM

## 2021-11-01 DIAGNOSIS — M25.461 EFFUSION OF RIGHT KNEE: ICD-10-CM

## 2021-11-01 PROCEDURE — 93971 EXTREMITY STUDY: CPT

## 2021-11-01 PROCEDURE — 20610 DRAIN/INJ JOINT/BURSA W/O US: CPT | Performed by: ORTHOPAEDIC SURGERY

## 2021-11-01 PROCEDURE — 93971 EXTREMITY STUDY: CPT | Performed by: SURGERY

## 2021-11-01 PROCEDURE — 73562 X-RAY EXAM OF KNEE 3: CPT

## 2021-11-01 PROCEDURE — 1036F TOBACCO NON-USER: CPT | Performed by: ORTHOPAEDIC SURGERY

## 2021-11-01 PROCEDURE — 99214 OFFICE O/P EST MOD 30 MIN: CPT | Performed by: ORTHOPAEDIC SURGERY

## 2021-11-01 PROCEDURE — 73564 X-RAY EXAM KNEE 4 OR MORE: CPT

## 2021-11-01 PROCEDURE — 3008F BODY MASS INDEX DOCD: CPT | Performed by: ORTHOPAEDIC SURGERY

## 2021-11-01 RX ORDER — METHYLPREDNISOLONE ACETATE 40 MG/ML
1 INJECTION, SUSPENSION INTRA-ARTICULAR; INTRALESIONAL; INTRAMUSCULAR; SOFT TISSUE
Status: COMPLETED | OUTPATIENT
Start: 2021-11-01 | End: 2021-11-01

## 2021-11-01 RX ORDER — LIDOCAINE HYDROCHLORIDE 10 MG/ML
3 INJECTION, SOLUTION INFILTRATION; PERINEURAL
Status: COMPLETED | OUTPATIENT
Start: 2021-11-01 | End: 2021-11-01

## 2021-11-01 RX ADMIN — METHYLPREDNISOLONE ACETATE 1 ML: 40 INJECTION, SUSPENSION INTRA-ARTICULAR; INTRALESIONAL; INTRAMUSCULAR; SOFT TISSUE at 15:50

## 2021-11-01 RX ADMIN — LIDOCAINE HYDROCHLORIDE 3 ML: 10 INJECTION, SOLUTION INFILTRATION; PERINEURAL at 15:50

## 2021-11-16 ENCOUNTER — APPOINTMENT (OUTPATIENT)
Dept: LAB | Facility: MEDICAL CENTER | Age: 61
End: 2021-11-16
Payer: COMMERCIAL

## 2021-11-16 DIAGNOSIS — C61 PROSTATE CA (HCC): ICD-10-CM

## 2021-11-22 DIAGNOSIS — I10 ESSENTIAL HYPERTENSION: ICD-10-CM

## 2021-11-22 RX ORDER — VALSARTAN 80 MG/1
TABLET ORAL
Qty: 60 TABLET | Refills: 3 | Status: SHIPPED | OUTPATIENT
Start: 2021-11-22 | End: 2022-03-30

## 2021-11-29 ENCOUNTER — APPOINTMENT (OUTPATIENT)
Dept: LAB | Facility: MEDICAL CENTER | Age: 61
End: 2021-11-29
Payer: COMMERCIAL

## 2021-11-29 DIAGNOSIS — C61 PROSTATE CANCER (HCC): ICD-10-CM

## 2021-11-29 LAB — PSA SERPL-MCNC: 1.4 NG/ML (ref 0–4)

## 2021-11-29 PROCEDURE — 84153 ASSAY OF PSA TOTAL: CPT

## 2021-11-29 PROCEDURE — 36415 COLL VENOUS BLD VENIPUNCTURE: CPT

## 2021-12-28 ENCOUNTER — OFFICE VISIT (OUTPATIENT)
Dept: RHEUMATOLOGY | Facility: CLINIC | Age: 61
End: 2021-12-28
Payer: COMMERCIAL

## 2021-12-28 ENCOUNTER — APPOINTMENT (OUTPATIENT)
Dept: LAB | Facility: MEDICAL CENTER | Age: 61
End: 2021-12-28
Payer: COMMERCIAL

## 2021-12-28 VITALS
WEIGHT: 215.3 LBS | HEIGHT: 73 IN | HEART RATE: 70 BPM | BODY MASS INDEX: 28.53 KG/M2 | SYSTOLIC BLOOD PRESSURE: 148 MMHG | TEMPERATURE: 97.6 F | DIASTOLIC BLOOD PRESSURE: 88 MMHG

## 2021-12-28 DIAGNOSIS — M11.20 PSEUDOGOUT: Primary | ICD-10-CM

## 2021-12-28 DIAGNOSIS — Z79.899 HIGH RISK MEDICATION USE: ICD-10-CM

## 2021-12-28 DIAGNOSIS — M11.20 CHONDROCALCINOSIS: ICD-10-CM

## 2021-12-28 DIAGNOSIS — M17.0 PRIMARY OSTEOARTHRITIS OF BOTH KNEES: ICD-10-CM

## 2021-12-28 DIAGNOSIS — M25.561 PAIN IN BOTH KNEES, UNSPECIFIED CHRONICITY: ICD-10-CM

## 2021-12-28 DIAGNOSIS — J84.10 GRANULOMATOUS LUNG DISEASE (HCC): ICD-10-CM

## 2021-12-28 DIAGNOSIS — M25.562 PAIN IN BOTH KNEES, UNSPECIFIED CHRONICITY: ICD-10-CM

## 2021-12-28 DIAGNOSIS — M25.50 MULTIPLE JOINT PAIN: ICD-10-CM

## 2021-12-28 DIAGNOSIS — M11.9 CRYSTALLINE ARTHRITIS: ICD-10-CM

## 2021-12-28 DIAGNOSIS — M65.80 CRYSTALLINE ARTHRITIS: ICD-10-CM

## 2021-12-28 LAB
ALBUMIN SERPL BCP-MCNC: 4.2 G/DL (ref 3.5–5)
ALP SERPL-CCNC: 82 U/L (ref 46–116)
ALT SERPL W P-5'-P-CCNC: 41 U/L (ref 12–78)
ANION GAP SERPL CALCULATED.3IONS-SCNC: 4 MMOL/L (ref 4–13)
AST SERPL W P-5'-P-CCNC: 31 U/L (ref 5–45)
BASOPHILS # BLD AUTO: 0.05 THOUSANDS/ΜL (ref 0–0.1)
BASOPHILS NFR BLD AUTO: 1 % (ref 0–1)
BILIRUB SERPL-MCNC: 0.66 MG/DL (ref 0.2–1)
BUN SERPL-MCNC: 20 MG/DL (ref 5–25)
CALCIUM SERPL-MCNC: 9.9 MG/DL (ref 8.3–10.1)
CHLORIDE SERPL-SCNC: 107 MMOL/L (ref 100–108)
CO2 SERPL-SCNC: 29 MMOL/L (ref 21–32)
CREAT SERPL-MCNC: 1.24 MG/DL (ref 0.6–1.3)
CRP SERPL QL: <3 MG/L
EOSINOPHIL # BLD AUTO: 0.2 THOUSAND/ΜL (ref 0–0.61)
EOSINOPHIL NFR BLD AUTO: 3 % (ref 0–6)
ERYTHROCYTE [DISTWIDTH] IN BLOOD BY AUTOMATED COUNT: 12.9 % (ref 11.6–15.1)
ERYTHROCYTE [SEDIMENTATION RATE] IN BLOOD: 14 MM/HOUR (ref 0–19)
FERRITIN SERPL-MCNC: 142 NG/ML (ref 8–388)
GFR SERPL CREATININE-BSD FRML MDRD: 61 ML/MIN/1.73SQ M
GLUCOSE P FAST SERPL-MCNC: 103 MG/DL (ref 65–99)
HCT VFR BLD AUTO: 46 % (ref 36.5–49.3)
HGB BLD-MCNC: 14.4 G/DL (ref 12–17)
IMM GRANULOCYTES # BLD AUTO: 0.02 THOUSAND/UL (ref 0–0.2)
IMM GRANULOCYTES NFR BLD AUTO: 0 % (ref 0–2)
IRON SATN MFR SERPL: 38 % (ref 20–50)
IRON SERPL-MCNC: 132 UG/DL (ref 65–175)
LYMPHOCYTES # BLD AUTO: 2.03 THOUSANDS/ΜL (ref 0.6–4.47)
LYMPHOCYTES NFR BLD AUTO: 35 % (ref 14–44)
MCH RBC QN AUTO: 28.5 PG (ref 26.8–34.3)
MCHC RBC AUTO-ENTMCNC: 31.3 G/DL (ref 31.4–37.4)
MCV RBC AUTO: 91 FL (ref 82–98)
MONOCYTES # BLD AUTO: 0.62 THOUSAND/ΜL (ref 0.17–1.22)
MONOCYTES NFR BLD AUTO: 11 % (ref 4–12)
NEUTROPHILS # BLD AUTO: 2.93 THOUSANDS/ΜL (ref 1.85–7.62)
NEUTS SEG NFR BLD AUTO: 50 % (ref 43–75)
NRBC BLD AUTO-RTO: 0 /100 WBCS
PLATELET # BLD AUTO: 230 THOUSANDS/UL (ref 149–390)
PMV BLD AUTO: 11.2 FL (ref 8.9–12.7)
POTASSIUM SERPL-SCNC: 4.4 MMOL/L (ref 3.5–5.3)
PROT SERPL-MCNC: 7.6 G/DL (ref 6.4–8.2)
PTH-INTACT SERPL-MCNC: 34.3 PG/ML (ref 18.4–80.1)
RBC # BLD AUTO: 5.05 MILLION/UL (ref 3.88–5.62)
SODIUM SERPL-SCNC: 140 MMOL/L (ref 136–145)
TIBC SERPL-MCNC: 343 UG/DL (ref 250–450)
URATE SERPL-MCNC: 6.6 MG/DL (ref 4.2–8)
WBC # BLD AUTO: 5.85 THOUSAND/UL (ref 4.31–10.16)

## 2021-12-28 PROCEDURE — 83550 IRON BINDING TEST: CPT | Performed by: INTERNAL MEDICINE

## 2021-12-28 PROCEDURE — 1036F TOBACCO NON-USER: CPT | Performed by: INTERNAL MEDICINE

## 2021-12-28 PROCEDURE — 85025 COMPLETE CBC W/AUTO DIFF WBC: CPT | Performed by: INTERNAL MEDICINE

## 2021-12-28 PROCEDURE — 99204 OFFICE O/P NEW MOD 45 MIN: CPT | Performed by: INTERNAL MEDICINE

## 2021-12-28 PROCEDURE — 80053 COMPREHEN METABOLIC PANEL: CPT | Performed by: INTERNAL MEDICINE

## 2021-12-28 PROCEDURE — 82728 ASSAY OF FERRITIN: CPT | Performed by: INTERNAL MEDICINE

## 2021-12-28 PROCEDURE — 83540 ASSAY OF IRON: CPT | Performed by: INTERNAL MEDICINE

## 2021-12-28 PROCEDURE — 86140 C-REACTIVE PROTEIN: CPT | Performed by: INTERNAL MEDICINE

## 2021-12-28 PROCEDURE — 82164 ANGIOTENSIN I ENZYME TEST: CPT | Performed by: INTERNAL MEDICINE

## 2021-12-28 PROCEDURE — 84550 ASSAY OF BLOOD/URIC ACID: CPT | Performed by: INTERNAL MEDICINE

## 2021-12-28 PROCEDURE — 85652 RBC SED RATE AUTOMATED: CPT | Performed by: INTERNAL MEDICINE

## 2021-12-28 PROCEDURE — 3008F BODY MASS INDEX DOCD: CPT | Performed by: INTERNAL MEDICINE

## 2021-12-28 PROCEDURE — 83970 ASSAY OF PARATHORMONE: CPT | Performed by: INTERNAL MEDICINE

## 2021-12-28 PROCEDURE — 86255 FLUORESCENT ANTIBODY SCREEN: CPT | Performed by: INTERNAL MEDICINE

## 2021-12-28 PROCEDURE — 36415 COLL VENOUS BLD VENIPUNCTURE: CPT | Performed by: INTERNAL MEDICINE

## 2021-12-28 RX ORDER — COLCHICINE 0.6 MG/1
0.6 TABLET ORAL DAILY
Qty: 30 TABLET | Refills: 3 | Status: SHIPPED | OUTPATIENT
Start: 2021-12-28

## 2021-12-28 RX ORDER — HYDROXYCHLOROQUINE SULFATE 200 MG/1
200 TABLET, FILM COATED ORAL 2 TIMES DAILY
Qty: 60 TABLET | Refills: 3 | Status: SHIPPED | OUTPATIENT
Start: 2021-12-28 | End: 2022-04-27

## 2021-12-28 RX ORDER — PREDNISONE 10 MG/1
TABLET ORAL
Qty: 21 TABLET | Refills: 0 | Status: SHIPPED | OUTPATIENT
Start: 2021-12-28 | End: 2022-01-11

## 2021-12-29 LAB — ACE SERPL-CCNC: 41 U/L (ref 14–82)

## 2021-12-31 DIAGNOSIS — M25.50 MULTIPLE JOINT PAIN: Primary | ICD-10-CM

## 2021-12-31 LAB
C-ANCA TITR SER IF: NORMAL TITER
MYELOPEROXIDASE AB SER IA-ACNC: <9 U/ML (ref 0–9)
P-ANCA ATYPICAL TITR SER IF: NORMAL TITER
P-ANCA TITR SER IF: NORMAL TITER
PROTEINASE3 AB SER IA-ACNC: <3.5 U/ML (ref 0–3.5)

## 2022-01-03 ENCOUNTER — APPOINTMENT (OUTPATIENT)
Dept: LAB | Facility: MEDICAL CENTER | Age: 62
End: 2022-01-03
Payer: COMMERCIAL

## 2022-01-03 PROCEDURE — 86618 LYME DISEASE ANTIBODY: CPT | Performed by: INTERNAL MEDICINE

## 2022-01-03 PROCEDURE — 36415 COLL VENOUS BLD VENIPUNCTURE: CPT | Performed by: INTERNAL MEDICINE

## 2022-01-04 LAB — B BURGDOR IGG+IGM SER-ACNC: 25

## 2022-01-21 ENCOUNTER — OFFICE VISIT (OUTPATIENT)
Dept: OBGYN CLINIC | Facility: CLINIC | Age: 62
End: 2022-01-21
Payer: COMMERCIAL

## 2022-01-21 VITALS
WEIGHT: 209 LBS | HEIGHT: 74 IN | SYSTOLIC BLOOD PRESSURE: 130 MMHG | BODY MASS INDEX: 26.82 KG/M2 | DIASTOLIC BLOOD PRESSURE: 80 MMHG

## 2022-01-21 DIAGNOSIS — M23.91 INTERNAL DERANGEMENT OF RIGHT KNEE: Primary | ICD-10-CM

## 2022-01-21 PROCEDURE — 3008F BODY MASS INDEX DOCD: CPT | Performed by: ORTHOPAEDIC SURGERY

## 2022-01-21 PROCEDURE — 99213 OFFICE O/P EST LOW 20 MIN: CPT | Performed by: ORTHOPAEDIC SURGERY

## 2022-01-21 PROCEDURE — 1036F TOBACCO NON-USER: CPT | Performed by: ORTHOPAEDIC SURGERY

## 2022-01-21 NOTE — PROGRESS NOTES
Ortho Sports Medicine Knee Visit     Assesment:   right knee possible meniscal tear vs osteoarthritis vs pseudogout     Plan:    Conservative treatment:    Ice to knee for 20 minutes at least 1-2 times daily  Let pain guide gradual return activities  Instructed the patient to continue treatment with a rheumatologist for pseudogout and other possible autoimmune disorders causing continued effusions and multiple joint pains  He will also follow up with vascular surgery as he has what appears to be a hardened vein about the medial aspect of his lower leg just below the knee  He sees vascular for a history of varicous veins  He understood and all questions were answered  Imaging: We will obtain an MRI of the knee to rule out   Follow up in 1-2 weeks for review of MRI results for discussion of results and further treatment options based on these results  Injection:    No Injection planned at this time  May consider future corticosteroid injection depending on clinical exam/imaging  Surgery:     No surgery is recommended at this point, continue with conservative treatment plan as noted  History of Present Illness: The patient is returns for follow up of right knee  Since the prior visit, He reports only transient benefit  Patient was provided with an aspiration and cortisone injection on 11/1/2021 with benefit for a few months  He is also seeing a Rheumatologist for pseudo gout and multiple joint complaints  Pain is located anterior, posterior, medial, deep  Pain is improved by rest and ice  Pain is aggravated by squatting, weight bearing, standing and pivoting on a planted foot  Symptoms include clicking, popping and cracking  The patient has tried rest, ice, NSAIDS and injection  I have reviewed the past medical, surgical, social and family history, medications and allergies as documented in the EMR      Review of systems: ROS is negative other than that noted in the HPI  Constitutional: Negative for fatigue and fever  Cardiovascular: Negative for chest pain  Pulmonary: negative for shortness of breath    PMH/PSH:  Past Medical History:   Diagnosis Date    BPH with urinary obstruction     AND OTHER LOWER URINARY TRACT SYMPTOMS     Chronic pain disorder     Detached retina, right 2015    Elevated prostate specific antigen (PSA) 2017    Feeling of incomplete bladder emptying     History of hypertension     History of nocturia     Hypertension     Low back pain     Neuroma of foot 2017    Prostate cancer ([address] ) 2017    Weak urinary stream      Past Surgical History:   Procedure Laterality Date    BACK SURGERY  2008    EYE SURGERY      REPAIR DETACHED RETINA     EYE SURGERY Right     HERNIA REPAIR      LAMINECTOMY      LUMBAR LAMINECTOMY Bilateral     L4-5    PROSTATE BIOPSY Bilateral 2017    SPINE SURGERY      TONSILLECTOMY      VASECTOMY      SURGERY VAS DEFERENS         Physical Exam:    There were no vitals taken for this visit  General/Constitutional: NAD, well developed, well nourished  HENT: Normocephalic, atraumatic  CV: Intact distal pulses, regular rate  Resp: No respiratory distress or labored breathing  Lymphatic: No lymphadenopathy palpated  Neuro: Alert and Oriented x 3, no focal deficits  Psych: Normal mood, normal affect, normal judgement, normal behavior  Skin: Warm, dry, no rashes, no erythema       Knee Exam (focused): RIGHT LEFT   ROM:   0-130 0-130   Palpation: Effusion large negative     MJL tenderness Medial joint line tenderness   Also tender about what appears to be a hardened vein Negative     LJL tenderness Negative Negative   Instability: Varus stable stable     Valgus stable stable   Special Tests: Lachman Negative Negative     Posterior drawer Negative Negative     Anterior drawer Negative Negative     Pivot shift not tested not tested     Dial not tested not tested   Patella: Palpation no tenderness no tenderness     Mobility 1/4 1/4     Apprehension Negative Negative   Other: Single leg 1/4 squat not tested not tested      LE NV Exam: +2 DP/PT pulses bilaterally  Sensation intact to light touch L2-S1 bilaterally    No calf tenderness to palpation bilaterally      Knee Imaging    No new imaging reviewed today    Scribe Attestation    I,:  Alida Bundy am acting as a scribe while in the presence of the attending physician :       I,:  Adria Herrera, DO personally performed the services described in this documentation    as scribed in my presence :

## 2022-01-27 ENCOUNTER — TELEPHONE (OUTPATIENT)
Dept: OBGYN CLINIC | Facility: HOSPITAL | Age: 62
End: 2022-01-27

## 2022-01-27 NOTE — TELEPHONE ENCOUNTER
April from [facility] is requesting a copy of the MRI order  Please fax to B#[phone] Attn: April

## 2022-02-07 ENCOUNTER — OFFICE VISIT (OUTPATIENT)
Dept: OBGYN CLINIC | Facility: MEDICAL CENTER | Age: 62
End: 2022-02-07
Payer: COMMERCIAL

## 2022-02-07 VITALS
TEMPERATURE: 97.4 F | HEIGHT: 74 IN | HEART RATE: 106 BPM | WEIGHT: 209 LBS | BODY MASS INDEX: 26.82 KG/M2 | DIASTOLIC BLOOD PRESSURE: 74 MMHG | SYSTOLIC BLOOD PRESSURE: 113 MMHG

## 2022-02-07 DIAGNOSIS — M17.0 PRIMARY OSTEOARTHRITIS OF BOTH KNEES: ICD-10-CM

## 2022-02-07 DIAGNOSIS — M54.50 LUMBAR SPINE PAIN: ICD-10-CM

## 2022-02-07 DIAGNOSIS — M23.306 DEGENERATION OF MENISCUS OF RIGHT KNEE: Primary | ICD-10-CM

## 2022-02-07 DIAGNOSIS — M25.461 EFFUSION OF RIGHT KNEE: ICD-10-CM

## 2022-02-07 PROCEDURE — 3008F BODY MASS INDEX DOCD: CPT | Performed by: ORTHOPAEDIC SURGERY

## 2022-02-07 PROCEDURE — 99214 OFFICE O/P EST MOD 30 MIN: CPT | Performed by: ORTHOPAEDIC SURGERY

## 2022-02-07 PROCEDURE — 20610 DRAIN/INJ JOINT/BURSA W/O US: CPT | Performed by: ORTHOPAEDIC SURGERY

## 2022-02-07 PROCEDURE — 1036F TOBACCO NON-USER: CPT | Performed by: ORTHOPAEDIC SURGERY

## 2022-02-07 RX ORDER — LIDOCAINE HYDROCHLORIDE 10 MG/ML
2.5 INJECTION, SOLUTION INFILTRATION; PERINEURAL
Status: COMPLETED | OUTPATIENT
Start: 2022-02-07 | End: 2022-02-07

## 2022-02-07 RX ORDER — METHYLPREDNISOLONE ACETATE 40 MG/ML
1 INJECTION, SUSPENSION INTRA-ARTICULAR; INTRALESIONAL; INTRAMUSCULAR; SOFT TISSUE
Status: COMPLETED | OUTPATIENT
Start: 2022-02-07 | End: 2022-02-07

## 2022-02-07 RX ORDER — TRIAMCINOLONE ACETONIDE 40 MG/ML
40 INJECTION, SUSPENSION INTRA-ARTICULAR; INTRAMUSCULAR
Status: COMPLETED | OUTPATIENT
Start: 2022-02-07 | End: 2022-02-07

## 2022-02-07 RX ADMIN — LIDOCAINE HYDROCHLORIDE 2.5 ML: 10 INJECTION, SOLUTION INFILTRATION; PERINEURAL at 16:45

## 2022-02-07 RX ADMIN — TRIAMCINOLONE ACETONIDE 40 MG: 40 INJECTION, SUSPENSION INTRA-ARTICULAR; INTRAMUSCULAR at 16:45

## 2022-02-07 RX ADMIN — METHYLPREDNISOLONE ACETATE 1 ML: 40 INJECTION, SUSPENSION INTRA-ARTICULAR; INTRALESIONAL; INTRAMUSCULAR; SOFT TISSUE at 16:45

## 2022-02-07 NOTE — PROGRESS NOTES
Ortho Sports Medicine Knee Visit     Assesment:   right knee large effusion, osteoarthritis, chondrocalcinosis   Left knee ostearthritis     Plan:    Conservative treatment:    Ice to knee for 20 minutes at least 1-2 times daily  OTC NSAIDS prn for pain  Patient was given right knee aspiration and CSI, left knee CSI  Discussed with patient to continue with Dr Hastings Distance  He is not arthroscopy candidate due to level of osteoarthritis  Send to physical therapy for knee's and back  Would also recommend seeing Dr Waqas Desouza again for lumbar spine   See back in 3 months     Imaging:    No imaging was available for review today  MRI report reviewed  Injection:    The risks and benefits of the injection (which include but are not limited to: infection, bleeding,damage to nerve/artery, need for further intervention), as well as the risks and benefits of all alternative treatments were explained and understood  The patient elected to proceed with injection  The procedure was done with aseptic technique, and the patient tolerated the procedure well with no complications  A corticosteroid injection was performed in both knee's with right knee aspiration   The patient should take 1-2 days off of activity, with gradual return to activity as tolerated  Ice to the knee 1-2 times daily for 20 minutes, for next 24-48 hrs  Surgery:     No surgery is recommended at this point, continue with conservative treatment plan as noted  History of Present Illness: The patient is returns for follow up of his left knee  Here to go over MRI  He did bring report not the disk  Patient was provided with an aspiration and cortisone injection on 11/1/2021 with benefit for a few months  He is also seeing a Rheumatologist Dr Seth Donovan for pseudo gout and multiple joint complaints  He is on colchicine and plaquenil       Also see's vascular for varicose venins, discussed seeing them again for possible hardened vein in medial aspect of lower leg  His pain is global in nature with large effusion  He has trouble getting up from seated positions, moving around due to pain and swelling in knee  Pain is improved by rest, ice and NSAIDS  Pain is aggravated by stairs, squatting and weight bearing  Symptoms include clicking, popping and swelling  The patient has tried rest, ice, NSAIDS and injection  I have reviewed the past medical, surgical, social and family history, medications and allergies as documented in the EMR  Review of systems: ROS is negative other than that noted in the HPI  Constitutional: Negative for fatigue and fever  Cardiovascular: Negative for chest pain  Pulmonary: negative for shortness of breath    PMH/PSH:  Past Medical History:   Diagnosis Date    BPH with urinary obstruction     AND OTHER LOWER URINARY TRACT SYMPTOMS     Chronic pain disorder     Detached retina, right 2015    Elevated prostate specific antigen (PSA) 2017    Feeling of incomplete bladder emptying     History of hypertension     History of nocturia     Hypertension     Low back pain     Neuroma of foot 2017    Prostate cancer ([facility] [address] ) 2017    Weak urinary stream      Past Surgical History:   Procedure Laterality Date    BACK SURGERY  2008    EYE SURGERY      REPAIR DETACHED RETINA     EYE SURGERY Right     HERNIA REPAIR      LAMINECTOMY      LUMBAR LAMINECTOMY Bilateral     L4-5    PROSTATE BIOPSY Bilateral 2017    SPINE SURGERY      TONSILLECTOMY      VASECTOMY      SURGERY VAS DEFERENS         Physical Exam:    Blood pressure 113/74, pulse (!) 106, temperature (!) 97 4 °F (36 3 °C), height 6' 2" (1 88 m), weight 94 8 kg (209 lb)      General/Constitutional: NAD, well developed, well nourished  HENT: Normocephalic, atraumatic  CV: Intact distal pulses, regular rate  Resp: No respiratory distress or labored breathing  Lymphatic: No lymphadenopathy palpated  Neuro: Alert and Oriented x 3, no focal deficits  Psych: Normal mood, normal affect, normal judgement, normal behavior  Skin: Warm, dry, no rashes, no erythema       Knee Exam (focused): RIGHT LEFT   ROM:   0-120 0-130   Palpation: Effusion Large  negative     MJL tenderness Positive Negative     LJL tenderness Negative Negative   Instability: Varus stable stable     Valgus stable stable   Special Tests: Lachman Negative Negative     Posterior drawer Negative Negative     Anterior drawer Negative Negative     Pivot shift not tested not tested     Dial not tested not tested   Patella: Palpation no tenderness no tenderness     Mobility 1/4 1/4     Apprehension Negative Negative   Other: Single leg 1/4 squat not tested not tested      LE NV Exam: +2 DP/PT pulses bilaterally  Sensation intact to light touch L2-S1 bilaterally    No calf tenderness to palpation bilaterally      Knee Imaging    MRI report of the right knee were reviewed, which demonstrate complex chronic medial and lateral meniscal tears, irregularity in ACL and PCL, moderate diffuse cartilage thinning MFC, mild to moderate MTP, mild to moderate cartilage thinning LFC, large joint effusion  I have reviewed the radiology report  Large joint arthrocentesis: R knee  Universal Protocol:  Consent: Verbal consent obtained    Risks and benefits: risks, benefits and alternatives were discussed  Consent given by: patient  Patient understanding: patient states understanding of the procedure being performed  Site marked: the operative site was marked  Patient identity confirmed: verbally with patient    Supporting Documentation  Indications: pain and joint swelling   Procedure Details  Location: knee - R knee  Preparation: Patient was prepped and draped in the usual sterile fashion  Needle size: 25 G  Ultrasound guidance: no  Approach: lateral  Medication group details: 3 ML bupivacaine PF marcaine 0 5% injection     Patient tolerance: patient tolerated the procedure well with no immediate complications  Dressing:  Sterile dressing applied    Large joint arthrocentesis: R knee  Universal Protocol:  Consent: Verbal consent obtained  Risks and benefits: risks, benefits and alternatives were discussed  Consent given by: patient  Patient understanding: patient states understanding of the procedure being performed  Site marked: the operative site was marked  Patient identity confirmed: verbally with patient    Supporting Documentation  Indications: pain   Procedure Details  Location: knee - R knee  Preparation: Patient was prepped and draped in the usual sterile fashion  Needle size: 18 G  Ultrasound guidance: no  Approach: lateral  Medications administered: 2 5 mL lidocaine 1 %; 1 mL methylPREDNISolone acetate 40 mg/mL    Aspirate amount: 50 mL  Aspirate: blood-tinged    Patient tolerance: patient tolerated the procedure well with no immediate complications  Dressing:  Sterile dressing applied    Large joint arthrocentesis: L knee  Universal Protocol:  Consent: Verbal consent obtained    Risks and benefits: risks, benefits and alternatives were discussed  Consent given by: patient  Patient understanding: patient states understanding of the procedure being performed  Site marked: the operative site was marked  Patient identity confirmed: verbally with patient    Supporting Documentation  Indications: pain   Procedure Details  Location: knee - L knee  Preparation: Patient was prepped and draped in the usual sterile fashion  Needle size: 22 G  Ultrasound guidance: no  Approach: lateral  Medications administered: 2 5 mL lidocaine 1 %; 40 mg triamcinolone acetonide 40 mg/mL    Patient tolerance: patient tolerated the procedure well with no immediate complications  Dressing:  Sterile dressing applied          Scribe Attestation    I,:  Davide Harvey am acting as a scribe while in the presence of the attending physician :       I,:  Karli Dee DO personally performed the services described in this documentation    as scribed in my presence :

## 2022-02-11 ENCOUNTER — EVALUATION (OUTPATIENT)
Dept: PHYSICAL THERAPY | Facility: MEDICAL CENTER | Age: 62
End: 2022-02-11
Payer: COMMERCIAL

## 2022-02-11 DIAGNOSIS — M54.50 LUMBAR SPINE PAIN: ICD-10-CM

## 2022-02-11 DIAGNOSIS — M17.0 PRIMARY OSTEOARTHRITIS OF BOTH KNEES: Primary | ICD-10-CM

## 2022-02-11 PROCEDURE — 97162 PT EVAL MOD COMPLEX 30 MIN: CPT

## 2022-02-11 RX ORDER — MULTIVITAMIN
1 TABLET ORAL DAILY
COMMUNITY

## 2022-02-11 NOTE — PROGRESS NOTES
PT Evaluation     Today's date: 2022  Patient name: Usha Zepeda  : 1960  MRN: [id number]  Referring provider: Mary Bang DO  Dx:   Encounter Diagnosis     ICD-10-CM    1  Primary osteoarthritis of both knees  M17 0 Ambulatory Referral to Physical Therapy   2  Lumbar spine pain  M54 50 Ambulatory Referral to Physical Therapy                  Assessment  Assessment details: Usha Zepeda is a pleasant 57 y o  male who presents with bilateral knee pain R>L and low back pain  He also presents with s/s consistent with hip degeneration based on complaints and clinical testing  The primary movement problem is patellar hypomobility with superolateral restrictions and lumbopelvic hypomobility resulting in pain with function, tissue extensibility deficit, limited lumbar and knee ROM, and strength deficit  This limits his ability to perform ADLs, IADL, and work related activities, such as bending, walking, standing, getting in and out of the car  No referral is necessary at this based on examination results  The patient's greatest concerns are fear of having to get a knee replacement  Problem List:  1) patellar hypomobility with SL restrictions- addressing with manual interventions  2)lumbopelvic hypomobility- addressing with manual interventions  3) tissue extensibility deficit- addressing with stretching and flexibility   4) gait dysfunction addressing with gait training    Etiologic factors include degenerative changes and tight hip flexors  Pt  will benefit from skilled PT services that includes manual therapy techniques to enhance tissue extensibility, neuromuscular re-education to facilitate motor control, therapeutic exercise to increase functional mobility, and modalities prn to reduce pain and inflammation      Impairments: abnormal or restricted ROM, impaired physical strength, lacks appropriate home exercise program and pain with function  Understanding of Dx/Px/POC: good   Prognosis: good  Prognosis details: Positive prognostic indicators include positive attitude toward recovery  Negative prognostic indicators include chronicity of symptoms    Goals  Patient will be independent with home exercise program    Patient will be able to manage symptoms independently  Impairment Goals  - Pt I with initial HEP in 1-2 visits  - Improve ROM equal to contralateral side in 4-6 weeks  - Increase strength to 5/5 in all affected areas in 4-6 weeks    Functional Goals  - Increase Functional Status Measure to: 84 upon discharge   - Patient will be independent with comprehensive HEP upon discharge   - Ambulation is improved to prior level of function upon discharge   - Stair climbing is improved to prior level of function upon discharge   - Squatting is improved to prior level of function upon discharge       Plan  Plan details: Prognosis above is given PT services 1-2x/week tapering to 1x/week over the next 10-12 weeks and home program adherence  Patient would benefit from: skilled physical therapy  Planned modality interventions: hydrotherapy, cryotherapy, low level laser therapy and TENS  Planned therapy interventions: home exercise program, graded exercise, gait training, functional ROM exercises, flexibility, therapeutic exercise, therapeutic activities, stretching, strengthening, patient education, postural training, balance, manual therapy, joint mobilization and neuromuscular re-education  Treatment plan discussed with: patient        Subjective Evaluation    History of Present Illness  Onset date: year and a half  Mechanism of injury: Pt reports that he had some shots in his knee previously  A few months later he still had pain and Dr Irma Nunez drained his knee about 6 months before that plus an injection  Jan 21st he drained it again because of the discomfort and lack of function  He had an MRI that showed a baker's cyst, pseedogout, and osteoarthritis   He never had injury to his knee other than a hamstring injury  He works as a   It requires a lot of standing  He reports he has most difficulty with walking, standing, and bending  He reports that injection have been helping  He reports that the R side hurts more than L  He reports that he gets dorsal anterior foot pain in the morning  He denies numbness and tingling  Pt reports that in  he had an laminectomy  He reports that he has been going good  A few years ago he turned a weird way and had pain  It eventually went away  He reports that he has low back pain with L glute pain  2/10 worst achy pain  He reports that it is just there       Pain  Current pain ratin  At best pain ratin  At worst pain ratin  Location: pes anserine area, in both knees,   Quality: dull ache and sharp  Relieving factors: rest  Aggravating factors: walking, standing and stair climbing    Social Support  Stairs in house: yes   Lives in: multiple-level home  Lives with: spouse    Employment status: working ( home )    Diagnostic Tests  MRI studies: abnormal  Treatments  Previous treatment: injection treatment  Current treatment: injection treatment  Patient Goals  Patient goals for therapy: independence with ADLs/IADLs, return to sport/leisure activities, increased strength and decreased pain  Patient goal: have something to help with pain, bending over to pick something up with no pain,         Objective     Observations     Additional Observation Details  Mild edema in R knee    Active Range of Motion     Lumbar   Flexion:  Restriction level: minimal  Extension:  with pain Restriction level: moderate  Left lateral flexion:  with pain Restriction level: moderate  Right lateral flexion:  with pain Restriction level: moderate  Left rotation:  Restriction level: moderate  Right rotation:  Restriction level: moderate  Left Knee   Flexion: 120 degrees   Extension: WFL    Right Knee   Flexion: 115 degrees   Extension: [facility] Patellar Mobility:   Left Knee   WFL: medial, lateral, superior and inferior  Right Knee   Hypomobile: medial, lateral, superior and inferior     Additional Mobility Details  Hypomobility in SL compartment and MI compartment with restriction in lateral tissues    Joint Play     Hypomobile: L2, L3, L4, L5 and S1     Strength/Myotome Testing     Left Hip   Planes of Motion   Flexion: 4  Extension: 4-  Abduction: 3+    Right Hip   Planes of Motion   Flexion: 4  Extension: 4-  Abduction: 3+    Left Knee   Flexion: 4  Prone flexion: 4  Extension: 4    Right Knee   Flexion: 4  Prone flexion: 4  Extension: 4    Left Ankle/Foot   Dorsiflexion: 4+    Right Ankle/Foot   Dorsiflexion: 4+    Tests     Left Hip   Positive CHRIST and FADIR       Ambulation     Observational Gait     Additional Observational Gait Details  Decreased step length   Lack hip ext with pre swing                Precautions:     HEP: hip flx stretch  Manuals             Lateral STM with MI patellar mob             Hip mobs                                       Neuro Re-Ed             clams             Hip abd/ext             Leg press                                                                 Ther Ex                                                                                                                     Ther Activity                                       Gait Training                                       Modalities

## 2022-02-15 ENCOUNTER — OFFICE VISIT (OUTPATIENT)
Dept: PHYSICAL THERAPY | Facility: MEDICAL CENTER | Age: 62
End: 2022-02-15
Payer: COMMERCIAL

## 2022-02-15 DIAGNOSIS — M17.0 PRIMARY OSTEOARTHRITIS OF BOTH KNEES: Primary | ICD-10-CM

## 2022-02-15 DIAGNOSIS — M54.50 LUMBAR SPINE PAIN: ICD-10-CM

## 2022-02-15 PROCEDURE — 97112 NEUROMUSCULAR REEDUCATION: CPT

## 2022-02-15 PROCEDURE — 97110 THERAPEUTIC EXERCISES: CPT

## 2022-02-15 PROCEDURE — 97140 MANUAL THERAPY 1/> REGIONS: CPT

## 2022-02-15 NOTE — PROGRESS NOTES
Daily Note     Today's date: 2/15/2022  Patient name: Efrain Rivera  : 1960  MRN: [id number]  Referring provider: Karlie Bateman DO  Dx:   Encounter Diagnosis     ICD-10-CM    1  Primary osteoarthritis of both knees  M17 0    2  Lumbar spine pain  M54 50                   Subjective: Pt reports that his knees feel pretty good today  He reports that his back is slightly achy  He reports that he has had knee pain once in the past week versus everyday prior to IE  Objective: See treatment diary below      Assessment: POC initiated  Tolerated treatment well  Performed lumbar mobs with improvement in symptoms  No adverse effects noted throughout session  Patient demonstrated fatigue post treatment, exhibited good technique with therapeutic exercises and would benefit from continued PT to address impairments and return to PLOF  Plan: Continue per plan of care        Precautions:     HEP: hip flx stretch  Manuals 2/15            Lateral STM with MI patellar mob [facility] DARLINE            Hip mobs             Lumbar mobs  CPA gr 4                         Neuro Re-Ed             clams 2x10             Hip abd/ext 2x10            Leg press SL 70# 3x10                                                                Ther Ex             rec bike 10' res 3            Hip flx stretch x20 active ea                                                                                          Ther Activity                                       Gait Training                                       Modalities

## 2022-03-01 ENCOUNTER — OFFICE VISIT (OUTPATIENT)
Dept: PHYSICAL THERAPY | Facility: MEDICAL CENTER | Age: 62
End: 2022-03-01
Payer: COMMERCIAL

## 2022-03-01 DIAGNOSIS — M54.50 LUMBAR SPINE PAIN: ICD-10-CM

## 2022-03-01 DIAGNOSIS — M17.0 PRIMARY OSTEOARTHRITIS OF BOTH KNEES: Primary | ICD-10-CM

## 2022-03-01 PROCEDURE — 97112 NEUROMUSCULAR REEDUCATION: CPT

## 2022-03-01 PROCEDURE — 97110 THERAPEUTIC EXERCISES: CPT

## 2022-03-01 PROCEDURE — 97140 MANUAL THERAPY 1/> REGIONS: CPT

## 2022-03-01 NOTE — PROGRESS NOTES
Daily Note     Today's date: 3/1/2022  Patient name: Mary Day  : 1960  MRN: [id number]  Referring provider: Jennifer Escalona DO  Dx:   Encounter Diagnosis     ICD-10-CM    1  Primary osteoarthritis of both knees  M17 0    2  Lumbar spine pain  M54 50                   Subjective: Pt reports that his knees feel good  He reports that his L glute bothers him going up steps  He states that his lack was achy after last session  Objective: See treatment diary below      Assessment: Tolerated treatment well  Held lumbar mobs secondary to subjective regarding achiness afterwards  Patient continues to lack IR b/l  Performed piriformis release to address L glute pain with steps  Educated patient on STM at home for piriformis and updated HEP to include hamstring 90/90 stretch  Patient demonstrated fatigue post treatment, exhibited good technique with therapeutic exercises and would benefit from continued PT to address impairments and return to PLOF  Plan: Continue per plan of care        Precautions:     HEP: hip flx stretch, 90/90 stretch, hip abd/ext  Manuals 2/15 3/           Lateral STM with MI patellar mob [facility] DARLINE            Hip mobs [facility] DARLINE [facility]           Lumbar mobs  CPA gr 4            Piriformis release             Neuro Re-Ed             clams 2x10  3x10           Hip abd/ext 2x10            Leg press SL 70# 3x10 Dl 3x12 110#                                                               Ther Ex             rec bike 10' res 3 10' res 5           Hip flx stretch x20 active ea                                                                                          Ther Activity                                       Gait Training                                       Modalities

## 2022-03-07 ENCOUNTER — OFFICE VISIT (OUTPATIENT)
Dept: PHYSICAL THERAPY | Facility: MEDICAL CENTER | Age: 62
End: 2022-03-07
Payer: COMMERCIAL

## 2022-03-07 DIAGNOSIS — M17.0 PRIMARY OSTEOARTHRITIS OF BOTH KNEES: Primary | ICD-10-CM

## 2022-03-07 DIAGNOSIS — M54.50 LUMBAR SPINE PAIN: ICD-10-CM

## 2022-03-07 PROCEDURE — 97112 NEUROMUSCULAR REEDUCATION: CPT

## 2022-03-07 PROCEDURE — 97140 MANUAL THERAPY 1/> REGIONS: CPT

## 2022-03-07 PROCEDURE — 97110 THERAPEUTIC EXERCISES: CPT

## 2022-03-07 NOTE — PROGRESS NOTES
Daily Note     Today's date: 3/7/2022  Patient name: Usha Zepeda  : 1960  MRN: [id number]  Referring provider: Mary Bang DO  Dx:   Encounter Diagnosis     ICD-10-CM    1  Primary osteoarthritis of both knees  M17 0    2  Lumbar spine pain  M54 50                   Subjective: Pt reports that his noticed his R knee bothering him today  He notes swelling in his R knee and discomfort when he bends it  Pt reports that stairs have been difficult as well as walking and standing  He reports zero improvement since starting therapy  Objective: See treatment diary below      Assessment: Pt presents with edema in R knee  He also has a hard sport around his pes anserine bursa  He has limited flx ROM secondary to edema  He continues to lack significant IR ROM oh hip, which causes LBP  Hip mobs improved ROM with less pain in low back  Tolerated treatment well  Discussed with patient about holding off on PT since there has been no improvement and regression in function  Discussed returning to referring provider for knee pain and regression in function  Plan: Hold therapy until follow up with referring provider        Precautions:     HEP: hip flx stretch, 90/90 stretch, hip abd/ext  Manuals 2/15 3 3/7          Lateral STM with MI patellar mob [facility] patellar mob [facility] DARLINE          Hip mobs [facility]          Lumbar mobs  CPA gr 4            Piriformis release  [facility]          Neuro Re-Ed             clams 2x10  3x10 3x10          Hip abd/ext 2x10            Leg press SL 70# 3x10 Dl 3x12 110#           Reverse clamshell   3x10                                                 Ther Ex             rec bike 10' res 3 10' res 5 10' res 5          Hip flx stretch x20 active ea            Ant capsule str   10x10s                                                                           Ther Activity                                       Gait Training                                       Modalities

## 2022-03-14 ENCOUNTER — OFFICE VISIT (OUTPATIENT)
Dept: OBGYN CLINIC | Facility: MEDICAL CENTER | Age: 62
End: 2022-03-14
Payer: COMMERCIAL

## 2022-03-14 ENCOUNTER — APPOINTMENT (OUTPATIENT)
Dept: PHYSICAL THERAPY | Facility: MEDICAL CENTER | Age: 62
End: 2022-03-14
Payer: COMMERCIAL

## 2022-03-14 VITALS
BODY MASS INDEX: 26.69 KG/M2 | DIASTOLIC BLOOD PRESSURE: 81 MMHG | HEIGHT: 74 IN | SYSTOLIC BLOOD PRESSURE: 136 MMHG | WEIGHT: 208 LBS

## 2022-03-14 DIAGNOSIS — M23.306 DEGENERATION OF MENISCUS OF RIGHT KNEE: Primary | ICD-10-CM

## 2022-03-14 DIAGNOSIS — R10.32 LEFT GROIN PAIN: ICD-10-CM

## 2022-03-14 DIAGNOSIS — M25.552 PAIN IN LEFT HIP: ICD-10-CM

## 2022-03-14 DIAGNOSIS — M17.11 PRIMARY OSTEOARTHRITIS OF RIGHT KNEE: ICD-10-CM

## 2022-03-14 DIAGNOSIS — M11.261 PSEUDOGOUT OF RIGHT KNEE: ICD-10-CM

## 2022-03-14 PROCEDURE — 3008F BODY MASS INDEX DOCD: CPT | Performed by: ORTHOPAEDIC SURGERY

## 2022-03-14 PROCEDURE — 1036F TOBACCO NON-USER: CPT | Performed by: ORTHOPAEDIC SURGERY

## 2022-03-14 PROCEDURE — 99213 OFFICE O/P EST LOW 20 MIN: CPT | Performed by: ORTHOPAEDIC SURGERY

## 2022-03-14 PROCEDURE — 20610 DRAIN/INJ JOINT/BURSA W/O US: CPT | Performed by: ORTHOPAEDIC SURGERY

## 2022-03-14 NOTE — PROGRESS NOTES
Ortho Sports Medicine Knee Follow Up Visit     Assesment:     57 y o  male right knee meniscus degeneration, possible pseudogout, chondrocalcinosis, right and left knee osteoarthritis, and suspicion of left hip osteoarthritis    Plan:      Conservative treatment:    Ice to knee for 20 minutes at least 1-2 times daily  PT for ROM/strengthening to knee, hip and core  OTC NSAIDS prn for pain  See Dr Arleen Ba again with Rheumatology again for clarification of pseudogout  He already has this appointment scheduled for May of this year  Referral to Dr Roberto Roper for consult regarding possible hip osteoarthritis  Imaging: All imaging from today was reviewed by myself and explained to the patient  Injection:    No Injection planned at this time  A right knee aspiration has been performed today, drawing up about 50 mL of fluid  Surgery:     No surgery is recommended at this point, continue with conservative treatment plan as noted  We discussed that a knee replacement is the best surgical option for his conditions, and Fidel Ray verbalized understanding that should he decide to go forward with this procedure, it would need to be performed at least 3 months after today's injection to decrease the risk of infection  Follow up:    Return for Follow up with Dr Roberto Roper  Chief Complaint   Patient presents with    Right Knee - Follow-up       History of Present Illness: The patient is returns for follow up of right knee meniscus degeneration, osteoarthritis, chondrocalcinosis  Since the prior visit, He reports no improvement  His last aspiration and injection was received on 2/7/2022  He believes the injection has worn off in the last month and that he is due for another aspiration  Pain is located anterior  Pain is improved by rest, ice, NSAIDS, aspiration, and injection  Pain is aggravated by stairs, squatting and weight bearing  Symptoms include clicking, popping and swelling   He experiences stiffness in the mornings at times  The patient has tried rest, ice, NSAIDS physical therapy, aspiration, and injection  Since his last visit, he has obtained and brought in his MRI disc for review today  Knee Surgical History:  None    Past Medical, Social and Family History:  Past Medical History:   Diagnosis Date    BPH with urinary obstruction     AND OTHER LOWER URINARY TRACT SYMPTOMS     Chronic pain disorder     Detached retina, right 2015    Elevated prostate specific antigen (PSA) 2017    Feeling of incomplete bladder emptying     History of hypertension     History of nocturia     Hypertension     Low back pain     Neuroma of foot 2017    Prostate cancer ([facility] [address] ) 2017    Weak urinary stream      Past Surgical History:   Procedure Laterality Date    BACK SURGERY  2008    EYE SURGERY      REPAIR DETACHED RETINA     EYE SURGERY Right     HERNIA REPAIR      LAMINECTOMY      LUMBAR LAMINECTOMY Bilateral     L4-5    PROSTATE BIOPSY Bilateral 2017    SPINE SURGERY      TONSILLECTOMY      VASECTOMY      SURGERY VAS DEFERENS      No Known Allergies  Current Outpatient Medications on File Prior to Visit   Medication Sig Dispense Refill    colchicine (COLCRYS) 0 6 mg tablet Take 1 tablet (0 6 mg total) by mouth daily 30 tablet 3    hydroxychloroquine (PLAQUENIL) 200 mg tablet Take 1 tablet (200 mg total) by mouth 2 (two) times a day 60 tablet 3    Multiple Vitamin (multivitamin) tablet Take 1 tablet by mouth daily      valsartan (DIOVAN) 80 mg tablet take 2 tablets by mouth once daily 60 tablet 3     No current facility-administered medications on file prior to visit       Social History     Socioeconomic History    Marital status: /Civil Union     Spouse name: Not on file    Number of children: 2    Years of education: Not on file    Highest education level: Not on file   Occupational History    Not on file   Tobacco Use    Smoking status: Never Smoker  Smokeless tobacco: Never Used   Vaping Use    Vaping Use: Never used   Substance and Sexual Activity    Alcohol use: Not Currently     Alcohol/week: 3 0 standard drinks     Types: 3 Cans of beer per week    Drug use: No    Sexual activity: Yes     Partners: Female     Birth control/protection: Male Sterilization   Other Topics Concern    Not on file   Social History Narrative    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          TWO CHILDREN      Social Determinants of Health     Financial Resource Strain: Not on file   Food Insecurity: Not on file   Transportation Needs: Not on file   Physical Activity: Not on file   Stress: Not on file   Social Connections: Not on file   Intimate Partner Violence: Not on file   Housing Stability: Not on file         I have reviewed the past medical, surgical, social and family history, medications and allergies as documented in the EMR  Review of systems: ROS is negative other than that noted in the HPI  Constitutional: Negative for fatigue and fever  Physical Exam:    Blood pressure 136/81, height 6' 2" (1 88 m), weight 94 3 kg (208 lb)  General/Constitutional: NAD, well developed, well nourished  HENT: Normocephalic, atraumatic  CV: Intact distal pulses, regular rate  Resp: No respiratory distress or labored breathing  Lymphatic: No lymphadenopathy palpated  Neuro: Alert and Oriented x 3, no focal deficits  Psych: Normal mood, normal affect, normal judgement, normal behavior  Skin: Warm, dry, no rashes, no erythema      Knee Exam (focused): RIGHT LEFT   ROM:   0-130 0-130   Palpation: Effusion negative negative     MJL tenderness Negative Negative     LJL tenderness Negative Negative   Meniscus:  Daryn Negative Negative    Apley's Compression Negative Negative   Instability: Varus stable stable     Valgus stable stable   Special Tests: Lachman Negative Negative     Posterior drawer Negative Negative     Anterior drawer Negative Negative     Pivot shift not tested not tested     Dial not tested not tested   Patella: Palpation no tenderness no tenderness     Mobility 1/4 1/4     Apprehension Negative Negative   Other: Single leg 1/4 squat not tested not tested      Large joint arthrocentesis  Universal Protocol:  Consent: Verbal consent obtained  Consent given by: patient  Patient understanding: patient states understanding of the procedure being performed  Patient consent: the patient's understanding of the procedure matches consent given  Patient identity confirmed: verbally with patient    Supporting Documentation  Indications: joint swelling and pain   Procedure Details  Location: knee -   Needle size: 25 G  Ultrasound guidance: no  Approach: anterolateral    Aspirate amount: 50 mL  Aspirate: yellow    Patient tolerance: patient tolerated the procedure well with no immediate complications  Dressing:  Sterile dressing applied           LE NV Exam: +2 DP/PT pulses bilaterally  Sensation intact to light touch L2-S1 bilaterally    No calf tenderness to palpation bilaterally      Knee Imaging    No imaging was performed today and MRI of the right knee was reviewed, which demonstrates complex chronic medial and lateral meniscal tears, irregularity in ACL and PCL, moderate diffuse cartilage thinning MFC, mild to moderate MTP, mild to moderate cartilage thinning LFC, large joint effusion, as noted in the last note from 2/7/22  I have reviewed the radiology report         Scribe Attestation    I,:  Sandy Mazariegos am acting as a scribe while in the presence of the attending physician :       I,:  Andrea Currie DO personally performed the services described in this documentation    as scribed in my presence :

## 2022-03-30 DIAGNOSIS — I10 ESSENTIAL HYPERTENSION: ICD-10-CM

## 2022-03-30 RX ORDER — VALSARTAN 80 MG/1
TABLET ORAL
Qty: 60 TABLET | Refills: 3 | Status: SHIPPED | OUTPATIENT
Start: 2022-03-30

## 2022-03-30 NOTE — TELEPHONE ENCOUNTER
Requested Prescriptions     Pending Prescriptions Disp Refills    valsartan (DIOVAN) 80 mg tablet [Pharmacy Med Name: VALSARTAN 80 MG TABLET] 60 tablet 3     Sig: take 2 tablets by mouth once daily     LOV 3/29/21, F/U non scheduled, labs completed

## 2022-04-07 ENCOUNTER — OFFICE VISIT (OUTPATIENT)
Dept: OBGYN CLINIC | Facility: CLINIC | Age: 62
End: 2022-04-07
Payer: COMMERCIAL

## 2022-04-07 ENCOUNTER — TRANSCRIBE ORDERS (OUTPATIENT)
Dept: PAIN MEDICINE | Facility: CLINIC | Age: 62
End: 2022-04-07

## 2022-04-07 VITALS
BODY MASS INDEX: 27.46 KG/M2 | DIASTOLIC BLOOD PRESSURE: 78 MMHG | HEART RATE: 70 BPM | HEIGHT: 74 IN | WEIGHT: 214 LBS | SYSTOLIC BLOOD PRESSURE: 124 MMHG

## 2022-04-07 DIAGNOSIS — M17.11 PRIMARY OSTEOARTHRITIS OF RIGHT KNEE: ICD-10-CM

## 2022-04-07 DIAGNOSIS — M25.552 PAIN IN LEFT HIP: ICD-10-CM

## 2022-04-07 DIAGNOSIS — M16.11 PRIMARY OSTEOARTHRITIS OF ONE HIP, RIGHT: ICD-10-CM

## 2022-04-07 DIAGNOSIS — R10.32 LEFT GROIN PAIN: ICD-10-CM

## 2022-04-07 DIAGNOSIS — M16.12 PRIMARY OSTEOARTHRITIS OF LEFT HIP: Primary | ICD-10-CM

## 2022-04-07 DIAGNOSIS — M16.12 PRIMARY OSTEOARTHRITIS OF ONE HIP, LEFT: Primary | ICD-10-CM

## 2022-04-07 PROCEDURE — 3008F BODY MASS INDEX DOCD: CPT | Performed by: ORTHOPAEDIC SURGERY

## 2022-04-07 PROCEDURE — 99214 OFFICE O/P EST MOD 30 MIN: CPT | Performed by: ORTHOPAEDIC SURGERY

## 2022-04-07 RX ORDER — DICLOFENAC SODIUM 75 MG/1
75 TABLET, DELAYED RELEASE ORAL 2 TIMES DAILY
Qty: 60 TABLET | Refills: 1 | Status: SHIPPED | OUTPATIENT
Start: 2022-04-07

## 2022-04-07 NOTE — PROGRESS NOTES
Assessment/Plan     1  Primary osteoarthritis of one hip, left    2  Pain in left hip    3  Left groin pain    4  Primary osteoarthritis of right knee    5  Primary osteoarthritis of one hip, right      Orders Placed This Encounter   Procedures    XR hip/pelv 2-3 vws left if performed    Steroid Injection     · Patient has severe bilateral hip osteoarthritis,pain is worse on the left  He also has moderate right knee arthritis  · Discussed conservative treatments: steroid injections, medications, physical therapy and weight loss ,topicals, ice and heat and surgical option DONNA   · He is a good candidate for anterior DONNA in the future  · Will be ordering left hip IA steroid injection   Prescribed patient Diclofenac prn pain, medications warnings were reviewed with patient  He is aware not to take Advil or Aleve together   · He can not take Tylenol due to having fatty liver  · Discussed with patient for his right knee to continue with CSI and may try VS injections ( last knee injection was on 2/7/21)   Return in 6 weeks (on 5/19/2022) for Left hip / right knee   I answered all of the patient's questions during the visit and provided education of the patient's condition during the visit  The patient verbalized understanding of the information given and agrees with the plan  This note was dictated using [employer] software  It may contain errors including improperly dictated words  Please contact physician directly for any questions  History of Present Illness   Chief complaint:   Chief Complaint   Patient presents with    Left Hip - Pain       HPI: Harlan Reyna is a 57 y o  male that c/o left hip pain  He was referred by Dr Sagar Stanley  He states has a history  of a fracture pelvis  20 years ago from [address]  He has been having left hip pain for a few years, denies any recent falls or trauma  He is having achy pain that comes and goes in the left groin to posterior hip  Denies any radiating leg symptoms   He does feels his leg lengths are equal  Pain is worse going up steps, and transitional positions  He notes last night he was having cramping at night down bilateral  medial thighs  He did go to physical therapy but had no relief  He is taking OTC Advil with some relief  He has no history of having injections or surgeries on the left hip  He is also complaining of right knee pain  He has bee treating with Dr Jaqueline Rodriguez for moderate osteoarthritis , chondral calcinosis and had right knee CSI and aspiration with complete relief  He did see a rheumatologist for pseudogout and was on medications with no relief  ROS:    See HPI for musculoskeletal review     All other systems reviewed are negative     Historical Information   Past Medical History:   Diagnosis Date    BPH with urinary obstruction     AND OTHER LOWER URINARY TRACT SYMPTOMS     Chronic pain disorder     Detached retina, right 2015    Elevated prostate specific antigen (PSA) 2017    Feeling of incomplete bladder emptying     History of hypertension     History of nocturia     Hypertension     Low back pain     Neuroma of foot 2017    Prostate cancer ([facility] [address] ) 2017    Weak urinary stream      Past Surgical History:   Procedure Laterality Date    BACK SURGERY  2008    EYE SURGERY      REPAIR DETACHED RETINA     EYE SURGERY Right     HERNIA REPAIR      LAMINECTOMY      LUMBAR LAMINECTOMY Bilateral     L4-5    PROSTATE BIOPSY Bilateral 2017    SPINE SURGERY      TONSILLECTOMY      VASECTOMY      SURGERY VAS DEFERENS      Social History   Social History     Substance and Sexual Activity   Alcohol Use Not Currently    Alcohol/week: 3 0 standard drinks    Types: 3 Cans of beer per week     Social History     Substance and Sexual Activity   Drug Use No     Social History     Tobacco Use   Smoking Status Never Smoker   Smokeless Tobacco Never Used     Family History:   Family History   Problem Relation Age of Onset    Hypertension Mother    Emaline Folds Peripheral vascular disease Mother     Dementia Father        Current Outpatient Medications on File Prior to Visit   Medication Sig Dispense Refill    colchicine (COLCRYS) 0 6 mg tablet Take 1 tablet (0 6 mg total) by mouth daily 30 tablet 3    hydroxychloroquine (PLAQUENIL) 200 mg tablet Take 1 tablet (200 mg total) by mouth 2 (two) times a day 60 tablet 3    Multiple Vitamin (multivitamin) tablet Take 1 tablet by mouth daily      valsartan (DIOVAN) 80 mg tablet take 2 tablets by mouth once daily 60 tablet 3     No current facility-administered medications on file prior to visit  No Known Allergies    Objective   Vitals: Blood pressure 124/78, pulse 70, height 6' 2" (1 88 m), weight 97 1 kg (214 lb)  ,Body mass index is 27 48 kg/m²  PE:  AAOx 3  WDWN  Hearing intact, no drainage from eyes  Regular rate  no audible wheezing  no abdominal distension  LE compartments soft, skin intact    left hip:   No dislocation/deformity  ROM: FF 0-100  ER 0-20  IR 0  No TTP over greater trochanter  No TTP over SIJ    Right hip   No dislocation/deformity  ROM: FF 0-110   ER 0-30  IR 0-20   No TTP over greater trochanter  No TTP over SIJ  Back:    No TTP over lumbar spinous processes, paraspinal musculature  SLR: Neg       Right knee  AROM: 3-115      Imaging Studies: I have personally reviewed pertinent films in PACS   XR bilateralhip:  Severe DJD Bilaterally     X-ray right knee demonstrates moderate DJD and chondrocalcinosis       Scribe Attestation    I,:  Katey Guido am acting as a scribe while in the presence of the attending physician :       I,:  Haile Cho DO personally performed the services described in this documentation    as scribed in my presence :

## 2022-05-03 ENCOUNTER — HOSPITAL ENCOUNTER (OUTPATIENT)
Dept: RADIOLOGY | Facility: MEDICAL CENTER | Age: 62
Discharge: HOME | End: 2022-05-03
Attending: PHYSICAL MEDICINE & REHABILITATION | Admitting: PHYSICAL MEDICINE & REHABILITATION
Payer: COMMERCIAL

## 2022-05-03 VITALS
HEART RATE: 73 BPM | RESPIRATION RATE: 20 BRPM | TEMPERATURE: 98.7 F | SYSTOLIC BLOOD PRESSURE: 154 MMHG | DIASTOLIC BLOOD PRESSURE: 88 MMHG | OXYGEN SATURATION: 96 %

## 2022-05-03 DIAGNOSIS — M16.12 PRIMARY OSTEOARTHRITIS OF LEFT HIP: ICD-10-CM

## 2022-05-03 PROCEDURE — 20610 DRAIN/INJ JOINT/BURSA W/O US: CPT | Performed by: PHYSICAL MEDICINE & REHABILITATION

## 2022-05-03 PROCEDURE — 77002 NEEDLE LOCALIZATION BY XRAY: CPT | Performed by: PHYSICAL MEDICINE & REHABILITATION

## 2022-05-03 PROCEDURE — 77002 NEEDLE LOCALIZATION BY XRAY: CPT

## 2022-05-03 RX ORDER — BUPIVACAINE HCL/PF 2.5 MG/ML
3 VIAL (ML) INJECTION ONCE
Status: COMPLETED | OUTPATIENT
Start: 2022-05-03 | End: 2022-05-03

## 2022-05-03 RX ORDER — METHYLPREDNISOLONE ACETATE 40 MG/ML
40 INJECTION, SUSPENSION INTRA-ARTICULAR; INTRALESIONAL; INTRAMUSCULAR; PARENTERAL; SOFT TISSUE ONCE
Status: COMPLETED | OUTPATIENT
Start: 2022-05-03 | End: 2022-05-03

## 2022-05-03 RX ADMIN — Medication 3 ML: at 15:03

## 2022-05-03 RX ADMIN — METHYLPREDNISOLONE ACETATE 40 MG: 40 INJECTION, SUSPENSION INTRA-ARTICULAR; INTRALESIONAL; INTRAMUSCULAR; PARENTERAL; SOFT TISSUE at 15:03

## 2022-05-03 RX ADMIN — IOHEXOL 2 ML: 300 INJECTION, SOLUTION INTRAVENOUS at 15:03

## 2022-05-03 NOTE — H&P
History of Present Illness:  The patient is a 57 y o  male who presents with complaints of left hip pain    Patient Active Problem List   Diagnosis    Hypertension    Prostate carcinoma ([facility] [address] )    Gout    Umbilical hernia without obstruction and without gangrene    Osteoarthritis of knee    Varicose veins of leg with swelling, bilateral    Pain in both knees    Chondrocalcinosis    Trigger finger, left index finger    Arthritis of carpometacarpal (CMC) joint of right thumb    Granulomatous lung disease (HCC)    Acute pain of left shoulder    Primary osteoarthritis of left hip       Past Medical History:   Diagnosis Date    BPH with urinary obstruction     AND OTHER LOWER URINARY TRACT SYMPTOMS     Chronic pain disorder     Detached retina, right 2015    Elevated prostate specific antigen (PSA) 2017    Feeling of incomplete bladder emptying     History of hypertension     History of nocturia     Hypertension     Low back pain     Neuroma of foot 2017    Prostate cancer ([facility] [address] ) 2017    Weak urinary stream        Past Surgical History:   Procedure Laterality Date    BACK SURGERY  2008    EYE SURGERY      REPAIR DETACHED RETINA     EYE SURGERY Right     HERNIA REPAIR      LAMINECTOMY      LUMBAR LAMINECTOMY Bilateral     L4-5    PROSTATE BIOPSY Bilateral 2017    SPINE SURGERY      TONSILLECTOMY      VASECTOMY      SURGERY VAS DEFERENS          Current Outpatient Medications:     colchicine (COLCRYS) 0 6 mg tablet, Take 1 tablet (0 6 mg total) by mouth daily, Disp: 30 tablet, Rfl: 3    diclofenac (VOLTAREN) 75 mg EC tablet, Take 1 tablet (75 mg total) by mouth 2 (two) times a day PRN for pain, Disp: 60 tablet, Rfl: 1    hydroxychloroquine (PLAQUENIL) 200 mg tablet, Take 1 tablet (200 mg total) by mouth 2 (two) times a day, Disp: 60 tablet, Rfl: 3    Multiple Vitamin (multivitamin) tablet, Take 1 tablet by mouth daily, Disp: , Rfl:     valsartan (DIOVAN) 80 mg tablet, take 2 tablets by mouth once daily, Disp: 60 tablet, Rfl: 3    Current Facility-Administered Medications:     bupivacaine (PF) (MARCAINE) 0 25 % injection 3 mL, 3 mL, Intra-articular, Once, Thana , DO    iohexol (OMNIPAQUE) 300 mg/mL injection 2 mL, 2 mL, Injection, Once, Thana , DO    methylPREDNISolone acetate (DEPO-MEDROL) injection 40 mg, 40 mg, Intra-articular, Once, Thana , DO    No Known Allergies    Physical Exam:   Vitals:    05/03/22 1451   BP: 144/76   Pulse: 87   Resp: 18   Temp: 98 7 °F (37 1 °C)   SpO2: 96%     General: Awake, Alert, Oriented x 3, Mood and affect appropriate  Respiratory: Respirations even and unlabored  Cardiovascular: Peripheral pulses intact; no edema  Musculoskeletal Exam: left hip pain    ASA Score: 2    Patient/Chart Verification  Patient ID Verified: Verbal  Consents Confirmed: Procedural,To be obtained in the Pre-Procedure area  H&P( within 30 days) Verified: To be obtained in the Pre-Procedure area  Allergies Reviewed: Yes  Anticoag/NSAID held?: NA  Currently on antibiotics?: No  Pregnancy denied?: NA    Assessment:   1   Primary osteoarthritis of left hip        Plan: Primary osteoarthritis left hip

## 2022-05-03 NOTE — DISCHARGE INSTRUCTIONS

## 2022-05-10 ENCOUNTER — TELEPHONE (OUTPATIENT)
Dept: PAIN MEDICINE | Facility: CLINIC | Age: 62
End: 2022-05-10

## 2022-05-10 NOTE — TELEPHONE ENCOUNTER
1st attempt call, unable to leave msg to call back with % of improvement and pain level   Patient had difficulty hearing me

## 2022-05-16 ENCOUNTER — OFFICE VISIT (OUTPATIENT)
Dept: OBGYN CLINIC | Facility: MEDICAL CENTER | Age: 62
End: 2022-05-16
Payer: COMMERCIAL

## 2022-05-16 VITALS
SYSTOLIC BLOOD PRESSURE: 137 MMHG | HEART RATE: 73 BPM | BODY MASS INDEX: 27.35 KG/M2 | WEIGHT: 213 LBS | DIASTOLIC BLOOD PRESSURE: 76 MMHG

## 2022-05-16 DIAGNOSIS — M11.261 PSEUDOGOUT OF RIGHT KNEE: ICD-10-CM

## 2022-05-16 DIAGNOSIS — M17.11 PRIMARY OSTEOARTHRITIS OF RIGHT KNEE: ICD-10-CM

## 2022-05-16 DIAGNOSIS — M25.461 EFFUSION OF RIGHT KNEE: ICD-10-CM

## 2022-05-16 DIAGNOSIS — M16.12 PRIMARY OSTEOARTHRITIS OF ONE HIP, LEFT: Primary | ICD-10-CM

## 2022-05-16 LAB — CRYSTALS SNV QL MICRO: NORMAL

## 2022-05-16 PROCEDURE — 99213 OFFICE O/P EST LOW 20 MIN: CPT | Performed by: ORTHOPAEDIC SURGERY

## 2022-05-16 PROCEDURE — 20610 DRAIN/INJ JOINT/BURSA W/O US: CPT | Performed by: ORTHOPAEDIC SURGERY

## 2022-05-16 PROCEDURE — 89060 EXAM SYNOVIAL FLUID CRYSTALS: CPT | Performed by: ORTHOPAEDIC SURGERY

## 2022-05-16 PROCEDURE — 1036F TOBACCO NON-USER: CPT | Performed by: ORTHOPAEDIC SURGERY

## 2022-05-16 RX ORDER — LIDOCAINE HYDROCHLORIDE 10 MG/ML
4 INJECTION, SOLUTION INFILTRATION; PERINEURAL
Status: COMPLETED | OUTPATIENT
Start: 2022-05-16 | End: 2022-05-16

## 2022-05-16 RX ORDER — TRIAMCINOLONE ACETONIDE 40 MG/ML
20 INJECTION, SUSPENSION INTRA-ARTICULAR; INTRAMUSCULAR
Status: COMPLETED | OUTPATIENT
Start: 2022-05-16 | End: 2022-05-16

## 2022-05-16 RX ORDER — LIDOCAINE HYDROCHLORIDE 10 MG/ML
5 INJECTION, SOLUTION INFILTRATION; PERINEURAL
Status: COMPLETED | OUTPATIENT
Start: 2022-05-16 | End: 2022-05-16

## 2022-05-16 RX ADMIN — LIDOCAINE HYDROCHLORIDE 5 ML: 10 INJECTION, SOLUTION INFILTRATION; PERINEURAL at 08:55

## 2022-05-16 RX ADMIN — LIDOCAINE HYDROCHLORIDE 4 ML: 10 INJECTION, SOLUTION INFILTRATION; PERINEURAL at 08:55

## 2022-05-16 RX ADMIN — TRIAMCINOLONE ACETONIDE 20 MG: 40 INJECTION, SUSPENSION INTRA-ARTICULAR; INTRAMUSCULAR at 08:55

## 2022-05-16 NOTE — PROGRESS NOTES
Assessment/Plan     1  Primary osteoarthritis of one hip, left    2  Primary osteoarthritis of right knee    3  Effusion of right knee    4  Pseudogout of right knee      Orders Placed This Encounter   Procedures    Large joint arthrocentesis: R knee    Large joint arthrocentesis: R knee    Synovial fluid, crystal     · Patient has severe left hip osteoarthritis, moderate right knee osteoarthritis and pseudogout  · Discussed with patient conservative treatments for the right knee: steroid injections, physical therapy, medications, bracing, visco supplementation injections  · Received right knee steroid injection  and aspiration today  Patient knows to ice and avoid strenuous activity for 1-2 days if needed  · Will be sending aspiration off for analysis for crystals (patient has history of having pseudogout in the right knee and great toe)  · Continue taking diclofenac 75 mg as needed for pain relief  · Avoid taking Tylenol due to having hx of fatty liver   · May repeat steroid injection every three months   · May try viscosupplementation injection in the near future, patient will call for the order   · Patient is aware he may call to have order placed for left hip steroid injection when needed  Return if symptoms worsen or fail to improve  I answered all of the patient's questions during the visit and provided education of the patient's condition during the visit  The patient verbalized understanding of the information given and agrees with the plan  This note was dictated using [employer] software  It may contain errors including improperly dictated words  Please contact physician directly for any questions  Subjective   Chief Complaint:   Chief Complaint   Patient presents with    Left Hip - Follow-up       HPI:  Isai Arguello is a 57 y o  male who presents for follow up for left hip pain due to severe osteoarthritis and right knee pain due to moderate osteoarthritis    Patient had a left hip steroid injection on 05/03/2022 and states he had complete relief the first few hours after the injection and states he feels in now 99 5% improved  He states occasional twinge in the posterolateral aspect of the left hip  He states he is also having tightness in the right knee  He has been treated Dr Pratibha Guan and had a right knee steroid injection on 02/07/2022 with relief and on 03/14/2022 right knee aspiration with relief  He states he is having pain anterior aspect of the knee that comes and goes  Patient did go to physical therapy but did not continue with home exercise  He has been taking ibuprofen as needed for pain relief  He does have history of pseudogout in the right knee in great toe and was treating with Dr Chino Rodríguez  and was on colchicine and Plaquenil  Review of Systems  See HPI for musculoskeletal review     All other systems reviewed are negative     History:  Past Medical History:   Diagnosis Date    BPH with urinary obstruction     AND OTHER LOWER URINARY TRACT SYMPTOMS     Chronic pain disorder     Detached retina, right 2015    Elevated prostate specific antigen (PSA) 2017    Feeling of incomplete bladder emptying     History of hypertension     History of nocturia     Hypertension     Low back pain     Neuroma of foot 2017    Prostate cancer ([facility] [address] ) 2017    Weak urinary stream      Past Surgical History:   Procedure Laterality Date    BACK SURGERY  2008    EYE SURGERY      REPAIR DETACHED RETINA     EYE SURGERY Right     HERNIA REPAIR      LAMINECTOMY      LUMBAR LAMINECTOMY Bilateral     L4-5    PROSTATE BIOPSY Bilateral 2017    SPINE SURGERY      TONSILLECTOMY      VASECTOMY      SURGERY VAS DEFERENS      Social History   Social History     Substance and Sexual Activity   Alcohol Use Not Currently    Alcohol/week: 3 0 standard drinks    Types: 3 Cans of beer per week     Social History     Substance and Sexual Activity   Drug Use No     Social History     Tobacco Use   Smoking Status Never Smoker   Smokeless Tobacco Never Used     Family History:   Family History   Problem Relation Age of Onset    Hypertension Mother     Peripheral vascular disease Mother     Dementia Father        Current Outpatient Medications on File Prior to Visit   Medication Sig Dispense Refill    colchicine (COLCRYS) 0 6 mg tablet Take 1 tablet (0 6 mg total) by mouth daily 30 tablet 3    diclofenac (VOLTAREN) 75 mg EC tablet Take 1 tablet (75 mg total) by mouth 2 (two) times a day PRN for pain 60 tablet 1    hydroxychloroquine (PLAQUENIL) 200 mg tablet Take 1 tablet (200 mg total) by mouth 2 (two) times a day 60 tablet 3    Multiple Vitamin (multivitamin) tablet Take 1 tablet by mouth daily      valsartan (DIOVAN) 80 mg tablet take 2 tablets by mouth once daily 60 tablet 3     No current facility-administered medications on file prior to visit  No Known Allergies     Objective     /76   Pulse 73   Wt 96 6 kg (213 lb)   BMI 27 35 kg/m²      PE:  AAOx 3  WDWN  Hearing intact, no drainage from eyes  no audible wheezing  no abdominal distension  LE compartments soft, skin intact        rightknee:    Appearance:  no swelling   No ecchymosis  no obvious joint deformity   Mild  effusion    Large joint arthrocentesis: R knee  Universal Protocol:  Consent: Verbal consent obtained  Risks and benefits: risks, benefits and alternatives were discussed  Consent given by: patient  Time out: Immediately prior to procedure a "time out" was called to verify the correct patient, procedure, equipment, support staff and site/side marked as required    Timeout called at: 5/16/2022 8:53 AM   Patient understanding: patient states understanding of the procedure being performed  Site marked: the operative site was marked  Supporting Documentation  Indications: joint swelling   Procedure Details  Location: knee - R knee  Preparation: Patient was prepped and draped in the usual sterile fashion  Needle size: 18 G  Approach: superolateral   Medications administered: 5 mL lidocaine 1 %    Aspirate amount: 56 mL  Aspirate: blood-tinged  Analysis: fluid sample sent for laboratory analysis    Patient tolerance: patient tolerated the procedure well with no immediate complications  Dressing:  Sterile dressing applied    Large joint arthrocentesis: R knee  Universal Protocol:  Consent: Verbal consent obtained  Risks and benefits: risks, benefits and alternatives were discussed  Consent given by: patient  Time out: Immediately prior to procedure a "time out" was called to verify the correct patient, procedure, equipment, support staff and site/side marked as required    Timeout called at: 5/16/2022 8:54 AM   Patient understanding: patient states understanding of the procedure being performed  Site marked: the operative site was marked  Supporting Documentation  Indications: pain   Procedure Details  Location: knee - R knee  Preparation: Patient was prepped and draped in the usual sterile fashion  Needle size: 18 G  Approach: superolateral   Medications administered: 4 mL lidocaine 1 %; 20 mg triamcinolone acetonide 40 mg/mL    Patient tolerance: patient tolerated the procedure well with no immediate complications  Dressing:  Sterile dressing applied              Scribe Attestation    I,:  Emani Guido am acting as a scribe while in the presence of the attending physician :       I,:  Ruby Albright DO personally performed the services described in this documentation    as scribed in my presence :

## 2022-05-23 ENCOUNTER — TELEPHONE (OUTPATIENT)
Dept: OBGYN CLINIC | Facility: HOSPITAL | Age: 62
End: 2022-05-23

## 2022-05-23 NOTE — TELEPHONE ENCOUNTER
Hello,    Please advise if the following patient can be forced onto the schedule:    Patient:  Braden Cadena  : 1960  MRN:   [id number]  Call back: [phone]  Reason for appointment f/u bilat thumbs  Requested doctor/location: Dr Chilango Ocampo      EMAILED [facility]

## 2022-06-06 ENCOUNTER — OFFICE VISIT (OUTPATIENT)
Dept: OBGYN CLINIC | Facility: HOSPITAL | Age: 62
End: 2022-06-06
Payer: COMMERCIAL

## 2022-06-06 VITALS
HEIGHT: 74 IN | SYSTOLIC BLOOD PRESSURE: 128 MMHG | DIASTOLIC BLOOD PRESSURE: 74 MMHG | WEIGHT: 213 LBS | HEART RATE: 82 BPM | BODY MASS INDEX: 27.34 KG/M2

## 2022-06-06 DIAGNOSIS — M18.12 ARTHRITIS OF CARPOMETACARPAL (CMC) JOINT OF LEFT THUMB: ICD-10-CM

## 2022-06-06 DIAGNOSIS — M18.11 ARTHRITIS OF CARPOMETACARPAL (CMC) JOINT OF RIGHT THUMB: ICD-10-CM

## 2022-06-06 DIAGNOSIS — G56.02 CARPAL TUNNEL SYNDROME ON LEFT: Primary | ICD-10-CM

## 2022-06-06 PROCEDURE — 3008F BODY MASS INDEX DOCD: CPT | Performed by: PHYSICIAN ASSISTANT

## 2022-06-06 PROCEDURE — 99214 OFFICE O/P EST MOD 30 MIN: CPT | Performed by: PHYSICIAN ASSISTANT

## 2022-06-06 PROCEDURE — 20600 DRAIN/INJ JOINT/BURSA W/O US: CPT | Performed by: PHYSICIAN ASSISTANT

## 2022-06-06 PROCEDURE — 1036F TOBACCO NON-USER: CPT | Performed by: PHYSICIAN ASSISTANT

## 2022-06-06 RX ORDER — LIDOCAINE HYDROCHLORIDE 10 MG/ML
2 INJECTION, SOLUTION INFILTRATION; PERINEURAL
Status: COMPLETED | OUTPATIENT
Start: 2022-06-06 | End: 2022-06-06

## 2022-06-06 RX ORDER — TRIAMCINOLONE ACETONIDE 40 MG/ML
20 INJECTION, SUSPENSION INTRA-ARTICULAR; INTRAMUSCULAR
Status: COMPLETED | OUTPATIENT
Start: 2022-06-06 | End: 2022-06-06

## 2022-06-06 RX ADMIN — TRIAMCINOLONE ACETONIDE 20 MG: 40 INJECTION, SUSPENSION INTRA-ARTICULAR; INTRAMUSCULAR at 16:07

## 2022-06-06 RX ADMIN — LIDOCAINE HYDROCHLORIDE 2 ML: 10 INJECTION, SOLUTION INFILTRATION; PERINEURAL at 16:07

## 2022-06-06 NOTE — PROGRESS NOTES
CHIEF COMPLAINT:  Chief Complaint   Patient presents with    Right Thumb - Pain    Left Thumb - Pain       SUBJECTIVE:  Harlan Reyna is a 57y o  year old male who presents for follow-up regarding bilat thumb CMC arthritis  He is also complaining of clicking and locking in his right long finger  He states it only occasionally click 1st thing in the morning  He denies any pain  He also notes some numbness and tingling into the thumb, index and long finger on the left side  He states it has been coming and going for a period of time  His last cortisone injection for bilateral thumb CMC arthritis was performed on 05/07/2021  He states he would like to try repeat cortisone injections for this issue        PAST MEDICAL HISTORY:  Past Medical History:   Diagnosis Date    BPH with urinary obstruction     AND OTHER LOWER URINARY TRACT SYMPTOMS     Chronic pain disorder     Detached retina, right 2015    Elevated prostate specific antigen (PSA) 2017    Feeling of incomplete bladder emptying     History of hypertension     History of nocturia     Hypertension     Low back pain     Neuroma of foot 2017    Prostate cancer ([address] ) 2017    Weak urinary stream        PAST SURGICAL HISTORY:  Past Surgical History:   Procedure Laterality Date    BACK SURGERY  2008    EYE SURGERY      REPAIR DETACHED RETINA     EYE SURGERY Right     HERNIA REPAIR      LAMINECTOMY      LUMBAR LAMINECTOMY Bilateral     L4-5    PROSTATE BIOPSY Bilateral 2017    SPINE SURGERY      TONSILLECTOMY      VASECTOMY      SURGERY VAS DEFERENS        FAMILY HISTORY:  Family History   Problem Relation Age of Onset    Hypertension Mother     Peripheral vascular disease Mother     Dementia Father        SOCIAL HISTORY:  Social History     Tobacco Use    Smoking status: Never Smoker    Smokeless tobacco: Never Used   Vaping Use    Vaping Use: Never used   Substance Use Topics    Alcohol use: Not Currently     Alcohol/week: 3 0 standard drinks     Types: 3 Cans of beer per week    Drug use: No       MEDICATIONS:    Current Outpatient Medications:     diclofenac (VOLTAREN) 75 mg EC tablet, Take 1 tablet (75 mg total) by mouth 2 (two) times a day PRN for pain, Disp: 60 tablet, Rfl: 1    valsartan (DIOVAN) 80 mg tablet, take 2 tablets by mouth once daily, Disp: 60 tablet, Rfl: 3    colchicine (COLCRYS) 0 6 mg tablet, Take 1 tablet (0 6 mg total) by mouth daily, Disp: 30 tablet, Rfl: 3    hydroxychloroquine (PLAQUENIL) 200 mg tablet, Take 1 tablet (200 mg total) by mouth 2 (two) times a day, Disp: 60 tablet, Rfl: 3    Multiple Vitamin (multivitamin) tablet, Take 1 tablet by mouth daily, Disp: , Rfl:     ALLERGIES:  No Known Allergies    REVIEW OF SYSTEMS:  Review of Systems  ROS:   General: no fever, no chills  HEENT:  No loss of hearing or eyesight problems  Eyes:  No red eyes  Respiratory:  No coughing, shortness of breath or wheezing  Cardiovascular:  No chest pain, no palpitations  GI:  Abdomen soft nontender, denies nausea  Endocrine:  No muscle weakness, no frequent urination, no excessive thirst  Urinary:  No dysuria, no incontinence  Musculoskeletal: see HPI and PE  SKIN:  No skin rash, no dry skin  Neurological:  No headaches, no confusion  Psychiatric:  No suicide thoughts, no anxiety, no depression  Review of all other systems is negative    VITALS:  Vitals:    06/06/22 1533   BP: 128/74   Pulse: 82       LABS:  HgA1c:   Lab Results   Component Value Date    HGBA1C 6 3 (H) 06/15/2021     BMP:   Lab Results   Component Value Date    CALCIUM 9 9 12/28/2021     01/20/2016    K 4 4 12/28/2021    CO2 29 12/28/2021     12/28/2021    BUN 20 12/28/2021    CREATININE 1 24 12/28/2021       _____________________________________________________  PHYSICAL EXAMINATION:  General: well developed and well nourished, alert, oriented times 3 and appears comfortable  Psychiatric: Normal  HEENT: Trachea Midline, No torticollis  Pulmonary: No audible wheezing or respiratory distress   Skin: No masses, erythema, lacerations, fluctation, ulcerations  Neurovascular: Sensation Intact to the Median, Ulnar, Radial Nerve, Motor Intact to the Median, Ulnar, Radial Nerve and Pulses Intact    MUSCULOSKELETAL EXAMINATION:  Bilateral CMC Exam:  No adduction contracture  No hyperextension deformity of MCP joint  Positive localized tenderness over radial and dorsal aspect of thumb (CMC joint)  Grind test is Positive for pain and Positive for crepitus  No triggering or tenderness over the A1 pulley  No pain with Finkelsteins maneuver     Left Carpal Tunnel Exam:    Negative thenar atrophy  Negative phalen's test  Positive carpal tunnel compression  Negative tinels over median nerve at the wrist   Opposition strength 5/5  Abduction strength 5/5           ___________________________________________________  STUDIES REVIEWED:  No studies reviewed  PROCEDURES PERFORMED:  Small joint arthrocentesis: R thumb CMC  [place] Protocol:  Consent: Verbal consent obtained  Risks and benefits: risks, benefits and alternatives were discussed  Consent given by: patient  Time out: Immediately prior to procedure a "time out" was called to verify the correct patient, procedure, equipment, support staff and site/side marked as required    Patient understanding: patient states understanding of the procedure being performed  Patient consent: the patient's understanding of the procedure matches consent given  Site marked: the operative site was marked  Patient identity confirmed: verbally with patient    Supporting Documentation  Indications: pain   Procedure Details  Location: thumb - R thumb CMC  Preparation: Patient was prepped and draped in the usual sterile fashion  Needle size: 25 G  Approach: dorsal  Medications administered: 20 mg triamcinolone acetonide 40 mg/mL; 2 mL lidocaine 1 %    Patient tolerance: patient tolerated the procedure well with no immediate complications  Dressing:  Sterile dressing applied    Small joint arthrocentesis: L thumb CMC  [place] Protocol:  Consent: Verbal consent obtained  Risks and benefits: risks, benefits and alternatives were discussed  Consent given by: patient  Time out: Immediately prior to procedure a "time out" was called to verify the correct patient, procedure, equipment, support staff and site/side marked as required  Patient understanding: patient states understanding of the procedure being performed  Patient consent: the patient's understanding of the procedure matches consent given  Site marked: the operative site was marked  Patient identity confirmed: verbally with patient    Supporting Documentation  Indications: pain   Procedure Details  Location: thumb - L thumb CMC  Preparation: Patient was prepped and draped in the usual sterile fashion  Needle size: 25 G  Approach: dorsal  Medications administered: 20 mg triamcinolone acetonide 40 mg/mL; 2 mL lidocaine 1 %    Patient tolerance: patient tolerated the procedure well with no immediate complications  Dressing:  Sterile dressing applied             _____________________________________________________  ASSESSMENT/PLAN:      Diagnoses and all orders for this visit:    Carpal tunnel syndrome on left  · We will order an ultrasound of the left wrist to evaluate for carpal tunnel  · He states that he does not have symptoms at nighttime and he does not wake up from sleep  · He can take vitamin B6   · He will follow up after ultrasound to go over test results    Bilateral thumb CMC arthritis  · Patient was given bilateral thumb CMC cortisone injections today  He tolerated well      · He was advised that we can repeat the cortisone injection every 3-4 months as needed for pain  · He will follow up after his ultrasound to go over test results    Right long trigger finger  · Patient was advised to use heat massage over the area as demonstrated in the office   · He was advised that we can try cortisone injection if this does not work  · He will follow up after ultrasound for re-evaluation        Follow Up:  Return for after ultrasound with Dr Clint Mccullough   To Do Next Visit:  Re-evaluation of current issue    General Discussions:  [facility] Arthritis: The anatomy and physiology of carpometacarpal joint arthritis was discussed with the patient today in the office  Deterioration of the articular cartilage eventually leads to hypermobility at the thumb [facility] joint, resulting in joint subluxation, osteophyte formation, cystic changes within the trapezium and base of the first metacarpal, as well as subchondral sclerosis  Eventually, pain, limited mobility, and compensatory hyperextension at the metacarpophalangeal joint may develop  While normal activity and usage of the thumb joint may provide a painful experience to the patient, this typically does not result in damage to the thumb or hand  Treatment options include resting thumb spica splints to decreased joint edema, pain, and inflammation  Therapy exercises to strengthen the thenar musculature may relieve pain, but do not alter the overall continued development of osteoarthritis  Oral medications, topical medications, corticosteroid injections may decrease pain and increase overall function  Eventually, approximately 5% of patients may require surgical intervention  Portions of the record may have been created with voice recognition software  Occasional wrong word or "sound a like" substitutions may have occurred due to the inherent limitations of voice recognition software  Read the chart carefully and recognize, using context, where substitutions have occurred

## 2022-07-13 ENCOUNTER — HOSPITAL ENCOUNTER (OUTPATIENT)
Dept: ULTRASOUND IMAGING | Facility: HOSPITAL | Age: 62
Discharge: HOME | End: 2022-07-13
Payer: COMMERCIAL

## 2022-07-13 DIAGNOSIS — G56.02 CARPAL TUNNEL SYNDROME ON LEFT: ICD-10-CM

## 2022-07-13 PROCEDURE — 76882 US LMTD JT/FCL EVL NVASC XTR: CPT

## 2022-07-20 ENCOUNTER — TELEMEDICINE (OUTPATIENT)
Dept: OBGYN CLINIC | Facility: CLINIC | Age: 62
End: 2022-07-20
Payer: COMMERCIAL

## 2022-07-20 DIAGNOSIS — G56.02 CARPAL TUNNEL SYNDROME ON LEFT: Primary | ICD-10-CM

## 2022-07-20 PROCEDURE — 99212 OFFICE O/P EST SF 10 MIN: CPT | Performed by: SURGERY

## 2022-07-20 NOTE — PROGRESS NOTES
Virtual Regular Visit    Verification of patient location:    Patient is located in the following state in which I hold an active license [place]      Assessment/Plan:    Patient will obtain wrist cock-up splint  Ultrasound result  reviewed - consistent with carpal tunnel syndrome on the left  Patient will initiate splinting protocol after his vacation  Will call back and follow up as needed        Problem List Items Addressed This Visit    None              Reason for visit is   Chief Complaint   Patient presents with    Virtual Regular Visit        Encounter provider Mariam Dong MD    Provider located at [address]  [address] [address] [phone]      Recent Visits  No visits were found meeting these conditions  Showing recent visits within past 7 days and meeting all other requirements  Future Appointments  No visits were found meeting these conditions  Showing future appointments within next 150 days and meeting all other requirements       The patient was identified by name and date of birth  Franky Opitz was informed that this is a telemedicine visit and that the visit is being conducted through [address] [place] [address] [facility] [employer] and patient was informed that this is a secure, HIPAA-compliant platform  He agrees to proceed     My office door was closed  No one else was in the room  He acknowledged consent and understanding of privacy and security of the video platform  The patient has agreed to participate and understands they can discontinue the visit at any time  Patient is aware this is a billable service  Libia Chew is a 57 y o  male recently had bilateral CMC injections was doing well  was getting paresthesias in the radial 3 digits ultrasound obtained  He is not currently bracing  HPI   No new issues, had near complete relief of pain at [facility] joint    Numbness and tingling does not wake him up from sleep    Past Medical History:   Diagnosis Date    BPH with urinary obstruction     AND OTHER LOWER URINARY TRACT SYMPTOMS     Chronic pain disorder     Detached retina, right 2015    Elevated prostate specific antigen (PSA) 2017    Feeling of incomplete bladder emptying     History of hypertension     History of nocturia     Hypertension     Low back pain     Neuroma of foot 2017    Prostate cancer ([address] ) 2017    Weak urinary stream        Past Surgical History:   Procedure Laterality Date    BACK SURGERY  2008    EYE SURGERY      REPAIR DETACHED RETINA     EYE SURGERY Right     HERNIA REPAIR      LAMINECTOMY      LUMBAR LAMINECTOMY Bilateral     L4-5    PROSTATE BIOPSY Bilateral 2017    SPINE SURGERY      TONSILLECTOMY      VASECTOMY      SURGERY VAS DEFERENS        Current Outpatient Medications   Medication Sig Dispense Refill    colchicine (COLCRYS) 0 6 mg tablet Take 1 tablet (0 6 mg total) by mouth daily 30 tablet 3    diclofenac (VOLTAREN) 75 mg EC tablet Take 1 tablet (75 mg total) by mouth 2 (two) times a day PRN for pain 60 tablet 1    hydroxychloroquine (PLAQUENIL) 200 mg tablet Take 1 tablet (200 mg total) by mouth 2 (two) times a day 60 tablet 3    Multiple Vitamin (multivitamin) tablet Take 1 tablet by mouth daily      valsartan (DIOVAN) 80 mg tablet take 2 tablets by mouth once daily 60 tablet 3     No current facility-administered medications for this visit  No Known Allergies      Video Exam    There were no vitals filed for this visit  VIRTUAL VISIT [facility] verbally agrees to participate in [employer]  Pt is aware that [employer] could be limited without vital signs or the ability to perform a full hands-on physical [place] understands he or the provider may request at any time to terminate the video visit and request the patient to seek care or treatment in person

## 2022-08-15 DIAGNOSIS — I10 ESSENTIAL HYPERTENSION: ICD-10-CM

## 2022-08-15 RX ORDER — VALSARTAN 80 MG/1
TABLET ORAL
Qty: 60 TABLET | Refills: 3 | Status: SHIPPED | OUTPATIENT
Start: 2022-08-15

## 2022-08-16 ENCOUNTER — TELEPHONE (OUTPATIENT)
Dept: FAMILY MEDICINE CLINIC | Facility: CLINIC | Age: 62
End: 2022-08-16

## 2022-08-16 DIAGNOSIS — R73.02 IMPAIRED GLUCOSE TOLERANCE: Primary | ICD-10-CM

## 2022-08-16 NOTE — TELEPHONE ENCOUNTER
Patient would like blood work done before his physical on 9/7/22 Please add order and mail to patient

## 2022-09-01 ENCOUNTER — APPOINTMENT (OUTPATIENT)
Dept: LAB | Facility: MEDICAL CENTER | Age: 62
End: 2022-09-01
Payer: COMMERCIAL

## 2022-09-01 DIAGNOSIS — C61 PROSTATE CA (HCC): ICD-10-CM

## 2022-09-01 DIAGNOSIS — R73.02 IMPAIRED GLUCOSE TOLERANCE: ICD-10-CM

## 2022-09-01 LAB
ALBUMIN SERPL BCP-MCNC: 3.8 G/DL (ref 3.5–5)
ALP SERPL-CCNC: 75 U/L (ref 46–116)
ALT SERPL W P-5'-P-CCNC: 50 U/L (ref 12–78)
ANION GAP SERPL CALCULATED.3IONS-SCNC: 7 MMOL/L (ref 4–13)
AST SERPL W P-5'-P-CCNC: 32 U/L (ref 5–45)
BILIRUB SERPL-MCNC: 0.72 MG/DL (ref 0.2–1)
BUN SERPL-MCNC: 21 MG/DL (ref 5–25)
CALCIUM SERPL-MCNC: 9 MG/DL (ref 8.3–10.1)
CHLORIDE SERPL-SCNC: 107 MMOL/L (ref 96–108)
CHOLEST SERPL-MCNC: 184 MG/DL
CO2 SERPL-SCNC: 25 MMOL/L (ref 21–32)
CREAT SERPL-MCNC: 1.33 MG/DL (ref 0.6–1.3)
CREAT UR-MCNC: 195 MG/DL
EST. AVERAGE GLUCOSE BLD GHB EST-MCNC: 131 MG/DL
GFR SERPL CREATININE-BSD FRML MDRD: 56 ML/MIN/1.73SQ M
GLUCOSE P FAST SERPL-MCNC: 111 MG/DL (ref 65–99)
HBA1C MFR BLD: 6.2 %
HDLC SERPL-MCNC: 59 MG/DL
LDLC SERPL CALC-MCNC: 106 MG/DL (ref 0–100)
MICROALBUMIN UR-MCNC: 9.1 MG/L (ref 0–20)
MICROALBUMIN/CREAT 24H UR: 5 MG/G CREATININE (ref 0–30)
NONHDLC SERPL-MCNC: 125 MG/DL
POTASSIUM SERPL-SCNC: 4.7 MMOL/L (ref 3.5–5.3)
PROT SERPL-MCNC: 7.6 G/DL (ref 6.4–8.4)
PSA SERPL-MCNC: 1.6 NG/ML (ref 0–4)
SODIUM SERPL-SCNC: 139 MMOL/L (ref 135–147)
TRIGL SERPL-MCNC: 94 MG/DL
TSH SERPL DL<=0.05 MIU/L-ACNC: 3.11 UIU/ML (ref 0.45–4.5)

## 2022-09-01 PROCEDURE — 80061 LIPID PANEL: CPT

## 2022-09-01 PROCEDURE — 83036 HEMOGLOBIN GLYCOSYLATED A1C: CPT

## 2022-09-01 PROCEDURE — 82570 ASSAY OF URINE CREATININE: CPT | Performed by: FAMILY MEDICINE

## 2022-09-01 PROCEDURE — 84443 ASSAY THYROID STIM HORMONE: CPT

## 2022-09-01 PROCEDURE — 36415 COLL VENOUS BLD VENIPUNCTURE: CPT

## 2022-09-01 PROCEDURE — 84153 ASSAY OF PSA TOTAL: CPT

## 2022-09-01 PROCEDURE — 82043 UR ALBUMIN QUANTITATIVE: CPT | Performed by: FAMILY MEDICINE

## 2022-09-01 PROCEDURE — 80053 COMPREHEN METABOLIC PANEL: CPT

## 2022-09-07 ENCOUNTER — OFFICE VISIT (OUTPATIENT)
Dept: FAMILY MEDICINE CLINIC | Facility: CLINIC | Age: 62
End: 2022-09-07
Payer: COMMERCIAL

## 2022-09-07 VITALS
WEIGHT: 213 LBS | SYSTOLIC BLOOD PRESSURE: 110 MMHG | HEART RATE: 82 BPM | DIASTOLIC BLOOD PRESSURE: 70 MMHG | HEIGHT: 74 IN | BODY MASS INDEX: 27.34 KG/M2

## 2022-09-07 DIAGNOSIS — I10 PRIMARY HYPERTENSION: ICD-10-CM

## 2022-09-07 DIAGNOSIS — C61 PROSTATE CARCINOMA (HCC): ICD-10-CM

## 2022-09-07 DIAGNOSIS — R73.02 IMPAIRED GLUCOSE TOLERANCE: ICD-10-CM

## 2022-09-07 DIAGNOSIS — R53.83 FATIGUE, UNSPECIFIED TYPE: ICD-10-CM

## 2022-09-07 DIAGNOSIS — Z12.11 SCREENING FOR COLON CANCER: ICD-10-CM

## 2022-09-07 DIAGNOSIS — Z00.00 WELL ADULT EXAM: Primary | ICD-10-CM

## 2022-09-07 PROBLEM — M25.512 ACUTE PAIN OF LEFT SHOULDER: Status: RESOLVED | Noted: 2020-05-12 | Resolved: 2022-09-07

## 2022-09-07 PROCEDURE — 99396 PREV VISIT EST AGE 40-64: CPT | Performed by: FAMILY MEDICINE

## 2022-09-07 PROCEDURE — 3725F SCREEN DEPRESSION PERFORMED: CPT | Performed by: FAMILY MEDICINE

## 2022-09-07 NOTE — PATIENT INSTRUCTIONS
Await lab, decrease Valsartan to 1/day to see if that decreases fatigue  Weight Management   AMBULATORY CARE:   Why it is important to manage your weight:  Being overweight increases your risk of health conditions such as heart disease, high blood pressure, type 2 diabetes, and certain types of cancer  It can also increase your risk for osteoarthritis, sleep apnea, and other respiratory problems  Aim for a slow, steady weight loss  Even a small amount of weight loss can lower your risk of health problems  Risks of being overweight:  Extra weight can cause many health problems, including the following:  Diabetes (high blood sugar level)    High blood pressure or high cholesterol    Heart disease    Stroke    Gallbladder or liver disease    Cancer of the colon, breast, prostate, liver, or kidney    Sleep apnea    Arthritis or gout    Screening  is done to check for health conditions before you have signs or symptoms  If you are 28to 79years old, your blood sugar level may be checked every 3 years for signs of prediabetes or diabetes  Your healthcare provider will check your blood pressure at each visit  High blood pressure can lead to a stroke or other problems  Your provider may check for signs of heart disease, cancer, or other health problems  How to lose weight safely:  A safe and healthy way to lose weight is to eat fewer calories and get regular exercise  You can lose up about 1 pound a week by decreasing the number of calories you eat by 500 calories each day  You can decrease calories by eating smaller portion sizes or by cutting out high-calorie foods  Read labels to find out how many calories are in the foods you eat  You can also burn calories with exercise such as walking, swimming, or biking  You will be more likely to keep weight off if you make these changes part of your lifestyle  Exercise at least 30 minutes per day on most days of the week   You can also fit in more physical activity by taking the stairs instead of the elevator or parking farther away from stores  Ask your healthcare provider about the best exercise plan for you  Healthy meal plan for weight management:  A healthy meal plan includes a variety of foods, contains fewer calories, and helps you stay healthy  A healthy meal plan includes the following:     Eat whole-grain foods more often  A healthy meal plan should contain fiber  Fiber is the part of grains, fruits, and vegetables that is not broken down by your body  Whole-grain foods are healthy and provide extra fiber in your diet  Some examples of whole-grain foods are whole-wheat breads and pastas, oatmeal, brown rice, and bulgur  Eat a variety of vegetables every day  Include dark, leafy greens such as spinach, kale, dax greens, and mustard greens  Eat yellow and orange vegetables such as carrots, sweet potatoes, and winter squash  Eat a variety of fruits every day  Choose fresh or canned fruit (canned in its own juice or light syrup) instead of juice  Fruit juice has very little or no fiber  Eat low-fat dairy foods  Drink fat-free (skim) milk or 1% milk  Eat fat-free yogurt and low-fat cottage cheese  Try low-fat cheeses such as mozzarella and other reduced-fat cheeses  Choose meat and other protein foods that are low in fat  Choose beans or other legumes such as split peas or lentils  Choose fish, skinless poultry (chicken or turkey), or lean cuts of red meat (beef or pork)  Before you cook meat or poultry, cut off any visible fat  Use less fat and oil  Try baking foods instead of frying them  Add less fat, such as margarine, sour cream, regular salad dressing and mayonnaise to foods  Eat fewer high-fat foods  Some examples of high-fat foods include french fries, doughnuts, ice cream, and cakes  Eat fewer sweets  Limit foods and drinks that are high in sugar  This includes candy, cookies, regular soda, and sweetened drinks      Ways to decrease calories:   Eat smaller portions  Use a small plate with smaller servings  Do not eat second helpings  When you eat at a restaurant, ask for a box and place half of your meal in the box before you eat  Share an entrée with someone else  Replace high-calorie snacks with healthy, low-calorie snacks  Choose fresh fruit, vegetables, fat-free rice cakes, or air-popped popcorn instead of potato chips, nuts, or chocolate  Choose water or calorie-free drinks instead of soda or sweetened drinks  Do not shop for groceries when you are hungry  You may be more likely to make unhealthy food choices  Take a grocery list of healthy foods and shop after you have eaten  Eat regular meals  Do not skip meals  Skipping meals can lead to overeating later in the day  This can make it harder for you to lose weight  Eat a healthy snack in place of a meal if you do not have time to eat a regular meal  Talk with a dietitian to help you create a meal plan and schedule that is right for you  Other things to consider as you try to lose weight:   Be aware of situations that may give you the urge to overeat, such as eating while watching television  Find ways to avoid these situations  For example, read a book, go for a walk, or do crafts  Meet with a weight loss support group or friends who are also trying to lose weight  This may help you stay motivated to continue working on your weight loss goals  © Copyright [employer] 2022 Information is for End User's use only and may not be sold, redistributed or otherwise used for commercial purposes  All illustrations and images included in CareNotes® are the copyrighted property of A [employer]  or Rachelle Rockwell   The above information is an  only  It is not intended as medical advice for individual conditions or treatments   Talk to your doctor, nurse or pharmacist before following any medical regimen to see if it is safe and effective for you

## 2022-09-07 NOTE — PROGRESS NOTES
[address] [facility] PRIMARY CARE    NAME: Nydia Torres  AGE: 60 y o  SEX: male  : 1960     DATE: 2022     Assessment and Plan:     Problem List Items Addressed This Visit        Cardiovascular and Mediastinum    Hypertension     BP stable            Genitourinary    Prostate carcinoma [facility])     Sees urology           Other Visit Diagnoses     Well adult exam    -  Primary    Impaired glucose tolerance        Relevant Orders    Comprehensive metabolic panel    Hemoglobin A1C    Screening for colon cancer        Relevant Orders    Ambulatory referral for colonoscopy    Fatigue, unspecified type        Relevant Orders    CBC and differential    C-reactive protein    BMI 27 0-27 9,adult              Immunizations and preventive care screenings were discussed with patient today  Appropriate education was printed on patient's after visit summary  Counseling:  Alcohol/drug use: discussed moderation in alcohol intake, the recommendations for healthy alcohol use, and avoidance of illicit drug use  Dental Health: discussed importance of regular tooth brushing, flossing, and dental visits  Injury prevention: discussed safety/seat belts, safety helmets, smoke detectors, carbon dioxide detectors, and smoking near bedding or upholstery  Sexual health: discussed sexually transmitted diseases, partner selection, use of condoms, avoidance of unintended pregnancy, and contraceptive alternatives  Exercise: the importance of regular exercise/physical activity was discussed  Recommend exercise 3-5 times per week for at least 30 minutes  Return in about 6 months (around 3/7/2023)  Chief Complaint:     Chief Complaint   Patient presents with    Physical Exam     Pt is present today for annual physical exam and lab test review  Pt has some question regarding colonoscopy and influenza vaccine        History of Present Illness:     Adult Annual Physical   Patient here for a comprehensive physical exam  The patient reports problems - fatigue,joint aches  Diet and Physical Activity  Diet/Nutrition: well balanced diet, heart healthy (low sodium) diet, consuming 3-5 servings of fruits/vegetables daily and adequate fiber intake  Exercise: walking, 1-2 hours on average and climbing steps at work  Depression Screening  PHQ-2/9 Depression Screening    Little interest or pleasure in doing things: 0 - not at all  Feeling down, depressed, or hopeless: 0 - not at all  PHQ-2 Score: 0  PHQ-2 Interpretation: Negative depression screen       General Health  Sleep: sleeps well, gets 7-8 hours of sleep on average, unrefreshing sleep and wife  up several times to use bathroom  Hearing: normal - bilateral   Vision: goes for regular eye exams, most recent eye exam <1 year ago and wears glasses  Had retinal detachment on r  Dental: regular dental visits and brushes teeth twice daily   Health  Symptoms include: none     Review of Systems:     Review of Systems   Constitutional: Positive for appetite change and fatigue  Negative for activity change  Respiratory: Negative for shortness of breath  Cardiovascular: Negative for chest pain  Musculoskeletal: Positive for arthralgias  Neurological: Negative for dizziness, weakness and headaches  Psychiatric/Behavioral: The patient is not nervous/anxious           Irritable  especially  With dogs      Past Medical History:     Past Medical History:   Diagnosis Date    BPH with urinary obstruction     AND OTHER LOWER URINARY TRACT SYMPTOMS     Chronic pain disorder     Detached retina, right 2015    Elevated prostate specific antigen (PSA) 2017    Feeling of incomplete bladder emptying     History of hypertension     History of nocturia     Hypertension     Low back pain     Neuroma of foot 2017    Prostate cancer ([facility] [address] ) 2017    Weak urinary stream       Past Surgical History:     Past Surgical History:   Procedure Laterality Date    BACK SURGERY  2008    EYE SURGERY      REPAIR DETACHED RETINA     EYE SURGERY Right     HERNIA REPAIR      LAMINECTOMY      LUMBAR LAMINECTOMY Bilateral     L4-5    PROSTATE BIOPSY Bilateral 2017    SPINE SURGERY      TONSILLECTOMY      VASECTOMY      SURGERY VAS DEFERENS       Family History:     Family History   Problem Relation Age of Onset    Hypertension Mother     Peripheral vascular disease Mother     Dementia Father       Social History:     Social History     Socioeconomic History    Marital status: /Civil Union     Spouse name: None    Number of children: 2    Years of education: None    Highest education level: None   Occupational History    None   Tobacco Use    Smoking status: Never Smoker    Smokeless tobacco: Never Used   Vaping Use    Vaping Use: Never used   Substance and Sexual Activity    Alcohol use: Not Currently     Alcohol/week: 3 0 standard drinks     Types: 3 Cans of beer per week    Drug use: No    Sexual activity: Yes     Partners: Female     Birth control/protection: Male Sterilization   Other Topics Concern    None   Social History Narrative    3/4 time          TWO CHILDREN , 2 stepsons     Social Determinants of Health     Financial Resource Strain: Not on file   Food Insecurity: Not on file   Transportation Needs: Not on file   Physical Activity: Not on file   Stress: Not on file   Social Connections: Not on file   Intimate Partner Violence: Not on file   Housing Stability: Not on file      Current Medications:     Current Outpatient Medications   Medication Sig Dispense Refill    diclofenac (VOLTAREN) 75 mg EC tablet Take 1 tablet (75 mg total) by mouth 2 (two) times a day PRN for pain 60 tablet 1    valsartan (DIOVAN) 80 mg tablet take 2 tablets by mouth once daily 60 tablet 3     No current facility-administered medications for this visit        Allergies:     No Known Allergies Physical Exam:     /70 (BP Location: Left arm, Patient Position: Sitting, Cuff Size: Standard)   Pulse 82   Ht 6' 2" (1 88 m)   Wt 96 6 kg (213 lb)   BMI 27 35 kg/m²     Physical Exam  Vitals reviewed  Constitutional:       Appearance: Normal appearance  Cardiovascular:      Rate and Rhythm: Normal rate and regular rhythm  Pulses: Normal pulses  Heart sounds: Normal heart sounds  Pulmonary:      Effort: Pulmonary effort is normal       Breath sounds: Normal breath sounds  Musculoskeletal:      Right lower leg: No edema  Left lower leg: No edema  Lymphadenopathy:      Cervical: No cervical adenopathy  Neurological:      Mental Status: He is alert  Psychiatric:         Mood and Affect: Mood normal           Fozia Elise MD  [facility] PRIMARY CARE  BMI Counseling: Body mass index is 27 35 kg/m²  The BMI is above normal  Nutrition recommendations include reducing portion sizes, decreasing overall calorie intake, 3-5 servings of fruits/vegetables daily, reducing fast food intake and consuming healthier snacks  Exercise recommendations include exercising 3-5 times per week

## 2022-09-12 ENCOUNTER — TELEPHONE (OUTPATIENT)
Dept: OBGYN CLINIC | Facility: HOSPITAL | Age: 62
End: 2022-09-12

## 2022-09-12 DIAGNOSIS — M16.12 PRIMARY OSTEOARTHRITIS OF ONE HIP, LEFT: ICD-10-CM

## 2022-09-12 DIAGNOSIS — M16.11 PRIMARY OSTEOARTHRITIS OF ONE HIP, RIGHT: ICD-10-CM

## 2022-09-12 RX ORDER — DICLOFENAC SODIUM 75 MG/1
75 TABLET, DELAYED RELEASE ORAL 2 TIMES DAILY PRN
Qty: 60 TABLET | Refills: 1 | Status: SHIPPED | OUTPATIENT
Start: 2022-09-12

## 2022-09-12 NOTE — TELEPHONE ENCOUNTER
DR Junior Milligan  RE: Diclofenac not refilled  CB: [phone]    Patient called stating that he called pharmacy in regard to his Diclofenac refill and he was told to call the office   Caller asked to speak to clinical team

## 2022-09-13 ENCOUNTER — OFFICE VISIT (OUTPATIENT)
Dept: OBGYN CLINIC | Facility: MEDICAL CENTER | Age: 62
End: 2022-09-13
Payer: COMMERCIAL

## 2022-09-13 VITALS
DIASTOLIC BLOOD PRESSURE: 82 MMHG | HEART RATE: 77 BPM | HEIGHT: 74 IN | WEIGHT: 215.5 LBS | BODY MASS INDEX: 27.66 KG/M2 | SYSTOLIC BLOOD PRESSURE: 129 MMHG

## 2022-09-13 DIAGNOSIS — M17.11 PRIMARY OSTEOARTHRITIS OF RIGHT KNEE: ICD-10-CM

## 2022-09-13 DIAGNOSIS — M16.12 PRIMARY OSTEOARTHRITIS OF ONE HIP, LEFT: ICD-10-CM

## 2022-09-13 DIAGNOSIS — M25.461 EFFUSION OF RIGHT KNEE: Primary | ICD-10-CM

## 2022-09-13 PROCEDURE — 20610 DRAIN/INJ JOINT/BURSA W/O US: CPT | Performed by: ORTHOPAEDIC SURGERY

## 2022-09-13 PROCEDURE — 99213 OFFICE O/P EST LOW 20 MIN: CPT | Performed by: ORTHOPAEDIC SURGERY

## 2022-09-13 RX ORDER — LIDOCAINE HYDROCHLORIDE 10 MG/ML
4 INJECTION, SOLUTION INFILTRATION; PERINEURAL
Status: COMPLETED | OUTPATIENT
Start: 2022-09-13 | End: 2022-09-13

## 2022-09-13 RX ORDER — LIDOCAINE HYDROCHLORIDE 10 MG/ML
2 INJECTION, SOLUTION INFILTRATION; PERINEURAL
Status: COMPLETED | OUTPATIENT
Start: 2022-09-13 | End: 2022-09-13

## 2022-09-13 RX ORDER — TRIAMCINOLONE ACETONIDE 40 MG/ML
40 INJECTION, SUSPENSION INTRA-ARTICULAR; INTRAMUSCULAR
Status: COMPLETED | OUTPATIENT
Start: 2022-09-13 | End: 2022-09-13

## 2022-09-13 RX ADMIN — LIDOCAINE HYDROCHLORIDE 4 ML: 10 INJECTION, SOLUTION INFILTRATION; PERINEURAL at 17:02

## 2022-09-13 RX ADMIN — TRIAMCINOLONE ACETONIDE 40 MG: 40 INJECTION, SUSPENSION INTRA-ARTICULAR; INTRAMUSCULAR at 17:02

## 2022-09-13 RX ADMIN — LIDOCAINE HYDROCHLORIDE 2 ML: 10 INJECTION, SOLUTION INFILTRATION; PERINEURAL at 17:02

## 2022-09-13 NOTE — PROGRESS NOTES
Assessment/Plan:  1  Effusion of right knee    2  Primary osteoarthritis of one hip, left    3  Primary osteoarthritis of right knee      Orders Placed This Encounter   Procedures    Large joint arthrocentesis: R knee    Large joint arthrocentesis: R knee    FL spine and pain procedure     · Patient has severe bilateral hip osteoarthritis, with pain worse on his left  He also has right knee meniscal tear with moderate osteoarthritis  · Referral for FL injection with [facility] with provided during today's visit waking have a repeat injection of his left hip which he did receive relief from previously  Discussed pay attention to relief of this injection to see if pain coming from SIJ instead of hip  He does have a history of lumbar issues that may be referring pain to his hip also  · Aspiration right knee yielding 41mL of blood-tinged fluid was performed during today's visit  He also received a right knee steroid injection today  Patient should ice and avoid strenuous activity for 1-2 days if needed  Patient should avoid vaccines for 2 weeks if possible  If patient is diabetic should also monitor glucose over the next 7 to 10 days  · Normal appearance of aspiration; labs not needed  · Continue Diclofenac for pain as needed    Return if symptoms worsen or fail to improve  I answered all of the patient's questions during the visit and provided education of the patient's condition during the visit  The patient verbalized understanding of the information given and agrees with the plan  This note was dictated using VivaRay software  It may contain errors including improperly dictated words  Please contact physician directly for any questions  Subjective   Chief Complaint:   Chief Complaint   Patient presents with    Left Hip - Pain    Right Knee - Swelling       HPI  Dayna Diaz is a 60 y o  male who presents for follow up for left hip osteoarthritis and right knee osteoarthritis    He states that recently his left hip has been bothering him again with dull achy pain with periods of prolonged sitting  He did receive a ultrasound-guided injection on 05/03/2022 from Dr Christa Valles which did provide him significant relief of his symptoms and he would like a repeat injection  He also states that his right knee has been bothering him recently with increased swelling  He does have moderate osteoarthritis with meniscal tears  He states that his pain is worse in the morning and going up and down steps  He mentions an occasional clicking catching sensation  He previously has received cortisone injection and aspirations of his knee which have relieved his symptoms  He previously has seen Rheumatology for concern of pseudogout and was on medication which provided no relief of his swelling  He denies any new injury or trauma to his right knee or left hip  He denies any radiating symptoms  Review of Systems  ROS:    See HPI for musculoskeletal review     All other systems reviewed are negative     History:  Past Medical History:   Diagnosis Date    BPH with urinary obstruction     AND OTHER LOWER URINARY TRACT SYMPTOMS     Chronic pain disorder     Detached retina, right 2015    Elevated prostate specific antigen (PSA) 2017    Feeling of incomplete bladder emptying     History of hypertension     History of nocturia     Hypertension     Low back pain     Neuroma of foot 2017    Prostate cancer ([facility] [address] ) 2017    Weak urinary stream      Past Surgical History:   Procedure Laterality Date    BACK SURGERY  2008    EYE SURGERY      REPAIR DETACHED RETINA     EYE SURGERY Right     HERNIA REPAIR      LAMINECTOMY      LUMBAR LAMINECTOMY Bilateral     L4-5    PROSTATE BIOPSY Bilateral 2017    SPINE SURGERY      TONSILLECTOMY      VASECTOMY      SURGERY VAS DEFERENS      Social History   Social History     Substance and Sexual Activity   Alcohol Use Not Currently    Alcohol/week: 3 0 standard drinks    Types: 3 Cans of beer per week     Social History     Substance and Sexual Activity   Drug Use No     Social History     Tobacco Use   Smoking Status Never Smoker   Smokeless Tobacco Never Used     Family History:   Family History   Problem Relation Age of Onset    Hypertension Mother     Peripheral vascular disease Mother     Dementia Father        Current Outpatient Medications on File Prior to Visit   Medication Sig Dispense Refill    diclofenac (VOLTAREN) 75 mg EC tablet Take 1 tablet (75 mg total) by mouth 2 (two) times a day as needed (pain) Take with food  60 tablet 1    valsartan (DIOVAN) 80 mg tablet take 2 tablets by mouth once daily 60 tablet 3     No current facility-administered medications on file prior to visit  No Known Allergies     Objective     /82   Pulse 77   Ht 6' 2" (1 88 m)   Wt 97 8 kg (215 lb 8 oz)   BMI 27 67 kg/m²      PE:  AAOx 3  WDWN  Hearing intact, no drainage from eyes  no audible wheezing  no abdominal distension  LE compartments soft, skin intact    Ortho Exam:  left hip:   No dislocation/deformity  ROM: FF 0-100  ER 0-20  IR 0  No TTP over greater trochanter  No TTP over SIJ        Right knee  Moderate crepitus   Mild swelling  Mild effusion   No medial or lateral joint line tenderness   No patellar tenderness  AROM: 5-110        Imaging Studies: I have personally reviewed pertinent films in PACS   -XR bilateral hip:  Performed on 04/07/2022 demonstrates   Severe DJD Bilaterally   -X-ray right knee:  Performed on 11/01/2021 demonstrates moderate DJD and chondrocalcinosis   -MRI right knee: performed on 02/02/2022 demonstrates blunted appearance posterior horn medial meniscus likely complex degenerative tear as well as complex degenerative tear lateral meniscus  Moderate diffuse cartilage loss    Large joint arthrocentesis: R knee  Universal Protocol:  Consent: Verbal consent obtained  Written consent not obtained    Risks and benefits: risks, benefits and alternatives were discussed  Consent given by: patient  Timeout called at: 9/13/2022 4:58 PM   Patient understanding: patient states understanding of the procedure being performed  Patient consent: the patient's understanding of the procedure matches consent given  Site marked: the operative site was marked  Patient identity confirmed: verbally with patient    Supporting Documentation  Indications: pain and diagnostic evaluation   Procedure Details  Location: knee - R knee  Preparation: Patient was prepped and draped in the usual sterile fashion  Needle size: 18 G  Ultrasound guidance: no  Approach: lateral  Medications administered: 4 mL lidocaine 1 %    Aspirate amount: 41 mL  Aspirate: blood-tinged    Patient tolerance: patient tolerated the procedure well with no immediate complications  Dressing:  Sterile dressing applied    Large joint arthrocentesis: R knee  Universal Protocol:  Consent: Verbal consent obtained  Risks and benefits: risks, benefits and alternatives were discussed  Consent given by: patient  Time out: Immediately prior to procedure a "time out" was called to verify the correct patient, procedure, equipment, support staff and site/side marked as required    Timeout called at: 9/13/2022 4:59 PM   Patient understanding: patient states understanding of the procedure being performed  Patient consent: the patient's understanding of the procedure matches consent given  Site marked: the operative site was marked  Patient identity confirmed: verbally with patient    Supporting Documentation  Indications: pain and diagnostic evaluation   Procedure Details  Location: knee - R knee  Preparation: Patient was prepped and draped in the usual sterile fashion  Needle size: 22 G  Ultrasound guidance: no  Approach: anterolateral  Medications administered: 2 mL lidocaine 1 %; 40 mg triamcinolone acetonide 40 mg/mL    Patient tolerance: patient tolerated the procedure well with no immediate complications  Dressing:  Sterile dressing applied

## 2022-09-20 ENCOUNTER — APPOINTMENT (OUTPATIENT)
Dept: LAB | Facility: MEDICAL CENTER | Age: 62
End: 2022-09-20
Payer: COMMERCIAL

## 2022-09-20 DIAGNOSIS — R53.83 FATIGUE, UNSPECIFIED TYPE: ICD-10-CM

## 2022-09-20 LAB
BASOPHILS # BLD AUTO: 0.04 THOUSANDS/ΜL (ref 0–0.1)
BASOPHILS NFR BLD AUTO: 1 % (ref 0–1)
CRP SERPL QL: <3 MG/L
EOSINOPHIL # BLD AUTO: 0.27 THOUSAND/ΜL (ref 0–0.61)
EOSINOPHIL NFR BLD AUTO: 5 % (ref 0–6)
ERYTHROCYTE [DISTWIDTH] IN BLOOD BY AUTOMATED COUNT: 13.2 % (ref 11.6–15.1)
HCT VFR BLD AUTO: 44.6 % (ref 36.5–49.3)
HGB BLD-MCNC: 13.9 G/DL (ref 12–17)
IMM GRANULOCYTES # BLD AUTO: 0.02 THOUSAND/UL (ref 0–0.2)
IMM GRANULOCYTES NFR BLD AUTO: 0 % (ref 0–2)
LYMPHOCYTES # BLD AUTO: 2.45 THOUSANDS/ΜL (ref 0.6–4.47)
LYMPHOCYTES NFR BLD AUTO: 42 % (ref 14–44)
MCH RBC QN AUTO: 28.7 PG (ref 26.8–34.3)
MCHC RBC AUTO-ENTMCNC: 31.2 G/DL (ref 31.4–37.4)
MCV RBC AUTO: 92 FL (ref 82–98)
MONOCYTES # BLD AUTO: 0.58 THOUSAND/ΜL (ref 0.17–1.22)
MONOCYTES NFR BLD AUTO: 10 % (ref 4–12)
NEUTROPHILS # BLD AUTO: 2.52 THOUSANDS/ΜL (ref 1.85–7.62)
NEUTS SEG NFR BLD AUTO: 42 % (ref 43–75)
NRBC BLD AUTO-RTO: 0 /100 WBCS
PLATELET # BLD AUTO: 219 THOUSANDS/UL (ref 149–390)
PMV BLD AUTO: 11.1 FL (ref 8.9–12.7)
RBC # BLD AUTO: 4.85 MILLION/UL (ref 3.88–5.62)
WBC # BLD AUTO: 5.88 THOUSAND/UL (ref 4.31–10.16)

## 2022-09-20 PROCEDURE — 86140 C-REACTIVE PROTEIN: CPT

## 2022-09-20 PROCEDURE — 85025 COMPLETE CBC W/AUTO DIFF WBC: CPT

## 2022-09-20 PROCEDURE — 36415 COLL VENOUS BLD VENIPUNCTURE: CPT

## 2022-10-11 ENCOUNTER — HOSPITAL ENCOUNTER (OUTPATIENT)
Dept: RADIOLOGY | Facility: MEDICAL CENTER | Age: 62
Discharge: HOME | End: 2022-10-11
Payer: COMMERCIAL

## 2022-10-11 VITALS
TEMPERATURE: 97.5 F | SYSTOLIC BLOOD PRESSURE: 126 MMHG | OXYGEN SATURATION: 95 % | HEART RATE: 75 BPM | DIASTOLIC BLOOD PRESSURE: 76 MMHG | RESPIRATION RATE: 20 BRPM

## 2022-10-11 DIAGNOSIS — M16.12 PRIMARY OSTEOARTHRITIS OF ONE HIP, LEFT: ICD-10-CM

## 2022-10-11 PROCEDURE — 77002 NEEDLE LOCALIZATION BY XRAY: CPT

## 2022-10-11 PROCEDURE — 20610 DRAIN/INJ JOINT/BURSA W/O US: CPT | Performed by: PHYSICAL MEDICINE & REHABILITATION

## 2022-10-11 PROCEDURE — 77002 NEEDLE LOCALIZATION BY XRAY: CPT | Performed by: PHYSICAL MEDICINE & REHABILITATION

## 2022-10-11 RX ORDER — BUPIVACAINE HCL/PF 2.5 MG/ML
3 VIAL (ML) INJECTION ONCE
Status: COMPLETED | OUTPATIENT
Start: 2022-10-11 | End: 2022-10-11

## 2022-10-11 RX ORDER — METHYLPREDNISOLONE ACETATE 40 MG/ML
40 INJECTION, SUSPENSION INTRA-ARTICULAR; INTRALESIONAL; INTRAMUSCULAR; PARENTERAL; SOFT TISSUE ONCE
Status: COMPLETED | OUTPATIENT
Start: 2022-10-11 | End: 2022-10-11

## 2022-10-11 RX ADMIN — METHYLPREDNISOLONE ACETATE 40 MG: 40 INJECTION, SUSPENSION INTRA-ARTICULAR; INTRALESIONAL; INTRAMUSCULAR; PARENTERAL; SOFT TISSUE at 16:13

## 2022-10-11 RX ADMIN — IOHEXOL 2 ML: 300 INJECTION, SOLUTION INTRAVENOUS at 16:12

## 2022-10-11 RX ADMIN — Medication 3 ML: at 16:13

## 2022-10-11 NOTE — H&P
History of Present Illness: The patient is a 60 y o  male who presents with complaints of left hip pain    Past Medical History:   Diagnosis Date   • BPH with urinary obstruction     AND OTHER LOWER URINARY TRACT SYMPTOMS    • Chronic pain disorder    • Detached retina, right 2015   • Elevated prostate specific antigen (PSA) 2017   • Feeling of incomplete bladder emptying    • History of hypertension    • History of nocturia    • Hypertension    • Low back pain    • Neuroma of foot 2017   • Prostate cancer ([facility] [address] ) 2017   • Weak urinary stream        Past Surgical History:   Procedure Laterality Date   • BACK SURGERY  2008   • EYE SURGERY      REPAIR DETACHED RETINA    • EYE SURGERY Right    • HERNIA REPAIR     • LAMINECTOMY     • LUMBAR LAMINECTOMY Bilateral     L4-5   • PROSTATE BIOPSY Bilateral 2017   • SPINE SURGERY     • TONSILLECTOMY     • VASECTOMY      SURGERY VAS DEFERENS          Current Outpatient Medications:   •  diclofenac (VOLTAREN) 75 mg EC tablet, Take 1 tablet (75 mg total) by mouth 2 (two) times a day as needed (pain) Take with food  , Disp: 60 tablet, Rfl: 1  •  valsartan (DIOVAN) 80 mg tablet, take 2 tablets by mouth once daily, Disp: 60 tablet, Rfl: 3    No Known Allergies    Physical Exam:   Vitals:    10/11/22 1556   BP: 129/85   Pulse: 75   Resp: 20   Temp: 97 5 °F (36 4 °C)   SpO2: 95%     General: Awake, Alert, Oriented x 3, Mood and affect appropriate  Respiratory: Respirations even and unlabored  Cardiovascular: Peripheral pulses intact; no edema  Musculoskeletal Exam: left hip pain    ASA Score: 2    Patient/Chart Verification  Patient ID Verified: Verbal  ID Band Applied: No  Consents Confirmed: Procedural, To be obtained in the Pre-Procedure area  H&P( within 30 days) Verified: To be obtained in the Pre-Procedure area  Interval H&P(within 24 hr) Complete (required for Outpatients and Surgery Admit only): To be obtained in the Pre-Procedure area  Allergies Reviewed:  Yes  Anticoag/NSAID held?: NA  Currently on antibiotics?: No    Assessment:   1   Primary osteoarthritis of one hip, left        Plan: Left hip osteoarthritis

## 2022-10-11 NOTE — DISCHARGE INSTRUCTIONS
Steroid Joint Injection   WHAT YOU NEED TO KNOW:   A steroid joint injection is a procedure to inject steroid medicine into a joint  Steroid medicine decreases pain and inflammation  The injection may also contain an anesthetic (numbing medicine) to decrease pain  It may be done to treat conditions such as arthritis, gout, or carpal tunnel syndrome  The injections may be given in your knee, ankle, shoulder, elbow, wrist, ankle or sacroiliac joint  Do not apply heat to any area that is numb  If you have discomfort or soreness at the injection site, you may apply ice today, 20 minutes on and 20 minutes off  Tomorrow you may use ice or warm, moist heat  Do not apply ice or heat directly to the skin  You may have an increase or change in the discomfort for 36-48 hours after your treatment  Apply ice and continue with any pain medicine you have been prescribed  Do not do anything strenuous today  You may shower, but no tub baths or hot tubs today  You may resume your normal activities tomorrow, but do not “overdo it”  Resume normal activities slowly when you are feeling better  If you experience redness, drainage or swelling at the injection site, or if you develop a fever above 100 degrees, please call [facility] at [phone] or go to the Emergency Room  Continue to take all routine medicines prescribed by your primary care physician unless otherwise instructed by our staff  Most blood thinners should be started again according to your regularly scheduled dosing  If you have any questions, please give our office a call  As no general anesthesia was used in today's procedure, you should not experience any side effects related to anesthesia  If you are diabetic, the steroids used in today's injection may temporarily increase your blood sugar levels after the first few days after your injection   Please keep a close eye on your sugars and alert the doctor who manages your diabetes if your sugars are significantly high from your baseline or you are symptomatic  If you have a problem specifically related to your procedure, please call our office at [phone]  Problems not related to your procedure should be directed to your primary care physician

## 2022-10-18 ENCOUNTER — TELEPHONE (OUTPATIENT)
Dept: GASTROENTEROLOGY | Facility: CLINIC | Age: 62
End: 2022-10-18

## 2022-10-18 ENCOUNTER — TELEPHONE (OUTPATIENT)
Dept: PAIN MEDICINE | Facility: CLINIC | Age: 62
End: 2022-10-18

## 2022-10-18 NOTE — TELEPHONE ENCOUNTER
10/18/22  Screened by: Miriam Early    Referring Provider Rashawn Teran    Pre- Screening: There is no height or weight on file to calculate BMI  Has patient been referred for a routine screening Colonoscopy? yes  Is the patient between 39-70 years old? yes      Previous Colonoscopy yes   If yes:    Date: 10 years ago     Facility:     Reason:       SCHEDULING STAFF: If the patient is between 39yrs-47yrs, please advise patient to confirm benefits/coverage with their insurance company for a routine screening colonoscopy, some insurance carriers will only cover at [id number] or older  If the patient is over 66years old, please schedule an office visit  Does the patient want to see a Gastroenterologist prior to their procedure OR are they having any GI symptoms? no    Has the patient been hospitalized or had abdominal surgery in the past 6 months? no    Does the patient use supplemental oxygen? no    Does the patient take Coumadin, Lovenox, Plavix, Elliquis, Xarelto, or other blood thinning medication? no    Has the patient had a stroke, cardiac event, or stent placed in the past year? no    SCHEDULING STAFF: If patient answers NO to above questions, then schedule procedure  If patient answers YES to above questions, then schedule office appointment  Patient passed OA please reach out to patient at [phone]    If patient is between 45yrs - 49yrs, please advise patient that we will have to confirm benefits & coverage with their insurance company for a routine screening colonoscopy

## 2022-11-20 DIAGNOSIS — M16.12 PRIMARY OSTEOARTHRITIS OF ONE HIP, LEFT: ICD-10-CM

## 2022-11-20 DIAGNOSIS — M16.11 PRIMARY OSTEOARTHRITIS OF ONE HIP, RIGHT: ICD-10-CM

## 2022-11-21 RX ORDER — DICLOFENAC SODIUM 75 MG/1
TABLET, DELAYED RELEASE ORAL
Qty: 60 TABLET | Refills: 1 | Status: SHIPPED | OUTPATIENT
Start: 2022-11-21

## 2023-01-10 ENCOUNTER — APPOINTMENT (OUTPATIENT)
Dept: LAB | Facility: MEDICAL CENTER | Age: 63
End: 2023-01-10

## 2023-01-10 DIAGNOSIS — R73.02 IMPAIRED GLUCOSE TOLERANCE: ICD-10-CM

## 2023-01-10 LAB
ALBUMIN SERPL BCP-MCNC: 4 G/DL (ref 3.5–5)
ALP SERPL-CCNC: 89 U/L (ref 46–116)
ALT SERPL W P-5'-P-CCNC: 46 U/L (ref 12–78)
ANION GAP SERPL CALCULATED.3IONS-SCNC: 5 MMOL/L (ref 4–13)
AST SERPL W P-5'-P-CCNC: 33 U/L (ref 5–45)
BILIRUB SERPL-MCNC: 0.78 MG/DL (ref 0.2–1)
BUN SERPL-MCNC: 25 MG/DL (ref 5–25)
CALCIUM SERPL-MCNC: 9.4 MG/DL (ref 8.3–10.1)
CHLORIDE SERPL-SCNC: 110 MMOL/L (ref 96–108)
CO2 SERPL-SCNC: 27 MMOL/L (ref 21–32)
CREAT SERPL-MCNC: 1.25 MG/DL (ref 0.6–1.3)
GFR SERPL CREATININE-BSD FRML MDRD: 60 ML/MIN/1.73SQ M
GLUCOSE P FAST SERPL-MCNC: 122 MG/DL (ref 65–99)
POTASSIUM SERPL-SCNC: 4.4 MMOL/L (ref 3.5–5.3)
PROT SERPL-MCNC: 7.6 G/DL (ref 6.4–8.4)
SODIUM SERPL-SCNC: 142 MMOL/L (ref 135–147)

## 2023-01-12 LAB
EST. AVERAGE GLUCOSE BLD GHB EST-MCNC: 123 MG/DL
HBA1C MFR BLD: 5.9 %

## 2023-01-17 ENCOUNTER — PREP FOR PROCEDURE (OUTPATIENT)
Dept: GASTROENTEROLOGY | Facility: CLINIC | Age: 63
End: 2023-01-17

## 2023-01-17 DIAGNOSIS — Z12.11 SCREENING FOR COLON CANCER: Primary | ICD-10-CM

## 2023-01-25 DIAGNOSIS — M16.12 PRIMARY OSTEOARTHRITIS OF ONE HIP, LEFT: ICD-10-CM

## 2023-01-25 DIAGNOSIS — M16.11 PRIMARY OSTEOARTHRITIS OF ONE HIP, RIGHT: ICD-10-CM

## 2023-01-25 RX ORDER — DICLOFENAC SODIUM 75 MG/1
TABLET, DELAYED RELEASE ORAL
Qty: 60 TABLET | Refills: 1 | Status: SHIPPED | OUTPATIENT
Start: 2023-01-25

## 2023-02-06 ENCOUNTER — OFFICE VISIT (OUTPATIENT)
Dept: FAMILY MEDICINE CLINIC | Facility: CLINIC | Age: 63
End: 2023-02-06

## 2023-02-06 VITALS
BODY MASS INDEX: 27.53 KG/M2 | WEIGHT: 214.5 LBS | SYSTOLIC BLOOD PRESSURE: 136 MMHG | HEIGHT: 74 IN | OXYGEN SATURATION: 97 % | DIASTOLIC BLOOD PRESSURE: 82 MMHG | TEMPERATURE: 97.3 F | HEART RATE: 68 BPM

## 2023-02-06 DIAGNOSIS — R73.02 IMPAIRED GLUCOSE TOLERANCE: Primary | ICD-10-CM

## 2023-02-06 DIAGNOSIS — C61 PROSTATE CARCINOMA (HCC): ICD-10-CM

## 2023-02-06 DIAGNOSIS — G47.10 HYPERSOMNIA: ICD-10-CM

## 2023-02-06 NOTE — PROGRESS NOTES
Name: Caleb Sánchez      : 1960      MRN: [id number]  Encounter Provider: Katy Yousif MD  Encounter Date: 2023   Encounter department: [facility] PRIMARY CARE    Assessment & Plan     1  Impaired glucose tolerance  -     Lipid panel; Future; Expected date: 04/10/2023  -     Comprehensive metabolic panel; Future; Expected date: 04/10/2023  -     Hemoglobin A1C; Future; Expected date: 04/10/2023    2  Prostate carcinoma [facility])  Assessment & Plan:  Sees urology           Subjective      Here to review bs-improvement in HBA1C, fasting up, no sx diabetes, does have decreased appetite    Review of Systems   Constitutional: Positive for appetite change  Negative for activity change, fatigue and unexpected weight change  Respiratory: Negative for shortness of breath  Cardiovascular: Negative for chest pain  Endocrine: Negative for polydipsia, polyphagia and polyuria  Genitourinary: Negative for difficulty urinating  Neurological: Negative for dizziness and headaches  Psychiatric/Behavioral: Positive for sleep disturbance  Hypersomnia       Current Outpatient Medications on File Prior to Visit   Medication Sig   • diclofenac (VOLTAREN) 75 mg EC tablet take 1 tablet by mouth twice a day if needed with food   • valsartan (DIOVAN) 80 mg tablet take 2 tablets by mouth once daily (Patient taking differently: Pt states 1 tab a day)       Objective     /82 (BP Location: Right arm, Patient Position: Sitting, Cuff Size: Large)   Pulse 68   Temp (!) 97 3 °F (36 3 °C) (Temporal)   Ht 6' 2" (1 88 m)   Wt 97 3 kg (214 lb 8 oz)   SpO2 97%   BMI 27 54 kg/m²     Physical Exam  Vitals reviewed  Constitutional:       Appearance: Normal appearance  Cardiovascular:      Rate and Rhythm: Normal rate and regular rhythm  Pulses: Normal pulses  Heart sounds: Normal heart sounds  Pulmonary:      Effort: Pulmonary effort is normal       Breath sounds: Normal breath sounds  Musculoskeletal:      Right lower leg: No edema  Left lower leg: No edema  Lymphadenopathy:      Cervical: No cervical adenopathy  Neurological:      Mental Status: He is alert     Psychiatric:         Mood and Affect: Mood normal        Yenni Morales MD

## 2023-02-09 ENCOUNTER — OFFICE VISIT (OUTPATIENT)
Dept: SLEEP CENTER | Facility: CLINIC | Age: 63
End: 2023-02-09

## 2023-02-09 VITALS
BODY MASS INDEX: 27.54 KG/M2 | OXYGEN SATURATION: 90 % | HEIGHT: 74 IN | SYSTOLIC BLOOD PRESSURE: 156 MMHG | HEART RATE: 65 BPM | WEIGHT: 214.6 LBS | DIASTOLIC BLOOD PRESSURE: 82 MMHG

## 2023-02-09 DIAGNOSIS — R29.818 SUSPECTED SLEEP APNEA: Primary | ICD-10-CM

## 2023-02-09 DIAGNOSIS — G47.10 HYPERSOMNIA: ICD-10-CM

## 2023-02-09 NOTE — ASSESSMENT & PLAN NOTE
A home sleep study was ordered today to rule out possible sleep apnea as a cause for his hypersomnia  The patient would like to return after his sleep study is completed in order to review it in detail and discuss possible treatments  He is aware that CPAP would likely be an option but his reluctant to use it as he does not feel he would be comfortable with it

## 2023-02-09 NOTE — PATIENT INSTRUCTIONS
Please get your home sleep study done as soon as possible  Your results should be done within a week  Please return in 1 to 2 weeks after study is completed so we may go over your test in detail and discuss possible treatment options if necessary  Nursing Support:  When: Monday through Friday 7A-5PM except holidays  Where: Our direct line is [phone]  If you are having a true emergency please call 911  In the event that the line is busy or it is after hours please leave a voice message and we will return your call  Please speak clearly, leaving your full name, birth date, best number to reach you and the reason for your call  Medication refills: We will need the name of the medication, the dosage, the ordering provider, whether you get a 30 or 90 day refill, and the pharmacy name and address  Medications will be ordered by the provider only  Nurses cannot call in prescriptions  Please allow 7 days for medication refills  Physician requested updates: If your provider requested that you call with an update after starting medication, please be ready to provide us the medication and dosage, what time you take your medication, the time you attempt to fall asleep, time you fall asleep, when you wake up, and what time you get out of bed  Sleep Study Results: We will contact you with sleep study results and/or next steps after the physician has reviewed your testing

## 2023-02-09 NOTE — PROGRESS NOTES
Consultation - [address] [facility], 1960, MRN: [id number]    2023        Reason for Consult / Principal Problem:  Hypersomnia  Suspected Obstructive Sleep Apnea       Thank you for the opportunity of participating in the evaluation and care of this patient in the Sleep Clinic at [facility]  Subjective:     HPI: Kae Van is a 60y o  year old male who presents today upon referral from his PCP for issues with hypersomnia  Patient states in the past 1 to 2 years he has noted that he can easily fall asleep if he is sedentary  He states this is a change for him  He states if he is physically or mentally stimulated he has an easy time staying awake  However, in the evenings when he returns from work and tries to watch TV he can easily find himself dozing off  He feels he sleeps well at night  His wife does not complain of any loud snoring  He states occasionally he will have light snoring  He has not had any gasping choking snoring or witnessed apneic episodes  He has never had a prior sleep study or has seen a sleep specialist in the past   He states he is willing to have a sleep study performed but would be reluctant to start CPAP if necessary  Comorbid conditions:  Hypertension    Sleep Study Results: no Prior studies    CPAP Equipment:  No prior use    Employment: , daylight hours, could get called out at night if needed, drives 10 minutes to work, no drowsy driving, no CDL license    Sleep Schedule:       Bedtime: 9 PM all nights      Latency: Within minutes      Wakeup time: 4:30 AM on workdays, 6 to 7 AM on days off    Awakenings:       Frequency:  Zero-1        Causes: Dogs, wife using the bathroom      Duration:  Few minutes     Daytime Sleepiness / Inappropriate Sleep:       Most severe:  After dinner       No planned naps      Inappropriate drowsiness / sleep:   Will doze with TV anytime, if occupied can stay awake     Snoring:  Not usually     Apnea:  Not wittnessed    Change in Weight:  Sable within 10 pounds     Restless Leg Syndrome:  No  clinical symptoms consistent with this diagnosis     Other Complaints:  No reports of sleep walking, sleep talking, sleep paralysis or hallucinations surrounding sleep  Denies waking up with headaches, bruxism, and dry mouth  Social History:      Caffeine:  Coffee 1/2 pot per day, likes coffee not necessarily to stay awake, iced tea in evenings      Tobacco:   reports that he has never smoked  He has never used smokeless tobacco      E-cig/Vaping:    E-Cigarette/Vaping   • E-Cigarette Use Never User       E-Cigarette/Vaping Substances   • Nicotine No    • THC No    • CBD No    • Flavoring No    • Other No    • Unknown No          Alcohol:   reports that he does not currently use alcohol after a past usage of about 3 0 standard drinks per week  When goes out to dinner will have a few drinks       Drugs:   reports no history of drug use  The review of systems and following portions of the patient's history were reviewed and updated as appropriate: allergies, current medications, past family history, past medical history, past social history, past surgical history and problem list         Objective:       Vitals:    02/09/23 0957   BP: 156/82   BP Location: Left arm   Patient Position: Sitting   Cuff Size: Standard   Pulse: 65   SpO2: 90%   Weight: 97 3 kg (214 lb 9 6 oz)   Height: 6' 2" (1 88 m)     Body mass index is 27 55 kg/m²  Neck Circumference: 17  Venus Sleepiness Scale:  Total score: 9      Current Outpatient Medications:   •  diclofenac (VOLTAREN) 75 mg EC tablet, take 1 tablet by mouth twice a day if needed with food, Disp: 60 tablet, Rfl: 1  •  valsartan (DIOVAN) 80 mg tablet, take 2 tablets by mouth once daily (Patient taking differently: Pt states 1 tab a day), Disp: 60 tablet, Rfl: 3    Physical Exam  General Appearance: Alert, cooperative, no distress, appears stated age     Head:   Normocephalic, without obvious abnormality, atraumatic     Eyes:   PERRL, conjunctiva/corneas clear          Nose:  Nares normal, septum midline, mucosa normal, no drainage or sinus tenderness           Throat:  Lips, teeth and gums normal; tongue normal in size and midline in position; mucosa moist, narrowed airway, uvula thick and long, tonsils absent, Mallampati class 3     Neck:  Supple, symmetrical, trachea midline, no adenopathy; no thyromegaly noted, no carotid bruit or JVD     Lungs:      Clear to auscultation bilaterally, respirations unlabored     Heart:   Regular rate and rhythm, S1 and S2 normal, no murmur, rub or gallop       Extremities:  Extremities normal, atraumatic, no cyanosis or edema       Skin:  Skin color, texture, turgor normal, no rashes or lesions       Neurologic:  No focal deficits noted  ASSESSMENT / PLAN     1  Suspected sleep apnea  Assessment & Plan:  A home sleep study was ordered today to rule out possible sleep apnea as a cause for his hypersomnia  The patient would like to return after his sleep study is completed in order to review it in detail and discuss possible treatments  He is aware that CPAP would likely be an option but his reluctant to use it as he does not feel he would be comfortable with it  Orders:  -     Home Study; Future; Expected date: 02/09/2023    2  Hypersomnia  Assessment & Plan:  Patient has been experiencing hypersomnia in the past 1 to 2 years  He can easily doze off if he is sedentary and not stimulated mentally or physically  We ordered a home sleep study to further evaluate his sleep and rule out sleep apnea  If he does not have sleep apnea, we could potentially discuss medications to help with wakefulness  Orders:  -     Ambulatory Referral to Sleep Medicine  -     Home Study;  Future; Expected date: 02/09/2023         Counseling / Coordination of Care  Total clinic time spent today 50 minutes in chart review, face-to-face time spent with the patient, coordination of care and documentation  A description of the counseling / coordination of care:     diagnostic results, instructions for management, risk factor reductions, prognosis, patient and family education, impressions, risks and benefits of treatment options and importance of compliance with treatment    The following instructions have been given to the patient today:    Patient Instructions   Please get your home sleep study done as soon as possible  Your results should be done within a week  Please return in 1 to 2 weeks after study is completed so we may go over your test in detail and discuss possible treatment options if necessary  Nursing Support:  When: Monday through Friday 7A-5PM except holidays  Where: Our direct line is [phone]  If you are having a true emergency please call 911  In the event that the line is busy or it is after hours please leave a voice message and we will return your call  Please speak clearly, leaving your full name, birth date, best number to reach you and the reason for your call  Medication refills: We will need the name of the medication, the dosage, the ordering provider, whether you get a 30 or 90 day refill, and the pharmacy name and address  Medications will be ordered by the provider only  Nurses cannot call in prescriptions  Please allow 7 days for medication refills  Physician requested updates: If your provider requested that you call with an update after starting medication, please be ready to provide us the medication and dosage, what time you take your medication, the time you attempt to fall asleep, time you fall asleep, when you wake up, and what time you get out of bed  Sleep Study Results: We will contact you with sleep study results and/or next steps after the physician has reviewed your testing               [facility]

## 2023-02-28 ENCOUNTER — OFFICE VISIT (OUTPATIENT)
Dept: FAMILY MEDICINE CLINIC | Facility: CLINIC | Age: 63
End: 2023-02-28

## 2023-02-28 VITALS
DIASTOLIC BLOOD PRESSURE: 80 MMHG | BODY MASS INDEX: 27.46 KG/M2 | SYSTOLIC BLOOD PRESSURE: 128 MMHG | WEIGHT: 214 LBS | HEIGHT: 74 IN | HEART RATE: 76 BPM

## 2023-02-28 DIAGNOSIS — C61 PROSTATE CARCINOMA (HCC): ICD-10-CM

## 2023-02-28 DIAGNOSIS — I10 ESSENTIAL HYPERTENSION: ICD-10-CM

## 2023-02-28 DIAGNOSIS — R41.3 TRANSIENT AMNESIA: Primary | ICD-10-CM

## 2023-02-28 DIAGNOSIS — I10 PRIMARY HYPERTENSION: ICD-10-CM

## 2023-02-28 DIAGNOSIS — I65.02 STENOSIS OF LEFT VERTEBRAL ARTERY: ICD-10-CM

## 2023-02-28 DIAGNOSIS — J84.10 GRANULOMATOUS LUNG DISEASE (HCC): ICD-10-CM

## 2023-02-28 RX ORDER — ASPIRIN 81 MG/1
81 TABLET, CHEWABLE ORAL DAILY
COMMUNITY
Start: 2023-02-25 | End: 2023-05-26

## 2023-02-28 RX ORDER — VALSARTAN 160 MG/1
160 TABLET ORAL DAILY
Qty: 90 TABLET | Refills: 1 | Status: SHIPPED | OUTPATIENT
Start: 2023-02-28

## 2023-02-28 NOTE — PROGRESS NOTES
Name: Neeraj Parks      : 1960      MRN: [id number]  Encounter Provider: Maurice Swanson MD  Encounter Date: 2023   Encounter department: [facility] PRIMARY CARE    Assessment & Plan     1  Transient amnesia    2  Prostate carcinoma [facility])  Assessment & Plan:  Sees urology        Depression Screening and Follow-up Plan: Patient was screened for depression during today's encounter  They screened negative with a PHQ-2 score of 0  Subjective      Pt present for f/u of ED visit where he was dx with transient amnesia  He states he was confused this past 23  He was asking his wife the same questions over and over so she brought him to the ED  States his BP was high in ED  He had a CT done which showed old stroke in the L cerebellum and mild intracranial disease  MRI was negative  States he was told to start taking baby Asprin  Plans on getting EEG done and was recommended to f/u w/ neurologist  About 2 hr or so after getting to the ED he states the confusion went away  States he has not has any memory problem since  Father had dementia  Denies headache, sob, cp  He denies hitting head or any injury  Review of Systems   Constitutional: Negative for activity change, appetite change and fatigue  Going for sleep study in April, states he naps during the day   HENT: Negative for ear pain and tinnitus  Respiratory: Negative for shortness of breath  Cardiovascular: Negative for chest pain  Musculoskeletal: Negative for arthralgias  Neurological: Negative for dizziness, light-headedness and headaches  Psychiatric/Behavioral: Positive for confusion  Negative for decreased concentration         Current Outpatient Medications on File Prior to Visit   Medication Sig   • aspirin 81 mg chewable tablet Chew 81 mg daily   • diclofenac (VOLTAREN) 75 mg EC tablet take 1 tablet by mouth twice a day if needed with food   • valsartan (DIOVAN) 80 mg tablet take 2 tablets by mouth once daily (Patient taking differently: Pt states 1 tab a day)       Objective     /80   Pulse 76   Ht 6' 2" (1 88 m)   Wt 97 1 kg (214 lb)   BMI 27 48 kg/m²     Physical Exam  Constitutional:       General: He is not in acute distress  Appearance: He is normal weight  Neck:      Vascular: No carotid bruit  Cardiovascular:      Rate and Rhythm: Normal rate and regular rhythm  Pulses: Normal pulses  Heart sounds: Normal heart sounds  Pulmonary:      Effort: Pulmonary effort is normal       Breath sounds: Normal breath sounds  Lymphadenopathy:      Cervical: No cervical adenopathy  Skin:     General: Skin is warm  Neurological:      Mental Status: He is alert        Gait: Gait normal    Psychiatric:         Mood and Affect: Mood normal        Hanane Rock MD

## 2023-03-08 ENCOUNTER — OFFICE VISIT (OUTPATIENT)
Dept: NEUROSURGERY | Facility: CLINIC | Age: 63
End: 2023-03-08

## 2023-03-08 VITALS
TEMPERATURE: 97.6 F | OXYGEN SATURATION: 99 % | HEIGHT: 74 IN | BODY MASS INDEX: 27.98 KG/M2 | HEART RATE: 72 BPM | WEIGHT: 218 LBS | RESPIRATION RATE: 16 BRPM | DIASTOLIC BLOOD PRESSURE: 96 MMHG | SYSTOLIC BLOOD PRESSURE: 158 MMHG

## 2023-03-08 DIAGNOSIS — I65.02 STENOSIS OF LEFT VERTEBRAL ARTERY: ICD-10-CM

## 2023-03-08 PROBLEM — R93.89 ABNORMAL COMPUTED TOMOGRAPHY ANGIOGRAPHY (CTA): Status: ACTIVE | Noted: 2023-03-08

## 2023-03-08 NOTE — PROGRESS NOTES
Neurosurgery Office Note  Lillian Rogers 60 y o  male MRN: [id number]      Assessment/Plan     Abnormal computed tomography angiography (CTA)  New evaluation after CTA head/neck ordered for transient global amnesia  · Episode of confusion, forgetfulness, perseverating in February 2023  He is amnestic to this event  No acute stroke on MRI  · Has not had another episode  Started on baby ASA  · Exam: Nonfocal    Imaging:  · CTA head/neck 2/25/23: 1   Small old infarct in the left cerebellum  ASPECTS is 10  2  Atherosclerotic changes at the carotid bifurcations and proximal internal carotid arteries without significant stenosis  3  Concentric atherosclerotic calcification involving the dominant intracranial left vertebral artery with moderate stenosis  4   No acute major vessel occlusion  · MRI brain 2/25/23: 1  No mass or abnormal contrast enhancement  2  No acute infarction  Plan:  · Reviewed imaging with patient and Dr Soto Nelson  Intracranial left vertebral artery stenosis (dominant vertebral artery) noted on CTA with patent bilateral ICAs and right vertebral artery  Unlikely that the vertebral artery stenosis was the cause of his amnestic event  · No neurosurgical intervention recommended or follow up imaging required  · Recommend Neurology evaluation for recent amnestic episode as well as forgetfulness/cognitive assessment  · Discussed medical management of the left vertebral stenosis with continuing baby aspirin, good control of blood pressure, avoiding tobacco, etc    · Follow up as needed  Call with any questions or concerns          Diagnoses and all orders for this visit:    Stenosis of left vertebral artery  -     Ambulatory Referral to Neurosurgery          I have spent a total time of 40 minutes on 03/08/23 in caring for this patient including Diagnostic results, Risk factor reductions, Impressions, Counseling / Coordination of care, Documenting in the medical record, Reviewing / ordering tests, medicine, procedures  , Obtaining or reviewing history   and Communicating with other healthcare professionals   CHIEF COMPLAINT    Chief Complaint   Patient presents with   • Consult     Vertebral artery stenosis, with outside imaging       HISTORY    History of Present Illness     60y o  year old male     50-year-old gentleman seen for new evaluation of left vertebral artery stenosis  This was found on evaluation in February 2023 after an episode of transient global amnesia  At the time he was confused, forgetful, re-asking questions and perseverating  He did not have vision loss, dysarthria or numbness/tingling in his extremities  He remembers parts of his hospital visit, but is amnestic to others  Has not had another episode  There was no acute stroke on MRI  Since the episode, he has been started on a baby ASA  Has not seen Neurology yet  He notes that he is more forgetful, or worries that he is forgetting things  Denies HA, dizziness, double vision, gait/balance issues, BBI  See Discussion    REVIEW OF SYSTEMS    Review of Systems   Constitutional: Negative  HENT: Negative  Negative for nosebleeds (not nosebleeds but some blood when blowing nose), tinnitus and trouble swallowing  Eyes: Positive for visual disturbance (some blurriness, right eye, h/o repaired detached retina)  Respiratory: Negative  Cardiovascular: Negative  Gastrointestinal: Negative  Endocrine: Negative  Genitourinary: Negative  Musculoskeletal: Negative  Skin: Negative  Allergic/Immunologic: Negative  Neurological: Positive for numbness (tingling hands/fingers)  Negative for dizziness, tremors, seizures, weakness, light-headedness and headaches  Hematological: Negative  Psychiatric/Behavioral: Negative for confusion, decreased concentration, dysphoric mood and sleep disturbance  The patient is not nervous/anxious           Concerned about forgetting things, last few months       ROS obtained by MA  Reviewed  See HPI  Meds/Allergies     Current Outpatient Medications   Medication Sig Dispense Refill   • aspirin 81 mg chewable tablet Chew 81 mg daily     • diclofenac (VOLTAREN) 75 mg EC tablet take 1 tablet by mouth twice a day if needed with food 60 tablet 1   • valsartan (DIOVAN) 160 mg tablet Take 1 tablet (160 mg total) by mouth daily 90 tablet 1     No current facility-administered medications for this visit  No Known Allergies    PAST HISTORY    Past Medical History:   Diagnosis Date   • Abnormal computed tomography angiography (CTA) 3/8/2023   • BPH with urinary obstruction     AND OTHER LOWER URINARY TRACT SYMPTOMS    • Chronic pain disorder    • Detached retina, right 2015   • Elevated prostate specific antigen (PSA) 2017   • Feeling of incomplete bladder emptying    • History of hypertension    • History of nocturia    • Hypertension    • Low back pain    • Neuroma of foot 2017   • Prostate cancer ([facility] [address] ) 2017   • Weak urinary stream        Past Surgical History:   Procedure Laterality Date   • BACK SURGERY  2008   • EYE SURGERY      REPAIR DETACHED RETINA    • EYE SURGERY Right    • HERNIA REPAIR     • LAMINECTOMY     • LUMBAR LAMINECTOMY Bilateral     L4-5   • PROSTATE BIOPSY Bilateral 2017   • SPINE SURGERY     • TONSILLECTOMY     • VASECTOMY      SURGERY VAS DEFERENS        Social History     Tobacco Use   • Smoking status: Never   • Smokeless tobacco: Never   Vaping Use   • Vaping Use: Never used   Substance Use Topics   • Alcohol use: Yes     Alcohol/week: 3 0 standard drinks     Types: 3 Cans of beer per week     Comment: occasional   • Drug use: No       Family History   Problem Relation Age of Onset   • Hypertension Mother    • Peripheral vascular disease Mother    • Dementia Father          Above history personally reviewed  EXAM    Vitals:Blood pressure 158/96, pulse 72, temperature 97 6 °F (36 4 °C), temperature source Tympanic, resp   rate 16, height 6' 2" (1 88 m), weight 98 9 kg (218 lb), SpO2 99 %  ,Body mass index is 27 99 kg/m²  Physical Exam  Vitals reviewed  Constitutional:       General: He is awake  Appearance: Normal appearance  HENT:      Head: Normocephalic and atraumatic  Eyes:      Extraocular Movements: EOM normal       Conjunctiva/sclera: Conjunctivae normal       Pupils: Pupils are equal, round, and reactive to light  Cardiovascular:      Rate and Rhythm: Normal rate  Pulmonary:      Effort: Pulmonary effort is normal    Skin:     General: Skin is warm and dry  Neurological:      Mental Status: He is alert and oriented to person, place, and time  Motor: Motor strength is normal       Coordination: Finger-Nose-Finger Test normal       Gait: Gait is intact  Deep Tendon Reflexes:      Reflex Scores:       Bicep reflexes are 2+ on the right side and 2+ on the left side  Brachioradialis reflexes are 2+ on the right side and 2+ on the left side  Patellar reflexes are 2+ on the right side and 2+ on the left side  Achilles reflexes are 2+ on the right side and 2+ on the left side  Psychiatric:         Attention and Perception: Attention and perception normal          Mood and Affect: Mood and affect normal          Speech: Speech normal          Behavior: Behavior normal  Behavior is cooperative  Thought Content: Thought content normal          Cognition and Memory: Cognition and memory normal          Judgment: Judgment normal          Neurologic Exam     Mental Status   Oriented to person, place, and time  Follows 2 step commands  Attention: normal  Concentration: normal    Speech: speech is normal   Level of consciousness: alert  Knowledge: good  Able to perform simple calculations  Able to name object  Able to repeat  Normal comprehension  Cranial Nerves     CN III, IV, VI   Pupils are equal, round, and reactive to light  Extraocular motions are normal    Right pupil: Shape: regular  Reactivity: brisk  Consensual response: intact  Left pupil: Shape: regular  Reactivity: brisk  Consensual response: intact  CN III: no CN III palsy  CN VI: no CN VI palsy  Nystagmus: none   Ophthalmoparesis: none  Upgaze: normal  Downgaze: normal  Conjugate gaze: present    CN V   Facial sensation intact  CN VII   Facial expression full, symmetric  CN VIII   Hearing: intact    CN XI   Right trapezius strength: normal  Left trapezius strength: normal    CN XII   CN XII normal      Motor Exam   Muscle bulk: normal  Overall muscle tone: normal  Right arm pronator drift: absent  Left arm pronator drift: absent    Strength   Strength 5/5 throughout  Sensory Exam   Light touch normal      Gait, Coordination, and Reflexes     Gait  Gait: normal    Coordination   Finger to nose coordination: normal    Tremor   Resting tremor: absent  Intention tremor: absent  Action tremor: absent    Reflexes   Right brachioradialis: 2+  Left brachioradialis: 2+  Right biceps: 2+  Left biceps: 2+  Right patellar: 2+  Left patellar: 2+  Right achilles: 2+  Left achilles: 2+  Right Jenkins: absent  Left Jenkins: absent  Right ankle clonus: absent  Left ankle clonus: absent        MEDICAL DECISION MAKING    Imaging Studies:     CT head wo contrast    Result Date: 3/3/2023  Narrative: 1 2 840 [id number]  3 970 2 [id number]  6 [id number] 3    CTA neck w wo contrast    Result Date: 3/3/2023  Narrative: 1 2 840 [id number]  1 571 1 [id number]  9 [id number] 7    MRI brain w wo contrast    Result Date: 3/3/2023  Narrative: 1 2 840 [id number]  8 285 8 [id number]  7 [id number] 6      I have personally reviewed pertinent reports     and I have personally reviewed pertinent films in PACS

## 2023-03-08 NOTE — ASSESSMENT & PLAN NOTE
New evaluation after CTA head/neck ordered for transient global amnesia  · Episode of confusion, forgetfulness, perseverating in February 2023  He is amnestic to this event  No acute stroke on MRI  · Has not had another episode  Started on baby ASA  · Exam: Nonfocal    Imaging:  · CTA head/neck 2/25/23: 1   Small old infarct in the left cerebellum  ASPECTS is 10  2  Atherosclerotic changes at the carotid bifurcations and proximal internal carotid arteries without significant stenosis  3  Concentric atherosclerotic calcification involving the dominant intracranial left vertebral artery with moderate stenosis  4   No acute major vessel occlusion  · MRI brain 2/25/23: 1  No mass or abnormal contrast enhancement  2  No acute infarction  Plan:  · Reviewed imaging with patient and Dr Jocelyn Luis  Intracranial left vertebral artery stenosis (dominant vertebral artery) noted on CTA with patent bilateral ICAs and right vertebral artery  Unlikely that the vertebral artery stenosis was the cause of his amnestic event  · No neurosurgical intervention recommended or follow up imaging required  · Recommend Neurology evaluation for recent amnestic episode as well as forgetfulness/cognitive assessment  · Discussed medical management of the left vertebral stenosis with continuing baby aspirin, good control of blood pressure, avoiding tobacco, etc    · Follow up as needed  Call with any questions or concerns

## 2023-03-13 ENCOUNTER — APPOINTMENT (OUTPATIENT)
Dept: LAB | Facility: MEDICAL CENTER | Age: 63
End: 2023-03-13

## 2023-03-13 DIAGNOSIS — C61 PROSTATE CANCER (HCC): ICD-10-CM

## 2023-03-13 LAB — PSA SERPL-MCNC: 1.8 NG/ML (ref 0–4)

## 2023-04-05 ENCOUNTER — HOSPITAL ENCOUNTER (OUTPATIENT)
Dept: SLEEP CENTER | Facility: CLINIC | Age: 63
Discharge: HOME | End: 2023-04-05

## 2023-04-05 DIAGNOSIS — G47.10 HYPERSOMNIA: ICD-10-CM

## 2023-04-05 DIAGNOSIS — R29.818 SUSPECTED SLEEP APNEA: ICD-10-CM

## 2023-04-05 NOTE — PROGRESS NOTES
Home Sleep Study Documentation    HOME STUDY DEVICE: Noxturnal yes                                           Crystal G3 no      Pre-Sleep Home Study:    Set-up and instructions performed by: Carmelina Vinson, ABBI, RST, CRT    Technician performed demonstration for Patient: yes    Return demonstration performed by Patient: yes    Written instructions provided to Patient: yes    Patient signed consent form: yes        Post-Sleep Home Study:    Additional comments by Patient:         Home Sleep Study Failed:no:    Failure reason: N/A    Reported or Detected: N/A    Scored by: COLE Valdez

## 2023-05-04 ENCOUNTER — TELEPHONE (OUTPATIENT)
Dept: GASTROENTEROLOGY | Facility: MEDICAL CENTER | Age: 63
End: 2023-05-04

## 2023-05-04 DIAGNOSIS — Z12.11 COLON CANCER SCREENING: Primary | ICD-10-CM

## 2023-05-08 ENCOUNTER — OFFICE VISIT (OUTPATIENT)
Dept: OBGYN CLINIC | Facility: HOSPITAL | Age: 63
End: 2023-05-08

## 2023-05-08 VITALS — HEART RATE: 67 BPM | SYSTOLIC BLOOD PRESSURE: 135 MMHG | DIASTOLIC BLOOD PRESSURE: 69 MMHG

## 2023-05-08 DIAGNOSIS — M18.11 ARTHRITIS OF CARPOMETACARPAL (CMC) JOINT OF RIGHT THUMB: Primary | ICD-10-CM

## 2023-05-08 DIAGNOSIS — M18.12 ARTHRITIS OF CARPOMETACARPAL (CMC) JOINT OF LEFT THUMB: ICD-10-CM

## 2023-05-08 DIAGNOSIS — M65.332 TRIGGER FINGER, LEFT MIDDLE FINGER: ICD-10-CM

## 2023-05-08 RX ORDER — LIDOCAINE HYDROCHLORIDE 10 MG/ML
1 INJECTION, SOLUTION INFILTRATION; PERINEURAL
Status: COMPLETED | OUTPATIENT
Start: 2023-05-08 | End: 2023-05-08

## 2023-05-08 RX ORDER — BUPIVACAINE HYDROCHLORIDE 2.5 MG/ML
0.5 INJECTION, SOLUTION INFILTRATION; PERINEURAL
Status: COMPLETED | OUTPATIENT
Start: 2023-05-08 | End: 2023-05-08

## 2023-05-08 RX ORDER — DEXAMETHASONE SODIUM PHOSPHATE 10 MG/ML
40 INJECTION, SOLUTION INTRAMUSCULAR; INTRAVENOUS
Status: COMPLETED | OUTPATIENT
Start: 2023-05-08 | End: 2023-05-08

## 2023-05-08 RX ORDER — TRIAMCINOLONE ACETONIDE 40 MG/ML
40 INJECTION, SUSPENSION INTRA-ARTICULAR; INTRAMUSCULAR
Status: COMPLETED | OUTPATIENT
Start: 2023-05-08 | End: 2023-05-08

## 2023-05-08 RX ADMIN — TRIAMCINOLONE ACETONIDE 40 MG: 40 INJECTION, SUSPENSION INTRA-ARTICULAR; INTRAMUSCULAR at 12:51

## 2023-05-08 RX ADMIN — BUPIVACAINE HYDROCHLORIDE 0.5 ML: 2.5 INJECTION, SOLUTION INFILTRATION; PERINEURAL at 12:51

## 2023-05-08 RX ADMIN — DEXAMETHASONE SODIUM PHOSPHATE 40 MG: 10 INJECTION, SOLUTION INTRAMUSCULAR; INTRAVENOUS at 12:51

## 2023-05-08 RX ADMIN — LIDOCAINE HYDROCHLORIDE 1 ML: 10 INJECTION, SOLUTION INFILTRATION; PERINEURAL at 12:51

## 2023-05-08 NOTE — PROGRESS NOTES
ASSESSMENT/PLAN:      61 yo male with bilateral CMC arthritis and beginning of left middle finer trigger finger  Due to acute exacerbation of chronic CMC arthritis we discussed a repeat steroid injection to both CMC joints  Patient elected to proceed with bilateral thumb injections which was tolerated well  He may continue to get these as often as every 3 months if needed  I do feel that he is starting to get a left middle trigger finger  We discussed risks, benefits, and alternatives to initial TF injection and patient accepted  If this provides no relief can consider further interventions  Follow up as needed  The patient verbalized understanding of exam findings and treatment plan  We engaged in the shared decision-making process and treatment options were discussed at length with the patient  Surgical and conservative management discussed today along with risks and benefits  Diagnoses and all orders for this visit:    Arthritis of carpometacarpal [place]) joint of right thumb  -     Small joint arthrocentesis    Arthritis of carpometacarpal (CMC) joint of left thumb  -     Small joint arthrocentesis    Trigger finger, left middle finger  -     Hand/upper extremity injection          Follow Up:  Return if symptoms worsen or fail to improve  To Do Next Visit:  Re-evaluation of current issue    ____________________________________________________________________________________________________________________________________________      CHIEF COMPLAINT:  Chief Complaint   Patient presents with   • Left Hand - Pain     Arthritis of carpometacarpal (CMC) joint of left thumb   • Right Hand - Pain     Arthritis of carpometacarpal (CMC) joint of right thumb       SUBJECTIVE:  Rangel Duvall is a 60y o  year old RHD male who presents for follow up evaluation of bilateral thumbs and left middle finger  He has been getting good relief of the thumb pain with injections, last ones in June last year   Left middle finger finger has been struggling to move and he notes pain at the A1 pulley  He did have a trigger finger on the other side which resolved on its own  I have personally reviewed all the relevant PMH, PSH, SH, FH, Medications and allergies  PAST MEDICAL HISTORY:  Past Medical History:   Diagnosis Date   • Abnormal computed tomography angiography (CTA) 3/8/2023   • BPH with urinary obstruction     AND OTHER LOWER URINARY TRACT SYMPTOMS    • Chronic pain disorder    • Detached retina, right 2015   • Elevated prostate specific antigen (PSA) 2017   • Feeling of incomplete bladder emptying    • History of hypertension    • History of nocturia    • Hypertension    • Low back pain    • Neuroma of foot 2017   • Prostate cancer ([facility] [address] ) 2017   • Weak urinary stream        PAST SURGICAL HISTORY:  Past Surgical History:   Procedure Laterality Date   • BACK SURGERY  2008   • EYE SURGERY      REPAIR DETACHED RETINA    • EYE SURGERY Right    • HERNIA REPAIR     • LAMINECTOMY     • LUMBAR LAMINECTOMY Bilateral     L4-5   • PROSTATE BIOPSY Bilateral 2017   • SPINE SURGERY     • TONSILLECTOMY     • VASECTOMY      SURGERY VAS DEFERENS        FAMILY HISTORY:  Family History   Problem Relation Age of Onset   • Hypertension Mother    • Peripheral vascular disease Mother    • Dementia Father        SOCIAL HISTORY:  Social History     Tobacco Use   • Smoking status: Never   • Smokeless tobacco: Never   Vaping Use   • Vaping Use: Never used   Substance Use Topics   • Alcohol use:  Yes     Alcohol/week: 3 0 standard drinks     Types: 3 Cans of beer per week     Comment: occasional   • Drug use: No       MEDICATIONS:    Current Outpatient Medications:   •  aspirin 81 mg chewable tablet, Chew 81 mg daily, Disp: , Rfl:   •  diclofenac (VOLTAREN) 75 mg EC tablet, take 1 tablet by mouth twice a day if needed with food, Disp: 60 tablet, Rfl: 1  •  polyethylene glycol (GOLYTELY) 4000 mL solution, Take 4,000 mL by mouth once for 1 dose, Disp: 4000 mL, Rfl: 0  •  valsartan (DIOVAN) 160 mg tablet, Take 1 tablet (160 mg total) by mouth daily, Disp: 90 tablet, Rfl: 1    ALLERGIES:  No Known Allergies    REVIEW OF SYSTEMS:  Review of Systems   Constitutional: Negative for chills, fatigue, fever and unexpected weight change  HENT: Negative for hearing loss, nosebleeds and sore throat  Eyes: Negative for pain, redness and visual disturbance  Respiratory: Negative for cough, shortness of breath and wheezing  Cardiovascular: Negative for chest pain, palpitations and leg swelling  Gastrointestinal: Negative for abdominal pain, nausea and vomiting  Endocrine: Negative for polydipsia and polyuria  Genitourinary: Negative for difficulty urinating and hematuria  Musculoskeletal: Positive for arthralgias  Negative for joint swelling and myalgias  Skin: Negative for rash and wound  Neurological: Negative for dizziness, numbness and headaches  Psychiatric/Behavioral: Negative for decreased concentration, dysphoric mood and suicidal ideas  The patient is not nervous/anxious          VITALS:  Vitals:    05/08/23 1220   BP: 135/69   Pulse: 67       LABS:  HgA1c:   Lab Results   Component Value Date    HGBA1C 6 2 (H) 04/13/2023     BMP:   Lab Results   Component Value Date    CALCIUM 9 4 04/13/2023     01/20/2016    K 4 5 04/13/2023    CO2 29 04/13/2023     (H) 04/13/2023    BUN 24 04/13/2023    CREATININE 1 25 04/13/2023       _____________________________________________________  PHYSICAL EXAMINATION:  General: well developed and well nourished, alert, oriented times 3 and appears comfortable  Psychiatric: Normal  HEENT: Normocephalic, Atraumatic Trachea Midline, No torticollis  Pulmonary: No audible wheezing or respiratory distress   Cardiovascular: No pitting edema, 2+ radial pulse   Skin: No masses, erythema, lacerations, fluctation, ulcerations  Neurovascular: Sensation Intact to the Median, Ulnar, Radial Nerve, Motor "Intact to the Median, Ulnar, Radial Nerve and Pulses Intact  Musculoskeletal: Normal, except as noted in detailed exam and in HPI  MUSCULOSKELETAL EXAMINATION:  left middle finger:  Negative palpable nodule over the A1 pulley  Positive tenderness to palpation over A1 pulley  Negative catching  Negative clicking  Bilateral CMC Exam:  No adduction contracture  No hyperextension deformity of MCP joint  Positive localized tenderness over radial and dorsal aspect of thumb (CMC joint)  Grind test is Negative for pain and Negative for crepitus  Metacarpal load shift test positive   No triggering or tenderness over the A1 pulley  no pain with Finkelstein’s maneuver         ___________________________________________________  STUDIES REVIEWED:  No new studies to review      PROCEDURES PERFORMED:  Small joint arthrocentesis: bilateral thumb CMC  Universal Protocol:  Consent: Verbal consent obtained  Risks and benefits: risks, benefits and alternatives were discussed  Consent given by: patient  Time out: Immediately prior to procedure a \"time out\" was called to verify the correct patient, procedure, equipment, support staff and site/side marked as required    Patient understanding: patient states understanding of the procedure being performed  Site marked: the operative site was marked  Required items: required blood products, implants, devices, and special equipment available  Patient identity confirmed: verbally with patient    Supporting Documentation  Indications: pain   Procedure Details  Location: thumb - bilateral thumb CMC  Needle size: 25 G  Ultrasound guidance: no  Approach: dorsal    Medications (Right): 0 5 mL bupivacaine 0 25 %; 40 mg triamcinolone acetonide 40 mg/mLMedications (Left): 0 5 mL bupivacaine 0 25 %; 40 mg triamcinolone acetonide 40 mg/mL   Patient tolerance: patient tolerated the procedure well with no immediate complications  Dressing:  Sterile dressing applied    Hand/upper extremity " "injection: L long A1  Universal Protocol:  Consent: Verbal consent obtained  Risks and benefits: risks, benefits and alternatives were discussed  Consent given by: patient  Time out: Immediately prior to procedure a \"time out\" was called to verify the correct patient, procedure, equipment, support staff and site/side marked as required  Patient understanding: patient states understanding of the procedure being performed  Site marked: the operative site was marked  Required items: required blood products, implants, devices, and special equipment available  Patient identity confirmed: verbally with patient    Supporting Documentation  Indications: pain and therapeutic   Procedure Details  Condition:trigger finger Location: long finger - L long A1   Preparation: Patient was prepped and draped in the usual sterile fashion  Needle size: 25 G  Ultrasound guidance: no  Approach: volar  Medications administered: 1 mL lidocaine 1 %; 40 mg dexamethasone 100 mg/10 mL    Patient tolerance: patient tolerated the procedure well with no immediate complications  Dressing:  Sterile dressing applied              _____________________________________________________      Scribe Attestation    I,:  Rogelio Goodrich PA-C am acting as a scribe while in the presence of the attending physician :       I,:  Serafin Giles MD personally performed the services described in this documentation    as scribed in my presence  :           "

## 2023-05-08 NOTE — TELEPHONE ENCOUNTER
Spoke to patient confirming upcoming procedure  Patient was instructed to call [phone] if they have any questions or concerns about the prep instructions or if they need to change or cancel the procedure

## 2023-05-17 RX ORDER — SODIUM CHLORIDE 9 MG/ML
125 INJECTION, SOLUTION INTRAVENOUS CONTINUOUS
Status: CANCELLED | OUTPATIENT
Start: 2023-05-17

## 2023-05-19 ENCOUNTER — ANESTHESIA EVENT (OUTPATIENT)
Dept: GASTROENTEROLOGY | Facility: MEDICAL CENTER | Age: 63
End: 2023-05-19

## 2023-05-19 ENCOUNTER — HOSPITAL ENCOUNTER (OUTPATIENT)
Dept: GASTROENTEROLOGY | Facility: MEDICAL CENTER | Age: 63
Setting detail: OUTPATIENT SURGERY
End: 2023-05-19
Attending: INTERNAL MEDICINE

## 2023-05-19 ENCOUNTER — ANESTHESIA (OUTPATIENT)
Dept: GASTROENTEROLOGY | Facility: MEDICAL CENTER | Age: 63
End: 2023-05-19

## 2023-05-19 VITALS
SYSTOLIC BLOOD PRESSURE: 129 MMHG | HEIGHT: 74 IN | HEART RATE: 67 BPM | OXYGEN SATURATION: 98 % | WEIGHT: 199 LBS | DIASTOLIC BLOOD PRESSURE: 74 MMHG | BODY MASS INDEX: 25.54 KG/M2 | TEMPERATURE: 97.8 F | RESPIRATION RATE: 16 BRPM

## 2023-05-19 DIAGNOSIS — Z12.11 SCREENING FOR COLON CANCER: ICD-10-CM

## 2023-05-19 RX ORDER — SODIUM CHLORIDE 9 MG/ML
125 INJECTION, SOLUTION INTRAVENOUS CONTINUOUS
Status: DISCONTINUED | OUTPATIENT
Start: 2023-05-19 | End: 2023-05-23 | Stop reason: HOSPADM

## 2023-05-19 RX ORDER — PROPOFOL 10 MG/ML
INJECTION, EMULSION INTRAVENOUS CONTINUOUS PRN
Status: DISCONTINUED | OUTPATIENT
Start: 2023-05-19 | End: 2023-05-19

## 2023-05-19 RX ORDER — PROPOFOL 10 MG/ML
INJECTION, EMULSION INTRAVENOUS AS NEEDED
Status: DISCONTINUED | OUTPATIENT
Start: 2023-05-19 | End: 2023-05-19

## 2023-05-19 RX ORDER — LIDOCAINE HYDROCHLORIDE 20 MG/ML
INJECTION, SOLUTION EPIDURAL; INFILTRATION; INTRACAUDAL; PERINEURAL AS NEEDED
Status: DISCONTINUED | OUTPATIENT
Start: 2023-05-19 | End: 2023-05-19

## 2023-05-19 RX ADMIN — PROPOFOL 130 MG: 10 INJECTION, EMULSION INTRAVENOUS at 09:03

## 2023-05-19 RX ADMIN — PROPOFOL 130 MCG/KG/MIN: 10 INJECTION, EMULSION INTRAVENOUS at 09:02

## 2023-05-19 RX ADMIN — SODIUM CHLORIDE 125 ML/HR: 0.9 INJECTION, SOLUTION INTRAVENOUS at 08:04

## 2023-05-19 RX ADMIN — LIDOCAINE HYDROCHLORIDE 60 MG: 20 INJECTION, SOLUTION EPIDURAL; INFILTRATION; INTRACAUDAL; PERINEURAL at 08:59

## 2023-05-19 NOTE — H&P
H&P EXAM - Outpatient Endoscopy   Kathleen Sultana 60 y o  male MRN: [id number]    [facility] PRE/PST   Encounter: [id number]        History and Physical -  [facility]  Kathleen Sultana 60 y o  male MRN: [id number]                  HPI: Kathleen Sultana is a 60y o  year old male who presents for colon cancer screening      REVIEW OF SYSTEMS: Per the HPI, and otherwise unremarkable      Historical Information   Past Medical History:   Diagnosis Date   • Abnormal computed tomography angiography (CTA) 3/8/2023   • BPH with urinary obstruction     AND OTHER LOWER URINARY TRACT SYMPTOMS    • Chronic pain disorder    • Detached retina, right 2015   • Elevated prostate specific antigen (PSA) 2017   • Feeling of incomplete bladder emptying    • History of hypertension    • History of nocturia    • Hypertension    • Low back pain    • Neuroma of foot 2017   • Prostate cancer ([address] ) 2017   • Weak urinary stream      Past Surgical History:   Procedure Laterality Date   • BACK SURGERY  2008   • EYE SURGERY      REPAIR DETACHED RETINA    • EYE SURGERY Right    • HERNIA REPAIR     • LAMINECTOMY     • LUMBAR LAMINECTOMY Bilateral     L4-5   • PROSTATE BIOPSY Bilateral 2017   • SPINE SURGERY     • TONSILLECTOMY     • VASECTOMY      SURGERY VAS DEFERENS      Social History   Social History     Substance and Sexual Activity   Alcohol Use Yes   • Alcohol/week: 3 0 standard drinks   • Types: 3 Cans of beer per week    Comment: occasional     Social History     Substance and Sexual Activity   Drug Use No     Social History     Tobacco Use   Smoking Status Never   Smokeless Tobacco Never     Family History   Problem Relation Age of Onset   • Hypertension Mother    • Peripheral vascular disease Mother    • Dementia Father        Meds/Allergies     (Not in a hospital admission)      No Known Allergies    Objective     /75   Pulse 68   Temp 97 8 °F (36 6 °C) (Temporal)   Resp 16   Ht 6' "2\" (1 88 m)   Wt 90 3 kg (199 lb)   SpO2 98%   BMI 25 55 kg/m²       PHYSICAL EXAM    Gen: NAD  CV: RRR  CHEST: Clear  ABD: soft, NT/ND  EXT: no edema      ASSESSMENT/PLAN:  This is a 60y o  year old male here for colonoscopy, and he is stable and optimized for his procedure            "

## 2023-05-19 NOTE — ANESTHESIA POSTPROCEDURE EVALUATION
"Post-Op Assessment Note    CV Status:  Stable    Pain management: adequate     Mental Status:  Alert and awake   Hydration Status:  Euvolemic   PONV Controlled:  Controlled   Airway Patency:  Patent      Post Op Vitals Reviewed: Yes            No notable events documented      BP      Temp      Pulse     Resp      SpO2      /74   Pulse 67   Temp 97 8 °F (36 6 °C) (Temporal)   Resp 16   Ht 6' 2\" (1 88 m)   Wt 90 3 kg (199 lb)   SpO2 98%   BMI 25 55 kg/m²     "

## 2023-05-19 NOTE — ANESTHESIA PREPROCEDURE EVALUATION
Procedure:  COLONOSCOPY    Relevant Problems   CARDIO   (+) Hypertension      /RENAL   (+) Prostate carcinoma (HCC)      MUSCULOSKELETAL   (+) Arthritis of carpometacarpal (CMC) joint of right thumb   (+) Gout   (+) Osteoarthritis of knee   (+) Primary osteoarthritis of one hip, left      PULMONARY   (+) Obstructive sleep apnea        Physical Exam    Airway    Mallampati score: II  TM Distance: >3 FB  Neck ROM: full     Dental   No notable dental hx     Cardiovascular  Rhythm: regular, Rate: normal, Cardiovascular exam normal    Pulmonary  Pulmonary exam normal Breath sounds clear to auscultation,     Other Findings        Anesthesia Plan  ASA Score- 2     Anesthesia Type- IV sedation with anesthesia with ASA Monitors  Additional Monitors:   Airway Plan:           Plan Factors-    Chart reviewed  Patient summary reviewed  Induction-     Postoperative Plan-     Informed Consent- Anesthetic plan and risks discussed with patient

## 2023-06-19 DIAGNOSIS — M16.11 PRIMARY OSTEOARTHRITIS OF ONE HIP, RIGHT: ICD-10-CM

## 2023-06-19 DIAGNOSIS — M16.12 PRIMARY OSTEOARTHRITIS OF ONE HIP, LEFT: ICD-10-CM

## 2023-06-19 RX ORDER — DICLOFENAC SODIUM 75 MG/1
TABLET, DELAYED RELEASE ORAL
Qty: 60 TABLET | Refills: 1 | Status: SHIPPED | OUTPATIENT
Start: 2023-06-19

## 2023-07-05 ENCOUNTER — OFFICE VISIT (OUTPATIENT)
Dept: OBGYN CLINIC | Facility: MEDICAL CENTER | Age: 63
End: 2023-07-05
Payer: COMMERCIAL

## 2023-07-05 VITALS
SYSTOLIC BLOOD PRESSURE: 145 MMHG | BODY MASS INDEX: 27.85 KG/M2 | HEIGHT: 74 IN | DIASTOLIC BLOOD PRESSURE: 84 MMHG | HEART RATE: 88 BPM | WEIGHT: 217 LBS

## 2023-07-05 DIAGNOSIS — M16.12 PRIMARY OSTEOARTHRITIS OF ONE HIP, LEFT: ICD-10-CM

## 2023-07-05 DIAGNOSIS — I83.93 VARICOSE VEINS OF BOTH LOWER EXTREMITIES, UNSPECIFIED WHETHER COMPLICATED: ICD-10-CM

## 2023-07-05 DIAGNOSIS — M46.1 SACROILIITIS (HCC): ICD-10-CM

## 2023-07-05 DIAGNOSIS — G89.29 CHRONIC PAIN OF BOTH KNEES: ICD-10-CM

## 2023-07-05 DIAGNOSIS — M17.0 BILATERAL PRIMARY OSTEOARTHRITIS OF KNEE: Primary | ICD-10-CM

## 2023-07-05 DIAGNOSIS — M25.462 EFFUSION OF LEFT KNEE: ICD-10-CM

## 2023-07-05 DIAGNOSIS — M25.561 CHRONIC PAIN OF BOTH KNEES: ICD-10-CM

## 2023-07-05 DIAGNOSIS — M25.562 CHRONIC PAIN OF BOTH KNEES: ICD-10-CM

## 2023-07-05 PROCEDURE — 20610 DRAIN/INJ JOINT/BURSA W/O US: CPT | Performed by: PHYSICIAN ASSISTANT

## 2023-07-05 PROCEDURE — 99214 OFFICE O/P EST MOD 30 MIN: CPT | Performed by: PHYSICIAN ASSISTANT

## 2023-07-05 RX ORDER — BUPIVACAINE HYDROCHLORIDE 2.5 MG/ML
2 INJECTION, SOLUTION INFILTRATION; PERINEURAL
Status: COMPLETED | OUTPATIENT
Start: 2023-07-05 | End: 2023-07-05

## 2023-07-05 RX ORDER — TRIAMCINOLONE ACETONIDE 40 MG/ML
40 INJECTION, SUSPENSION INTRA-ARTICULAR; INTRAMUSCULAR
Status: COMPLETED | OUTPATIENT
Start: 2023-07-05 | End: 2023-07-05

## 2023-07-05 RX ORDER — BUPIVACAINE HYDROCHLORIDE 2.5 MG/ML
4 INJECTION, SOLUTION INFILTRATION; PERINEURAL
Status: COMPLETED | OUTPATIENT
Start: 2023-07-05 | End: 2023-07-05

## 2023-07-05 RX ADMIN — BUPIVACAINE HYDROCHLORIDE 2 ML: 2.5 INJECTION, SOLUTION INFILTRATION; PERINEURAL at 15:00

## 2023-07-05 RX ADMIN — TRIAMCINOLONE ACETONIDE 40 MG: 40 INJECTION, SUSPENSION INTRA-ARTICULAR; INTRAMUSCULAR at 15:00

## 2023-07-05 RX ADMIN — BUPIVACAINE HYDROCHLORIDE 4 ML: 2.5 INJECTION, SOLUTION INFILTRATION; PERINEURAL at 15:00

## 2023-07-05 NOTE — PROGRESS NOTES
Assessment/Plan     1. Bilateral primary osteoarthritis of knee    2. Sacroiliitis ([address])    3. Primary osteoarthritis of one hip, left    4. Effusion of left knee    5. Chronic pain of both knees    6. Varicose veins of both lower extremities, unspecified whether complicated      Orders Placed This Encounter   Procedures   • Large joint arthrocentesis: L knee   • Large joint arthrocentesis: L knee   • Large joint arthrocentesis: R knee   • FL spine and pain procedure   • Ambulatory Referral to Vascular Surgery         · Patient has severe left hip osteoarthritis and moderate bilateral knee osteoarthritis, left sided sacroiliitis. · Patient underwent left knee aspiration and steroid injection and right knee steroid injection today. Tolerated procedures well. Postinjection instructions reviewed. Fluid was benign appearing so it was not sent for labs. · Order placed for left SI joint steroid injection with pain management. Patient advised to monitor his pain for the first few hours after the injection for diagnostic purposes. · Patient will contact his PCP regarding indomethacin refill. · He will follow-up with Dr. Caden Blanco for left shoulder pain. · Patient aware that we could discuss total hip replacement for the left hip in the future if he would like. Otherwise he is aware he can repeat knee injections every 3 months. · Referral provided for eval of bilat varicose veins with vascular surgery. Return in about 3 months (around 10/5/2023). I answered all of the patient's questions during the visit and provided education of the patient's condition during the visit. The patient verbalized understanding of the information given and agrees with the plan. This note was dictated using [employer] software. It may contain errors including improperly dictated words. Please contact physician directly for any questions.     Subjective   Chief Complaint:   Chief Complaint   Patient presents with   • Left Hip - Follow-up       HPI:  Jaron Shrestha is a 60 y.o. male who presents for follow up for left hip and bilateral knee pain. Patient was last seen in 2022 at which time a left hip injection was ordered. Patient does not recall much relief after the intra-articular steroid injection. He also had his right knee aspirated and injected at that appointment. Patient reports over the last few months he has developed return of bilateral knee pain and left hip pain. Hip pain is primarily located in the left buttock area and occasionally in the left groin. Patient notes bilateral knee pain neither knee worse than the other at this time. He notes pain is worse going sitting to standing. He notes swelling in the left knee as well. Patient states he did try taking the diclofenac and did not have much relief from this. He reports that he has taken indomethacin in the past which provided good relief. Patient also briefly notes that he has issues with his left shoulder for which she has previously seen . Patient would like to have his left knee aspirated and injected in his right knee injected as well today. He notes he is not ready for hip replacement yet as he works full-time at a  home. Review of Systems  See HPI for musculoskeletal review.    All other systems reviewed are negative     History:  Past Medical History:   Diagnosis Date   • Abnormal computed tomography angiography (CTA) 3/8/2023   • BPH with urinary obstruction     AND OTHER LOWER URINARY TRACT SYMPTOMS    • Chronic pain disorder    • Detached retina, right    • Elevated prostate specific antigen (PSA)    • Feeling of incomplete bladder emptying    • History of hypertension    • History of nocturia    • Hypertension    • Low back pain    • Neuroma of foot 2017   • Prostate cancer ([address]) 2017   • Weak urinary stream      Past Surgical History:   Procedure Laterality Date   • BACK SURGERY     • EYE SURGERY REPAIR DETACHED RETINA    • EYE SURGERY Right    • HERNIA REPAIR     • LAMINECTOMY     • LUMBAR LAMINECTOMY Bilateral     L4-5   • PROSTATE BIOPSY Bilateral 2017   • SPINE SURGERY     • TONSILLECTOMY     • VASECTOMY      SURGERY VAS DEFERENS      Social History   Social History     Substance and Sexual Activity   Alcohol Use Yes   • Alcohol/week: 3.0 standard drinks of alcohol   • Types: 3 Cans of beer per week    Comment: occasional     Social History     Substance and Sexual Activity   Drug Use No     Social History     Tobacco Use   Smoking Status Never   Smokeless Tobacco Never     Family History:   Family History   Problem Relation Age of Onset   • Hypertension Mother    • Peripheral vascular disease Mother    • Dementia Father        Current Outpatient Medications on File Prior to Visit   Medication Sig Dispense Refill   • aspirin 81 mg chewable tablet Chew 81 mg daily     • diclofenac (VOLTAREN) 75 mg EC tablet take 1 tablet by mouth twice a day if needed with food 60 tablet 1   • valsartan (DIOVAN) 160 mg tablet Take 1 tablet (160 mg total) by mouth daily 90 tablet 1     No current facility-administered medications on file prior to visit. No Known Allergies     Objective     /84   Pulse 88   Ht 6' 2" (1.88 m)   Wt 98.4 kg (217 lb)   BMI 27.86 kg/m²      PE:  AAOx 3  WDWN  Hearing intact, no drainage from eyes  no audible wheezing  no abdominal distension  LE compartments soft, skin intact    left hip:   No dislocation/deformity  Neg. [facility]  ROM: 0-90, ER 5, IR 0  Unable to perform. Laurent Test  Positive. Impingement test  No TTP over greater trochanter  Abduction: 5/5  Neg. Eliezer's test  +TTP over SIJ    AT/GS intact    Bilateral knee:  No erythema  No ecchymosis  +effusion left knee  AROM: 0- 120  Stable to varus and valgus stress      Large joint arthrocentesis: L knee  Universal Protocol:  Consent: Verbal consent obtained.   Risks and benefits: risks, benefits and alternatives were discussed  Consent given by: patient  Site marked: the operative site was marked  Supporting Documentation  Indications: pain and joint swelling   Procedure Details  Location: knee - L knee  Preparation: Patient was prepped and draped in the usual sterile fashion  Needle size: 22 G  Ultrasound guidance: no  Approach: lateral  Medications administered: 4 mL bupivacaine 0.25 %    Patient tolerance: patient tolerated the procedure well with no immediate complications    Large joint arthrocentesis: L knee  Universal Protocol:  Consent: Verbal consent obtained. Risks and benefits: risks, benefits and alternatives were discussed  Consent given by: patient  Site marked: the operative site was marked  Supporting Documentation  Indications: pain and joint swelling   Procedure Details  Location: knee - L knee  Preparation: Patient was prepped and draped in the usual sterile fashion  Needle size: 18 G  Ultrasound guidance: no  Approach: lateral  Medications administered: 2 mL bupivacaine 0.25 %; 40 mg triamcinolone acetonide 40 mg/mL    Aspirate amount: 50 mL  Aspirate: serous, blood-tinged and yellow    Patient tolerance: patient tolerated the procedure well with no immediate complications  Dressing:  Sterile dressing applied    Large joint arthrocentesis: R knee  Universal Protocol:  Consent: Verbal consent obtained.   Risks and benefits: risks, benefits and alternatives were discussed  Consent given by: patient  Site marked: the operative site was marked  Supporting Documentation  Indications: pain   Procedure Details  Location: knee - R knee  Preparation: Patient was prepped and draped in the usual sterile fashion  Needle size: 22 G  Ultrasound guidance: no  Approach: anterolateral  Medications administered: 2 mL bupivacaine 0.25 %; 40 mg triamcinolone acetonide 40 mg/mL    Patient tolerance: patient tolerated the procedure well with no immediate complications  Dressing:  Sterile dressing applied          Scribe Attestation    I,:  Noman Singh PA-C am acting as a scribe while in the presence of the attending physician.:       I,:  Linda Asencio, DO personally performed the services described in this documentation    as scribed in my presence.:

## 2023-07-06 ENCOUNTER — TELEPHONE (OUTPATIENT)
Age: 63
End: 2023-07-06

## 2023-07-06 NOTE — TELEPHONE ENCOUNTER
Patient being seen at [facility] and following with XENA  Please schedule left SIJ with him  Thank you

## 2023-07-06 NOTE — TELEPHONE ENCOUNTER
Caller: Shanthi Cain PT    Doctor: Dr Bell Thompson     Reason for call: Pt has a referral for injection     Call back#: [phone]

## 2023-07-07 NOTE — TELEPHONE ENCOUNTER
Direct referral from Dr. Martha Flanagan for left SIJ injection. Prior injection with Dr. Estephania Sandoval. I will call and coordinate.

## 2023-07-07 NOTE — TELEPHONE ENCOUNTER
Called patient and scheduled:     Left SIJ injection on 7/12    Reviewed instructions: , NPO 1 hour prior, loose-fitting/comfortable pants, if ill/fever/infx/abx to call and reschedule. No immunizations or vaccinations 2w prior/after steroid injections. Patient stated verbal understanding.

## 2023-07-12 ENCOUNTER — HOSPITAL ENCOUNTER (OUTPATIENT)
Dept: RADIOLOGY | Facility: MEDICAL CENTER | Age: 63
Discharge: HOME | End: 2023-07-12
Payer: COMMERCIAL

## 2023-07-12 ENCOUNTER — OFFICE VISIT (OUTPATIENT)
Dept: OBGYN CLINIC | Facility: MEDICAL CENTER | Age: 63
End: 2023-07-12
Payer: COMMERCIAL

## 2023-07-12 VITALS
TEMPERATURE: 98.1 F | SYSTOLIC BLOOD PRESSURE: 150 MMHG | RESPIRATION RATE: 18 BRPM | OXYGEN SATURATION: 96 % | HEART RATE: 71 BPM | DIASTOLIC BLOOD PRESSURE: 87 MMHG

## 2023-07-12 VITALS
HEART RATE: 74 BPM | BODY MASS INDEX: 27 KG/M2 | WEIGHT: 210.4 LBS | HEIGHT: 74 IN | DIASTOLIC BLOOD PRESSURE: 72 MMHG | SYSTOLIC BLOOD PRESSURE: 132 MMHG

## 2023-07-12 DIAGNOSIS — M16.12 PRIMARY OSTEOARTHRITIS OF ONE HIP, LEFT: Primary | ICD-10-CM

## 2023-07-12 DIAGNOSIS — M46.1 SACROILIITIS (HCC): ICD-10-CM

## 2023-07-12 PROCEDURE — 27096 INJECT SACROILIAC JOINT: CPT | Performed by: PHYSICAL MEDICINE & REHABILITATION

## 2023-07-12 PROCEDURE — 99213 OFFICE O/P EST LOW 20 MIN: CPT | Performed by: ORTHOPAEDIC SURGERY

## 2023-07-12 RX ORDER — BUPIVACAINE HCL/PF 2.5 MG/ML
1.5 VIAL (ML) INJECTION ONCE
Status: COMPLETED | OUTPATIENT
Start: 2023-07-12 | End: 2023-07-12

## 2023-07-12 RX ORDER — METHYLPREDNISOLONE ACETATE 40 MG/ML
40 INJECTION, SUSPENSION INTRA-ARTICULAR; INTRALESIONAL; INTRAMUSCULAR; PARENTERAL; SOFT TISSUE ONCE
Status: COMPLETED | OUTPATIENT
Start: 2023-07-12 | End: 2023-07-12

## 2023-07-12 RX ADMIN — Medication 1.5 ML: at 10:14

## 2023-07-12 RX ADMIN — METHYLPREDNISOLONE ACETATE 40 MG: 40 INJECTION, SUSPENSION INTRA-ARTICULAR; INTRALESIONAL; INTRAMUSCULAR; PARENTERAL; SOFT TISSUE at 10:14

## 2023-07-12 RX ADMIN — IOHEXOL 0.5 ML: 300 INJECTION, SOLUTION INTRAVENOUS at 10:14

## 2023-07-12 NOTE — H&P
History of Present Illness: The patient is a 60 y.o. male who presents with complaints of left SI pain    Past Medical History:   Diagnosis Date   • Abnormal computed tomography angiography (CTA) 3/8/2023   • BPH with urinary obstruction     AND OTHER LOWER URINARY TRACT SYMPTOMS    • Chronic pain disorder    • Detached retina, right 2015   • Elevated prostate specific antigen (PSA) 2017   • Feeling of incomplete bladder emptying    • History of hypertension    • History of nocturia    • Hypertension    • Low back pain    • Neuroma of foot 2017   • Prostate cancer ([address]) 2017   • Weak urinary stream        Past Surgical History:   Procedure Laterality Date   • BACK SURGERY  2008   • EYE SURGERY      REPAIR DETACHED RETINA    • EYE SURGERY Right    • HERNIA REPAIR     • LAMINECTOMY     • LUMBAR LAMINECTOMY Bilateral     L4-5   • PROSTATE BIOPSY Bilateral 2017   • SPINE SURGERY     • TONSILLECTOMY     • VASECTOMY      SURGERY VAS DEFERENS          Current Outpatient Medications:   •  aspirin 81 mg chewable tablet, Chew 81 mg daily, Disp: , Rfl:   •  diclofenac (VOLTAREN) 75 mg EC tablet, take 1 tablet by mouth twice a day if needed with food, Disp: 60 tablet, Rfl: 1  •  valsartan (DIOVAN) 160 mg tablet, Take 1 tablet (160 mg total) by mouth daily, Disp: 90 tablet, Rfl: 1  No current facility-administered medications for this encounter. No Known Allergies    Physical Exam:   Vitals:    07/12/23 1018   BP: 150/87   Pulse: 71   Resp: 18   Temp:    SpO2: 96%     General: Awake, Alert, Oriented x 3, Mood and affect appropriate  Respiratory: Respirations even and unlabored  Cardiovascular: Peripheral pulses intact; no edema  Musculoskeletal Exam: left SI pain    ASA Score: 2    Patient/Chart Verification  Patient ID Verified: Verbal  ID Band Applied: No  Consents Confirmed: Procedural, To be obtained in the Pre-Procedure area  H&P( within 30 days) Verified:  To be obtained in the Pre-Procedure area  Interval H&P(within 24 hr) Complete (required for Outpatients and Surgery Admit only): To be obtained in the Pre-Procedure area  Allergies Reviewed: Yes  Anticoag/NSAID held?: NA  Currently on antibiotics?: No    Assessment:   1.  Sacroiliitis ([address])        Plan: left SIJ CSI under guidance, left sacroiliitis

## 2023-07-12 NOTE — DISCHARGE INSTRUCTIONS

## 2023-07-13 NOTE — PROGRESS NOTES
Assessment/Plan     1. Primary osteoarthritis of one hip, left      No orders of the defined types were placed in this encounter. · Patient has severe left hip osteoarthritis. · Patient is a surgical candidate. He would like to he would like to pursue left total hip replacement. Patient has been placed on the schedule for a left anterior DONNA on Wednesday, 12/27/2023. · Patient will return in October for preoperative planning visit. · We will likely plan to obtain [facility] clearance and placed patient on aspirin for DVT prophylaxis. · Details of surgery briefly reviewed with patient. · Continue diclofenac tabs as needed for pain. · Continue activity as tolerated. · Patient aware he must wait 3 months between an injection and surgery. Return in about 3 months (around 10/12/2023) for L DONNA planning. I answered all of the patient's questions during the visit and provided education of the patient's condition during the visit. The patient verbalized understanding of the information given and agrees with the plan. This note was dictated using [employer]ot[employer] software. It may contain errors including improperly dictated words. Please contact physician directly for any questions. Subjective   Chief Complaint:   Chief Complaint   Patient presents with   • Left Hip - Follow-up, Pain       HPI:  Zac Bautista is a 60 y.o. male who presents for follow up for left hip pain. Patient states that he would like to proceed with left total hip replacement. He would like to plan for surgery in December. He notes he did have a left SI joint injection with pain management today and does already feel some pain relief from that injection. Overall he continues to have pain located in the left groin and lateral hip area. He is taking diclofenac tablets as needed for pain. He is active at home. He is not a smoker and his hemoglobin A1c is less than 7. Review of Systems  See HPI for musculoskeletal review.    All other systems reviewed are negative     History:  Past Medical History:   Diagnosis Date   • Abnormal computed tomography angiography (CTA) 3/8/2023   • BPH with urinary obstruction     AND OTHER LOWER URINARY TRACT SYMPTOMS    • Chronic pain disorder    • Detached retina, right 2015   • Elevated prostate specific antigen (PSA) 2017   • Feeling of incomplete bladder emptying    • History of hypertension    • History of nocturia    • Hypertension    • Low back pain    • Neuroma of foot 2017   • Prostate cancer ([address]) 2017   • Weak urinary stream      Past Surgical History:   Procedure Laterality Date   • BACK SURGERY  2008   • EYE SURGERY      REPAIR DETACHED RETINA    • EYE SURGERY Right    • HERNIA REPAIR     • LAMINECTOMY     • LUMBAR LAMINECTOMY Bilateral     L4-5   • PROSTATE BIOPSY Bilateral 2017   • SPINE SURGERY     • TONSILLECTOMY     • VASECTOMY      SURGERY VAS DEFERENS      Social History   Social History     Substance and Sexual Activity   Alcohol Use Yes   • Alcohol/week: 3.0 standard drinks of alcohol   • Types: 3 Cans of beer per week    Comment: occasional     Social History     Substance and Sexual Activity   Drug Use No     Social History     Tobacco Use   Smoking Status Never   Smokeless Tobacco Never     Family History:   Family History   Problem Relation Age of Onset   • Hypertension Mother    • Peripheral vascular disease Mother    • Dementia Father        Current Outpatient Medications on File Prior to Visit   Medication Sig Dispense Refill   • aspirin 81 mg chewable tablet Chew 81 mg daily     • diclofenac (VOLTAREN) 75 mg EC tablet take 1 tablet by mouth twice a day if needed with food 60 tablet 1   • valsartan (DIOVAN) 160 mg tablet Take 1 tablet (160 mg total) by mouth daily 90 tablet 1     Current Facility-Administered Medications on File Prior to Visit   Medication Dose Route Frequency Provider Last Rate Last Admin   • [COMPLETED] bupivacaine (PF) (MARCAINE) 0.25 % injection 1.5 mL  1.5 mL Intra-articular Once Mcclain Rumpf, DO   1.5 mL at 07/12/23 1014   • [COMPLETED] iohexol (OMNIPAQUE) 300 mg/mL injection 0.5 mL  0.5 mL Intra-articular Once Mcclain Rumpf, DO   0.5 mL at 07/12/23 1014   • [COMPLETED] methylPREDNISolone acetate (DEPO-MEDROL) injection 40 mg  40 mg Intra-articular Once Mcclain Rumpf, DO   40 mg at 07/12/23 1014     No Known Allergies     Objective     /72   Pulse 74   Ht 6' 2" (1.88 m)   Wt 95.4 kg (210 lb 6.4 oz)   BMI 27.01 kg/m²      PE:  AAOx 3  WDWN  Hearing intact, no drainage from eyes  no audible wheezing  no abdominal distension  LE compartments soft, skin intact    left hip:   No dislocation/deformity  ROM: 0-90, minimal ER and IR with pain  No TTP over greater trochanter  No TTP over SIJ    AT/GS intact      Scribe Attestation    I,:  Patricia Gonzalez PA-C am acting as a scribe while in the presence of the attending physician.:       I,:  Alcon Escobar DO personally performed the services described in this documentation    as scribed in my presence.:

## 2023-07-17 ENCOUNTER — APPOINTMENT (OUTPATIENT)
Dept: RADIOLOGY | Facility: MEDICAL CENTER | Age: 63
End: 2023-07-17
Payer: COMMERCIAL

## 2023-07-17 ENCOUNTER — OFFICE VISIT (OUTPATIENT)
Dept: OBGYN CLINIC | Facility: MEDICAL CENTER | Age: 63
End: 2023-07-17
Payer: COMMERCIAL

## 2023-07-17 VITALS
BODY MASS INDEX: 26.56 KG/M2 | DIASTOLIC BLOOD PRESSURE: 83 MMHG | HEIGHT: 74 IN | HEART RATE: 90 BPM | WEIGHT: 207 LBS | SYSTOLIC BLOOD PRESSURE: 132 MMHG

## 2023-07-17 DIAGNOSIS — M25.562 LEFT KNEE PAIN, UNSPECIFIED CHRONICITY: Primary | ICD-10-CM

## 2023-07-17 DIAGNOSIS — M75.42 IMPINGEMENT SYNDROME OF LEFT SHOULDER: ICD-10-CM

## 2023-07-17 DIAGNOSIS — M25.562 LEFT KNEE PAIN, UNSPECIFIED CHRONICITY: ICD-10-CM

## 2023-07-17 DIAGNOSIS — M25.512 ACUTE PAIN OF LEFT SHOULDER: ICD-10-CM

## 2023-07-17 PROCEDURE — 73030 X-RAY EXAM OF SHOULDER: CPT

## 2023-07-17 PROCEDURE — 20610 DRAIN/INJ JOINT/BURSA W/O US: CPT | Performed by: ORTHOPAEDIC SURGERY

## 2023-07-17 PROCEDURE — 99214 OFFICE O/P EST MOD 30 MIN: CPT | Performed by: ORTHOPAEDIC SURGERY

## 2023-07-17 RX ORDER — BETAMETHASONE SODIUM PHOSPHATE AND BETAMETHASONE ACETATE 3; 3 MG/ML; MG/ML
6 INJECTION, SUSPENSION INTRA-ARTICULAR; INTRALESIONAL; INTRAMUSCULAR; SOFT TISSUE
Status: COMPLETED | OUTPATIENT
Start: 2023-07-17 | End: 2023-07-17

## 2023-07-17 RX ORDER — LIDOCAINE HYDROCHLORIDE 10 MG/ML
4 INJECTION, SOLUTION INFILTRATION; PERINEURAL
Status: COMPLETED | OUTPATIENT
Start: 2023-07-17 | End: 2023-07-17

## 2023-07-17 RX ADMIN — LIDOCAINE HYDROCHLORIDE 4 ML: 10 INJECTION, SOLUTION INFILTRATION; PERINEURAL at 10:30

## 2023-07-17 RX ADMIN — BETAMETHASONE SODIUM PHOSPHATE AND BETAMETHASONE ACETATE 6 MG: 3; 3 INJECTION, SUSPENSION INTRA-ARTICULAR; INTRALESIONAL; INTRAMUSCULAR; SOFT TISSUE at 10:30

## 2023-07-17 NOTE — PROGRESS NOTES
Ortho Sports Medicine Shoulder Visit     Assesment:   left shoulder impingement    Plan:    Conservative treatment:    Ice to shoulder 1-2 times daily, for 20 minutes at a time. New plain films obtained and  independently reviewed. Activities as tolerated    Imaging: All imaging from today was reviewed by myself and explained to the patient. Injection:    The risks and benefits of the injection (which include but are not limited to: infection, bleeding,damage to nerve/artery, need for further intervention), as well as the risks and benefits of all alternative treatments were explained and understood. The patient elected to proceed with injection. The procedure was done with aseptic technique, and the patient tolerated the procedure well with no complications. A corticosteroid injection of the subacromial space was performed. Ice to the shoulder 1-2 times daily for 20 minutes, for next 24-48 hrs. Surgery:     No surgery is recommended at this point, continue with conservative treatment plan as noted. History of Present Illness: The patient is a 60 y.o., right hand dominant male whose occupation is  home, referred to me by themself, seen in clinic for evaluation of left shoulder pain. The patient denies a history of diabetes. The patient denies a history of thyroid disorder. Pain is located anterior, lateral.  The patient rates the pain as a 3/10. The pain has been present for 3 weeks. Pt states he was away on a trip and he could not sleep due to pain. He states some days are bad with pain and other days he has no pain with activities  Pt denies any trauma or injury.  He has not had any other treatments and had not taken and NDAID's, tried ice       Shoulder Surgical History:  None    Past Medical, Social and Family History:  Past Medical History:   Diagnosis Date   • Abnormal computed tomography angiography (CTA) 3/8/2023   • BPH with urinary obstruction     AND OTHER LOWER URINARY TRACT SYMPTOMS    • Chronic pain disorder    • Detached retina, right    • Elevated prostate specific antigen (PSA) 2017   • Feeling of incomplete bladder emptying    • History of hypertension    • History of nocturia    • Hypertension    • Low back pain    • Neuroma of foot    • Prostate cancer ([address] [facility]) 2017   • Weak urinary stream      Past Surgical History:   Procedure Laterality Date   • BACK SURGERY  2008   • EYE SURGERY      REPAIR DETACHED RETINA    • EYE SURGERY Right    • HERNIA REPAIR     • LAMINECTOMY     • LUMBAR LAMINECTOMY Bilateral     L4-5   • PROSTATE BIOPSY Bilateral 2017   • SPINE SURGERY     • TONSILLECTOMY     • VASECTOMY      SURGERY VAS DEFERENS      No Known Allergies  Current Outpatient Medications on File Prior to Visit   Medication Sig Dispense Refill   • aspirin 81 mg chewable tablet Chew 81 mg daily     • diclofenac (VOLTAREN) 75 mg EC tablet take 1 tablet by mouth twice a day if needed with food 60 tablet 1   • valsartan (DIOVAN) 160 mg tablet Take 1 tablet (160 mg total) by mouth daily 90 tablet 1     No current facility-administered medications on file prior to visit. Social History     Socioeconomic History   • Marital status: /Civil Union     Spouse name: Not on file   • Number of children: 2   • Years of education: Not on file   • Highest education level: Not on file   Occupational History   • Not on file   Tobacco Use   • Smoking status: Never   • Smokeless tobacco: Never   Vaping Use   • Vaping Use: Never used   Substance and Sexual Activity   • Alcohol use:  Yes     Alcohol/week: 3.0 standard drinks of alcohol     Types: 3 Cans of beer per week     Comment: occasional   • Drug use: No   • Sexual activity: Yes     Partners: Female     Birth control/protection: Male Sterilization   Other Topics Concern   • Not on file   Social History Narrative    3/4 time          TWO CHILDREN , 2 stepsons     Social Determinants of Health Financial Resource Strain: Not on file   Food Insecurity: Not on file   Transportation Needs: Not on file   Physical Activity: Not on file   Stress: Not on file   Social Connections: Not on file   Intimate Partner Violence: Not on file   Housing Stability: Not on file         I have reviewed the past medical, surgical, social and family history, medications and allergies as documented in the EMR. Review of systems: ROS is negative other than that noted in the HPI. Constitutional: Negative for fatigue and fever. HENT: Negative for sore throat. Respiratory: Negative for shortness of breath. Cardiovascular: Negative for chest pain. Gastrointestinal: Negative for abdominal pain. Endocrine: Negative for cold intolerance and heat intolerance. Genitourinary: Negative for flank pain. Musculoskeletal: Negative for back pain. Skin: Negative for rash. Allergic/Immunologic: Negative for immunocompromised state. Neurological: Negative for dizziness. Psychiatric/Behavioral: Negative for agitation. Physical Exam:    There were no vitals taken for this visit. General/Constitutional: NAD, well developed, well nourished  HENT: Normocephalic, atraumatic  CV: Intact distal pulses, regular rate  Resp: No respiratory distress or labored breathing  Lymphatic: No lymphadenopathy palpated  Neuro: Alert and Oriented x 3, no focal deficits  Psych: Normal mood, normal affect, normal judgement, normal behavior  Skin: Warm, dry, no rashes, no erythema     Shoulder Exam (focused):     Shoulder focused exam:       RIGHT LEFT    Scapula Atrophy Negative Negative     Winging Negative Negative     Protraction Negative Negative    Rotator cuff SS 5/5 4+/5     IS 5/5 5/5     SubS 5/5 5/5    ROM  170     ER0 60 60     ER90 90 90     IR90 40 40     IRb T6 T6    TTP: AC Negative Negative     Biceps Negative Negative     Coracoid Negative Negative    Special Tests: O'Briens Negative Negative     [place]-shear Negative Negative     Cross body Adduction Negative Negative     Speeds  Negative Negative     Zoila's Negative Negative     Whipple Negative Negative       Neer Negative Negative     Eldridge Negative Negative    Instability: Apprehension & relocation not tested not tested     Load & shift not tested not tested    Other: Crank Negative Negative               UE NV Exam: +2 Radial pulses bilaterally  Sensation intact to light touch C5-T1 bilaterally, Radial/median/ulnar nerve motor intact      Bilateral elbow, wrist, and and forearm ROM full, painless with passive ROM, no ttp or crepitance throughout extremities below shoulder joint    Cervical ROM is full without pain, numbness or tingling      Shoulder Imaging    X-rays of the left shoulder were reviewed, which demonstrate   No acute fractures  Mild Degenerative changes noted      Large joint arthrocentesis: L subacromial bursa  Universal Protocol:  Consent: Verbal consent obtained.   Risks and benefits: risks, benefits and alternatives were discussed  Consent given by: patient  Timeout called at: 7/17/2023 10:42 AM.  Patient understanding: patient states understanding of the procedure being performed  Site marked: the operative site was marked  Patient identity confirmed: verbally with patient    Supporting Documentation  Indications: pain   Procedure Details  Location: shoulder - L subacromial bursa  Needle size: 22 G  Ultrasound guidance: no  Approach: anterolateral  Medications administered: 4 mL lidocaine 1 %; 6 mg betamethasone acetate-betamethasone sodium phosphate 6 (3-3) mg/mL    Patient tolerance: patient tolerated the procedure well with no immediate complications  Dressing:  Sterile dressing applied

## 2023-07-19 ENCOUNTER — TELEPHONE (OUTPATIENT)
Dept: PAIN MEDICINE | Facility: CLINIC | Age: 63
End: 2023-07-19

## 2023-07-21 NOTE — TELEPHONE ENCOUNTER
Patient Reports  "doing fine "couldn't give a number       %     improvement post injection    Pain Level  2   /10

## 2023-08-16 ENCOUNTER — OFFICE VISIT (OUTPATIENT)
Dept: URGENT CARE | Facility: MEDICAL CENTER | Age: 63
End: 2023-08-16
Payer: COMMERCIAL

## 2023-08-16 VITALS
WEIGHT: 210.4 LBS | HEIGHT: 74 IN | TEMPERATURE: 97.1 F | RESPIRATION RATE: 20 BRPM | HEART RATE: 88 BPM | BODY MASS INDEX: 27 KG/M2 | OXYGEN SATURATION: 98 % | SYSTOLIC BLOOD PRESSURE: 110 MMHG | DIASTOLIC BLOOD PRESSURE: 65 MMHG

## 2023-08-16 DIAGNOSIS — R42 DIZZINESS AND GIDDINESS: Primary | ICD-10-CM

## 2023-08-16 DIAGNOSIS — R03.1 LOW BLOOD PRESSURE READING: ICD-10-CM

## 2023-08-16 LAB
ATRIAL RATE: 87 BPM
P AXIS: 61 DEGREES
PR INTERVAL: 124 MS
QRS AXIS: -52 DEGREES
QRSD INTERVAL: 90 MS
QT INTERVAL: 362 MS
QTC INTERVAL: 435 MS
T WAVE AXIS: 42 DEGREES
VENTRICULAR RATE: 87 BPM

## 2023-08-16 PROCEDURE — 93005 ELECTROCARDIOGRAM TRACING: CPT | Performed by: ORTHOPAEDIC SURGERY

## 2023-08-16 PROCEDURE — 93010 ELECTROCARDIOGRAM REPORT: CPT | Performed by: ORTHOPAEDIC SURGERY

## 2023-08-16 PROCEDURE — 99213 OFFICE O/P EST LOW 20 MIN: CPT | Performed by: ORTHOPAEDIC SURGERY

## 2023-08-16 NOTE — PROGRESS NOTES
[facility]        NAME: Mara Wallace is a 58 y.o. male  : 1960    MRN: [id number]  DATE: 2023  TIME: 4:18 PM    Assessment and Plan   Dizziness and giddiness [R42]  1. Dizziness and giddiness  ECG 12 lead      2. Low blood pressure reading          ECG performed, read by me, which shows normal sinus rhythm without ST changes, evidence of hypertrophy or heart block. Orthostatic blood pressure tested today, which showed increased heart rate, though no significant drop in blood pressure. BP upon arrival 110/65. Patient complaint of fatigue, but denies dizziness, or other concerning symptoms at that time. Discussed with patient that I recommend discontinuing Valsartan until he is able to see his PCP. I also recommend increased fluid intake, as there may be an aspect of dehydration to his symptoms. Discussed strict proceed to ER precautions. Patient Instructions       Follow up with PCP in 3-5 days. Proceed to  ER if symptoms worsen. 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 Took BP @ home in left arm - 102/83 and 80/63 - then came ot . Now just feeling tired         History of Present Illness       69-year-old male presents to the urgent care for dizziness and low blood pressure. 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 He notes while standing in the parking lot he felt a wave of dizziness hit him, like he was going to pass out. Immediate after sitting down he felt better. Then later, while at the [facility], he felt a similar wave of dizziness and lightheadedness. He sat down in his car with the air condition on, which helped his symptoms. Upon arriving home he took his blood pressure, which showed 102/63, repeat 80/62.  The patient tried calling his PCP, though they did not have any appointments available. The patient does have a past medical history of hypertension, as well as JÚNIOR, granulomatous lung disease, prostate carcinoma, and gout. He manages his high blood pressure with valsartan. He does note about three months ago his PCP decreased his valsartan dose from two tablets to one tablet daily. He takes his medication every morning. The patient initially reports that this is the first time he has ever felt lightheaded, but his wife, who accompanied him today, reports that a few weeks ago he told her he felt a little dizzy, and had low blood pressure then. The patient denies any chest pain, shortness of breath, headache, heart palpitations. He currently denies any dizziness or lightheadedness, but does admit to fatigue. He denies any nausea, vomiting, diarrhea, fevers, chills, congestion. He does complain that for the past few weeks he has been experiencing mid-gastric abdominal discomfort and some GERD-like symptoms yesterday, though this has resolved today. He denies any heart disease, and denies any family history of heart disease. Of note, the patient also admits today that he "does not like water", and had only had two 8oz cups of iced tea today. Review of Systems   Review of Systems   Constitutional: Positive for fatigue. Negative for activity change, appetite change, chills, diaphoresis, fever and unexpected weight change. HENT: Negative for congestion, ear pain, rhinorrhea, sore throat and trouble swallowing. Eyes: Negative for pain and visual disturbance. Respiratory: Negative for apnea, cough, chest tightness, shortness of breath and wheezing. Cardiovascular: Negative for chest pain and palpitations. Gastrointestinal: Negative for abdominal pain, constipation, diarrhea, nausea and vomiting. Genitourinary: Negative for dysuria, frequency and hematuria.    Musculoskeletal: Negative for arthralgias and back pain. Skin: Negative for color change and rash. Allergic/Immunologic: Negative. Neurological: Positive for dizziness and light-headedness. Negative for seizures, syncope, weakness, numbness and headaches. Psychiatric/Behavioral: Negative. All other systems reviewed and are negative.         Current Medications       Current Outpatient Medications:   •  aspirin 81 mg chewable tablet, Chew 81 mg daily, Disp: , Rfl:   •  diclofenac (VOLTAREN) 75 mg EC tablet, take 1 tablet by mouth twice a day if needed with food, Disp: 60 tablet, Rfl: 1  •  valsartan (DIOVAN) 160 mg tablet, Take 1 tablet (160 mg total) by mouth daily, Disp: 90 tablet, Rfl: 1    Current Allergies     Allergies as of 08/16/2023   • (No Known Allergies)            The following portions of the patient's history were reviewed and updated as appropriate: allergies, current medications, past family history, past medical history, past social history, past surgical history and problem list.     Past Medical History:   Diagnosis Date   • Abnormal computed tomography angiography (CTA) 3/8/2023   • BPH with urinary obstruction     AND OTHER LOWER URINARY TRACT SYMPTOMS    • Chronic pain disorder    • Detached retina, right 2015   • Elevated prostate specific antigen (PSA) 2017   • Feeling of incomplete bladder emptying    • History of hypertension    • History of nocturia    • Hypertension    • Low back pain    • Neuroma of foot 2017   • Prostate cancer ([address]) 2017   • Weak urinary stream        Past Surgical History:   Procedure Laterality Date   • BACK SURGERY  2008   • EYE SURGERY      REPAIR DETACHED RETINA    • EYE SURGERY Right    • HERNIA REPAIR     • LAMINECTOMY     • LUMBAR LAMINECTOMY Bilateral     L4-5   • PROSTATE BIOPSY Bilateral 2017   • SPINE SURGERY     • TONSILLECTOMY     • VASECTOMY      SURGERY VAS DEFERENS        Family History   Problem Relation Age of Onset   • Hypertension Mother    • Peripheral vascular disease Mother • Dementia Father          Medications have been verified. Objective   /65 (BP Location: Left arm, Patient Position: Sitting, Cuff Size: Standard)   Pulse 88   Temp (!) 97.1 °F (36.2 °C) (Tympanic)   Resp 20   Ht 6' 2" (1.88 m)   Wt 95.4 kg (210 lb 6.4 oz)   SpO2 98%   BMI 27.01 kg/m²        Physical Exam     Physical Exam  Vitals and nursing note reviewed. Constitutional:       General: He is not in acute distress. Appearance: Normal appearance. He is normal weight. He is not ill-appearing, toxic-appearing or diaphoretic. HENT:      Head: Normocephalic and atraumatic. Right Ear: Tympanic membrane normal.      Left Ear: Tympanic membrane normal.      Nose: Nose normal. No congestion. Mouth/Throat:      Pharynx: Oropharynx is clear. Eyes:      Extraocular Movements: Extraocular movements intact. Pupils: Pupils are equal, round, and reactive to light. Cardiovascular:      Rate and Rhythm: Normal rate and regular rhythm. Pulses: Normal pulses. Heart sounds: Normal heart sounds. Pulmonary:      Effort: Pulmonary effort is normal. No respiratory distress. Breath sounds: Normal breath sounds. No wheezing or rhonchi. Abdominal:      General: Abdomen is flat. Musculoskeletal:         General: Normal range of motion. Cervical back: Normal range of motion. Skin:     General: Skin is warm and dry. Capillary Refill: Capillary refill takes less than 2 seconds. Neurological:      General: No focal deficit present. Mental Status: He is alert and oriented to person, place, and time.    Psychiatric:         Mood and Affect: Mood normal.         Behavior: Behavior normal.

## 2023-08-18 ENCOUNTER — OFFICE VISIT (OUTPATIENT)
Dept: FAMILY MEDICINE CLINIC | Facility: CLINIC | Age: 63
End: 2023-08-18
Payer: COMMERCIAL

## 2023-08-18 VITALS
HEIGHT: 74 IN | DIASTOLIC BLOOD PRESSURE: 84 MMHG | WEIGHT: 212 LBS | BODY MASS INDEX: 27.21 KG/M2 | RESPIRATION RATE: 14 BRPM | SYSTOLIC BLOOD PRESSURE: 112 MMHG | HEART RATE: 87 BPM | TEMPERATURE: 97.8 F

## 2023-08-18 DIAGNOSIS — J40 BRONCHITIS: ICD-10-CM

## 2023-08-18 DIAGNOSIS — I10 ESSENTIAL HYPERTENSION: Primary | ICD-10-CM

## 2023-08-18 DIAGNOSIS — K30 INDIGESTION: ICD-10-CM

## 2023-08-18 PROCEDURE — 99214 OFFICE O/P EST MOD 30 MIN: CPT | Performed by: FAMILY MEDICINE

## 2023-08-18 RX ORDER — VALSARTAN 80 MG/1
80 TABLET ORAL DAILY
Qty: 90 TABLET | Refills: 3 | Status: SHIPPED | OUTPATIENT
Start: 2023-08-18

## 2023-08-18 RX ORDER — FAMOTIDINE 20 MG/1
20 TABLET, FILM COATED ORAL 2 TIMES DAILY PRN
Qty: 90 TABLET | Refills: 1 | Status: SHIPPED | OUTPATIENT
Start: 2023-08-18 | End: 2024-08-12

## 2023-08-18 RX ORDER — AZITHROMYCIN 250 MG/1
TABLET, FILM COATED ORAL
Qty: 6 TABLET | Refills: 0 | Status: SHIPPED | OUTPATIENT
Start: 2023-08-18 | End: 2023-08-22

## 2023-08-18 NOTE — PROGRESS NOTES
Name: Wayne Conway      : 1960      MRN: [id number]  Encounter Provider: Loree Coffey MD  Encounter Date: 2023   Encounter department: [facility]    Assessment & Plan     1. Essential hypertension  -     valsartan (DIOVAN) 80 mg tablet; Take 1 tablet (80 mg total) by mouth daily    2. Indigestion  -     famotidine (PEPCID) 20 mg tablet; Take 1 tablet (20 mg total) by mouth 2 (two) times a day as needed for indigestion or heartburn    3. Bronchitis  -     azithromycin (ZITHROMAX) 250 mg tablet; 2 1st day, 1/d for 4 days           Subjective      BP  Has  Been all over the  Place, went  Down to 80's/60's-stopped  Med  Now with some readings over 150-BP here in office  Is normal,cough  With green  Mucus, voice scratchy, post nasal drip with clear  Rhinorrhea, no ear  Pain, no sinus  Pain or headaches, no temps, occ wheezing at  Night, just  Since  Wife sick, did test negative for covid, pain in epigastric area-took Mylanta, had  Nl ekg  , having  Hip replacement 23 while wife off on  [place] break, has PTSD from last trip-found himself  More irritable    Hypertension  This is a new problem. The current episode started more than 1 year ago. The problem has been gradually improving since onset. The problem is controlled. Pertinent negatives include no anxiety, blurred vision, chest pain, headaches, malaise/fatigue, neck pain, orthopnea, palpitations, peripheral edema, PND, shortness of breath or sweats. There are no associated agents to hypertension. There are no known risk factors for coronary artery disease. Past treatments include angiotensin blockers. There are no compliance problems. Review of Systems   Constitutional: Negative for activity change, appetite change, fatigue and malaise/fatigue. HENT: Positive for postnasal drip and rhinorrhea. Negative for congestion, ear pain, sinus pressure, sinus pain and sore throat. Eyes: Negative for blurred vision. Respiratory: Positive for cough. Negative for shortness of breath. Cardiovascular: Negative for chest pain, palpitations, orthopnea and PND. Musculoskeletal: Negative for neck pain. Neurological: Negative for headaches. Psychiatric/Behavioral: The patient is not nervous/anxious. Current Outpatient Medications on File Prior to Visit   Medication Sig   • aspirin 81 mg chewable tablet Chew 81 mg daily   • diclofenac (VOLTAREN) 75 mg EC tablet take 1 tablet by mouth twice a day if needed with food   • [DISCONTINUED] valsartan (DIOVAN) 160 mg tablet Take 1 tablet (160 mg total) by mouth daily (Patient not taking: Reported on 8/18/2023)       Objective     /84 (BP Location: Right arm, Patient Position: Sitting, Cuff Size: Adult)   Pulse 87   Temp 97.8 °F (36.6 °C) (Temporal)   Resp 14   Ht 6' 2" (1.88 m)   Wt 96.2 kg (212 lb)   BMI 27.22 kg/m²     Physical Exam  Vitals reviewed. Constitutional:       Appearance: Normal appearance. HENT:      Right Ear: Tympanic membrane normal.      Left Ear: Tympanic membrane normal.      Nose: No congestion or rhinorrhea. Mouth/Throat:      Pharynx: No oropharyngeal exudate or posterior oropharyngeal erythema. Cardiovascular:      Rate and Rhythm: Normal rate and regular rhythm. Pulses: Normal pulses. Heart sounds: Normal heart sounds. Pulmonary:      Effort: Pulmonary effort is normal.      Breath sounds: Wheezing present. Lymphadenopathy:      Cervical: No cervical adenopathy. Neurological:      Mental Status: He is alert.    Psychiatric:         Mood and Affect: Mood normal.       Misti New MD

## 2023-08-22 ENCOUNTER — CONSULT (OUTPATIENT)
Dept: VASCULAR SURGERY | Facility: CLINIC | Age: 63
End: 2023-08-22
Payer: COMMERCIAL

## 2023-08-22 VITALS
OXYGEN SATURATION: 98 % | DIASTOLIC BLOOD PRESSURE: 80 MMHG | SYSTOLIC BLOOD PRESSURE: 126 MMHG | WEIGHT: 213.6 LBS | BODY MASS INDEX: 27.42 KG/M2 | HEART RATE: 74 BPM

## 2023-08-22 DIAGNOSIS — I83.93 VARICOSE VEINS OF BOTH LOWER EXTREMITIES, UNSPECIFIED WHETHER COMPLICATED: ICD-10-CM

## 2023-08-22 PROCEDURE — 99203 OFFICE O/P NEW LOW 30 MIN: CPT

## 2023-08-22 NOTE — ASSESSMENT & PLAN NOTE
Patient reports that varicose veins have been present for several years. He is denying any symptoms related to these veins at this time. Denies pain, numbness/tingling, heaviness, fatigue, tissue loss, bleeding, or swelling to lower extremities. He reports intermittent cramping in both legs at night, as well as transient episode of itching to site of varicose veins while on vacation 2 years ago    Plan:  -We discussed the pathophysiology of varicose veins as well as contributing lifestyle factors.  -We discussed initiating conservative measures including compression stockings, elevating legs, increasing physical activity and continued weight loss. -Patient is declining prescription for compression stockings at this time.  -As patient is asymptomatic, no further imaging or intervention necessary at this time.  -Return to office as needed if symptoms worsen or fail to improve.

## 2023-08-22 NOTE — PROGRESS NOTES
Assessment/Plan:    Varicose veins of both lower extremities  Patient reports that varicose veins have been present for several years. He is denying any symptoms related to these veins at this time. Denies pain, numbness/tingling, heaviness, fatigue, tissue loss, bleeding, or swelling to lower extremities. He reports intermittent cramping in both legs at night, as well as transient episode of itching to site of varicose veins while on vacation 2 years ago    Plan:  -We discussed the pathophysiology of varicose veins as well as contributing lifestyle factors.  -We discussed initiating conservative measures including compression stockings, elevating legs, increasing physical activity and continued weight loss. -Patient is declining prescription for compression stockings at this time.  -As patient is asymptomatic, no further imaging or intervention necessary at this time.  -Return to office as needed if symptoms worsen or fail to improve. Diagnoses and all orders for this visit:    Varicose veins of both lower extremities, unspecified whether complicated  -     Ambulatory Referral to Vascular Surgery          Subjective:      Patient ID: Bi Nuñez is a 58 y.o. male. Patient is a 58year old male, nonsmoker, with PMH HTN, JÚNIOR, and prostate cancer. Patient presents to the vascular surgery office upon referral by his orthopedist for the evaluation of varicose veins. Patient reports that varicose veins have been present for several years. He is denying any symptoms related to these veins at this time. Denies pain, numbness/tingling, heaviness, fatigue, tissue loss, bleeding, or swelling to lower extremities. He reports intermittent cramping in both legs at night, as well as transient episode of itching to site of varicose veins while on vacation 2 years ago. Denies any episodes of thrombophlebitis.  He has tried compression stockings in the past, however has not worn them consistently as they were difficulty to put on. He is uncertain of any family history of varicose veins. He has worked at [employer] for 10 years which requires him to be on his feet most of the day. Previously worked at a desk job for 30 years. He does not exercise regularly. Patient elevates legs on recliner when at home. The following portions of the patient's history were reviewed and updated as appropriate: allergies, current medications, past family history, past medical history, past social history, past surgical history and problem list.    Review of Systems   Constitutional: Negative. HENT: Negative. Eyes: Negative. Respiratory: Negative. Cardiovascular: Positive for leg swelling (right knee swelling). Endocrine: Negative. Genitourinary: Negative. Musculoskeletal: Negative. Skin: Negative. Negative for color change, pallor and wound. Allergic/Immunologic: Negative. Neurological: Negative. Negative for numbness. Hematological: Negative. Psychiatric/Behavioral: Negative. Objective:      /80 (BP Location: Right arm, Patient Position: Sitting, Cuff Size: Large)   Pulse 74   Wt 96.9 kg (213 lb 9.6 oz)   SpO2 98%   BMI 27.42 kg/m²          Physical Exam  Constitutional:       General: He is not in acute distress. Appearance: Normal appearance. HENT:      Head: Normocephalic and atraumatic. Cardiovascular:      Rate and Rhythm: Normal rate and regular rhythm. Pulmonary:      Effort: Pulmonary effort is normal. No respiratory distress. Musculoskeletal:         General: Swelling (right knee swelling) present. No tenderness. Right lower leg: No edema. Left lower leg: No edema. Skin:     General: Skin is warm and dry. Capillary Refill: Capillary refill takes less than 2 seconds. Comments: Scattered bulging truncal varicosities and reticular veins to bilateral ankles and lower extremities, R>L.    Neurological:      General: No focal deficit present. Mental Status: He is alert and oriented to person, place, and time. Psychiatric:         Mood and Affect: Mood normal.         Behavior: Behavior normal.       I have reviewed and made appropriate changes to the review of systems input by the medical assistant.     Vitals:    08/22/23 1427   BP: 126/80   BP Location: Right arm   Patient Position: Sitting   Cuff Size: Large   Pulse: 74   SpO2: 98%   Weight: 96.9 kg (213 lb 9.6 oz)       Patient Active Problem List   Diagnosis   • Hypertension   • Prostate carcinoma (HCC)   • Gout   • Umbilical hernia without obstruction and without gangrene   • Osteoarthritis of knee   • Varicose veins of both lower extremities   • Pain in both knees   • Chondrocalcinosis   • Trigger finger, left index finger   • Arthritis of carpometacarpal (CMC) joint of right thumb   • Granulomatous lung disease (HCC)   • Primary osteoarthritis of one hip, left   • Suspected sleep apnea   • Hypersomnia   • Abnormal computed tomography angiography (CTA)   • Obstructive sleep apnea   • Excessive daytime sleepiness   • Sacroiliitis (HCC)       Past Surgical History:   Procedure Laterality Date   • BACK SURGERY  2008   • EYE SURGERY      REPAIR DETACHED RETINA    • EYE SURGERY Right    • HERNIA REPAIR     • LAMINECTOMY     • LUMBAR LAMINECTOMY Bilateral     L4-5   • PROSTATE BIOPSY Bilateral 2017   • SPINE SURGERY     • TONSILLECTOMY     • VASECTOMY      SURGERY VAS DEFERENS        Family History   Problem Relation Age of Onset   • Hypertension Mother    • Peripheral vascular disease Mother    • Dementia Father        Social History     Socioeconomic History   • Marital status: /Civil Union     Spouse name: Not on file   • Number of children: 2   • Years of education: Not on file   • Highest education level: Not on file   Occupational History   • Not on file   Tobacco Use   • Smoking status: Never   • Smokeless tobacco: Never   Vaping Use   • Vaping Use: Never used   Substance and Sexual Activity   • Alcohol use: Yes     Alcohol/week: 3.0 standard drinks of alcohol     Types: 3 Cans of beer per week     Comment: occasional   • Drug use: No   • Sexual activity: Yes     Partners: Female     Birth control/protection: Male Sterilization   Other Topics Concern   • Not on file   Social History Narrative    3/4 time          TWO CHILDREN , 2 stepsons     Social Determinants of Health     Financial Resource Strain: Not on file   Food Insecurity: Not on file   Transportation Needs: Not on file   Physical Activity: Not on file   Stress: Not on file   Social Connections: Not on file   Intimate Partner Violence: Not on file   Housing Stability: Not on file       No Known Allergies      Current Outpatient Medications:   •  aspirin 81 mg chewable tablet, Chew 81 mg daily, Disp: , Rfl:   •  diclofenac (VOLTAREN) 75 mg EC tablet, take 1 tablet by mouth twice a day if needed with food, Disp: 60 tablet, Rfl: 1  •  famotidine (PEPCID) 20 mg tablet, Take 1 tablet (20 mg total) by mouth 2 (two) times a day as needed for indigestion or heartburn (Patient taking differently: Take 20 mg by mouth as needed for indigestion or heartburn), Disp: 90 tablet, Rfl: 1  •  valsartan (DIOVAN) 80 mg tablet, Take 1 tablet (80 mg total) by mouth daily, Disp: 90 tablet, Rfl: 3  •  azithromycin (ZITHROMAX) 250 mg tablet, 2 1st day, 1/d for 4 days (Patient not taking: Reported on 2023), Disp: 6 tablet, Rfl: 0    I have spent a total time of 30 minutes on 23 in caring for this patient including Diagnostic results, Prognosis, Risks and benefits of tx options, Instructions for management, Patient and family education, Importance of tx compliance, Risk factor reductions, Impressions, Counseling / Coordination of care, Documenting in the medical record, Reviewing / ordering tests, medicine, procedures   and Obtaining or reviewing history  .

## 2023-09-19 ENCOUNTER — OFFICE VISIT (OUTPATIENT)
Dept: OBGYN CLINIC | Facility: MEDICAL CENTER | Age: 63
End: 2023-09-19
Payer: COMMERCIAL

## 2023-09-19 ENCOUNTER — APPOINTMENT (OUTPATIENT)
Dept: RADIOLOGY | Facility: MEDICAL CENTER | Age: 63
End: 2023-09-19
Payer: COMMERCIAL

## 2023-09-19 VITALS
DIASTOLIC BLOOD PRESSURE: 89 MMHG | SYSTOLIC BLOOD PRESSURE: 148 MMHG | HEIGHT: 74 IN | HEART RATE: 79 BPM | WEIGHT: 215.6 LBS | BODY MASS INDEX: 27.67 KG/M2

## 2023-09-19 DIAGNOSIS — M16.11 PRIMARY OSTEOARTHRITIS OF ONE HIP, RIGHT: Primary | ICD-10-CM

## 2023-09-19 DIAGNOSIS — M25.551 PAIN IN RIGHT HIP: ICD-10-CM

## 2023-09-19 DIAGNOSIS — M16.12 PRIMARY OSTEOARTHRITIS OF ONE HIP, LEFT: ICD-10-CM

## 2023-09-19 DIAGNOSIS — M17.0 PRIMARY OSTEOARTHRITIS OF BOTH KNEES: ICD-10-CM

## 2023-09-19 PROCEDURE — 99214 OFFICE O/P EST MOD 30 MIN: CPT | Performed by: ORTHOPAEDIC SURGERY

## 2023-09-19 PROCEDURE — 73502 X-RAY EXAM HIP UNI 2-3 VIEWS: CPT

## 2023-09-19 RX ORDER — MELOXICAM 7.5 MG/1
7.5 TABLET ORAL 2 TIMES DAILY PRN
Qty: 60 TABLET | Refills: 1 | Status: SHIPPED | OUTPATIENT
Start: 2023-09-19

## 2023-09-19 RX ORDER — MELOXICAM 15 MG/1
7.5 TABLET ORAL DAILY
Qty: 60 TABLET | Refills: 2 | Status: CANCELLED | OUTPATIENT
Start: 2023-09-19

## 2023-09-19 NOTE — PROGRESS NOTES
Assessment/Plan     1. Primary osteoarthritis of one hip, right    2. Pain in right hip    3. Primary osteoarthritis of one hip, left    4. Primary osteoarthritis of both knees      Orders Placed This Encounter   Procedures   • XR hip/pelv 2-3 vws right if performed   • FL injection left hip (non arthrogram)   • FL injection right hip (non arthrogram)     • Patient presents with severe right hip osteoarthritis. • Discussed conservative treatment as well as risks and benefits of these treatment options. Meloxicam 7.5 mg bid prn pain, medications warnings were reviewed with patient. Patient NOT to take with other NSAIDs or oral steroids. Advised him to take only when needed due to previous increased creatinine levels. Can d/w PCP  • Add in Tylenol as needed for pain. 1,000 mg up to 3 times a day. Do not exceed 3,000 mg per day. Discuss use with PCP due to history of fatty liver. • Bilateral hip intra-articular CSI ordered to be performed by Interventional Radiology or spine and pain mgt if possible. Dustin Godfrey is aware the left hip injection can be performed no later than 9/27/23 if he would like to have this hip replaced first.  • Bilateral knee aspiration was not performed today, but will be considered with CSI at the next visit in October if needed. Return for appt in october. I answered all of the patient's questions during the visit and provided education of the patient's condition during the visit. The patient verbalized understanding of the information given and agrees with the plan. This note was dictated using [employer] software. It may contain errors including improperly dictated words. Please contact physician directly for any questions. History of Present Illness   Chief complaint:   Chief Complaint   Patient presents with   • Right Hip - Pain       HPI: Cynthia Tracy is a 58 y.o. male that c/o right hip pain and bilateral knee aspirations, previously seen for the left hip.  Pain has been present for 2 weeks, located posterior and in the groin, and described as achy and intermittent. Patient denies any previous injuries or surgeries. Pain is aggravated by walking and stairs, and alleviated by nothing. He has been taking Diclofenac tablets with no relief. Patient has not initiated PT or injections. Patient denies any radiating pain or distal paresthesias. Patient denies a leg length discrepancy. He would like intra-articular CSI ordered for both hips. He will be scheduled for a left DONNA on 12/27/23, and is aware hip injections need to be spaced out 3 months from a surgical procedure. In terms of the knees, he would like repeat bilateral CSI and and aspirations. He has received CSI and aspirations on 7/5/23 with relief. ROS:    See HPI for musculoskeletal review.    All other systems reviewed are negative     Historical Information   Past Medical History:   Diagnosis Date   • Abnormal computed tomography angiography (CTA) 3/8/2023   • BPH with urinary obstruction     AND OTHER LOWER URINARY TRACT SYMPTOMS    • Chronic pain disorder    • Detached retina, right 2015   • Elevated prostate specific antigen (PSA) 2017   • Feeling of incomplete bladder emptying    • History of hypertension    • History of nocturia    • Hypertension    • Low back pain    • Neuroma of foot 2017   • Prostate cancer ([address]) 2017   • Weak urinary stream      Past Surgical History:   Procedure Laterality Date   • BACK SURGERY  2008   • EYE SURGERY      REPAIR DETACHED RETINA    • EYE SURGERY Right    • HERNIA REPAIR     • LAMINECTOMY     • LUMBAR LAMINECTOMY Bilateral     L4-5   • PROSTATE BIOPSY Bilateral 2017   • SPINE SURGERY     • TONSILLECTOMY     • VASECTOMY      SURGERY VAS DEFERENS      Social History   Social History     Substance and Sexual Activity   Alcohol Use Yes   • Alcohol/week: 3.0 standard drinks of alcohol   • Types: 3 Cans of beer per week    Comment: occasional     Social History     Substance and Sexual Activity   Drug Use No     Social History     Tobacco Use   Smoking Status Never   Smokeless Tobacco Never     Family History:   Family History   Problem Relation Age of Onset   • Hypertension Mother    • Peripheral vascular disease Mother    • Dementia Father        Current Outpatient Medications on File Prior to Visit   Medication Sig Dispense Refill   • aspirin 81 mg chewable tablet Chew 81 mg daily     • diclofenac (VOLTAREN) 75 mg EC tablet take 1 tablet by mouth twice a day if needed with food 60 tablet 1   • famotidine (PEPCID) 20 mg tablet Take 1 tablet (20 mg total) by mouth 2 (two) times a day as needed for indigestion or heartburn (Patient taking differently: Take 20 mg by mouth as needed for indigestion or heartburn) 90 tablet 1   • valsartan (DIOVAN) 80 mg tablet Take 1 tablet (80 mg total) by mouth daily 90 tablet 3     No current facility-administered medications on file prior to visit. No Known Allergies    Objective   Vitals: Blood pressure 148/89, pulse 79, height 6' 2" (1.88 m), weight 97.8 kg (215 lb 9.6 oz). ,Body mass index is 27.68 kg/m². PE:  AAOx 3  WDWN  Hearing intact, no drainage from eyes  Regular rate  no audible wheezing  no abdominal distension  LE compartments soft, skin intact    right hip:   No dislocation/deformity  Neg. [facility]  ROM: 80 flexion, 0 ER, 0 IR  + Laurent Test  +  Impingement test  No TTP over greater trochanter  Abduction: 5/5  + Eliezer's test  No TTP over SIJ    rightLE:    Sensation grossly intact   Palpable pulse  AT/GS intact    Back:    No TTP over lumbar spinous processes, paraspinal musculature  SLR: Neg. Imaging Studies: I have personally reviewed pertinent films in PACS  righthip:    Severe, bone on bone arthritis with joint space narrowing.      Scribe Attestation    I,:  Martín Up am acting as a scribe while in the presence of the attending physician.:       I,:  Molly Treadwell DO personally performed the services described in this documentation    as scribed in my presence.:

## 2023-09-20 ENCOUNTER — APPOINTMENT (OUTPATIENT)
Dept: LAB | Facility: MEDICAL CENTER | Age: 63
End: 2023-09-20
Payer: COMMERCIAL

## 2023-09-20 DIAGNOSIS — C61 MALIGNANT NEOPLASM OF PROSTATE (HCC): ICD-10-CM

## 2023-09-20 LAB — PSA SERPL-MCNC: 1.82 NG/ML (ref 0–4)

## 2023-09-20 PROCEDURE — 36415 COLL VENOUS BLD VENIPUNCTURE: CPT

## 2023-09-20 PROCEDURE — 84153 ASSAY OF PSA TOTAL: CPT

## 2023-09-27 ENCOUNTER — HOSPITAL ENCOUNTER (OUTPATIENT)
Dept: RADIOLOGY | Facility: MEDICAL CENTER | Age: 63
Discharge: HOME | End: 2023-09-27
Payer: COMMERCIAL

## 2023-09-27 VITALS
SYSTOLIC BLOOD PRESSURE: 172 MMHG | OXYGEN SATURATION: 95 % | RESPIRATION RATE: 18 BRPM | DIASTOLIC BLOOD PRESSURE: 94 MMHG | HEART RATE: 71 BPM | TEMPERATURE: 97.6 F

## 2023-09-27 DIAGNOSIS — M16.11 PRIMARY OSTEOARTHRITIS OF RIGHT HIP: ICD-10-CM

## 2023-09-27 DIAGNOSIS — M16.12 PRIMARY OSTEOARTHRITIS OF LEFT HIP: ICD-10-CM

## 2023-09-27 PROCEDURE — 77002 NEEDLE LOCALIZATION BY XRAY: CPT

## 2023-09-27 PROCEDURE — 77002 NEEDLE LOCALIZATION BY XRAY: CPT | Performed by: PHYSICAL MEDICINE & REHABILITATION

## 2023-09-27 PROCEDURE — 20610 DRAIN/INJ JOINT/BURSA W/O US: CPT | Performed by: PHYSICAL MEDICINE & REHABILITATION

## 2023-09-27 RX ORDER — METHYLPREDNISOLONE ACETATE 40 MG/ML
80 INJECTION, SUSPENSION INTRA-ARTICULAR; INTRALESIONAL; INTRAMUSCULAR; PARENTERAL; SOFT TISSUE ONCE
Status: COMPLETED | OUTPATIENT
Start: 2023-09-27 | End: 2023-09-27

## 2023-09-27 RX ORDER — BUPIVACAINE HCL/PF 2.5 MG/ML
6 VIAL (ML) INJECTION ONCE
Status: COMPLETED | OUTPATIENT
Start: 2023-09-27 | End: 2023-09-27

## 2023-09-27 RX ADMIN — Medication 6 ML: at 15:49

## 2023-09-27 RX ADMIN — IOHEXOL 2 ML: 300 INJECTION, SOLUTION INTRAVENOUS at 15:49

## 2023-09-27 RX ADMIN — METHYLPREDNISOLONE ACETATE 80 MG: 40 INJECTION, SUSPENSION INTRA-ARTICULAR; INTRALESIONAL; INTRAMUSCULAR; SOFT TISSUE at 15:49

## 2023-09-27 NOTE — H&P
History of Present Illness:  The patient is a 58 y.o. male who presents with complaints of bilateral hip pain    Past Medical History:   Diagnosis Date   • Abnormal computed tomography angiography (CTA) 3/8/2023   • BPH with urinary obstruction     AND OTHER LOWER URINARY TRACT SYMPTOMS    • Chronic pain disorder    • Detached retina, right 2015   • Elevated prostate specific antigen (PSA) 2017   • Feeling of incomplete bladder emptying    • History of hypertension    • History of nocturia    • Hypertension    • Low back pain    • Neuroma of foot 2017   • Prostate cancer ([address]) 2017   • Weak urinary stream        Past Surgical History:   Procedure Laterality Date   • BACK SURGERY  2008   • EYE SURGERY      REPAIR DETACHED RETINA    • EYE SURGERY Right    • HERNIA REPAIR     • LAMINECTOMY     • LUMBAR LAMINECTOMY Bilateral     L4-5   • PROSTATE BIOPSY Bilateral 2017   • SPINE SURGERY     • TONSILLECTOMY     • VASECTOMY      SURGERY VAS DEFERENS          Current Outpatient Medications:   •  aspirin 81 mg chewable tablet, Chew 81 mg daily, Disp: , Rfl:   •  diclofenac (VOLTAREN) 75 mg EC tablet, take 1 tablet by mouth twice a day if needed with food, Disp: 60 tablet, Rfl: 1  •  famotidine (PEPCID) 20 mg tablet, Take 1 tablet (20 mg total) by mouth 2 (two) times a day as needed for indigestion or heartburn (Patient taking differently: Take 20 mg by mouth as needed for indigestion or heartburn), Disp: 90 tablet, Rfl: 1  •  meloxicam (Mobic) 7.5 mg tablet, Take 1 tablet (7.5 mg total) by mouth 2 (two) times a day as needed for moderate pain, Disp: 60 tablet, Rfl: 1  •  valsartan (DIOVAN) 80 mg tablet, Take 1 tablet (80 mg total) by mouth daily, Disp: 90 tablet, Rfl: 3    Current Facility-Administered Medications:   •  bupivacaine (PF) (MARCAINE) 0.25 % injection 6 mL, 6 mL, Intra-articular, Once, Miki Yung, DO  •  iohexol (OMNIPAQUE) 300 mg/mL injection 2 mL, 2 mL, Injection, Once, Miki Yung, DO  • methylPREDNISolone acetate (DEPO-MEDROL) injection 80 mg, 80 mg, Intra-articular, Once, Edita Beebe, DO    No Known Allergies    Physical Exam:   Vitals:    09/27/23 1535   BP: 167/97   Pulse: 76   Resp: 18   Temp: 97.6 °F (36.4 °C)   SpO2: 96%     General: Awake, Alert, Oriented x 3, Mood and affect appropriate  Respiratory: Respirations even and unlabored  Cardiovascular: Peripheral pulses intact; no edema  Musculoskeletal Exam: bilateral hip pain    ASA Score: 2    Patient/Chart Verification  Patient ID Verified: Verbal  ID Band Applied: No  Consents Confirmed: Procedural, To be obtained in the Pre-Procedure area  H&P( within 30 days) Verified: To be obtained in the Pre-Procedure area  Interval H&P(within 24 hr) Complete (required for Outpatients and Surgery Admit only): To be obtained in the Pre-Procedure area  Allergies Reviewed: Yes  Anticoag/NSAID held?: NA  Currently on antibiotics?: No    Assessment:   1. Primary osteoarthritis of right hip    2.  Primary osteoarthritis of left hip        Plan: ALEX HIP INJ (Banriccardo) (18856)

## 2023-09-27 NOTE — DISCHARGE INSTRUCTIONS

## 2023-10-04 ENCOUNTER — TELEPHONE (OUTPATIENT)
Dept: RADIOLOGY | Facility: MEDICAL CENTER | Age: 63
End: 2023-10-04

## 2023-10-11 ENCOUNTER — OFFICE VISIT (OUTPATIENT)
Dept: OBGYN CLINIC | Facility: MEDICAL CENTER | Age: 63
End: 2023-10-11
Payer: COMMERCIAL

## 2023-10-11 ENCOUNTER — APPOINTMENT (OUTPATIENT)
Dept: RADIOLOGY | Facility: MEDICAL CENTER | Age: 63
End: 2023-10-11
Payer: COMMERCIAL

## 2023-10-11 VITALS
BODY MASS INDEX: 27.64 KG/M2 | DIASTOLIC BLOOD PRESSURE: 86 MMHG | SYSTOLIC BLOOD PRESSURE: 156 MMHG | WEIGHT: 215.4 LBS | HEIGHT: 74 IN | HEART RATE: 80 BPM

## 2023-10-11 DIAGNOSIS — M16.12 PRIMARY OSTEOARTHRITIS OF ONE HIP, LEFT: ICD-10-CM

## 2023-10-11 DIAGNOSIS — Z01.818 PREOPERATIVE TESTING: ICD-10-CM

## 2023-10-11 DIAGNOSIS — M25.461 EFFUSION OF RIGHT KNEE: ICD-10-CM

## 2023-10-11 DIAGNOSIS — M17.12 PRIMARY OSTEOARTHRITIS OF LEFT KNEE: ICD-10-CM

## 2023-10-11 DIAGNOSIS — M17.11 PRIMARY OSTEOARTHRITIS OF RIGHT KNEE: ICD-10-CM

## 2023-10-11 DIAGNOSIS — M16.12 PRIMARY OSTEOARTHRITIS OF ONE HIP, LEFT: Primary | ICD-10-CM

## 2023-10-11 PROCEDURE — 73502 X-RAY EXAM HIP UNI 2-3 VIEWS: CPT

## 2023-10-11 PROCEDURE — 99213 OFFICE O/P EST LOW 20 MIN: CPT | Performed by: PHYSICIAN ASSISTANT

## 2023-10-11 PROCEDURE — 20610 DRAIN/INJ JOINT/BURSA W/O US: CPT | Performed by: PHYSICIAN ASSISTANT

## 2023-10-11 RX ORDER — ASCORBIC ACID 500 MG
500 TABLET ORAL DAILY
Qty: 30 TABLET | Refills: 1 | Status: SHIPPED | OUTPATIENT
Start: 2023-10-11

## 2023-10-11 RX ORDER — FERROUS SULFATE 324(65)MG
324 TABLET, DELAYED RELEASE (ENTERIC COATED) ORAL
Qty: 30 TABLET | Refills: 1 | Status: SHIPPED | OUTPATIENT
Start: 2023-10-11

## 2023-10-11 RX ORDER — MULTIVITAMIN
1 TABLET ORAL DAILY
Qty: 30 TABLET | Refills: 1 | Status: SHIPPED | OUTPATIENT
Start: 2023-10-11

## 2023-10-11 RX ORDER — FOLIC ACID 1 MG/1
1 TABLET ORAL DAILY
Qty: 30 TABLET | Refills: 1 | Status: SHIPPED | OUTPATIENT
Start: 2023-10-11

## 2023-10-11 RX ADMIN — TRIAMCINOLONE ACETONIDE 20 MG: 40 INJECTION, SUSPENSION INTRA-ARTICULAR; INTRAMUSCULAR at 09:45

## 2023-10-11 RX ADMIN — BUPIVACAINE HYDROCHLORIDE 2 ML: 2.5 INJECTION, SOLUTION INFILTRATION; PERINEURAL at 09:45

## 2023-10-11 RX ADMIN — BUPIVACAINE HYDROCHLORIDE 4 ML: 2.5 INJECTION, SOLUTION INFILTRATION; PERINEURAL at 09:45

## 2023-10-11 NOTE — PROGRESS NOTES
Assessment/Plan     1. Primary osteoarthritis of one hip, left    2. Effusion of right knee    3. Primary osteoarthritis of right knee    4. Primary osteoarthritis of left knee    5. Preoperative testing      Orders Placed This Encounter   Procedures    Large joint arthrocentesis: R knee    Large joint arthrocentesis: R knee    Large joint arthrocentesis: L knee    Large joint arthrocentesis: L knee    XR hip/pelv 2-3 vws left if performed    Comprehensive metabolic panel    Hemoglobin A1C W/EAG Estimation    CBC and differential    Anemia Panel w/Reflex    Protime-INR    APTT    Ambulatory referral to Physical Therapy    Ambulatory referral to Internal Medicine    Type and screen       Bell Thurston  has severe left hip arthritis. he has tried conservative treatment without adequate relief. We discussed treatment options as well as risks and benefits of treatment options. The patient would like to proceed with a Anterior left total hip arthroplasty. The risks of surgery include, but are not limited to infection, blood clot, wound healing problems, blood loss, damage to blood vessels and nerves, persistent pain and stiffness, dislocation, fracture, leg-length discrepancy, need for additional surgery, need for revision surgery, failure of hardware, heart attack, stroke, death. The patient understood and agreed to by oral and written consent. I answered all questions regarding surgery. The patient will be on  BID for 6 weeks postop  for DVT prophylaxis  The patient will obtain clearance from their PCP. Per patient PCP comfortable giving patient cardiac clearance. Will let PCP decide if comfortable or can send to cardio if needed. We discussed his family hx of blood clots. We discussed risks and benefits of seeing hematology prior to surgery. He opted to not see hematology. His mother was unhealthy and a smoker when she had a blood clot.     He has picked a day for left total hip arthroplasty on 12/27/23, R DONNA on 3/27  He will be on vitamins 30 days prior to surgery ( vit C, folic acid, iron and multivitamin)   Will repeat left hip x-ray at post op visit   Patient received bilateral knee steroid injections and  right knee aspiration in the office today  He is aware it is best to wait two weeks of steroid injections to get his flu shot or COVID vaccination     Return for PO Left anterior DONNA . I answered all of the patient's questions during the visit and provided education of the patient's condition during the visit. The patient verbalized understanding of the information given and agrees with the plan. This note was dictated using Kudar[employer] software. It may contain errors including improperly dictated words. Please contact physician directly for any questions. Subjective   Chief Complaint:   Chief Complaint   Patient presents with    Left Hip - Follow-up       HPI:  Kyree Jansen is a 58 y.o. male who presents for follow up for chronic left hip pain due to severe osteoarthritis. He is to today to consent for surgery for Left anterior total hip arthroplasty. He has picked a day on 12/27/23. He also has picked a date for right total hip arthroplasty on 3/27/2024. He states he is having pain lateral and posterior hip that comes and goes. He denies radiating leg pain and no leg length discrepancy. He notes he feels a crunching sound in his left hip at times. His pain is worse with stairs and walking. He is taking diclofenac 75 mg for pain relief.      History of MRSA: no  History of blood clots: no  Family history of blood clots: yes/ mother PE ( was a smoker and unhealthy)   History of Hepatitis C:no  History of HIV: no  Are you a smoker:no  Are you diabetic:no  Do you see a cardiologist: yes( tx in the past for hypertension)   Have you been vaccinated for COVID:no  Have you seen a dentist in the past year: yes  Do you have a leg length discrepancy:  no     Review of Systems  See HPI for musculoskeletal review.    All other systems reviewed are negative     History:  Past Medical History:   Diagnosis Date    Abnormal computed tomography angiography (CTA) 3/8/2023    BPH with urinary obstruction     AND OTHER LOWER URINARY TRACT SYMPTOMS     Chronic pain disorder     Detached retina, right 2015    Elevated prostate specific antigen (PSA) 2017    Feeling of incomplete bladder emptying     History of hypertension     History of nocturia     Hypertension     Low back pain     Neuroma of foot 2017    Prostate cancer ([address]) 2017    Weak urinary stream      Past Surgical History:   Procedure Laterality Date    BACK SURGERY  2008    EYE SURGERY      REPAIR DETACHED RETINA     EYE SURGERY Right     HERNIA REPAIR      LAMINECTOMY      LUMBAR LAMINECTOMY Bilateral     L4-5    PROSTATE BIOPSY Bilateral 2017    SPINE SURGERY      TONSILLECTOMY      VASECTOMY      SURGERY VAS DEFERENS      Social History   Social History     Substance and Sexual Activity   Alcohol Use Yes    Alcohol/week: 3.0 standard drinks of alcohol    Types: 3 Cans of beer per week    Comment: occasional     Social History     Substance and Sexual Activity   Drug Use No     Social History     Tobacco Use   Smoking Status Never   Smokeless Tobacco Never     Family History:   Family History   Problem Relation Age of Onset    Hypertension Mother     Peripheral vascular disease Mother     Dementia Father        Current Outpatient Medications on File Prior to Visit   Medication Sig Dispense Refill    aspirin 81 mg chewable tablet Chew 81 mg daily      diclofenac (VOLTAREN) 75 mg EC tablet take 1 tablet by mouth twice a day if needed with food (Patient not taking: Reported on 10/13/2023) 60 tablet 1    famotidine (PEPCID) 20 mg tablet Take 1 tablet (20 mg total) by mouth 2 (two) times a day as needed for indigestion or heartburn (Patient taking differently: Take 20 mg by mouth as needed for indigestion or heartburn) 90 tablet 1    meloxicam (Mobic) 7.5 mg tablet Take 1 tablet (7.5 mg total) by mouth 2 (two) times a day as needed for moderate pain 60 tablet 1    valsartan (DIOVAN) 80 mg tablet Take 1 tablet (80 mg total) by mouth daily 90 tablet 3     No current facility-administered medications on file prior to visit. No Known Allergies     Objective     /86   Pulse 80   Ht 6' 2" (1.88 m)   Wt 97.7 kg (215 lb 6.4 oz)   BMI 27.66 kg/m²      PE:  AAOx 3  WDWN  Hearing intact, no drainage from eyes  no audible wheezing  no abdominal distension  LE compartments soft, skin intact    left hip:   No dislocation/deformity  Neg. [facility]  ROM: full  Neg. Laurent Test  Neg. Impingement test  No TTP over greater trochanter  Abduction: 5/5  Neg. Eliezer's test  No TTP over SIJ    AT/GS intact    Leg length discrepancy: 1/2 cm shorter on the left     Imaging Studies: I have personally reviewed pertinent films in PACS  left hip:  Severe DJD, bone on bone with osteophyte formation     Large joint arthrocentesis: R knee  Universal Protocol:  Consent: Verbal consent obtained. Risks and benefits: risks, benefits and alternatives were discussed  Consent given by: patient  Time out: Immediately prior to procedure a "time out" was called to verify the correct patient, procedure, equipment, support staff and site/side marked as required. Timeout called at: 10/11/2023 12:25 PM.  Patient understanding: patient states understanding of the procedure being performed  Site marked: the operative site was marked  Supporting Documentation  Indications: pain   Procedure Details  Location: knee - R knee  Needle size: 22 G  Approach: anterolateral  Medications administered: 2 mL bupivacaine 0.25 %; 20 mg triamcinolone acetonide 40 mg/mL    Patient tolerance: patient tolerated the procedure well with no immediate complications  Dressing:  Sterile dressing applied      Large joint arthrocentesis: R knee  Universal Protocol:  Consent: Verbal consent obtained.   Risks and benefits: risks, benefits and alternatives were discussed  Consent given by: patient  Time out: Immediately prior to procedure a "time out" was called to verify the correct patient, procedure, equipment, support staff and site/side marked as required. Timeout called at: 10/11/2023 12:25 PM.  Patient understanding: patient states understanding of the procedure being performed  Site marked: the operative site was marked  Supporting Documentation  Indications: pain   Procedure Details  Location: knee - R knee  Needle size: 22 G  Approach: lateral  Medications administered: 4 mL bupivacaine 0.25 %    Aspirate amount: 40 mL  Aspirate: yellow  Patient tolerance: patient tolerated the procedure well with no immediate complications  Dressing:  Sterile dressing applied      Large joint arthrocentesis: L knee  Universal Protocol:  Consent: Verbal consent obtained. Risks and benefits: risks, benefits and alternatives were discussed  Consent given by: patient  Time out: Immediately prior to procedure a "time out" was called to verify the correct patient, procedure, equipment, support staff and site/side marked as required. Patient understanding: patient states understanding of the procedure being performed  Site marked: the operative site was marked  Supporting Documentation  Indications: pain   Procedure Details  Location: knee - L knee  Needle size: 22 G  Medications administered: 2 mL bupivacaine 0.25 %; 20 mg triamcinolone acetonide 40 mg/mL    Patient tolerance: patient tolerated the procedure well with no immediate complications  Dressing:  Sterile dressing applied      Large joint arthrocentesis: L knee  Universal Protocol:  Consent: Verbal consent obtained. Risks and benefits: risks, benefits and alternatives were discussed  Consent given by: patient  Time out: Immediately prior to procedure a "time out" was called to verify the correct patient, procedure, equipment, support staff and site/side marked as required.   Timeout called at: 10/11/2023 12:27 PM.  Patient understanding: patient states understanding of the procedure being performed  Site marked: the operative site was marked  Supporting Documentation  Indications: pain   Procedure Details  Location: knee - L knee  Needle size: 18 G  Approach: superior  Medications administered: 4 mL bupivacaine 0.25 %    Aspirate amount: 0 mL         Scribe Attestation      I,:  Jeremiah Guido am acting as a scribe while in the presence of the attending physician.:       I,:  Evie King, DO personally performed the services described in this documentation    as scribed in my presence.:

## 2023-10-13 ENCOUNTER — OFFICE VISIT (OUTPATIENT)
Dept: OBGYN CLINIC | Facility: MEDICAL CENTER | Age: 63
End: 2023-10-13
Payer: COMMERCIAL

## 2023-10-13 VITALS
BODY MASS INDEX: 27.67 KG/M2 | DIASTOLIC BLOOD PRESSURE: 89 MMHG | SYSTOLIC BLOOD PRESSURE: 146 MMHG | HEIGHT: 74 IN | WEIGHT: 215.6 LBS

## 2023-10-13 DIAGNOSIS — M75.42 IMPINGEMENT SYNDROME OF LEFT SHOULDER: Primary | ICD-10-CM

## 2023-10-13 PROCEDURE — 99213 OFFICE O/P EST LOW 20 MIN: CPT | Performed by: ORTHOPAEDIC SURGERY

## 2023-10-13 RX ORDER — BUPIVACAINE HYDROCHLORIDE 2.5 MG/ML
2 INJECTION, SOLUTION INFILTRATION; PERINEURAL
Status: COMPLETED | OUTPATIENT
Start: 2023-10-13 | End: 2023-10-13

## 2023-10-13 RX ORDER — BETAMETHASONE SODIUM PHOSPHATE AND BETAMETHASONE ACETATE 3; 3 MG/ML; MG/ML
6 INJECTION, SUSPENSION INTRA-ARTICULAR; INTRALESIONAL; INTRAMUSCULAR; SOFT TISSUE
Status: COMPLETED | OUTPATIENT
Start: 2023-10-13 | End: 2023-10-13

## 2023-10-13 RX ADMIN — BETAMETHASONE SODIUM PHOSPHATE AND BETAMETHASONE ACETATE 6 MG: 3; 3 INJECTION, SUSPENSION INTRA-ARTICULAR; INTRALESIONAL; INTRAMUSCULAR; SOFT TISSUE at 10:00

## 2023-10-13 RX ADMIN — BUPIVACAINE HYDROCHLORIDE 2 ML: 2.5 INJECTION, SOLUTION INFILTRATION; PERINEURAL at 10:00

## 2023-10-13 NOTE — PROGRESS NOTES
Ortho Sports Medicine Shoulder Follow Up Visit     Assesment:   58 y.o. male left shoulder impingement    Plan:    Conservative treatment:    Ice to shoulder 1-2 times daily, for 20 minutes at a time. Home exercise program for shoulder, including ROM and strenthening. Instructions given to patient of what exercises to perform. Imaging: All imaging from today was reviewed by myself and explained to the patient. Injection:    The risks and benefits of the injection (which include but are not limited to: infection, bleeding,damage to nerve/artery, need for further intervention), as well as the risks and benefits of all alternative treatments were explained and understood. The patient elected to proceed with injection. The procedure was done with aseptic technique, and the patient tolerated the procedure well with no complications. A corticosteroid injection of the subacromial space was performed. The patient should take 1-2 days off of activity, with gradual return to activity as tolerated. Ice to the shoulder 1-2 times daily for 20 minutes, for next 24-48 hrs. Surgery:     No surgery is recommended at this point, continue with conservative treatment plan as noted. Follow up:    Return in about 3 months (around 1/13/2024), or if symptoms worsen or fail to improve. Chief Complaint   Patient presents with    Left Shoulder - Follow-up         History of Present Illness: The patient is returns for follow up of left shoulder impingement. Since the prior visit, He reports significant improvement after left subacromial CSI. Pain is improved by rest, NSAIDS, and injection. Pain is aggravated by overhead activity and reaching back. Symptoms include swelling. The patient denies weakness. The patient has tried rest, ice, NSAIDS, physical therapy, and injection.         Shoulder Surgical History:  None    Past Medical, Social and Family History:  Past Medical History: Diagnosis Date    Abnormal computed tomography angiography (CTA) 3/8/2023    BPH with urinary obstruction     AND OTHER LOWER URINARY TRACT SYMPTOMS     Chronic pain disorder     Detached retina, right 2015    Elevated prostate specific antigen (PSA) 2017    Feeling of incomplete bladder emptying     History of hypertension     History of nocturia     Hypertension     Low back pain     Neuroma of foot 2017    Prostate cancer ([address]) 2017    Weak urinary stream      Past Surgical History:   Procedure Laterality Date    BACK SURGERY  2008    EYE SURGERY      REPAIR DETACHED RETINA     EYE SURGERY Right     HERNIA REPAIR      LAMINECTOMY      LUMBAR LAMINECTOMY Bilateral     L4-5    PROSTATE BIOPSY Bilateral 2017    SPINE SURGERY      TONSILLECTOMY      VASECTOMY      SURGERY VAS DEFERENS      No Known Allergies  Current Outpatient Medications on File Prior to Visit   Medication Sig Dispense Refill    ascorbic acid (VITAMIN C) 500 MG tablet Take 1 tablet (500 mg total) by mouth daily Begin 30 days prior to surgery. 30 tablet 1    aspirin 81 mg chewable tablet Chew 81 mg daily      famotidine (PEPCID) 20 mg tablet Take 1 tablet (20 mg total) by mouth 2 (two) times a day as needed for indigestion or heartburn (Patient taking differently: Take 20 mg by mouth as needed for indigestion or heartburn) 90 tablet 1    ferrous sulfate 324 (65 Fe) mg Take 1 tablet (324 mg total) by mouth daily before breakfast Begin 30 days prior to surgery. 30 tablet 1    folic acid (FOLVITE) 1 mg tablet Take 1 tablet (1 mg total) by mouth daily Begin 30 days prior to surgery. 30 tablet 1    meloxicam (Mobic) 7.5 mg tablet Take 1 tablet (7.5 mg total) by mouth 2 (two) times a day as needed for moderate pain 60 tablet 1    Multiple Vitamin (multivitamin) tablet Take 1 tablet by mouth daily Begin 30 days prior to surgery.  30 tablet 1    valsartan (DIOVAN) 80 mg tablet Take 1 tablet (80 mg total) by mouth daily 90 tablet 3    diclofenac (VOLTAREN) 75 mg EC tablet take 1 tablet by mouth twice a day if needed with food (Patient not taking: Reported on 10/13/2023) 60 tablet 1     No current facility-administered medications on file prior to visit. Social History     Socioeconomic History    Marital status: /Civil Union     Spouse name: Not on file    Number of children: 2    Years of education: Not on file    Highest education level: Not on file   Occupational History    Not on file   Tobacco Use    Smoking status: Never    Smokeless tobacco: Never   Vaping Use    Vaping Use: Never used   Substance and Sexual Activity    Alcohol use: Yes     Alcohol/week: 3.0 standard drinks of alcohol     Types: 3 Cans of beer per week     Comment: occasional    Drug use: No    Sexual activity: Yes     Partners: Female     Birth control/protection: Male Sterilization   Other Topics Concern    Not on file   Social History Narrative    3/4 time          TWO CHILDREN , 2 stepsons     Social Determinants of Health     Financial Resource Strain: Not on file   Food Insecurity: Not on file   Transportation Needs: Not on file   Physical Activity: Not on file   Stress: Not on file   Social Connections: Not on file   Intimate Partner Violence: Not on file   Housing Stability: Not on file       I have reviewed the past medical, surgical, social and family history, medications and allergies as documented in the EMR. Review of systems: ROS is negative other than that noted in the HPI. Constitutional: Negative for fatigue and fever. Physical Exam:    Blood pressure 146/89, height 6' 2" (1.88 m), weight 97.8 kg (215 lb 9.6 oz).     General/Constitutional: NAD, well developed, well nourished  HENT: Normocephalic, atraumatic  CV: Intact distal pulses, regular rate  Resp: No respiratory distress or labored breathing  GI: Soft and non-tender   Lymphatic: No lymphadenopathy palpated  Neuro: Alert and Oriented x 3, no focal deficits  Psych: Normal mood, normal affect, normal judgement, normal behavior  Skin: Warm, dry, no rashes, no erythema      Shoulder focused exam:       RIGHT LEFT    Scapula Atrophy Negative Negative     Winging Negative Negative     Protraction Negative Negative    Rotator cuff SS 5/5 4/5     IS 5/5 5/5     SubS 5/5 5/5    ROM     170     ER0 60 60     ER90 90    90     IR90 T6    T6     IRb T6    T6    TTP: AC Negative Negative     Biceps Negative Negative     Coracoid Negative Negative    Special Tests: O'Briens Negative Negative     [place]-shear Negative Negative     Cross body Adduction Negative Negative     Speeds  Negative Negative     Zoila's Negative Negative     Whipple Negative Negative       Neer Negative Negative     Eldridge Negative Negative    Instability: Apprehension & relocation not tested not tested     Load & shift not tested not tested    Other: Crank Negative Negative                 UE NV Exam: +2 Radial pulses bilaterally  Sensation intact to light touch C5-T1 bilaterally, Radial/median/ulnar nerve motor intact    Cervical ROM is full without pain, numbness or tingling      Shoulder Imaging    No imaging was performed today    Large joint arthrocentesis: L subacromial bursa  Universal Protocol:  Consent: Verbal consent obtained.   Risks and benefits: risks, benefits and alternatives were discussed  Consent given by: patient  Patient identity confirmed: verbally with patient  Supporting Documentation  Indications: pain   Procedure Details  Location: shoulder - L subacromial bursa  Needle size: 22 G  Ultrasound guidance: no  Approach: posterolateral  Medications administered: 6 mg betamethasone acetate-betamethasone sodium phosphate 6 (3-3) mg/mL; 2 mL bupivacaine 0.25 %    Patient tolerance: patient tolerated the procedure well with no immediate complications  Dressing:  Sterile dressing applied              Scribe Attestation      I,:   am acting as a scribe while in the presence of the attending physician.:       I,: personally performed the services described in this documentation    as scribed in my presence.:

## 2023-10-17 RX ORDER — BUPIVACAINE HYDROCHLORIDE 2.5 MG/ML
4 INJECTION, SOLUTION INFILTRATION; PERINEURAL
Status: COMPLETED | OUTPATIENT
Start: 2023-10-11 | End: 2023-10-11

## 2023-10-17 RX ORDER — TRIAMCINOLONE ACETONIDE 40 MG/ML
20 INJECTION, SUSPENSION INTRA-ARTICULAR; INTRAMUSCULAR
Status: COMPLETED | OUTPATIENT
Start: 2023-10-11 | End: 2023-10-11

## 2023-10-17 RX ORDER — CHLORHEXIDINE GLUCONATE ORAL RINSE 1.2 MG/ML
15 SOLUTION DENTAL ONCE
OUTPATIENT
Start: 2023-10-17 | End: 2023-10-17

## 2023-10-17 RX ORDER — CEFAZOLIN SODIUM 2 G/50ML
2000 SOLUTION INTRAVENOUS ONCE
OUTPATIENT
Start: 2023-10-17 | End: 2023-10-17

## 2023-10-17 RX ORDER — GABAPENTIN 300 MG/1
300 CAPSULE ORAL ONCE
OUTPATIENT
Start: 2023-10-17 | End: 2023-10-17

## 2023-10-17 RX ORDER — SODIUM CHLORIDE 9 MG/ML
75 INJECTION, SOLUTION INTRAVENOUS CONTINUOUS
OUTPATIENT
Start: 2023-10-17

## 2023-10-17 RX ORDER — BUPIVACAINE HYDROCHLORIDE 2.5 MG/ML
2 INJECTION, SOLUTION INFILTRATION; PERINEURAL
Status: COMPLETED | OUTPATIENT
Start: 2023-10-11 | End: 2023-10-11

## 2023-10-17 RX ORDER — CHLORHEXIDINE GLUCONATE 4 G/100ML
SOLUTION TOPICAL DAILY PRN
OUTPATIENT
Start: 2023-10-17

## 2023-10-17 RX ORDER — ACETAMINOPHEN 325 MG/1
975 TABLET ORAL ONCE
OUTPATIENT
Start: 2023-10-17 | End: 2023-10-17

## 2023-10-17 RX ORDER — TRANEXAMIC ACID 10 MG/ML
1000 INJECTION, SOLUTION INTRAVENOUS ONCE
OUTPATIENT
Start: 2023-10-17 | End: 2023-10-17

## 2023-11-02 DIAGNOSIS — M16.11 PRIMARY OSTEOARTHRITIS OF ONE HIP, RIGHT: ICD-10-CM

## 2023-11-02 DIAGNOSIS — M17.0 PRIMARY OSTEOARTHRITIS OF BOTH KNEES: ICD-10-CM

## 2023-11-02 DIAGNOSIS — M16.12 PRIMARY OSTEOARTHRITIS OF ONE HIP, LEFT: ICD-10-CM

## 2023-11-02 DIAGNOSIS — M25.551 PAIN IN RIGHT HIP: ICD-10-CM

## 2023-11-02 RX ORDER — MELOXICAM 7.5 MG/1
TABLET ORAL
Qty: 60 TABLET | Refills: 1 | Status: SHIPPED | OUTPATIENT
Start: 2023-11-02

## 2023-11-04 ENCOUNTER — OFFICE VISIT (OUTPATIENT)
Dept: URGENT CARE | Facility: MEDICAL CENTER | Age: 63
End: 2023-11-04
Payer: COMMERCIAL

## 2023-11-04 VITALS
DIASTOLIC BLOOD PRESSURE: 85 MMHG | RESPIRATION RATE: 20 BRPM | WEIGHT: 205 LBS | BODY MASS INDEX: 26.31 KG/M2 | HEIGHT: 74 IN | TEMPERATURE: 97.5 F | HEART RATE: 94 BPM | SYSTOLIC BLOOD PRESSURE: 135 MMHG | OXYGEN SATURATION: 97 %

## 2023-11-04 DIAGNOSIS — S46.012A ROTATOR CUFF STRAIN, LEFT, INITIAL ENCOUNTER: Primary | ICD-10-CM

## 2023-11-04 PROCEDURE — 99213 OFFICE O/P EST LOW 20 MIN: CPT | Performed by: FAMILY MEDICINE

## 2023-11-04 RX ORDER — PREDNISONE 20 MG/1
TABLET ORAL
Qty: 18 TABLET | Refills: 0 | Status: SHIPPED | OUTPATIENT
Start: 2023-11-04

## 2023-11-04 RX ORDER — KETOROLAC TROMETHAMINE 30 MG/ML
60 INJECTION, SOLUTION INTRAMUSCULAR; INTRAVENOUS ONCE
Status: COMPLETED | OUTPATIENT
Start: 2023-11-04 | End: 2023-11-04

## 2023-11-04 RX ADMIN — KETOROLAC TROMETHAMINE 60 MG: 30 INJECTION, SOLUTION INTRAMUSCULAR; INTRAVENOUS at 14:18

## 2023-11-04 NOTE — PROGRESS NOTES
[facility]        NAME: Sandy Alcala is a 58 y.o. male  : 1960    MRN: [id number]  DATE: 2023  TIME: 2:53 PM    Assessment and Plan   Rotator cuff strain, left, initial encounter [P83.490T]  1. Rotator cuff strain, left, initial encounter  ketorolac (TORADOL) injection 60 mg    predniSONE 20 mg tablet    Ambulatory Referral to Orthopedic Surgery            Patient Instructions       Follow up with PCP in 3-5 days. Proceed to  ER if symptoms worsen. Chief Complaint     Chief Complaint   Patient presents with    Shoulder Pain     Patient was seen by Ortho couple of weeks ago and received an injection in the L shoulder. It did not take away the pain. He was told to take motrin or tylenol. He was reaching with hi sL arm today and felt like he pulled something. History of Present Illness       53-year-old male here today with cute onset of left shoulder pain that began today. He was reaching out for tile when he had sudden pain in his left shoulder. He has a known history of impingement and arthritis in the left shoulder. He was recently seen by his orthopedist  who gave him a cortisone injection at that time he has had minimal improvement. Denies numbness or weakness of the left upper extremity. He has been taking Tylenol and ibuprofen with no significant improvement. Pain is currently 8 out of 10. Review of Systems   Review of Systems   Constitutional: Negative. Neurological: Negative. Current Medications       Current Outpatient Medications:     predniSONE 20 mg tablet, 3 tablets once daily day 1 through 3 then 2 tablets daily day 4 through 6 then 1 tablet daily day 7 through 9., Disp: 18 tablet, Rfl: 0    ascorbic acid (VITAMIN C) 500 MG tablet, Take 1 tablet (500 mg total) by mouth daily Begin 30 days prior to surgery. , Disp: 30 tablet, Rfl: 1    aspirin 81 mg chewable tablet, Chew 81 mg daily, Disp: , Rfl:     diclofenac (VOLTAREN) 75 mg EC tablet, take 1 tablet by mouth twice a day if needed with food (Patient not taking: Reported on 10/13/2023), Disp: 60 tablet, Rfl: 1    famotidine (PEPCID) 20 mg tablet, Take 1 tablet (20 mg total) by mouth 2 (two) times a day as needed for indigestion or heartburn (Patient taking differently: Take 20 mg by mouth as needed for indigestion or heartburn), Disp: 90 tablet, Rfl: 1    ferrous sulfate 324 (65 Fe) mg, Take 1 tablet (324 mg total) by mouth daily before breakfast Begin 30 days prior to surgery. , Disp: 30 tablet, Rfl: 1    folic acid (FOLVITE) 1 mg tablet, Take 1 tablet (1 mg total) by mouth daily Begin 30 days prior to surgery. , Disp: 30 tablet, Rfl: 1    meloxicam (MOBIC) 7.5 mg tablet, take 1 tablet by mouth twice a day if needed for moderate pain, Disp: 60 tablet, Rfl: 1    Multiple Vitamin (multivitamin) tablet, Take 1 tablet by mouth daily Begin 30 days prior to surgery. , Disp: 30 tablet, Rfl: 1    valsartan (DIOVAN) 80 mg tablet, Take 1 tablet (80 mg total) by mouth daily, Disp: 90 tablet, Rfl: 3  No current facility-administered medications for this visit.     Current Allergies     Allergies as of 11/04/2023    (No Known Allergies)            The following portions of the patient's history were reviewed and updated as appropriate: allergies, current medications, past family history, past medical history, past social history, past surgical history and problem list.     Past Medical History:   Diagnosis Date    Abnormal computed tomography angiography (CTA) 3/8/2023    BPH with urinary obstruction     AND OTHER LOWER URINARY TRACT SYMPTOMS     Chronic pain disorder     Detached retina, right 2015    Elevated prostate specific antigen (PSA) 2017    Feeling of incomplete bladder emptying     History of hypertension     History of nocturia     Hypertension     Low back pain     Neuroma of foot 2017    Prostate cancer ([address]) 2017    Weak urinary stream        Past Surgical History:   Procedure Laterality Date    BACK SURGERY  2008    EYE SURGERY      REPAIR DETACHED RETINA     EYE SURGERY Right     HERNIA REPAIR      LAMINECTOMY      LUMBAR LAMINECTOMY Bilateral     L4-5    PROSTATE BIOPSY Bilateral 2017    SPINE SURGERY      TONSILLECTOMY      VASECTOMY      SURGERY VAS DEFERENS        Family History   Problem Relation Age of Onset    Hypertension Mother     Peripheral vascular disease Mother     Dementia Father          Medications have been verified. Objective   /85   Pulse 94   Temp 97.5 °F (36.4 °C)   Resp 20   Ht 6' 2" (1.88 m)   Wt 93 kg (205 lb)   SpO2 97%   BMI 26.32 kg/m²   No LMP for male patient. Physical Exam     Physical Exam  Vitals and nursing note reviewed. Constitutional:       Appearance: Normal appearance. Musculoskeletal:         General: Swelling and tenderness present. Comments: Left shoulder: Forward flexion to 110 degrees, abduction to 50 degrees: Extension to 40 degrees, empty can test-negative. Eldridge test-positive. Apprehension test-positive. There is pain on external rotation. Noticeable swelling of the left anterior shoulder. No ecchymosis appreciated. Otherwise, patient exhibited good arm strength and tone, 5 out of 5. Neurological:      Mental Status: He is alert.

## 2023-11-04 NOTE — PATIENT INSTRUCTIONS
I suspect injury to the left rotator muscle possibly strain versus tear but difficult to assess on just physical exam alone. Patient was given Toradol 60 mg IM in the office. I also prescribed prednisone tapering from 60 down to 20 mg over 9 days. Advised to apply ice, avoid lifting objects above left shoulder. Patient also given outpatient orthopedic referral    Rotator Cuff Injury   WHAT YOU NEED TO KNOW:   A rotator cuff injury is damage to the muscles or tendons of your rotator cuff. The rotator cuff is a group of muscles and tendons that hold the shoulder joint in place. The damage may include muscle stretching, tendon tears, or bursa inflammation. The bursa is a fluid sac around the joint. DISCHARGE INSTRUCTIONS:   Call your doctor or orthopedist if:   You suddenly cannot move your arm. The pain in your shoulder or arm is not improving, or is worse than before you started treatment. You have new pain in your neck. You have questions or concerns about your condition or care. Medicines: You may need any of the following:  Acetaminophen  decreases pain and fever. It is available without a doctor's order. Ask how much to take and how often to take it. Follow directions. Read the labels of all other medicines you are using to see if they also contain acetaminophen, or ask your doctor or pharmacist. Acetaminophen can cause liver damage if not taken correctly. NSAIDs  help decrease swelling and pain or fever. This medicine is available with or without a doctor's order. NSAIDs can cause stomach bleeding or kidney problems in certain people. If you take blood thinner medicine, always ask your healthcare provider if NSAIDs are safe for you. Always read the medicine label and follow directions. Prescription pain medicine  may be given. Ask your healthcare provider how to take this medicine safely. Some prescription pain medicines contain acetaminophen.  Do not take other medicines that contain acetaminophen without talking to your healthcare provider. Too much acetaminophen may cause liver damage. Prescription pain medicine may cause constipation. Ask your healthcare provider how to prevent or treat constipation. Take your medicine as directed. Contact your healthcare provider if you think your medicine is not helping or if you have side effects. Tell your provider if you are allergic to any medicine. Keep a list of the medicines, vitamins, and herbs you take. Include the amounts, and when and why you take them. Bring the list or the pill bottles to follow-up visits. Carry your medicine list with you in case of an emergency. A physical therapist  can teach you exercises to help improve shoulder movement and strength, and decrease pain. You may learn changes to daily activities that will help decrease stress on your tendons. Self-care:   Rest your shoulder as directed. Overuse of your shoulder can make your injury worse. Avoid heavy lifting, putting your arms over your head, or sports that need an overhead or throwing motion. Any of these movements can cause or worsen a rotator cuff injury. Put ice on your shoulder for 15 minutes every hour, or as directed. Ice helps decrease pain and swelling. Use an ice pack, or put crushed ice in a plastic bag. Wrap a towel around the bag before you put it on your shoulder. Put heat on your shoulder when directed. After the first several days, heat may help relax the muscles in your shoulder. Use a heat pack or heating pad. Apply heat for 20 minutes every hour, or as directed. Follow up with your doctor or orthopedist as directed:  Write down your questions so you remember to ask them during your visits. © Copyright [facility] 2023 Information is for End User's use only and may not be sold, redistributed or otherwise used for commercial purposes. The above information is an  only.  It is not intended as medical advice for individual conditions or treatments. Talk to your doctor, nurse or pharmacist before following any medical regimen to see if it is safe and effective for you.

## 2023-11-21 ENCOUNTER — TELEPHONE (OUTPATIENT)
Dept: OBGYN CLINIC | Facility: HOSPITAL | Age: 63
End: 2023-11-21

## 2023-11-30 ENCOUNTER — APPOINTMENT (OUTPATIENT)
Dept: LAB | Facility: MEDICAL CENTER | Age: 63
End: 2023-11-30
Payer: COMMERCIAL

## 2023-11-30 ENCOUNTER — LAB REQUISITION (OUTPATIENT)
Dept: LAB | Facility: HOSPITAL | Age: 63
End: 2023-11-30
Payer: COMMERCIAL

## 2023-11-30 DIAGNOSIS — M16.12 PRIMARY OSTEOARTHRITIS OF ONE HIP, LEFT: ICD-10-CM

## 2023-11-30 DIAGNOSIS — Z01.818 PREOPERATIVE TESTING: ICD-10-CM

## 2023-11-30 DIAGNOSIS — M16.12 PRIMARY OSTEOARTHRITIS OF LEFT HIP: ICD-10-CM

## 2023-11-30 DIAGNOSIS — M16.12 UNILATERAL PRIMARY OSTEOARTHRITIS, LEFT HIP: ICD-10-CM

## 2023-11-30 LAB
ABO GROUP BLD: NORMAL
ABO GROUP BLD: NORMAL
ALBUMIN SERPL BCP-MCNC: 4.2 G/DL (ref 3.5–5)
ALP SERPL-CCNC: 91 U/L (ref 34–104)
ALT SERPL W P-5'-P-CCNC: 25 U/L (ref 7–52)
ANION GAP SERPL CALCULATED.3IONS-SCNC: 10 MMOL/L
APTT PPP: 28 SECONDS (ref 23–37)
AST SERPL W P-5'-P-CCNC: 25 U/L (ref 13–39)
BASOPHILS # BLD AUTO: 0.05 THOUSANDS/ÂΜL (ref 0–0.1)
BASOPHILS NFR BLD AUTO: 1 % (ref 0–1)
BILIRUB SERPL-MCNC: 0.71 MG/DL (ref 0.2–1)
BLD GP AB SCN SERPL QL: NEGATIVE
BUN SERPL-MCNC: 20 MG/DL (ref 5–25)
CALCIUM SERPL-MCNC: 9 MG/DL (ref 8.4–10.2)
CHLORIDE SERPL-SCNC: 104 MMOL/L (ref 96–108)
CO2 SERPL-SCNC: 27 MMOL/L (ref 21–32)
CREAT SERPL-MCNC: 1.19 MG/DL (ref 0.6–1.3)
EOSINOPHIL # BLD AUTO: 0.53 THOUSAND/ÂΜL (ref 0–0.61)
EOSINOPHIL NFR BLD AUTO: 9 % (ref 0–6)
ERYTHROCYTE [DISTWIDTH] IN BLOOD BY AUTOMATED COUNT: 13.5 % (ref 11.6–15.1)
EST. AVERAGE GLUCOSE BLD GHB EST-MCNC: 151 MG/DL
GFR SERPL CREATININE-BSD FRML MDRD: 64 ML/MIN/1.73SQ M
GLUCOSE P FAST SERPL-MCNC: 107 MG/DL (ref 65–99)
HBA1C MFR BLD: 6.9 %
HCT VFR BLD AUTO: 40.9 % (ref 36.5–49.3)
HGB BLD-MCNC: 13 G/DL (ref 12–17)
IMM GRANULOCYTES # BLD AUTO: 0.04 THOUSAND/UL (ref 0–0.2)
IMM GRANULOCYTES NFR BLD AUTO: 1 % (ref 0–2)
INR PPP: 1.02 (ref 0.84–1.19)
LYMPHOCYTES # BLD AUTO: 1.38 THOUSANDS/ÂΜL (ref 0.6–4.47)
LYMPHOCYTES NFR BLD AUTO: 24 % (ref 14–44)
MCH RBC QN AUTO: 29.2 PG (ref 26.8–34.3)
MCHC RBC AUTO-ENTMCNC: 31.8 G/DL (ref 31.4–37.4)
MCV RBC AUTO: 92 FL (ref 82–98)
MONOCYTES # BLD AUTO: 0.55 THOUSAND/ÂΜL (ref 0.17–1.22)
MONOCYTES NFR BLD AUTO: 10 % (ref 4–12)
NEUTROPHILS # BLD AUTO: 3.12 THOUSANDS/ÂΜL (ref 1.85–7.62)
NEUTS SEG NFR BLD AUTO: 55 % (ref 43–75)
NRBC BLD AUTO-RTO: 0 /100 WBCS
PLATELET # BLD AUTO: 292 THOUSANDS/UL (ref 149–390)
PMV BLD AUTO: 10 FL (ref 8.9–12.7)
POTASSIUM SERPL-SCNC: 4.1 MMOL/L (ref 3.5–5.3)
PROT SERPL-MCNC: 7 G/DL (ref 6.4–8.4)
PROTHROMBIN TIME: 13.3 SECONDS (ref 11.6–14.5)
RBC # BLD AUTO: 4.45 MILLION/UL (ref 3.88–5.62)
RH BLD: POSITIVE
RH BLD: POSITIVE
SODIUM SERPL-SCNC: 141 MMOL/L (ref 135–147)
SPECIMEN EXPIRATION DATE: NORMAL
WBC # BLD AUTO: 5.67 THOUSAND/UL (ref 4.31–10.16)

## 2023-11-30 PROCEDURE — 86900 BLOOD TYPING SEROLOGIC ABO: CPT | Performed by: ORTHOPAEDIC SURGERY

## 2023-11-30 PROCEDURE — 36415 COLL VENOUS BLD VENIPUNCTURE: CPT

## 2023-11-30 PROCEDURE — 80053 COMPREHEN METABOLIC PANEL: CPT

## 2023-11-30 PROCEDURE — 85025 COMPLETE CBC W/AUTO DIFF WBC: CPT

## 2023-11-30 PROCEDURE — 85730 THROMBOPLASTIN TIME PARTIAL: CPT

## 2023-11-30 PROCEDURE — 86850 RBC ANTIBODY SCREEN: CPT | Performed by: ORTHOPAEDIC SURGERY

## 2023-11-30 PROCEDURE — 85610 PROTHROMBIN TIME: CPT

## 2023-11-30 PROCEDURE — 86901 BLOOD TYPING SEROLOGIC RH(D): CPT | Performed by: ORTHOPAEDIC SURGERY

## 2023-11-30 PROCEDURE — 83036 HEMOGLOBIN GLYCOSYLATED A1C: CPT

## 2023-12-04 NOTE — PRE-PROCEDURE INSTRUCTIONS
Pre-Surgery Instructions:   Medication Instructions    ascorbic acid (VITAMIN C) 500 MG tablet Hold day of surgery.    aspirin 81 mg chewable tablet Instructions provided by MD    famotidine (PEPCID) 20 mg tablet Uses PRN- OK to take day of surgery    ferrous sulfate 324 (65 Fe) mg Hold day of surgery.    folic acid (FOLVITE) 1 mg tablet Hold day of surgery.    meloxicam (MOBIC) 7.5 mg tablet Stop taking 7 days prior to surgery.    Multiple Vitamin (multivitamin) tablet Stop taking 7 days prior to surgery.    valsartan (DIOVAN) 80 mg tablet Hold day of surgery. Or per physician instructions    Medication instructions for day surgery reviewed. Please use only a sip of water to take your instructed medications. Avoid all over the counter vitamins, supplements and NSAIDS for one week prior to surgery per anesthesia guidelines. Tylenol is ok to take as needed.     You will receive a call one business day prior to surgery with an arrival time and hospital directions. If your surgery is scheduled on a Monday, the hospital will be calling you on the Friday prior to your surgery. If you have not heard from anyone by 8pm, please call the hospital supervisor through the hospital  at [phone]. (Aaron [phone]).    Do not eat or drink anything after midnight the night before your surgery, including candy, mints, lifesavers, or chewing gum. Do not drink alcohol 24hrs before your surgery. Try not to smoke at least 24hrs before your surgery.       Follow the pre surgery showering instructions as listed in the “My Surgical Experience Booklet” or otherwise provided by your surgeon's office. Do not use a blade to shave the surgical area 1 week before surgery. It is okay to use a clean electric clippers up to 24 hours before surgery. Do not apply any lotions, creams, including makeup, cologne, deodorant, or perfumes after showering on the day of your surgery. Do not use dry shampoo, hair spray, hair gel, or any type  of hair products.     No contact lenses, eye make-up, or artificial eyelashes. Remove nail polish, including gel polish, and any artificial, gel, or acrylic nails if possible. Remove all jewelry including rings and body piercing jewelry.     Wear causal clothing that is easy to take on and off. Consider your type of surgery.    Keep any valuables, jewelry, piercings at home. Please bring any specially ordered equipment (sling, braces) if indicated.    Arrange for a responsible person to drive you to and from the hospital on the day of your surgery. Visitor Guidelines discussed.     Call the surgeon's office with any new illnesses, exposures, or additional questions prior to surgery.    Please reference your “My Surgical Experience Booklet” for additional information to prepare for your upcoming surgery.

## 2023-12-06 ENCOUNTER — OFFICE VISIT (OUTPATIENT)
Dept: FAMILY MEDICINE CLINIC | Facility: CLINIC | Age: 63
End: 2023-12-06
Payer: COMMERCIAL

## 2023-12-06 ENCOUNTER — EVALUATION (OUTPATIENT)
Dept: PHYSICAL THERAPY | Facility: MEDICAL CENTER | Age: 63
End: 2023-12-06
Payer: COMMERCIAL

## 2023-12-06 VITALS
WEIGHT: 204 LBS | HEART RATE: 73 BPM | HEIGHT: 74 IN | OXYGEN SATURATION: 99 % | BODY MASS INDEX: 26.18 KG/M2 | TEMPERATURE: 98 F | DIASTOLIC BLOOD PRESSURE: 70 MMHG | SYSTOLIC BLOOD PRESSURE: 130 MMHG

## 2023-12-06 DIAGNOSIS — M16.12 PRIMARY OSTEOARTHRITIS OF LEFT HIP: Primary | ICD-10-CM

## 2023-12-06 DIAGNOSIS — R73.02 IMPAIRED GLUCOSE TOLERANCE: ICD-10-CM

## 2023-12-06 DIAGNOSIS — Z01.818 PREOPERATIVE TESTING: ICD-10-CM

## 2023-12-06 DIAGNOSIS — M16.12 PRIMARY OSTEOARTHRITIS OF ONE HIP, LEFT: Primary | ICD-10-CM

## 2023-12-06 DIAGNOSIS — I10 PRIMARY HYPERTENSION: ICD-10-CM

## 2023-12-06 PROCEDURE — 97161 PT EVAL LOW COMPLEX 20 MIN: CPT | Performed by: PHYSICAL THERAPIST

## 2023-12-06 PROCEDURE — 99214 OFFICE O/P EST MOD 30 MIN: CPT | Performed by: FAMILY MEDICINE

## 2023-12-06 PROCEDURE — 97110 THERAPEUTIC EXERCISES: CPT | Performed by: PHYSICAL THERAPIST

## 2023-12-06 NOTE — PROGRESS NOTES
Presurgical Evaluation    Subjective:      Patient ID: Evelin Javed is a 58 y.o. male. Chief Complaint   Patient presents with    Pre-op Exam     Pre op/ surgery left hip on 12/27/23       Patient presents with:  Pre-op Exam: Pre op/ surgery left hip on 12/27/23     Patient is here for preop for this left hip that he is having done on 1227 plan is also for the right hip to be done on 327 the week before Easter. He has no complaints other than some postnasal drip with clear mucus. His preoperative labs shows an HbA1c of 6.9 with a fasting sugar of 107. He does admit to possibly eating more than his share of potato products, advised  to cut  down. The following portions of the patient's history were reviewed and updated as appropriate: allergies, current medications, past family history, past medical history, past social history, past surgical history, and problem list.    Procedure date: December 27, 2023    Surgeon:  Dr. Nadine Holm  Planned procedure:  l hip replacement  Diagnosis for procedure:  l hip pain  Plan is  for  r  hip  the  week before  Easter  Prior anesthesia: Yes   General; Complications:  None / Tolerated well  MAC; Complications:  None / Tolerated well  Local; Complications:  None / Tolerated well    CAD History: None   NOTE: Patient should see Cardiology if time available before surgery, and if appropriate. Pulmonary History: None    Renal history: None    Diabetes History:  None, borderline sugar     Neurological History: None     On Immunosuppressant meds/biologics: Yes      Review of Systems   Constitutional:  Negative for activity change, appetite change and fatigue. HENT:  Positive for postnasal drip. Negative for congestion, ear pain, sinus pressure, sinus pain and sore throat. Respiratory:  Negative for shortness of breath. Cardiovascular:  Negative for chest pain.          Current Outpatient Medications   Medication Sig Dispense Refill    ascorbic acid (VITAMIN C) 500 MG tablet Take 1 tablet (500 mg total) by mouth daily Begin 30 days prior to surgery. 30 tablet 1    famotidine (PEPCID) 20 mg tablet Take 1 tablet (20 mg total) by mouth 2 (two) times a day as needed for indigestion or heartburn (Patient taking differently: Take 20 mg by mouth as needed for indigestion or heartburn) 90 tablet 1    ferrous sulfate 324 (65 Fe) mg Take 1 tablet (324 mg total) by mouth daily before breakfast Begin 30 days prior to surgery. 30 tablet 1    folic acid (FOLVITE) 1 mg tablet Take 1 tablet (1 mg total) by mouth daily Begin 30 days prior to surgery. 30 tablet 1    meloxicam (MOBIC) 7.5 mg tablet take 1 tablet by mouth twice a day if needed for moderate pain 60 tablet 1    Multiple Vitamin (multivitamin) tablet Take 1 tablet by mouth daily Begin 30 days prior to surgery. 30 tablet 1    valsartan (DIOVAN) 80 mg tablet Take 1 tablet (80 mg total) by mouth daily 90 tablet 3    aspirin 81 mg chewable tablet Chew 81 mg daily      diclofenac (VOLTAREN) 75 mg EC tablet take 1 tablet by mouth twice a day if needed with food (Patient not taking: Reported on 10/13/2023) 60 tablet 1     No current facility-administered medications for this visit. Allergies on file:   Patient has no known allergies.     Patient Active Problem List   Diagnosis    Hypertension    Prostate carcinoma (HCC)    Gout    Umbilical hernia without obstruction and without gangrene    Impaired glucose tolerance    Osteoarthritis of knee    Varicose veins of both lower extremities    Pain in both knees    Chondrocalcinosis    Trigger finger, left index finger    Arthritis of carpometacarpal (CMC) joint of right thumb    Granulomatous lung disease (HCC)    Primary osteoarthritis of left hip    Suspected sleep apnea    Hypersomnia    Abnormal computed tomography angiography (CTA)    Obstructive sleep apnea    Excessive daytime sleepiness    Sacroiliitis (HCC)    Primary osteoarthritis of right hip        Past Medical History: Diagnosis Date    Abnormal computed tomography angiography (CTA) 03/08/2023    BPH with urinary obstruction     AND OTHER LOWER URINARY TRACT SYMPTOMS     Cancer ([address]) 2017    Chronic pain disorder     Detached retina, right 2015    Elevated prostate specific antigen (PSA) 2017    Feeling of incomplete bladder emptying     History of hypertension     History of nocturia     Hypertension     Low back pain     Neuroma of foot 2017    Prostate cancer ([address]) 2017    Weak urinary stream        Past Surgical History:   Procedure Laterality Date    BACK SURGERY  2008    EYE SURGERY      REPAIR DETACHED RETINA     EYE SURGERY Right     HERNIA REPAIR      LAMINECTOMY      LUMBAR LAMINECTOMY Bilateral     L4-5    PROSTATE BIOPSY Bilateral 2017    SPINE SURGERY      TONSILLECTOMY      VASECTOMY      SURGERY VAS DEFERENS        Family History   Problem Relation Age of Onset    Hypertension Mother     Peripheral vascular disease Mother     Dementia Father        Social History     Tobacco Use    Smoking status: Never    Smokeless tobacco: Never   Vaping Use    Vaping Use: Never used   Substance Use Topics    Alcohol use: Yes     Comment: I have stopped drinking alcohol. Drug use: No       Objective:    Vitals:    12/06/23 1520   BP: 130/70   BP Location: Left arm   Patient Position: Sitting   Cuff Size: Adult   Pulse: 73   Temp: 98 °F (36.7 °C)   TempSrc: Tympanic   SpO2: 99%   Weight: 92.5 kg (204 lb)   Height: 6' 2" (1.88 m)        Physical Exam  Vitals reviewed. Constitutional:       Appearance: Normal appearance. HENT:      Right Ear: Tympanic membrane normal.      Left Ear: Tympanic membrane normal.      Nose: No congestion or rhinorrhea. Mouth/Throat:      Pharynx: No oropharyngeal exudate or posterior oropharyngeal erythema. Eyes:      General:         Right eye: No discharge. Left eye: No discharge. Cardiovascular:      Rate and Rhythm: Normal rate and regular rhythm. Pulses: Normal pulses. Heart sounds: Normal heart sounds. Pulmonary:      Effort: Pulmonary effort is normal.      Breath sounds: Normal breath sounds. Abdominal:      General: Abdomen is flat. Bowel sounds are normal.      Palpations: Abdomen is soft. Musculoskeletal:      Right lower leg: No edema. Left lower leg: No edema. Lymphadenopathy:      Cervical: No cervical adenopathy. Skin:     Findings: No bruising. Neurological:      Mental Status: He is alert. Preop labs/testing available and reviewed: yes    eGFR   Date Value Ref Range Status   2023 64 ml/min/1.73sq m Final     WBC   Date Value Ref Range Status   2023 5.67 4.31 - 10.16 Thousand/uL Final     Hemoglobin   Date Value Ref Range Status   2023 13.0 12.0 - 17.0 g/dL Final     Hematocrit   Date Value Ref Range Status   2023 40.9 36.5 - 49.3 % Final     Platelets   Date Value Ref Range Status   2023 292 149 - 390 Thousands/uL Final     INR   Date Value Ref Range Status   2023 1.02 0.84 - 1.19 Final       EKG no    Echo no    Stress test/cath no    PFT/Cristobal no    Functional capacity: Mowing lawn, Push mower  5-6 Mets   Pick the highest level patient can comfortably perform   4 mets or greater for surgery    RCRI  High Risk surgery? 1 Point  CAD History:         1 Point   MI; Positive Stress Test; CP due to Mi;  Nitrate Usage to control Angina;  Pathologic Q wave on EKG  CHF Active:         1 Point   Pulm Edema; Paroxysmal Nocturnal Dyspnea;  Bibasilar Rales (crackles);S3; CHF on CXR  Cerebrovascular Disease (TIA or CVA):     1 Point  DM on Insulin:        1 Point  Serum Creat >2.0 mg/dl:       1 Point          Total Points: 0     Scorin: Class I, Very Low Risk (0.4%)     1: Class II, Low risk (0.9%)     2: Class III Moderate (6.6%)     3: Class IV High (>11%)      RAJENDRA Risk:  GFR:   eGFR   Date Value Ref Range Status   2023 64 ml/min/1.73sq m Final         For PCP:  If GFR>60, Hold ACE/ARB/Diuretic on the day of surgery, and NSAIDS 10 days before. If GFR<45, Consider PRE and POST op Nephrology Consult. If 46 <GFR> 59 : Has Patient had RAJENDRA in last 6 Months? no   If YES: Preop Nephrology consult   If No:  [id number] [facility] Nephrology consult. Assessment/Plan:    Patient is medically optimized (cleared) for the planned procedure. Further testing/evaluation is not required. Postop concerns: no    Problem List Items Addressed This Visit          Endocrine    Impaired glucose tolerance     Patient's fasting sugar was 107 HbA1c was 6.9 told him to go on a very low carb diet including decreasing potato products and we will reassess in March         Relevant Orders    Lipid panel    Comprehensive metabolic panel    Hemoglobin A1C       Cardiovascular and Mediastinum    Hypertension     BP  stable on Valsartan            Musculoskeletal and Integument    Primary osteoarthritis of left hip - Primary     Cleared for upcoming  surgery for hip replacement          Other Visit Diagnoses       BMI 26.0-26.9,adult                 Diagnoses and all orders for this visit:    Primary osteoarthritis of left hip    Primary hypertension    Impaired glucose tolerance  -     Lipid panel; Future  -     Comprehensive metabolic panel;  Future  -     Hemoglobin A1C; Future    BMI 26.0-26.9,adult          Pre-Surgery Instructions:   Medication Instructions    ascorbic acid (VITAMIN C) 500 MG tablet per anesthesia guidelines     famotidine (PEPCID) 20 mg tablet per anesthesia guidelines     ferrous sulfate 324 (65 Fe) mg per anesthesia guidelines     folic acid (FOLVITE) 1 mg tablet per anesthesia guidelines     meloxicam (MOBIC) 7.5 mg tablet per anesthesia guidelines     Multiple Vitamin (multivitamin) tablet per anesthesia guidelines     valsartan (DIOVAN) 80 mg tablet per anesthesia guidelines         NOTE: Please use the above to review important meds for your specialty, the remainder "per anesthesia Guidelines."    NOTE: Please place an Inbasket message for "[facility]" pool for complicated patients. BMI Counseling: Body mass index is 26.19 kg/m². The BMI is above normal. Nutrition recommendations include reducing portion sizes, decreasing overall calorie intake, 3-5 servings of fruits/vegetables daily, reducing fast food intake, and consuming healthier snacks. Exercise recommendations include exercising 3-5 times per week.

## 2023-12-06 NOTE — ASSESSMENT & PLAN NOTE
Patient's fasting sugar was 107 HbA1c was 6.9 told him to go on a very low carb diet including decreasing potato products and we will reassess in March

## 2023-12-06 NOTE — H&P (VIEW-ONLY)
Presurgical Evaluation    Subjective:      Patient ID: David Gomes is a 63 y.o. male.    Chief Complaint   Patient presents with    Pre-op Exam     Pre op/ surgery left hip on 12/27/23       Patient presents with:  Pre-op Exam: Pre op/ surgery left hip on 12/27/23     Patient is here for preop for this left hip that he is having done on 1227 plan is also for the right hip to be done on 327 the week before Easter.  He has no complaints other than some postnasal drip with clear mucus.  His preoperative labs shows an HbA1c of 6.9 with a fasting sugar of 107.  He does admit to possibly eating more than his share of potato products, advised  to cut  down.        The following portions of the patient's history were reviewed and updated as appropriate: allergies, current medications, past family history, past medical history, past social history, past surgical history, and problem list.    Procedure date: December 27, 2023    Surgeon:  Dr. Ramirez  Planned procedure:  l hip replacement  Diagnosis for procedure:  l hip pain  Plan is  for  r  hip  the  week before  Easter  Prior anesthesia: Yes   General; Complications:  None / Tolerated well  MAC; Complications:  None / Tolerated well  Local; Complications:  None / Tolerated well    CAD History: None   NOTE: Patient should see Cardiology if time available before surgery, and if appropriate.    Pulmonary History: None    Renal history: None    Diabetes History:  None, borderline sugar     Neurological History: None     On Immunosuppressant meds/biologics: Yes      Review of Systems   Constitutional:  Negative for activity change, appetite change and fatigue.   HENT:  Positive for postnasal drip. Negative for congestion, ear pain, sinus pressure, sinus pain and sore throat.    Respiratory:  Negative for shortness of breath.    Cardiovascular:  Negative for chest pain.         Current Outpatient Medications   Medication Sig Dispense Refill    ascorbic acid (VITAMIN C) 500  MG tablet Take 1 tablet (500 mg total) by mouth daily Begin 30 days prior to surgery. 30 tablet 1    famotidine (PEPCID) 20 mg tablet Take 1 tablet (20 mg total) by mouth 2 (two) times a day as needed for indigestion or heartburn (Patient taking differently: Take 20 mg by mouth as needed for indigestion or heartburn) 90 tablet 1    ferrous sulfate 324 (65 Fe) mg Take 1 tablet (324 mg total) by mouth daily before breakfast Begin 30 days prior to surgery. 30 tablet 1    folic acid (FOLVITE) 1 mg tablet Take 1 tablet (1 mg total) by mouth daily Begin 30 days prior to surgery. 30 tablet 1    meloxicam (MOBIC) 7.5 mg tablet take 1 tablet by mouth twice a day if needed for moderate pain 60 tablet 1    Multiple Vitamin (multivitamin) tablet Take 1 tablet by mouth daily Begin 30 days prior to surgery. 30 tablet 1    valsartan (DIOVAN) 80 mg tablet Take 1 tablet (80 mg total) by mouth daily 90 tablet 3    aspirin 81 mg chewable tablet Chew 81 mg daily      diclofenac (VOLTAREN) 75 mg EC tablet take 1 tablet by mouth twice a day if needed with food (Patient not taking: Reported on 10/13/2023) 60 tablet 1     No current facility-administered medications for this visit.       Allergies on file:   Patient has no known allergies.    Patient Active Problem List   Diagnosis    Hypertension    Prostate carcinoma (HCC)    Gout    Umbilical hernia without obstruction and without gangrene    Impaired glucose tolerance    Osteoarthritis of knee    Varicose veins of both lower extremities    Pain in both knees    Chondrocalcinosis    Trigger finger, left index finger    Arthritis of carpometacarpal (CMC) joint of right thumb    Granulomatous lung disease (HCC)    Primary osteoarthritis of left hip    Suspected sleep apnea    Hypersomnia    Abnormal computed tomography angiography (CTA)    Obstructive sleep apnea    Excessive daytime sleepiness    Sacroiliitis (HCC)    Primary osteoarthritis of right hip        Past Medical History:  "  Diagnosis Date    Abnormal computed tomography angiography (CTA) 03/08/2023    BPH with urinary obstruction     AND OTHER LOWER URINARY TRACT SYMPTOMS     Cancer (HCC) 2017    Chronic pain disorder     Detached retina, right 2015    Elevated prostate specific antigen (PSA) 2017    Feeling of incomplete bladder emptying     History of hypertension     History of nocturia     Hypertension     Low back pain     Neuroma of foot 2017    Prostate cancer (HCC) 2017    Weak urinary stream        Past Surgical History:   Procedure Laterality Date    BACK SURGERY  2008    EYE SURGERY      REPAIR DETACHED RETINA     EYE SURGERY Right     HERNIA REPAIR      LAMINECTOMY      LUMBAR LAMINECTOMY Bilateral     L4-5    PROSTATE BIOPSY Bilateral 2017    SPINE SURGERY      TONSILLECTOMY      VASECTOMY      SURGERY VAS DEFERENS        Family History   Problem Relation Age of Onset    Hypertension Mother     Peripheral vascular disease Mother     Dementia Father        Social History     Tobacco Use    Smoking status: Never    Smokeless tobacco: Never   Vaping Use    Vaping Use: Never used   Substance Use Topics    Alcohol use: Yes     Comment: I have stopped drinking alcohol.    Drug use: No       Objective:    Vitals:    12/06/23 1520   BP: 130/70   BP Location: Left arm   Patient Position: Sitting   Cuff Size: Adult   Pulse: 73   Temp: 98 °F (36.7 °C)   TempSrc: Tympanic   SpO2: 99%   Weight: 92.5 kg (204 lb)   Height: 6' 2\" (1.88 m)        Physical Exam  Vitals reviewed.   Constitutional:       Appearance: Normal appearance.   HENT:      Right Ear: Tympanic membrane normal.      Left Ear: Tympanic membrane normal.      Nose: No congestion or rhinorrhea.      Mouth/Throat:      Pharynx: No oropharyngeal exudate or posterior oropharyngeal erythema.   Eyes:      General:         Right eye: No discharge.         Left eye: No discharge.   Cardiovascular:      Rate and Rhythm: Normal rate and regular rhythm.      Pulses: Normal pulses. "      Heart sounds: Normal heart sounds.   Pulmonary:      Effort: Pulmonary effort is normal.      Breath sounds: Normal breath sounds.   Abdominal:      General: Abdomen is flat. Bowel sounds are normal.      Palpations: Abdomen is soft.   Musculoskeletal:      Right lower leg: No edema.      Left lower leg: No edema.   Lymphadenopathy:      Cervical: No cervical adenopathy.   Skin:     Findings: No bruising.   Neurological:      Mental Status: He is alert.           Preop labs/testing available and reviewed: yes    eGFR   Date Value Ref Range Status   2023 64 ml/min/1.73sq m Final     WBC   Date Value Ref Range Status   2023 5.67 4.31 - 10.16 Thousand/uL Final     Hemoglobin   Date Value Ref Range Status   2023 13.0 12.0 - 17.0 g/dL Final     Hematocrit   Date Value Ref Range Status   2023 40.9 36.5 - 49.3 % Final     Platelets   Date Value Ref Range Status   2023 292 149 - 390 Thousands/uL Final     INR   Date Value Ref Range Status   2023 1.02 0.84 - 1.19 Final       EKG no    Echo no    Stress test/cath no    PFT/Conrad no    Functional capacity: Mowing lawn, Push mower  5-6 Mets   Pick the highest level patient can comfortably perform   4 mets or greater for surgery    RCRI  High Risk surgery?         1 Point  CAD History:         1 Point   MI; Positive Stress Test; CP due to Mi;  Nitrate Usage to control Angina; Pathologic Q wave on EKG  CHF Active:         1 Point   Pulm Edema; Paroxysmal Nocturnal Dyspnea;  Bibasilar Rales (crackles);S3; CHF on CXR  Cerebrovascular Disease (TIA or CVA):     1 Point  DM on Insulin:        1 Point  Serum Creat >2.0 mg/dl:       1 Point          Total Points: 0     Scorin: Class I, Very Low Risk (0.4%)     1: Class II, Low risk (0.9%)     2: Class III Moderate (6.6%)     3: Class IV High (>11%)      RAJENDRA Risk:  GFR:   eGFR   Date Value Ref Range Status   2023 64 ml/min/1.73sq m Final         For PCP:  If GFR>60, Hold ACE/ARB/Diuretic  "on the day of surgery, and NSAIDS 10 days before.    If GFR<45, Consider PRE and POST op Nephrology Consult.    If 46 <GFR> 59 : Has Patient had RAJENDRA in last 6 Months? no   If YES: Preop Nephrology consult   If No:  Post Op Nephrology consult.           Assessment/Plan:    Patient is medically optimized (cleared) for the planned procedure.    Further testing/evaluation is not required.    Postop concerns: no    Problem List Items Addressed This Visit          Endocrine    Impaired glucose tolerance     Patient's fasting sugar was 107 HbA1c was 6.9 told him to go on a very low carb diet including decreasing potato products and we will reassess in March         Relevant Orders    Lipid panel    Comprehensive metabolic panel    Hemoglobin A1C       Cardiovascular and Mediastinum    Hypertension     BP  stable on Valsartan            Musculoskeletal and Integument    Primary osteoarthritis of left hip - Primary     Cleared for upcoming  surgery for hip replacement          Other Visit Diagnoses       BMI 26.0-26.9,adult                 Diagnoses and all orders for this visit:    Primary osteoarthritis of left hip    Primary hypertension    Impaired glucose tolerance  -     Lipid panel; Future  -     Comprehensive metabolic panel; Future  -     Hemoglobin A1C; Future    BMI 26.0-26.9,adult          Pre-Surgery Instructions:   Medication Instructions    ascorbic acid (VITAMIN C) 500 MG tablet per anesthesia guidelines     famotidine (PEPCID) 20 mg tablet per anesthesia guidelines     ferrous sulfate 324 (65 Fe) mg per anesthesia guidelines     folic acid (FOLVITE) 1 mg tablet per anesthesia guidelines     meloxicam (MOBIC) 7.5 mg tablet per anesthesia guidelines     Multiple Vitamin (multivitamin) tablet per anesthesia guidelines     valsartan (DIOVAN) 80 mg tablet per anesthesia guidelines         NOTE: Please use the above to review important meds for your specialty, the remainder \"per anesthesia Guidelines.\"    NOTE: " "Please place an Inbasket message for \"SOC\" pool for complicated patients.    BMI Counseling: Body mass index is 26.19 kg/m². The BMI is above normal. Nutrition recommendations include reducing portion sizes, decreasing overall calorie intake, 3-5 servings of fruits/vegetables daily, reducing fast food intake, and consuming healthier snacks. Exercise recommendations include exercising 3-5 times per week.  "

## 2023-12-06 NOTE — PATIENT INSTRUCTIONS
Await  lab  in March  Weight Management   AMBULATORY CARE:   Why it is important to manage your weight:  Being overweight increases your risk of health conditions such as heart disease, high blood pressure, type 2 diabetes, and certain types of cancer. It can also increase your risk for osteoarthritis, sleep apnea, and other respiratory problems. Aim for a slow, steady weight loss. Even a small amount of weight loss can lower your risk of health problems. Risks of being overweight:  Extra weight can cause many health problems, including the following:  Diabetes (high blood sugar level)    High blood pressure or high cholesterol    Heart disease    Stroke    Gallbladder or liver disease    Cancer of the colon, breast, prostate, liver, or kidney    Sleep apnea    Arthritis or gout    Screening  is done to check for health conditions before you have signs or symptoms. If you are 28to 79years old, your blood sugar level may be checked every 3 years for signs of prediabetes or diabetes. Your healthcare provider will check your blood pressure at each visit. High blood pressure can lead to a stroke or other problems. Your provider may check for signs of heart disease, cancer, or other health problems. How to lose weight safely:  A safe and healthy way to lose weight is to eat fewer calories and get regular exercise. You can lose up about 1 pound a week by decreasing the number of calories you eat by 500 calories each day. You can decrease calories by eating smaller portion sizes or by cutting out high-calorie foods. Read labels to find out how many calories are in the foods you eat. You can also burn calories with exercise such as walking, swimming, or biking. You will be more likely to keep weight off if you make these changes part of your lifestyle. Exercise at least 30 minutes per day on most days of the week.  You can also fit in more physical activity by taking the stairs instead of the elevator or parking farther away from stores. Ask your healthcare provider about the best exercise plan for you. Healthy meal plan for weight management:  A healthy meal plan includes a variety of foods, contains fewer calories, and helps you stay healthy. A healthy meal plan includes the following:     Eat whole-grain foods more often. A healthy meal plan should contain fiber. Fiber is the part of grains, fruits, and vegetables that is not broken down by your body. Whole-grain foods are healthy and provide extra fiber in your diet. Some examples of whole-grain foods are whole-wheat breads and pastas, oatmeal, brown rice, and bulgur. Eat a variety of vegetables every day. Include dark, leafy greens such as spinach, kale, dax greens, and mustard greens. Eat yellow and orange vegetables such as carrots, sweet potatoes, and winter squash. Eat a variety of fruits every day. Choose fresh or canned fruit (canned in its own juice or light syrup) instead of juice. Fruit juice has very little or no fiber. Eat low-fat dairy foods. Drink fat-free (skim) milk or 1% milk. Eat fat-free yogurt and low-fat cottage cheese. Try low-fat cheeses such as mozzarella and other reduced-fat cheeses. Choose meat and other protein foods that are low in fat. Choose beans or other legumes such as split peas or lentils. Choose fish, skinless poultry (chicken or turkey), or lean cuts of red meat (beef or pork). Before you cook meat or poultry, cut off any visible fat. Use less fat and oil. Try baking foods instead of frying them. Add less fat, such as margarine, sour cream, regular salad dressing and mayonnaise to foods. Eat fewer high-fat foods. Some examples of high-fat foods include french fries, doughnuts, ice cream, and cakes. Eat fewer sweets. Limit foods and drinks that are high in sugar. This includes candy, cookies, regular soda, and sweetened drinks. Ways to decrease calories:   Eat smaller portions.      Use a small plate with smaller servings. Do not eat second helpings. When you eat at a restaurant, ask for a box and place half of your meal in the box before you eat. Share an entrée with someone else. Replace high-calorie snacks with healthy, low-calorie snacks. Choose fresh fruit, vegetables, fat-free rice cakes, or air-popped popcorn instead of potato chips, nuts, or chocolate. Choose water or calorie-free drinks instead of soda or sweetened drinks. Do not shop for groceries when you are hungry. You may be more likely to make unhealthy food choices. Take a grocery list of healthy foods and shop after you have eaten. Eat regular meals. Do not skip meals. Skipping meals can lead to overeating later in the day. This can make it harder for you to lose weight. Eat a healthy snack in place of a meal if you do not have time to eat a regular meal. Talk with a dietitian to help you create a meal plan and schedule that is right for you. Other things to consider as you try to lose weight:   Be aware of situations that may give you the urge to overeat, such as eating while watching television. Find ways to avoid these situations. For example, read a book, go for a walk, or do crafts. Meet with a weight loss support group or friends who are also trying to lose weight. This may help you stay motivated to continue working on your weight loss goals. © Copyright [employer] 2023 Information is for End User's use only and may not be sold, redistributed or otherwise used for commercial purposes. The above information is an  only. It is not intended as medical advice for individual conditions or treatments. Talk to your doctor, nurse or pharmacist before following any medical regimen to see if it is safe and effective for you.

## 2023-12-06 NOTE — PROGRESS NOTES
PT Evaluation     Today's date: 2023  Patient name: Karli Degroot  : 1960  MRN: [id number]  Referring provider: Isacc Roe  Dx:   Encounter Diagnosis     ICD-10-CM    1. Primary osteoarthritis of one hip, left  M16.12 Ambulatory referral to Physical Therapy      2. Preoperative testing  Z01.818 Ambulatory referral to Physical Therapy                     Assessment  Assessment details: Karli Degroot is a 58 y.o. male presents for pre-op evaluation for L THR scheduled for 23. Wang Vogt was provided with HEP to perform effective POD #1 (assuming DC to home on POD #0). He is scheduled to for post-op evaluation for PT on 23 and will then continue with PT 2x/week tapering to 1x/week if appropriate until goals are reached. Karli Degroot has the below listed impairments and will benefit from skilled PT to improve deficits to return to prior level of function. Impairments: abnormal muscle firing, abnormal or restricted ROM, impaired physical strength and pain with function  Understanding of Dx/Px/POC: good   Prognosis: good    Goals  Impairment Goals  - Decrease pain to 0-1/10  - Improve ROM equal to contralateral side  - Increase strength equal to contralateral side    Functional Goals  - Patient will be independent with comprehensive HEP  - Ambulation is improved to prior level of function  - Stair climbing is improved to prior level of function      Plan  Patient would benefit from: skilled PT  Referral necessary: No  Planned therapy interventions: home exercise program, manual therapy, neuromuscular re-education, patient education, functional ROM exercises, strengthening, stretching and joint mobilization  Frequency: 2x week  Duration in weeks: 8  Treatment plan discussed with: patient        Subjective Evaluation    History of Present Illness  Mechanism of injury: David scheduled for L THR with Dr. Roberth Watts on 23.  Home setup, medication instructions, AD's, transportation for surgery discussed during Preoperative Elective Admission Assessment yesterday, .      Patient Goals  Patient goals for therapy: decreased pain    Pain  At best pain ratin  At worst pain rating: 10          Objective     Passive Range of Motion   Left Hip   Flexion: 90 degrees   Abduction: 20 degrees   Internal rotation (90/90): 5 degrees   Internal rotation (prone): 5 degrees     Strength/Myotome Testing     Left Hip   Planes of Motion   Flexion: 3+  Extension: 4  Abduction: 4  Adduction: 4  External rotation: 4  Internal rotation: 4    Left Knee   Flexion: 5  Extension: 5    Left Ankle/Foot   Dorsiflexion: 5  Plantar flexion: 5             Precautions: none      Manuals 12/6            L hip PROM                                                    Neuro Re-Ed                                                                                                        Ther Ex             Quad sets 5" x 10            Glut sets 5"x 10            Ankle pumps 10            Supine hip abd 10            Heel slides 10            LAQ 10                                      Ther Activity                                       Gait Training                                       Modalities

## 2023-12-20 ENCOUNTER — ANESTHESIA EVENT (OUTPATIENT)
Dept: PERIOP | Facility: HOSPITAL | Age: 63
End: 2023-12-20
Payer: COMMERCIAL

## 2023-12-27 ENCOUNTER — ANESTHESIA (OUTPATIENT)
Dept: PERIOP | Facility: HOSPITAL | Age: 63
End: 2023-12-27
Payer: COMMERCIAL

## 2023-12-27 ENCOUNTER — APPOINTMENT (OUTPATIENT)
Dept: RADIOLOGY | Facility: HOSPITAL | Age: 63
End: 2023-12-27
Payer: COMMERCIAL

## 2023-12-27 ENCOUNTER — HOSPITAL ENCOUNTER (OUTPATIENT)
Facility: HOSPITAL | Age: 63
Setting detail: OUTPATIENT SURGERY
Discharge: HOME | End: 2023-12-27
Attending: ORTHOPAEDIC SURGERY | Admitting: ORTHOPAEDIC SURGERY
Payer: COMMERCIAL

## 2023-12-27 VITALS
WEIGHT: 204 LBS | RESPIRATION RATE: 18 BRPM | DIASTOLIC BLOOD PRESSURE: 76 MMHG | BODY MASS INDEX: 26.18 KG/M2 | SYSTOLIC BLOOD PRESSURE: 126 MMHG | TEMPERATURE: 98.5 F | HEART RATE: 82 BPM | HEIGHT: 74 IN | OXYGEN SATURATION: 96 %

## 2023-12-27 DIAGNOSIS — Z96.642 STATUS POST TOTAL HIP REPLACEMENT, LEFT: Primary | ICD-10-CM

## 2023-12-27 PROCEDURE — 73503 X-RAY EXAM HIP UNI 4/> VIEWS: CPT

## 2023-12-27 PROCEDURE — C1776 JOINT DEVICE (IMPLANTABLE): HCPCS | Performed by: ORTHOPAEDIC SURGERY

## 2023-12-27 PROCEDURE — 27130 TOTAL HIP ARTHROPLASTY: CPT | Performed by: PHYSICIAN ASSISTANT

## 2023-12-27 PROCEDURE — 0054T BONE SRGRY CMPTR FLUOR IMAGE: CPT | Performed by: ORTHOPAEDIC SURGERY

## 2023-12-27 PROCEDURE — 73501 X-RAY EXAM HIP UNI 1 VIEW: CPT

## 2023-12-27 PROCEDURE — 97163 PT EVAL HIGH COMPLEX 45 MIN: CPT

## 2023-12-27 PROCEDURE — C1713 ANCHOR/SCREW BN/BN,TIS/BN: HCPCS | Performed by: ORTHOPAEDIC SURGERY

## 2023-12-27 PROCEDURE — 27130 TOTAL HIP ARTHROPLASTY: CPT | Performed by: ORTHOPAEDIC SURGERY

## 2023-12-27 DEVICE — BIOLOX DELTA CERAMIC FEMORAL HEAD +5.0 36MM DIA 12/14 TAPER
Type: IMPLANTABLE DEVICE | Site: HIP | Status: FUNCTIONAL
Brand: BIOLOX DELTA

## 2023-12-27 DEVICE — PINNACLE CANCELLOUS BONE SCREW 6.5MM X 20MM
Type: IMPLANTABLE DEVICE | Site: HIP | Status: FUNCTIONAL
Brand: PINNACLE

## 2023-12-27 DEVICE — ACTIS DUOFIX HIP PROSTHESIS (FEMORAL STEM 12/14 TAPER CEMENTLESS SIZE 6 HIGH COLLAR)  CE
Type: IMPLANTABLE DEVICE | Site: HIP | Status: FUNCTIONAL
Brand: ACTIS

## 2023-12-27 DEVICE — PINNACLE HIP SOLUTIONS ALTRX POLYETHYLENE ACETABULAR LINER NEUTRAL 36MM ID 58MM OD
Type: IMPLANTABLE DEVICE | Site: HIP | Status: FUNCTIONAL
Brand: PINNACLE ALTRX

## 2023-12-27 DEVICE — PINNACLE POROCOAT ACETABULAR SHELL SECTOR II 58MM OD
Type: IMPLANTABLE DEVICE | Site: HIP | Status: FUNCTIONAL
Brand: PINNACLE POROCOAT

## 2023-12-27 RX ORDER — SODIUM CHLORIDE, SODIUM LACTATE, POTASSIUM CHLORIDE, CALCIUM CHLORIDE 600; 310; 30; 20 MG/100ML; MG/100ML; MG/100ML; MG/100ML
125 INJECTION, SOLUTION INTRAVENOUS CONTINUOUS
Status: DISCONTINUED | OUTPATIENT
Start: 2023-12-27 | End: 2023-12-27 | Stop reason: HOSPADM

## 2023-12-27 RX ORDER — ACETAMINOPHEN 325 MG/1
975 TABLET ORAL ONCE
Status: COMPLETED | OUTPATIENT
Start: 2023-12-27 | End: 2023-12-27

## 2023-12-27 RX ORDER — ASCORBIC ACID 500 MG
500 TABLET ORAL DAILY
Status: DISCONTINUED | OUTPATIENT
Start: 2023-12-27 | End: 2023-12-27 | Stop reason: HOSPADM

## 2023-12-27 RX ORDER — BISACODYL 10 MG
10 SUPPOSITORY, RECTAL RECTAL DAILY PRN
Status: DISCONTINUED | OUTPATIENT
Start: 2023-12-27 | End: 2023-12-27 | Stop reason: HOSPADM

## 2023-12-27 RX ORDER — DOCUSATE SODIUM 100 MG/1
100 CAPSULE, LIQUID FILLED ORAL 2 TIMES DAILY
Qty: 30 CAPSULE | Refills: 0 | Status: SHIPPED | OUTPATIENT
Start: 2023-12-27

## 2023-12-27 RX ORDER — FENTANYL CITRATE/PF 50 MCG/ML
50 SYRINGE (ML) INJECTION
Status: COMPLETED | OUTPATIENT
Start: 2023-12-27 | End: 2023-12-27

## 2023-12-27 RX ORDER — CHLORHEXIDINE GLUCONATE ORAL RINSE 1.2 MG/ML
15 SOLUTION DENTAL ONCE
Status: COMPLETED | OUTPATIENT
Start: 2023-12-27 | End: 2023-12-27

## 2023-12-27 RX ORDER — ACETAMINOPHEN 325 MG/1
975 TABLET ORAL EVERY 8 HOURS SCHEDULED
Status: DISCONTINUED | OUTPATIENT
Start: 2023-12-27 | End: 2023-12-27 | Stop reason: HOSPADM

## 2023-12-27 RX ORDER — TRANEXAMIC ACID 10 MG/ML
1000 INJECTION, SOLUTION INTRAVENOUS ONCE
Status: COMPLETED | OUTPATIENT
Start: 2023-12-27 | End: 2023-12-27

## 2023-12-27 RX ORDER — OXYCODONE HYDROCHLORIDE 10 MG/1
10 TABLET ORAL EVERY 4 HOURS PRN
Qty: 42 TABLET | Refills: 0 | Status: SHIPPED | OUTPATIENT
Start: 2023-12-27 | End: 2024-01-06

## 2023-12-27 RX ORDER — ENOXAPARIN SODIUM 100 MG/ML
40 INJECTION SUBCUTANEOUS DAILY
Status: DISCONTINUED | OUTPATIENT
Start: 2023-12-27 | End: 2023-12-27 | Stop reason: HOSPADM

## 2023-12-27 RX ORDER — SODIUM CHLORIDE, SODIUM LACTATE, POTASSIUM CHLORIDE, CALCIUM CHLORIDE 600; 310; 30; 20 MG/100ML; MG/100ML; MG/100ML; MG/100ML
50 INJECTION, SOLUTION INTRAVENOUS CONTINUOUS
Status: DISCONTINUED | OUTPATIENT
Start: 2023-12-27 | End: 2023-12-27

## 2023-12-27 RX ORDER — BUPIVACAINE HYDROCHLORIDE 7.5 MG/ML
INJECTION, SOLUTION INTRASPINAL AS NEEDED
Status: DISCONTINUED | OUTPATIENT
Start: 2023-12-27 | End: 2023-12-27

## 2023-12-27 RX ORDER — GABAPENTIN 100 MG/1
100 CAPSULE ORAL EVERY 8 HOURS
Status: DISCONTINUED | OUTPATIENT
Start: 2023-12-27 | End: 2023-12-27 | Stop reason: HOSPADM

## 2023-12-27 RX ORDER — OXYCODONE HYDROCHLORIDE 5 MG/1
5 TABLET ORAL EVERY 4 HOURS PRN
Status: DISCONTINUED | OUTPATIENT
Start: 2023-12-27 | End: 2023-12-27 | Stop reason: HOSPADM

## 2023-12-27 RX ORDER — PROPOFOL 10 MG/ML
INJECTION, EMULSION INTRAVENOUS AS NEEDED
Status: DISCONTINUED | OUTPATIENT
Start: 2023-12-27 | End: 2023-12-27

## 2023-12-27 RX ORDER — SENNOSIDES 8.6 MG
1 TABLET ORAL DAILY
Status: DISCONTINUED | OUTPATIENT
Start: 2023-12-27 | End: 2023-12-27 | Stop reason: HOSPADM

## 2023-12-27 RX ORDER — GABAPENTIN 300 MG/1
300 CAPSULE ORAL ONCE
Status: COMPLETED | OUTPATIENT
Start: 2023-12-27 | End: 2023-12-27

## 2023-12-27 RX ORDER — CEPHALEXIN 500 MG/1
500 CAPSULE ORAL EVERY 12 HOURS SCHEDULED
Qty: 2 CAPSULE | Refills: 0 | Status: SHIPPED | OUTPATIENT
Start: 2023-12-27 | End: 2023-12-28

## 2023-12-27 RX ORDER — CEFAZOLIN SODIUM 2 G/50ML
2000 SOLUTION INTRAVENOUS EVERY 8 HOURS
Status: DISCONTINUED | OUTPATIENT
Start: 2023-12-27 | End: 2023-12-27 | Stop reason: HOSPADM

## 2023-12-27 RX ORDER — CALCIUM CARBONATE 500 MG/1
1000 TABLET, CHEWABLE ORAL DAILY PRN
Status: DISCONTINUED | OUTPATIENT
Start: 2023-12-27 | End: 2023-12-27 | Stop reason: HOSPADM

## 2023-12-27 RX ORDER — DOCUSATE SODIUM 100 MG/1
100 CAPSULE, LIQUID FILLED ORAL 2 TIMES DAILY
Status: DISCONTINUED | OUTPATIENT
Start: 2023-12-27 | End: 2023-12-27 | Stop reason: HOSPADM

## 2023-12-27 RX ORDER — MIDAZOLAM HYDROCHLORIDE 2 MG/2ML
INJECTION, SOLUTION INTRAMUSCULAR; INTRAVENOUS AS NEEDED
Status: DISCONTINUED | OUTPATIENT
Start: 2023-12-27 | End: 2023-12-27

## 2023-12-27 RX ORDER — GABAPENTIN 100 MG/1
100 CAPSULE ORAL 3 TIMES DAILY
Qty: 90 CAPSULE | Refills: 0 | Status: SHIPPED | OUTPATIENT
Start: 2023-12-27

## 2023-12-27 RX ORDER — CHLORHEXIDINE GLUCONATE 4 G/100ML
SOLUTION TOPICAL DAILY PRN
Status: DISCONTINUED | OUTPATIENT
Start: 2023-12-27 | End: 2023-12-27 | Stop reason: HOSPADM

## 2023-12-27 RX ORDER — ONDANSETRON 2 MG/ML
4 INJECTION INTRAMUSCULAR; INTRAVENOUS EVERY 6 HOURS PRN
Status: DISCONTINUED | OUTPATIENT
Start: 2023-12-27 | End: 2023-12-27 | Stop reason: HOSPADM

## 2023-12-27 RX ORDER — FOLIC ACID 1 MG/1
1 TABLET ORAL DAILY
Status: DISCONTINUED | OUTPATIENT
Start: 2023-12-27 | End: 2023-12-27 | Stop reason: HOSPADM

## 2023-12-27 RX ORDER — PROPOFOL 10 MG/ML
INJECTION, EMULSION INTRAVENOUS CONTINUOUS PRN
Status: DISCONTINUED | OUTPATIENT
Start: 2023-12-27 | End: 2023-12-27

## 2023-12-27 RX ORDER — LIDOCAINE HYDROCHLORIDE 10 MG/ML
INJECTION, SOLUTION EPIDURAL; INFILTRATION; INTRACAUDAL; PERINEURAL AS NEEDED
Status: DISCONTINUED | OUTPATIENT
Start: 2023-12-27 | End: 2023-12-27

## 2023-12-27 RX ORDER — MAGNESIUM HYDROXIDE 1200 MG/15ML
LIQUID ORAL AS NEEDED
Status: DISCONTINUED | OUTPATIENT
Start: 2023-12-27 | End: 2023-12-27 | Stop reason: HOSPADM

## 2023-12-27 RX ORDER — OXYCODONE HYDROCHLORIDE 10 MG/1
10 TABLET ORAL EVERY 4 HOURS PRN
Status: DISCONTINUED | OUTPATIENT
Start: 2023-12-27 | End: 2023-12-27 | Stop reason: HOSPADM

## 2023-12-27 RX ORDER — PHENYLEPHRINE HCL IN 0.9% NACL 1 MG/10 ML
SYRINGE (ML) INTRAVENOUS AS NEEDED
Status: DISCONTINUED | OUTPATIENT
Start: 2023-12-27 | End: 2023-12-27

## 2023-12-27 RX ORDER — ASPIRIN 325 MG
325 TABLET ORAL 2 TIMES DAILY
Qty: 84 TABLET | Refills: 0 | Status: SHIPPED | OUTPATIENT
Start: 2023-12-27

## 2023-12-27 RX ORDER — HYDROMORPHONE HCL/PF 1 MG/ML
0.5 SYRINGE (ML) INJECTION EVERY 4 HOURS PRN
Status: DISCONTINUED | OUTPATIENT
Start: 2023-12-27 | End: 2023-12-27 | Stop reason: HOSPADM

## 2023-12-27 RX ORDER — PROMETHAZINE HYDROCHLORIDE 12.5 MG/1
12.5 TABLET ORAL EVERY 6 HOURS PRN
Qty: 12 TABLET | Refills: 0 | Status: SHIPPED | OUTPATIENT
Start: 2023-12-27

## 2023-12-27 RX ORDER — HYDROMORPHONE HCL/PF 1 MG/ML
0.5 SYRINGE (ML) INJECTION
Status: DISCONTINUED | OUTPATIENT
Start: 2023-12-27 | End: 2023-12-27 | Stop reason: HOSPADM

## 2023-12-27 RX ORDER — SODIUM CHLORIDE 9 MG/ML
INJECTION, SOLUTION INTRAVENOUS AS NEEDED
Status: DISCONTINUED | OUTPATIENT
Start: 2023-12-27 | End: 2023-12-27 | Stop reason: HOSPADM

## 2023-12-27 RX ORDER — SODIUM CHLORIDE 9 MG/ML
75 INJECTION, SOLUTION INTRAVENOUS CONTINUOUS
Status: DISCONTINUED | OUTPATIENT
Start: 2023-12-27 | End: 2023-12-27

## 2023-12-27 RX ORDER — CEFAZOLIN SODIUM 2 G/50ML
2000 SOLUTION INTRAVENOUS ONCE
Status: COMPLETED | OUTPATIENT
Start: 2023-12-27 | End: 2023-12-27

## 2023-12-27 RX ORDER — ACETAMINOPHEN 500 MG
1000 TABLET ORAL EVERY 8 HOURS
Start: 2023-12-27

## 2023-12-27 RX ADMIN — TRANEXAMIC ACID 1000 MG: 10 INJECTION, SOLUTION INTRAVENOUS at 08:02

## 2023-12-27 RX ADMIN — CEFAZOLIN SODIUM 2000 MG: 2 SOLUTION INTRAVENOUS at 07:52

## 2023-12-27 RX ADMIN — BUPIVACAINE HYDROCHLORIDE IN DEXTROSE 1.8 ML: 7.5 INJECTION, SOLUTION SUBARACHNOID at 07:52

## 2023-12-27 RX ADMIN — MIDAZOLAM 2 MG: 1 INJECTION INTRAMUSCULAR; INTRAVENOUS at 07:44

## 2023-12-27 RX ADMIN — GABAPENTIN 300 MG: 300 CAPSULE ORAL at 05:41

## 2023-12-27 RX ADMIN — PROPOFOL 50 MG: 10 INJECTION, EMULSION INTRAVENOUS at 07:54

## 2023-12-27 RX ADMIN — PROPOFOL 100 MCG/KG/MIN: 10 INJECTION, EMULSION INTRAVENOUS at 07:56

## 2023-12-27 RX ADMIN — FENTANYL CITRATE 50 MCG: 50 INJECTION, SOLUTION INTRAMUSCULAR; INTRAVENOUS at 11:46

## 2023-12-27 RX ADMIN — LIDOCAINE HYDROCHLORIDE 30 MG: 10 INJECTION, SOLUTION EPIDURAL; INFILTRATION; INTRACAUDAL; PERINEURAL at 07:54

## 2023-12-27 RX ADMIN — IRON SUCROSE 300 MG: 20 INJECTION, SOLUTION INTRAVENOUS at 13:15

## 2023-12-27 RX ADMIN — Medication 100 MCG: at 09:21

## 2023-12-27 RX ADMIN — SODIUM CHLORIDE, SODIUM LACTATE, POTASSIUM CHLORIDE, AND CALCIUM CHLORIDE 50 ML/HR: .6; .31; .03; .02 INJECTION, SOLUTION INTRAVENOUS at 05:53

## 2023-12-27 RX ADMIN — DOCUSATE SODIUM 100 MG: 100 CAPSULE, LIQUID FILLED ORAL at 13:02

## 2023-12-27 RX ADMIN — PHENYLEPHRINE HYDROCHLORIDE 30 MCG/MIN: 10 INJECTION INTRAVENOUS at 09:36

## 2023-12-27 RX ADMIN — OXYCODONE HYDROCHLORIDE 10 MG: 10 TABLET ORAL at 13:09

## 2023-12-27 RX ADMIN — SENNOSIDES 8.6 MG: 8.6 TABLET, FILM COATED ORAL at 13:02

## 2023-12-27 RX ADMIN — CHLORHEXIDINE GLUCONATE 0.12% ORAL RINSE 15 ML: 1.2 LIQUID ORAL at 05:41

## 2023-12-27 RX ADMIN — Medication 100 MCG: at 09:34

## 2023-12-27 RX ADMIN — FENTANYL CITRATE 50 MCG: 50 INJECTION, SOLUTION INTRAMUSCULAR; INTRAVENOUS at 11:26

## 2023-12-27 RX ADMIN — CEFAZOLIN SODIUM 2000 MG: 2 SOLUTION INTRAVENOUS at 15:46

## 2023-12-27 RX ADMIN — ACETAMINOPHEN 975 MG: 325 TABLET ORAL at 05:41

## 2023-12-27 RX ADMIN — FOLIC ACID 1 MG: 1 TABLET ORAL at 13:02

## 2023-12-27 RX ADMIN — OXYCODONE HYDROCHLORIDE AND ACETAMINOPHEN 500 MG: 500 TABLET ORAL at 13:01

## 2023-12-27 RX ADMIN — ACETAMINOPHEN 975 MG: 325 TABLET ORAL at 13:01

## 2023-12-27 RX ADMIN — GABAPENTIN 100 MG: 100 CAPSULE ORAL at 13:01

## 2023-12-27 NOTE — OP NOTE
OPERATIVE REPORT  PATIENT NAME: David Gomes  : 1960  MRN: [id number]  Pt Location:   OR ROOM 12    Surgery Date: 2023    Surgeons and Role:     * Amy Ramirez, DO - Primary     * Winsome Muse PA-C - Assisting      * Alex Webb, ATC - Assisting    Preop Diagnosis:  Primary osteoarthritis of one hip, left [M16.12]    Post-Op Diagnosis Codes:     * Primary osteoarthritis of one hip, left [M16.12]    Procedure(s):  Left - ARTHROPLASTY HIP TOTAL ANTERIOR.NAVIGATED. potential same day discharge    Specimens:  * No specimens in log *    Estimated Blood Loss:   400 mL    Drains:  * No LDAs found *    Anesthesia Type:   Spinal     Operative Indications:  Primary osteoarthritis of one hip, left [M16.12]    Operative Findings:  See below    Complications:   None      Hip Approach: Direct anterior    Procedure and Technique:  Implants: Depuy  Hinckley acetabular sector II cup size 58mm  Actis high offset stem size 6  Neutral acetabular liner  Two 6.5 x 20mm screw  36mm +5 Biolox delta ceramic femoral head    INDICATIONS FOR PROCEDURE:  The patients is a 63 years old male who presented to the office with left hip arthritis.  Conservative treatments were tried and failed.  The patient had debilitating pain and decreased quality of life from their end-stage arthritis.  Surgery was then recommended.  Extensive counseling in regards to the reasons for surgical intervention as well as the risks and benefits of surgery were reviewed.  The risks include, but are not limited to infection, extensive blood clots, blood clots, wound healing problems, damage to blood vessels and nerves, dislocation, leg length discrepancy, fracture, the need for further surgery.  The patient understood and agreed to by oral and written consent.    OPERATIVE PROCEDURE:  The patient was identified as David Gomes by His ID bracelet by the surgical staff in the preoperative area at [facility].  The  patient was wheeled back to the surgical room and placed on the operative table.  Preoperative antibiotics were given.    Anesthesia was administered.      The patient was placed in the supine position on the hana table and all bony prominences were carefully protected.   The left leg was then prepped and draped in the usual sterile fashion.  A timeout was performed where the patient’s name and surgical site were once again identified.        An incision was made over  the anterior aspect of the patient’s left hip.  Dissection was made through the skin and subcutaneous tissue down to the fascia over the tensor fascia bill.  The fascia was incised and the interval between the tensor fascia bill and the sartorius was identified.  Retractors were placed.  Bleeders were cauterized.  We dissected down to the capsule.  A capsulotomy was made and the capsule was tagged with suture.  We released the capsule from the neck.  The femoral neck cut was made and the femoral head was removed.  Retractors were placed around the acetabulum.  The labrum was removed.  Sequential reaming took place and a size 58mm acetabular shell was found to be the best fit. XRs were taken to evaluate the position of the cup.  The shell was impacted into place.    Two 6.5 x 20mm screws were placed through the cup and the final liner was impacted into placed.  The liner was checked and found to be secure.       Attention was then paid to the femur.  The leg was manipulated for exposure of the femur proximally.  Soft tissue dissection was done to reveal the greater trochanter.  A canal finder were used to open the femoral canal and excess bone was removed laterally.  Sequential broaching took place and it was found that a size 6 femoral broach to be the best fit.  The broach was left in place and a size 36mm +1.5 femoral head with a high offset neck were then trialed.  XRs were taken.  Computer navigation was used to evaluate leg lengths and offset.   The leg was taken through a ROM to evaluate for stability.  Stability was adequate but we felt we could improve on leg length.  We removed the trials and then impacted the final size 6 stem.  We placed a trial 36mm +5 femoral head with high offset neck and trialed it.  XRs were taken.  Computer navigation was used to evaluate leg lengths and offset.  The leg was taken through a ROM to evaluate for stability.  Stability, leg length, and offset were found to be acceptable.  The final 36mm +5 femoral head was impacted securely into place.  The acetabulum was irrigated and the hip was carefully reduced.  Copious amounts of irrigation was used to irrigate the hip.  An irrisept wash followed by more irrigation was performed.  The capsule was repaired with a #5 Ethibond suture.  Irrigation was used throughout the closure.  The tensor fascia was repaired with #1-0 running vicryl suture.  The subcutaneous fat was closed with a running #1-0 vicryl suture.  The skin was closed with #2-0 vicryl and #3-0 monocryl subcutaneously.   Dermabond was placed on the incision and a mepilex dressing was placed.  The patient was transferred onto a hospital bed and awakened without difficulty.      I attest that I was present and performed this procedure.  Winsome Muse PA-C  and Alex Webb ATC were present for the entire procedure and provided essential assistance with limb position, patient prepping, and retraction.     A qualified resident physician was not available.    Patient Disposition:  hemodynamically stable              SIGNATURE: Amy Ramirez DO  DATE: December 27, 2023  TIME: 10:56 AM

## 2023-12-27 NOTE — NURSING NOTE
Discharge instructions given to patient. Patient verbalizes understanding of instructions. Patient sent home with all belongings. Patient with no distress or concerns at this time.

## 2023-12-27 NOTE — PLAN OF CARE
Problem: PAIN - ADULT  Goal: Verbalizes/displays adequate comfort level or baseline comfort level  Description: Interventions:  - Encourage patient to monitor pain and request assistance  - Assess pain using appropriate pain scale  - Administer analgesics based on type and severity of pain and evaluate response  - Implement non-pharmacological measures as appropriate and evaluate response  - Consider cultural and social influences on pain and pain management  - Notify physician/advanced practitioner if interventions unsuccessful or patient reports new pain  Outcome: Progressing     Problem: INFECTION - ADULT  Goal: Absence or prevention of progression during hospitalization  Description: INTERVENTIONS:  - Assess and monitor for signs and symptoms of infection  - Monitor lab/diagnostic results  - Monitor all insertion sites, i.e. indwelling lines, tubes, and drains  - Monitor endotracheal if appropriate and nasal secretions for changes in amount and color  - Stephens appropriate cooling/warming therapies per order  - Administer medications as ordered  - Instruct and encourage patient and family to use good hand hygiene technique  - Identify and instruct in appropriate isolation precautions for identified infection/condition  Outcome: Progressing     Problem: SAFETY ADULT  Goal: Patient will remain free of falls  Description: INTERVENTIONS:  - Educate patient/family on patient safety including physical limitations  - Instruct patient to call for assistance with activity   - Consult OT/PT to assist with strengthening/mobility   - Keep Call bell within reach  - Keep bed low and locked with side rails adjusted as appropriate  - Keep care items and personal belongings within reach  - Initiate and maintain comfort rounds  - Make Fall Risk Sign visible to staff  - Apply yellow socks and bracelet for high fall risk patients  - Consider moving patient to room near nurses station  Outcome: Progressing     Problem: SAFETY  ADULT  Goal: Maintain or return to baseline ADL function  Description: INTERVENTIONS:  -  Assess patient's ability to carry out ADLs; assess patient's baseline for ADL function and identify physical deficits which impact ability to perform ADLs (bathing, care of mouth/teeth, toileting, grooming, dressing, etc.)  - Assess/evaluate cause of self-care deficits   - Assess range of motion  - Assess patient's mobility; develop plan if impaired  - Assess patient's need for assistive devices and provide as appropriate  - Encourage maximum independence but intervene and supervise when necessary  - Involve family in performance of ADLs  - Assess for home care needs following discharge   - Consider OT consult to assist with ADL evaluation and planning for discharge  - Provide patient education as appropriate  Outcome: Progressing     Problem: SAFETY ADULT  Goal: Maintains/Returns to pre admission functional level  Description: INTERVENTIONS:  - Perform AM-PAC 6 Click Basic Mobility/ Daily Activity assessment daily.  - Set and communicate daily mobility goal to care team and patient/family/caregiver.   - Collaborate with rehabilitation services on mobility goals if consulted  - Out of bed for toileting  - Record patient progress and toleration of activity level   Outcome: Progressing     Problem: DISCHARGE PLANNING  Goal: Discharge to home or other facility with appropriate resources  Description: INTERVENTIONS:  - Identify barriers to discharge w/patient and caregiver  - Arrange for needed discharge resources and transportation as appropriate  - Identify discharge learning needs (meds, wound care, etc.)  - Arrange for interpretive services to assist at discharge as needed  - Refer to Case Management Department for coordinating discharge planning if the patient needs post-hospital services based on physician/advanced practitioner order or complex needs related to functional status, cognitive ability, or social support  system  Outcome: Progressing

## 2023-12-27 NOTE — CASE MANAGEMENT
Case Management Progress Note    Patient name David Gomes  Location 7T U 707/7T U 707-01 MRN [id number]  : 1960 Date 2023       LOS (days): 0  Geometric Mean LOS (GMLOS) (days):   Days to GMLOS:        OBJECTIVE:        Current admission status: Outpatient Surgery  Preferred Pharmacy:   [id number] - [place] - [address]  [address] [address]  Phone: [phone] Fax: [phone]    Primary Care Provider: Josefina Haddad MD    Primary Insurance: BLUE CROSS  Secondary Insurance:     PROGRESS NOTE:    CM consulted by orthopedics s/p hip replacement. Per Ortho pre-op assessment/PT assessment, pt resides in a two story home with significant other and was independent with ADLs prior to admission. Pt has a RW. Therapy recommending OP therapy. No CM needs identified at this time. CM will continue to follow.     Call to [email or web address] [phone] and all scripts filled and ready for .  OP therapy scheduled  at [facility].  Has home RW at bedside.  Met with patient and SO at bedside and discussed same.

## 2023-12-27 NOTE — DISCHARGE INSTR - AVS FIRST PAGE
ANTERIOR TOTAL HIP REPLACEMENT DISCHARGE INSTRUCTIONS    Surgical Dressing:  You may remove your dressing 7 days from the date of your surgery then change your dressing daily until drainage stops.  Do not put any lotions or creams on your incision.  If there are any signs of infection such as drainage persisting beyond a few days, unusual looking drainage (yellow, green), increased redness around the incision, or fever/chills let your doctor know.      Medications:  Upon discharge you will be given a prescription for an anticoagulant (i.e. Ecotrin (Aspirin), Coumadin (Warfarin), Lovenox (Enoxaparin)) and a narcotic pain reliever.  Do not take any over the counter NSAIDs (i.e. Ibuprofen, Motrin, Advil, Naprosyn, Aleve) while on your anticoagulation medication.  Narcotic pain relievers cannot be refilled over the phone.  Please be mindful of the number of pills you have left so you can  a prescription for a refill if needed during office hours.        If you are on Coumadin (Warfarin) you will need your blood drawn every Monday and Thursday once you are home.  Initially your visiting nurse will draw your blood.  Later you will go to an outpatient lab.  You will receive a phone call the next day and your Coumadin (warfarin) will be dosed based on these results.  Take this dosage daily until you hear from us next.      Walking:  Use two crutches or a walker for EVERY step.  Gradually increase your walking daily.  You can progress to a cane as tolerated once advised by your physical therapist.      Sleeping Positions:  You may not sleep on your stomach.      Bathing:  No tub baths.  Do not submerge your incision.  You may shower.  You may sit on a shower chair or stand and shower briefly.  Use your crutches/walker to get in and out of the shower.      Sexual Relations:  Resume according to your comfort.    Swimming:  No swimming or hot tubs until approved by your physician.  Swimming will be allowed once your  incision is well healed.      Driving:  You may ride as a passenger now.  No driving until your follow-up appointment.  To get into the car use the front passenger seat with the seat pushed back as far as possible.  Scoot yourself back in the seat.  Use your hands to assist your legs into the car.      Physical therapy:  When you have finished with home visiting PT, you may begin outpatient PT.  Call your surgeon’s office if you have not already received a prescription.  A prescription can be faxed to the outpatient center of your choice.      Special considerations:  To minimize swelling, stiffness, and decrease pain use cold as needed, but not heat.  Ice 20 minutes at a time with a cloth between your skin and the ice.  Ice after walking, when you have pain, or after you have completed your exercises.  Limit your sitting to 60 minutes at one time.  Continue to wear your DUONG stockings at all times for 2 weeks after surgery, except when washing.  You can wear them as needed after that.      Follow up:  Call [phone] to make an appointment to see your surgeon within 2 weeks of your surgery if you haven’t done so already.  If you have any questions during business hours please call the direct office phone number [phone].  Otherwise please call [phone] for any concerns.      For the future:  Prior to any dental work, including routine cleanings, call the office for a prescription for antibiotics to be taken prior to your dental appointment.  For any invasive procedures (i.e. endoscopy, colonoscopy, etc.) inform the doctor performing the procedure that you have a total knee replacement and will antibiotics just prior to the procedure to protect it.

## 2023-12-27 NOTE — ANESTHESIA POSTPROCEDURE EVALUATION
Post-Op Assessment Note    CV Status:  Stable  Pain Score: 0    Pain management: adequate       Mental Status:  Alert   Hydration Status:  Stable   PONV Controlled:  Controlled   Airway Patency:  Patent     Post Op Vitals Reviewed: Yes      Staff: CRNA               BP   119/66   Temp   97.9   Pulse  77   Resp   20   SpO2   98

## 2023-12-27 NOTE — ANESTHESIA PROCEDURE NOTES
Spinal Block    Patient location during procedure: OR  Start time: 12/27/2023 7:52 AM  Reason for block: procedure for pain, at surgeon's request and primary anesthetic  Staffing  Performed by: Sumit Jimenez CRNA  Authorized by: Jyoti Adrian MD    Preanesthetic Checklist  Completed: patient identified, IV checked, site marked, risks and benefits discussed, surgical consent, monitors and equipment checked, pre-op evaluation and timeout performed  Spinal Block  Patient position: sitting  Prep: ChloraPrep  Patient monitoring: heart rate, cardiac monitor, continuous pulse ox and frequent blood pressure checks  Approach: midline  Location: L3-4  Injection technique: single-shot  Needle  Needle type: pencil-tip   Needle gauge: 25 G  Needle length: 5 cm  Assessment  Sensory level: T10  Events: cerebrospinal fluid  Injection Assessment:  negative aspiration for heme, no paresthesia on injection and positive aspiration for clear CSF.  Post-procedure:  site cleaned

## 2023-12-27 NOTE — PHYSICAL THERAPY NOTE
Physical Therapy Evaluation    Patient's Name: David Gomes    Admitting Diagnosis  Primary osteoarthritis of one hip, left [M16.12]    Problem List  Patient Active Problem List   Diagnosis    Hypertension    Prostate carcinoma (HCC)    Gout    Umbilical hernia without obstruction and without gangrene    Impaired glucose tolerance    Osteoarthritis of knee    Varicose veins of both lower extremities    Pain in both knees    Chondrocalcinosis    Trigger finger, left index finger    Arthritis of carpometacarpal (CMC) joint of right thumb    Granulomatous lung disease (HCC)    Primary osteoarthritis of left hip    Suspected sleep apnea    Hypersomnia    Abnormal computed tomography angiography (CTA)    Obstructive sleep apnea    Excessive daytime sleepiness    Sacroiliitis (HCC)    Primary osteoarthritis of right hip    Status post total hip replacement, left       Past Medical History  Past Medical History:   Diagnosis Date    Abnormal computed tomography angiography (CTA) 03/08/2023    BPH with urinary obstruction     AND OTHER LOWER URINARY TRACT SYMPTOMS     Cancer (HCC) 2017    Chronic pain disorder     Detached retina, right 2015    Elevated prostate specific antigen (PSA) 2017    Feeling of incomplete bladder emptying     History of hypertension     History of nocturia     Hypertension     Low back pain     Neuroma of foot 2017    Prostate cancer (HCC) 2017    Weak urinary stream        Past Surgical History  Past Surgical History:   Procedure Laterality Date    BACK SURGERY  2008    EYE SURGERY      REPAIR DETACHED RETINA     EYE SURGERY Right     HERNIA REPAIR      LAMINECTOMY      LUMBAR LAMINECTOMY Bilateral     L4-5    PROSTATE BIOPSY Bilateral 2017    SPINE SURGERY      TONSILLECTOMY      VASECTOMY      SURGERY VAS DEFERENS        Recent Imaging  XR hip/pelv 1 vw left if performed   Final Result by Shay Benavidez MD (12/27 1257)      Postoperative changes as above.         Workstation  performed: [id number]         XR hip/pelv 4+ vw left if performed    (Results Pending)       Recent Vital Signs  Vitals:    12/27/23 1315 12/27/23 1330 12/27/23 1407 12/27/23 1500   BP: 118/83 135/94 150/82 115/72   BP Location: Left arm Left arm Left arm Right arm   Pulse: 66 80 77 74   Resp: 18 18 18 18   Temp: (!) 96.1 °F (35.6 °C) (!) 96.6 °F (35.9 °C) (!) 96.5 °F (35.8 °C) 97.6 °F (36.4 °C)   TempSrc: Temporal Temporal Temporal Temporal   SpO2: 96% 95% 95% 98%   Weight:       Height:            12/27/23 1455   Note Type   Note type Evaluation   Pain Assessment   Pain Assessment Tool 0-10   Pain Score 6   Pain Location/Orientation Location: Hip;Orientation: Left   Restrictions/Precautions   Weight Bearing Precautions Per Order No   Other Precautions Multiple lines;Fall Risk   Home Living   Type of Home House   Home Layout Two level;Stairs to enter with rails   Bathroom Shower/Tub Tub/shower unit   Bathroom Toilet Standard   Prior Function   Level of [place] Independent with ADLs;Independent with functional mobility   Lives With Spouse   Receives Help From Family   General   Family/Caregiver Present No   Cognition   Overall Cognitive Status WFL   Arousal/Participation Alert   Orientation Level Oriented X4   Memory Within functional limits   Following Commands Follows all commands and directions without difficulty   RLE Assessment   RLE Assessment WFL   LLE Assessment   LLE Assessment   (3-/5)   Coordination   Movements are Fluid and Coordinated 0   Coordination and Movement Description mild LE coordination deficit   Sensation X   Light Touch   RLE Light Touch Grossly intact   LLE Light Touch Grossly intact   Bed Mobility   Supine to Sit 7  Independent   Sit to Supine 7  Independent   Transfers   Sit to Stand 5  Supervision   Additional items Armrests;Verbal cues;Increased time required   Stand to Sit 5  Supervision   Additional items Armrests;Increased time required;Verbal cues   Ambulation/Elevation   Gait  pattern Step through pattern;Decreased toe off;Decreased heel strike;Decreased foot clearance   Gait Assistance 5  Supervision   Assistive Device Rolling walker   Distance 15ft, 250ft   Stair Management Assistance 5  Supervision   Additional items Assist x 1;Verbal cues;Tactile cues;Increased time required   Stair Management Technique Step to pattern;Foreward;Two rails   Number of Stairs 6   Balance   Static Sitting Fair +   Dynamic Sitting Fair +   Static Standing Fair   Dynamic Standing Fair -   Ambulatory Fair -   Endurance Deficit   Endurance Deficit Yes   Endurance Deficit Description post op pain and fatigue   Activity Tolerance   Activity Tolerance Patient limited by pain;Patient limited by fatigue   Medical Staff Made Aware spoke to CM   Nurse Made Aware spoke to RN   Assessment   Prognosis Good   Problem List Decreased strength;Decreased endurance;Impaired balance;Decreased mobility;Decreased coordination;Impaired sensation;Pain   Barriers to Discharge Inaccessible home environment;Decreased caregiver support   Goals   Patient Goals to go home   Discharge Recommendation   Rehab Resource Intensity Level, PT III (Minimum Resource Intensity)   Equipment Recommended Walker   Walker Package Recommended Wheeled walker   AM-PAC Basic Mobility Inpatient   Turning in Flat Bed Without Bedrails 4   Lying on Back to Sitting on Edge of Flat Bed Without Bedrails 4   Moving Bed to Chair 3   Standing Up From Chair Using Arms 3   Walk in Room 3   Climb 3-5 Stairs With Railing 3   Basic Mobility Inpatient Raw Score 20   Basic Mobility Standardized Score 43.99   Highest Level Of Mobility   JH-HLM Goal 6: Walk 10 steps or more   JH-HLM Achieved 8: Walk 250 feet ot more   End of Consult   Patient Position at End of Consult Bedside chair;All needs within reach       ASSESSMENT                                                                                                                     David ANDREA Eliseo is a 63 y.o. male  admitted to [facility] on 12/27/2023 for Status post total hip replacement, left. Pt  has a past medical history of Abnormal computed tomography angiography (CTA) (03/08/2023), BPH with urinary obstruction, Cancer (HCC) (2017), Chronic pain disorder, Detached retina, right (2015), Elevated prostate specific antigen (PSA) (2017), Feeling of incomplete bladder emptying, History of hypertension, History of nocturia, Hypertension, Low back pain, Neuroma of foot (2017), Prostate cancer (HCC) (2017), and Weak urinary stream.. PT was consulted and pt was seen on 12/27/2023 for mobility assessment and d/c planning.   Impairments limiting pt at this time include decreased ROM, impaired balance, decreased endurance, decreased coordination, increased fall risk, decreased ADLS, decreased IADLS, pain, and decreased strength. Pt is currently functioning at a independent level for bed mobility, supervision assistance x1 level for transfers, supervision assistance x1 level for ambulation with Rolling Walker, and supervision assistance x1 for elevations. The patient's AM-PAC Basic Mobility Inpatient Short Form Raw Score is 20. A Raw score of greater than 16 suggests the patient may benefit from discharge to home. Please also refer to the recommendation of the Physical Therapist for safe discharge planning.    Recommendations                                                                                                                DME: Rolling Walker    Discharge Disposition:  Home with Outpatient Physical Therapy       Jm Morse PT, DPT

## 2023-12-27 NOTE — ANESTHESIA PREPROCEDURE EVALUATION
Procedure:  ARTHROPLASTY HIP TOTAL ANTERIOR,NAVIGATED, potential same day discharge (Left: Hip)    Relevant Problems   CARDIO   (+) Hypertension      /RENAL   (+) Prostate carcinoma (HCC)      MUSCULOSKELETAL   (+) Arthritis of carpometacarpal (CMC) joint of right thumb   (+) Gout   (+) Osteoarthritis of knee   (+) Primary osteoarthritis of left hip   (+) Primary osteoarthritis of right hip   (+) Sacroiliitis (HCC)      PULMONARY   (+) Obstructive sleep apnea        Physical Exam    Airway    Mallampati score: III  TM Distance: >3 FB  Neck ROM: full     Dental       Cardiovascular  Cardiovascular exam normal    Pulmonary  Pulmonary exam normal     Other Findings        Anesthesia Plan  ASA Score- 2     Anesthesia Type- spinal with ASA Monitors.         Additional Monitors:     Airway Plan:     Comment: Pt had L4-L5 laminectomy in 2008. We can go one level up for spinal .       Plan Factors-Exercise tolerance (METS): >4 METS.    Chart reviewed. EKG reviewed.  Existing labs reviewed. Patient summary reviewed.    Patient is not a current smoker. Patient not instructed to abstain from smoking on day of procedure. Patient did not smoke on day of surgery.            Induction-     Postoperative Plan- Plan for postoperative opioid use.     Informed Consent- Anesthetic plan and risks discussed with patient and spouse.  I personally reviewed this patient with the CRNA. Discussed and agreed on the Anesthesia Plan with the CRNA..              Lab Results   Component Value Date    HGBA1C 6.9 (H) 11/30/2023       Lab Results   Component Value Date     01/20/2016    K 4.1 11/30/2023     11/30/2023    CO2 27 11/30/2023    BUN 20 11/30/2023    CREATININE 1.19 11/30/2023    GLUF 107 (H) 11/30/2023    CALCIUM 9.0 11/30/2023    AST 25 11/30/2023    ALT 25 11/30/2023    ALKPHOS 91 11/30/2023    PROT 7.5 01/20/2016    BILITOT 0.9 01/20/2016    EGFR 64 11/30/2023       Lab Results   Component Value Date    WBC 5.67  11/30/2023    HGB 13.0 11/30/2023    HCT 40.9 11/30/2023    MCV 92 11/30/2023     11/30/2023     Normal sinus rhythm  Left axis deviation  Abnormal ECG  When compared with ECG of 09-MAY-2020 14:53,  No significant change was found  Confirmed by Henry Vieyra ([id number]) on 8/16/2023 3:36:36 PM      Specimen Collected: 08/16/23 15:1

## 2023-12-27 NOTE — INTERVAL H&P NOTE
H&P reviewed. After examining the patient I find no changes in the patients condition since the H&P had been written.  Heart:  regular rate  Lungs:  no audible wheezing  Abd:  nondistended  LLE:  EHL/AT/GS intact, sensation grossly intact L4, L5, S1, palpable pedal pulse      Vitals:    12/27/23 0541   BP: 132/77   Pulse: 78   Resp: 18   Temp: 97.9 °F (36.6 °C)   SpO2: 94%

## 2023-12-28 ENCOUNTER — TELEPHONE (OUTPATIENT)
Dept: OBGYN CLINIC | Facility: HOSPITAL | Age: 63
End: 2023-12-28

## 2023-12-28 LAB
ABO GROUP BLD BPU: NORMAL
ABO GROUP BLD BPU: NORMAL
BPU ID: NORMAL
BPU ID: NORMAL
CROSSMATCH: NORMAL
CROSSMATCH: NORMAL
UNIT DISPENSE STATUS: NORMAL
UNIT DISPENSE STATUS: NORMAL
UNIT PRODUCT CODE: NORMAL
UNIT PRODUCT CODE: NORMAL
UNIT PRODUCT VOLUME: 350 ML
UNIT PRODUCT VOLUME: 350 ML
UNIT RH: NORMAL
UNIT RH: NORMAL

## 2023-12-28 NOTE — TELEPHONE ENCOUNTER
"Patient contacted for a postoperative follow up assessment. Patient reports doing   \"pretty good\" Patient states current pain level of a 0/10  when sitting and 6/10 when walking with RW. P Patient denies increase in swelling and dressing is clean, dry and intact. Patient is icing the site regularly.     We reviewed patients AVS medication list. Patient is taking Tylenol 1000mg every 8 hours, Oxycodone 10mg PRN, ASA 325mg BID, gabapentin 100mg TID, Colace 100mg BID. Patient has not yet had a BM but is passing gas.  Encouraged patient to increase fluid and fiber intake.     Patient denies nausea, vomiting, abdominal pain, chest pain, shortness of breath, fever, dizziness and calf pain. Patient confirmed post-op appointment with physical therapy on 12/29 and with surgeon on  01/05. Patient does not have any other questions or concerns at this time. Pt was encouraged to call with any questions, concerns or issues.    "

## 2023-12-29 ENCOUNTER — OFFICE VISIT (OUTPATIENT)
Dept: PHYSICAL THERAPY | Facility: MEDICAL CENTER | Age: 63
End: 2023-12-29
Payer: COMMERCIAL

## 2023-12-29 DIAGNOSIS — Z96.642 STATUS POST TOTAL REPLACEMENT OF LEFT HIP: ICD-10-CM

## 2023-12-29 DIAGNOSIS — M16.12 PRIMARY OSTEOARTHRITIS OF ONE HIP, LEFT: Primary | ICD-10-CM

## 2023-12-29 PROCEDURE — 97110 THERAPEUTIC EXERCISES: CPT

## 2023-12-29 PROCEDURE — 97140 MANUAL THERAPY 1/> REGIONS: CPT

## 2023-12-29 PROCEDURE — 97164 PT RE-EVAL EST PLAN CARE: CPT

## 2023-12-29 NOTE — PROGRESS NOTES
PT Re-Evaluation     Today's date: 2023  Patient name: David Gomes  : 1960  MRN: [id number]  Referring provider: Amy Ramirez,*  Dx:   Encounter Diagnosis     ICD-10-CM    1. Primary osteoarthritis of one hip, left  M16.12       2. Status post total replacement of left hip  Z96.642                      Assessment  Assessment details: David Gomes  is a 63 y.o. male  presents s/p L DONNA anterior approach on 23.    David Gomes  arrived ambulating c/ rolling walker. Current weight bearing status:  WBAT    Precautions, surgeon's protocol, and signs/symptoms of infection were reviewed with patient, who expressed verbal understanding.  Patient has the above listed impairments and will benefit from skilled PT to improve deficits to return to prior level of function.      Impairments: abnormal gait, abnormal muscle firing, abnormal muscle tone, abnormal or restricted ROM, abnormal movement, activity intolerance, impaired balance, impaired physical strength, lacks appropriate home exercise program, pain with function and poor body mechanics  Understanding of Dx/Px/POC: good   Prognosis: good  Prognosis details: Positive prognostic indicators: motivation to improve   Negative prognostic indicators: chronicity of symptoms    Goals  Impairment Goals  - Pt I with initial HEP in 1-2 visits  - Improve ROM equal to contralateral side in 4-6 weeks  - Increase strength to 5/5 in all affected areas in 4-6 weeks    Functional Goals  - Increase Functional Status Measure to: 67 in 6-8 weeks  - Patient will be independent with comprehensive HEP in 6-8 weeks  - Ambulation is improved to prior level of function in 6-8 weeks  - Stair climbing is improved to prior level of function in 6-8 weeks  - Squatting is improved to prior level of function in 6-8 weeks      Plan  Plan details: Prognosis above is given PT services 2x/week tapering to 1x/week over the next 2 months and home program  "adherence.  Patient would benefit from: skilled physical therapy  Planned modality interventions: low level laser therapy, TENS and cryotherapy  Planned therapy interventions: manual therapy, massage, neuromuscular re-education, patient education, postural training, strengthening, stretching, therapeutic activities, therapeutic exercise, flexibility, functional ROM exercises, graded exercise, home exercise program, balance, balance/weight bearing training, IASTM, kinesiology taping, joint mobilization, Allison taping and gait training  Treatment plan discussed with: patient    Subjective Evaluation    History of Present Illness  Mechanism of injury: surgery  Mechanism of injury: David Gomes presents s/p L DONNA anterior approach on 23. Patient denies any complications after surgery. Patient denies fever, chills or s/s of infection. Patient reports a little pain the calf. Pt reports that the pain is only with movement. Pt reports that he is sleeping in a recliner and he is sleeping well.   Aggravating factors: bending the knee, getting up out of the chair, walking    Relieving factors: rest, ice, medication  24hr pain pattern: 0/10 (current), 0/10 (best), 6/10 (worst), location: lateral hip, descriptors: \"its there. I feel it\"   Occupation/recreation: working at  home, walking  Patient goals: get back to work, walking      Objective     Observations     Additional Observation Details  L hip Incision covered by bandage- dry, no drainage or redness, mild bruising    Active Range of Motion     Right Hip   Normal active range of motion    Passive Range of Motion   Left Hip   Flexion: 90 degrees   Abduction: 20 degrees   Adduction: WFL  External rotation (90/90): 15 degrees with pain  Internal rotation (90/90): 10 degrees with pain    Strength/Myotome Testing     Right Hip   Normal muscle strength    Additional Strength Details  Left strength not assessed secondary to recent surgery    Ambulation " "  Weight-Bearing Status   Weight-Bearing Status (Left): weight-bearing as tolerated   Assistive device used: front-wheeled walker             Precautions: s/p L anterior DONNA on 12/27/23    HEP: quad set, glute set, ankle pumps, supine hip abd, heel slide, LAQ  Manuals 12/6 12/29           L hip PROM  JH                                                  Neuro Re-Ed                                                                                                        Ther Ex             Quad sets 5\" x 10 x30           Glut sets 5\"x 10 x30           Ankle pumps 10 x30           Supine hip abd 10 x30           Heel slides 10 x10           LAQ 10 3x10                                     Ther Activity                                       Gait Training             SPC  10'                        Modalities                                          "

## 2024-01-02 ENCOUNTER — OFFICE VISIT (OUTPATIENT)
Dept: PHYSICAL THERAPY | Facility: MEDICAL CENTER | Age: 64
End: 2024-01-02
Payer: COMMERCIAL

## 2024-01-02 DIAGNOSIS — Z96.642 STATUS POST TOTAL REPLACEMENT OF LEFT HIP: ICD-10-CM

## 2024-01-02 DIAGNOSIS — M16.12 PRIMARY OSTEOARTHRITIS OF ONE HIP, LEFT: Primary | ICD-10-CM

## 2024-01-02 PROCEDURE — 97110 THERAPEUTIC EXERCISES: CPT

## 2024-01-02 PROCEDURE — 97140 MANUAL THERAPY 1/> REGIONS: CPT

## 2024-01-02 NOTE — PROGRESS NOTES
"Daily Note     Today's date: 2024  Patient name: David Gomes  : 1960  MRN: [id number]  Referring provider: Amy Ramirez,*  Dx:   Encounter Diagnosis     ICD-10-CM    1. Primary osteoarthritis of one hip, left  M16.12       2. Status post total replacement of left hip  Z96.642                      Subjective: Pt reports that after last session he had a fever of 101 and hip to knee pain. He reports that since then it has been okay. He reports that he woke up this morning and had some hip pain and took medication which helped with the pain.       Objective: See treatment diary below      Assessment: POC initiated. Pt demonstrates guarding with PROM. Tolerated treatment well. Patient demonstrated fatigue post treatment, exhibited good technique with therapeutic exercises, and would benefit from continued PT      Plan: Continue per plan of care.      Precautions: s/p L anterior DONNA on 23    Manuals  1/2          L hip PROM  [facility]                                                 Neuro Re-Ed             Standing marches             CR/TR             Hip abd/ext             Step up             Lateral step up             LP                          Ther Ex             Quad sets 5\" x 10 x30 x30          Glut sets 5\"x 10 x30 x30          Ankle pumps 10 x30           Supine hip abd 10 x30 x30          Heel slides 10 x10 x20          LAQ 10 3x10 3x10          SLR   X10 PT assist          Hip abd   X20 blktb          Hip add   X20 5s          SAQ   3x10                       Ther Activity                                       Gait Training             SPC  10'                        Modalities                                       HEP: quad set, glute set, ankle pumps, supine hip abd, heel slide, LAQ   "

## 2024-01-05 ENCOUNTER — APPOINTMENT (OUTPATIENT)
Dept: RADIOLOGY | Facility: MEDICAL CENTER | Age: 64
End: 2024-01-05
Payer: COMMERCIAL

## 2024-01-05 ENCOUNTER — OFFICE VISIT (OUTPATIENT)
Dept: OBGYN CLINIC | Facility: MEDICAL CENTER | Age: 64
End: 2024-01-05
Payer: COMMERCIAL

## 2024-01-05 ENCOUNTER — OFFICE VISIT (OUTPATIENT)
Dept: PHYSICAL THERAPY | Facility: MEDICAL CENTER | Age: 64
End: 2024-01-05
Payer: COMMERCIAL

## 2024-01-05 VITALS
BODY MASS INDEX: 26.44 KG/M2 | WEIGHT: 206 LBS | SYSTOLIC BLOOD PRESSURE: 116 MMHG | HEART RATE: 103 BPM | DIASTOLIC BLOOD PRESSURE: 70 MMHG | HEIGHT: 74 IN

## 2024-01-05 DIAGNOSIS — M17.11 PRIMARY OSTEOARTHRITIS OF RIGHT KNEE: ICD-10-CM

## 2024-01-05 DIAGNOSIS — M16.12 PRIMARY OSTEOARTHRITIS OF ONE HIP, LEFT: Primary | ICD-10-CM

## 2024-01-05 DIAGNOSIS — Z96.642 STATUS POST TOTAL REPLACEMENT OF LEFT HIP: ICD-10-CM

## 2024-01-05 DIAGNOSIS — Z96.642 STATUS POST TOTAL HIP REPLACEMENT, LEFT: Primary | ICD-10-CM

## 2024-01-05 DIAGNOSIS — Z96.642 STATUS POST TOTAL HIP REPLACEMENT, LEFT: ICD-10-CM

## 2024-01-05 PROCEDURE — 73502 X-RAY EXAM HIP UNI 2-3 VIEWS: CPT

## 2024-01-05 PROCEDURE — 99024 POSTOP FOLLOW-UP VISIT: CPT | Performed by: PHYSICIAN ASSISTANT

## 2024-01-05 PROCEDURE — 97140 MANUAL THERAPY 1/> REGIONS: CPT

## 2024-01-05 PROCEDURE — 97110 THERAPEUTIC EXERCISES: CPT

## 2024-01-05 PROCEDURE — 20610 DRAIN/INJ JOINT/BURSA W/O US: CPT | Performed by: PHYSICIAN ASSISTANT

## 2024-01-05 RX ADMIN — LIDOCAINE HYDROCHLORIDE 2 ML: 10 INJECTION, SOLUTION INFILTRATION; PERINEURAL at 09:45

## 2024-01-05 RX ADMIN — METHYLPREDNISOLONE ACETATE 2 ML: 40 INJECTION, SUSPENSION INTRA-ARTICULAR; INTRALESIONAL; INTRAMUSCULAR; SOFT TISSUE at 09:45

## 2024-01-05 NOTE — PROGRESS NOTES
Assessment/Plan:  1. Status post total hip replacement, left    2. Primary osteoarthritis of right knee      Orders Placed This Encounter   Procedures    Large joint arthrocentesis: R knee    XR hip/pelv 2-3 vws left if performed       Patient is doing well 9 days status post L anterior DONNA on 12/27/23. He also has severe right hip osteoarthritis and moderate right knee osteoarthritis.   Continue outpatient PT.  Pain control prn- oxycodone, gabapentin and tylenol. Patient aware to wean away from opioids.   Continue with  BID for DVT prophylaxis.  DUONG stockings as needed for swelling.  May shower and let water run over incision, do not submerge incision. He was advised to keep his incision covered for another week since he has pets at home.   Continue with anterior hip precautions.  Patient should call ahead for abx prior to dental appts.   We will sign R DONNA consent at next visit.   Patient received right knee steroid injection today. Tolerated the procedure well. Post injection instructions reviewed.     Return in about 4 weeks (around 2/2/2024) for L DONNA post op 2.    I answered all of the patient's questions during the visit and provided education of the patient's condition during the visit.  The patient verbalized understanding of the information given and agrees with the plan.  This note was dictated using eyetok software.  It may contain errors including improperly dictated words.  Please contact physician directly for any questions.    Subjective   Chief Complaint:   Chief Complaint   Patient presents with    Left Hip - Post-op    Right Knee - Pain       David Gomes is a 63 y.o. male who presents for 9 day follow up s/p left DONNA.  Patient notes he is doing well overall. He had one episode of fever at 100 degrees after a session of PT. Patient notes he has not had any further episodes of fever or chills. Patient notes his pain is controlled overall. He has only take 2 tabs of oxycodone so far but  takes tylenol and gabapentin daily. Patient is compliant with ASA DVT ppx. Patient notes pain in the left thigh and right knee. Patient notes left leg feels slightly longer than right leg. Patient is requesting right knee steroid injection today due to persistent right knee pain and swelling. Previous R knee CSI has been beneficial to him. Patient is scheduled for R DONNA on March 27.       Review of Systems  ROS:    See HPI for musculoskeletal review.   All other systems reviewed are negative     History:  Past Medical History:   Diagnosis Date    Abnormal computed tomography angiography (CTA) 03/08/2023    BPH with urinary obstruction     AND OTHER LOWER URINARY TRACT SYMPTOMS     Cancer (HCC) 2017    Chronic pain disorder     Detached retina, right 2015    Elevated prostate specific antigen (PSA) 2017    Feeling of incomplete bladder emptying     History of hypertension     History of nocturia     Hypertension     Low back pain     Neuroma of foot 2017    Prostate cancer (HCC) 2017    Weak urinary stream      Past Surgical History:   Procedure Laterality Date    BACK SURGERY  2008    EYE SURGERY      REPAIR DETACHED RETINA     EYE SURGERY Right     HERNIA REPAIR      LAMINECTOMY      LUMBAR LAMINECTOMY Bilateral     L4-5    MS ARTHRP ACETBLR/PROX FEM PROSTC AGRFT/ALGRFT Left 12/27/2023    Procedure: ARTHROPLASTY HIP TOTAL ANTERIOR,NAVIGATED, potential same day discharge;  Surgeon: Amy Ramirez DO;  Location:  MAIN OR;  Service: Orthopedics    PROSTATE BIOPSY Bilateral 2017    SPINE SURGERY      TONSILLECTOMY      VASECTOMY      SURGERY VAS DEFERENS      Social History   Social History     Substance and Sexual Activity   Alcohol Use Yes    Comment: I have stopped drinking alcohol.     Social History     Substance and Sexual Activity   Drug Use No     Social History     Tobacco Use   Smoking Status Never   Smokeless Tobacco Never     Family History:   Family History   Problem Relation Age of Onset     "Hypertension Mother     Peripheral vascular disease Mother     Dementia Father        Current Outpatient Medications on File Prior to Visit   Medication Sig Dispense Refill    acetaminophen (TYLENOL) 500 mg tablet Take 2 tablets (1,000 mg total) by mouth every 8 (eight) hours      ascorbic acid (VITAMIN C) 500 MG tablet Take 1 tablet (500 mg total) by mouth daily Begin 30 days prior to surgery. 30 tablet 1    aspirin 325 mg tablet Take 1 tablet (325 mg total) by mouth 2 (two) times a day Take with food. 84 tablet 0    docusate sodium (COLACE) 100 mg capsule Take 1 capsule (100 mg total) by mouth 2 (two) times a day 30 capsule 0    famotidine (PEPCID) 20 mg tablet Take 1 tablet (20 mg total) by mouth 2 (two) times a day as needed for indigestion or heartburn (Patient taking differently: Take 20 mg by mouth as needed for indigestion or heartburn) 90 tablet 1    ferrous sulfate 324 (65 Fe) mg Take 1 tablet (324 mg total) by mouth daily before breakfast Begin 30 days prior to surgery. 30 tablet 1    folic acid (FOLVITE) 1 mg tablet Take 1 tablet (1 mg total) by mouth daily Begin 30 days prior to surgery. 30 tablet 1    gabapentin (NEURONTIN) 100 mg capsule Take 1 capsule (100 mg total) by mouth 3 (three) times a day 90 capsule 0    Multiple Vitamin (multivitamin) tablet Take 1 tablet by mouth daily Begin 30 days prior to surgery. 30 tablet 1    oxyCODONE (ROXICODONE) 10 MG TABS Take 1 tablet (10 mg total) by mouth every 4 (four) hours as needed for severe pain for up to 10 days Max Daily Amount: 60 mg 42 tablet 0    promethazine (PHENERGAN) 12.5 MG tablet Take 1 tablet (12.5 mg total) by mouth every 6 (six) hours as needed for nausea or vomiting 12 tablet 0    valsartan (DIOVAN) 80 mg tablet Take 1 tablet (80 mg total) by mouth daily 90 tablet 3     No current facility-administered medications on file prior to visit.     No Known Allergies     Objective     /70   Pulse 103   Ht 6' 2\" (1.88 m)   Wt 93.4 kg (206 " lb)   BMI 26.45 kg/m²      PE:  AAOx 3  WDWN  Hearing intact, no drainage from eyes  no audible wheezing  no abdominal distension  LE compartments soft, AT/GS intact    Ortho Exam:  left hip:   INC: C/D/I, No erythema, mild swelling  No pain with ROM  Left leg appears 3-4 mm longer than right leg    Right knee:  No ecchymosis  No erythema  + small effusion  No TTP  AROM: 3- 120  Stable to varus and valgus stress        Imaging Studies: I have personally reviewed pertinent films in PACS  XR left hip:  S/p DONNA, adequate alignment      Large joint arthrocentesis: R knee  Universal Protocol:  Consent: Verbal consent obtained.  Risks and benefits: risks, benefits and alternatives were discussed  Consent given by: patient  Site marked: the operative site was marked  Supporting Documentation  Indications: pain   Procedure Details  Location: knee - R knee  Preparation: Patient was prepped and draped in the usual sterile fashion  Needle size: 22 G  Ultrasound guidance: no  Approach: anterolateral  Medications administered: 2 mL lidocaine 1 %; 2 mL methylPREDNISolone acetate 40 mg/mL    Patient tolerance: patient tolerated the procedure well with no immediate complications  Dressing:  Sterile dressing applied              Scribe Attestation      I,:  Winsome Muse PA-C am acting as a scribe while in the presence of the attending physician.:       I,:  Amy Ramirez DO personally performed the services described in this documentation    as scribed in my presence.:

## 2024-01-05 NOTE — PROGRESS NOTES
"Daily Note     Today's date: 2024  Patient name: David Gomes  : 1960  MRN: [id number]  Referring provider: Amy Ramirez,*  Dx:   Encounter Diagnosis     ICD-10-CM    1. Primary osteoarthritis of one hip, left  M16.12       2. Status post total replacement of left hip  Z96.642                      Subjective: Pt reports that he had his follow up and it went well.       Objective: See treatment diary below      Assessment: Pt presents with ambulating with SPC. He demonstrates improvement in P/AAROM. Tolerated treatment well. Patient demonstrated fatigue post treatment, exhibited good technique with therapeutic exercises, and would benefit from continued PT      Plan: Continue per plan of care.      Precautions: s/p L anterior DONNA on 23    Manuals           L hip PROM  JH                                                  Neuro Re-Ed             Standing marches   2x10          CR/TR   x20          Hip abd/ext   X10 ea          Step up             Lateral step up             LP                          Ther Ex             Quad sets 5\" x 10 x30 x30          Glut sets 5\"x 10 x30 x30          Ankle pumps 10 x30           Supine hip abd 10 x30 x30          Heel slides 10 x10 x20          LAQ 10 3x10 3x10          SLR   X10 PT assist          Hip abd   X20 blktb          Hip add   X20 5s          SAQ   3x10                       Ther Activity                                       Gait Training             [facility]  10'                        Modalities                                       HEP: quad set, glute set, ankle pumps, supine hip abd, heel slide, LAQ   "

## 2024-01-08 ENCOUNTER — OFFICE VISIT (OUTPATIENT)
Dept: PHYSICAL THERAPY | Facility: MEDICAL CENTER | Age: 64
End: 2024-01-08
Payer: COMMERCIAL

## 2024-01-08 DIAGNOSIS — M16.12 PRIMARY OSTEOARTHRITIS OF ONE HIP, LEFT: Primary | ICD-10-CM

## 2024-01-08 DIAGNOSIS — Z96.642 STATUS POST TOTAL REPLACEMENT OF LEFT HIP: ICD-10-CM

## 2024-01-08 PROCEDURE — 97110 THERAPEUTIC EXERCISES: CPT

## 2024-01-08 PROCEDURE — 97140 MANUAL THERAPY 1/> REGIONS: CPT

## 2024-01-08 NOTE — PROGRESS NOTES
"Daily Note     Today's date: 2024  Patient name: David Gomes  : 1960  MRN: [id number]  Referring provider: Amy Ramirez,*  Dx:   Encounter Diagnosis     ICD-10-CM    1. Primary osteoarthritis of one hip, left  M16.12       2. Status post total replacement of left hip  Z96.642                      Subjective: Pt reports that his R hip is bothering him a little bit.       Objective: See treatment diary below      Assessment: Pt is progressing well with POC. Tolerated treatment well. Patient demonstrated fatigue post treatment, exhibited good technique with therapeutic exercises, and would benefit from continued PT      Plan: Continue per plan of care.      Precautions: s/p L anterior DONNA on 23    Manuals  18          L hip PROM  JH                                                  Neuro Re-Ed             Standing marches   2x10 3#          CR/TR   X30 3#          Hip abd/ext   X10 ea 3#          Step up             Lateral step up             LP                          Ther Ex             Quad sets 5\" x 10 x30 x30          Glut sets 5\"x 10 x30           Ankle pumps 10 x30           Supine hip abd 10 x30 x30          Heel slides 10 x10 x30          LAQ 10 3x10 3x10 3#          SLR   2X10 PT assist          Hip abd             Hip add             SAQ   3x15 3#          bike   10'           bridges   2x10 hip add, 2x10 hip abd blktb          Ther Activity                                       Gait Training             SPC  10'                        Modalities                                       HEP: quad set, glute set, ankle pumps, supine hip abd, heel slide, LAQ   "

## 2024-01-09 RX ORDER — METHYLPREDNISOLONE ACETATE 40 MG/ML
2 INJECTION, SUSPENSION INTRA-ARTICULAR; INTRALESIONAL; INTRAMUSCULAR; SOFT TISSUE
Status: COMPLETED | OUTPATIENT
Start: 2024-01-05 | End: 2024-01-05

## 2024-01-09 RX ORDER — LIDOCAINE HYDROCHLORIDE 10 MG/ML
2 INJECTION, SOLUTION INFILTRATION; PERINEURAL
Status: COMPLETED | OUTPATIENT
Start: 2024-01-05 | End: 2024-01-05

## 2024-01-10 ENCOUNTER — OFFICE VISIT (OUTPATIENT)
Dept: PHYSICAL THERAPY | Facility: MEDICAL CENTER | Age: 64
End: 2024-01-10
Payer: COMMERCIAL

## 2024-01-10 DIAGNOSIS — Z96.642 STATUS POST TOTAL REPLACEMENT OF LEFT HIP: ICD-10-CM

## 2024-01-10 DIAGNOSIS — M16.12 PRIMARY OSTEOARTHRITIS OF ONE HIP, LEFT: Primary | ICD-10-CM

## 2024-01-10 PROCEDURE — 97140 MANUAL THERAPY 1/> REGIONS: CPT

## 2024-01-10 PROCEDURE — 97110 THERAPEUTIC EXERCISES: CPT

## 2024-01-10 NOTE — PROGRESS NOTES
"Daily Note     Today's date: 1/10/2024  Patient name: David Gomes  : 1960  MRN: [id number]  Referring provider: Amy Ramirez,*  Dx:   Encounter Diagnosis     ICD-10-CM    1. Primary osteoarthritis of one hip, left  M16.12       2. Status post total replacement of left hip  Z96.642                      Subjective: Pt reports that his R hip has been very painful. He reports that he slept all day because of the pain.      Objective: See treatment diary below      Assessment: Pt is progressing well with POC, however he has been having R sided hip pain. This is likely from compensation with SPC ambulation. Tolerated treatment well. Patient demonstrated fatigue post treatment, exhibited good technique with therapeutic exercises, and would benefit from continued PT      Plan: Continue per plan of care.      Precautions: s/p L anterior DONNA on 23    Manuals  1/10          L hip PROM  JH JH                                                 Neuro Re-Ed             Standing marches   2x10 5#          CR/TR   X30 5#          Hip abd/ext   X10 ea 5#          Step up             Lateral step up             LP                          Ther Ex             Quad sets 5\" x 10 x30 x30          Glut sets 5\"x 10 x30           Ankle pumps 10 x30           Supine hip abd 10 x30 x30          Heel slides 10 x10 x30          LAQ 10 3x10 3x10 5#          SLR   2X10 PT assist          Hip abd             Hip add             SAQ   3x15 5#          bike   10'           bridges   2x10 hip add, 2x10 hip abd blktb          Bent knee fall out   x20          Seated shin slide   x20                       Ther Activity                                       Gait Training             SPC  10'                        Modalities                                       HEP: quad set, glute set, ankle pumps, supine hip abd, heel slide, LAQ   "

## 2024-01-11 DIAGNOSIS — Z96.642 STATUS POST TOTAL HIP REPLACEMENT, LEFT: Primary | ICD-10-CM

## 2024-01-11 RX ORDER — CELECOXIB 200 MG/1
200 CAPSULE ORAL DAILY PRN
Qty: 30 CAPSULE | Refills: 1 | Status: SHIPPED | OUTPATIENT
Start: 2024-01-11

## 2024-01-17 ENCOUNTER — OFFICE VISIT (OUTPATIENT)
Dept: PHYSICAL THERAPY | Facility: MEDICAL CENTER | Age: 64
End: 2024-01-17
Payer: COMMERCIAL

## 2024-01-17 DIAGNOSIS — Z96.642 STATUS POST TOTAL REPLACEMENT OF LEFT HIP: ICD-10-CM

## 2024-01-17 DIAGNOSIS — M16.12 PRIMARY OSTEOARTHRITIS OF ONE HIP, LEFT: Primary | ICD-10-CM

## 2024-01-17 PROCEDURE — 97110 THERAPEUTIC EXERCISES: CPT

## 2024-01-17 PROCEDURE — 97140 MANUAL THERAPY 1/> REGIONS: CPT

## 2024-01-17 NOTE — PROGRESS NOTES
"Daily Note     Today's date: 2024  Patient name: David Gomes  : 1960  MRN: [id number]  Referring provider: Amy Ramirez,*  Dx:   Encounter Diagnosis     ICD-10-CM    1. Primary osteoarthritis of one hip, left  M16.12       2. Status post total replacement of left hip  Z96.642                      Subjective: Pt reports that his R hip still hurts.       Objective: See treatment diary below      Assessment: Pt continues to have R sided hip pain. Tolerated treatment well. Patient demonstrated fatigue post treatment, exhibited good technique with therapeutic exercises, and would benefit from continued PT      Plan: Continue per plan of care.      Precautions: s/p L anterior DONNA on 23    Manuals           L hip PROM  JH JH                                                 Neuro Re-Ed             Standing marches   3x10 5#          CR/TR   X30 5#          Hip abd/ext   X30 ea 5#          Step up             Lateral step up             LP   DL x20 50#                       Ther Ex             Quad sets 5\" x 10 x30           Glut sets 5\"x 10 x30           Ankle pumps 10 x30           Supine hip abd 10 x30 x30          Heel slides 10 x10 x30          LAQ 10 3x10 3x10 5#          SLR   2X10          Hip abd             Hip add             SAQ   3x15 5#          bike   10'           bridges   2x10 hip add, 2x10 hip abd blktb          Bent knee fall out   x20          Seated shin slide   x20                       Ther Activity                                       Gait Training             SPC  10'                        Modalities                                       HEP: quad set, glute set, ankle pumps, supine hip abd, heel slide, LAQ   "

## 2024-01-19 ENCOUNTER — APPOINTMENT (OUTPATIENT)
Dept: PHYSICAL THERAPY | Facility: MEDICAL CENTER | Age: 64
End: 2024-01-19
Payer: COMMERCIAL

## 2024-01-22 ENCOUNTER — OFFICE VISIT (OUTPATIENT)
Dept: PHYSICAL THERAPY | Facility: MEDICAL CENTER | Age: 64
End: 2024-01-22
Payer: COMMERCIAL

## 2024-01-22 DIAGNOSIS — M16.12 PRIMARY OSTEOARTHRITIS OF ONE HIP, LEFT: Primary | ICD-10-CM

## 2024-01-22 DIAGNOSIS — Z96.642 STATUS POST TOTAL REPLACEMENT OF LEFT HIP: ICD-10-CM

## 2024-01-22 PROCEDURE — 97110 THERAPEUTIC EXERCISES: CPT

## 2024-01-22 PROCEDURE — 97140 MANUAL THERAPY 1/> REGIONS: CPT

## 2024-01-22 NOTE — PROGRESS NOTES
"Daily Note     Today's date: 2024  Patient name: David Gomes  : 1960  MRN: [id number]  Referring provider: Amy Ramirez,*  Dx:   Encounter Diagnosis     ICD-10-CM    1. Primary osteoarthritis of one hip, left  M16.12       2. Status post total replacement of left hip  Z96.642                      Subjective: Pt reports that his R hip is still bothering him. He reports that his left side is better .        Objective: See treatment diary below      Assessment: Pt demonstrates WFL PROM. He demonstrates improvement in AROM, specifically with shin slides. Tolerated treatment well. Patient demonstrated fatigue post treatment, exhibited good technique with therapeutic exercises, and would benefit from continued PT      Plan: Continue per plan of care.      Precautions: s/p L anterior DONNA on 23    Manuals           L hip PROM  JH                                                  Neuro Re-Ed             Standing marches   3x10 5#          CR/TR   X30 5#          Hip abd/ext   X30 ea 5#          Step up             Lateral step up             LP   DL x20 50#                       Ther Ex             Quad sets 5\" x 10 x30           Glut sets 5\"x 10 x30           Ankle pumps 10 x30           Supine hip abd 10 x30 x30          Heel slides 10 x10 x30          LAQ 10 3x10 3x10 5#          SLR   3X10          Hip abd             Hip add             SAQ             bike   10'           bridges   2x10 hip add, 2x10 hip abd blktb          Bent knee fall out   x20          Seated shin slide   x20                       Ther Activity                                       Gait Training             SPC  10'                        Modalities                                       HEP: quad set, glute set, ankle pumps, supine hip abd, heel slide, LAQ   "

## 2024-01-24 ENCOUNTER — OFFICE VISIT (OUTPATIENT)
Dept: PHYSICAL THERAPY | Facility: MEDICAL CENTER | Age: 64
End: 2024-01-24
Payer: COMMERCIAL

## 2024-01-24 DIAGNOSIS — M16.12 PRIMARY OSTEOARTHRITIS OF ONE HIP, LEFT: Primary | ICD-10-CM

## 2024-01-24 DIAGNOSIS — Z96.642 STATUS POST TOTAL REPLACEMENT OF LEFT HIP: ICD-10-CM

## 2024-01-24 PROCEDURE — 97140 MANUAL THERAPY 1/> REGIONS: CPT

## 2024-01-24 PROCEDURE — 97110 THERAPEUTIC EXERCISES: CPT

## 2024-01-24 NOTE — PROGRESS NOTES
"Daily Note     Today's date: 2024  Patient name: David Gomes  : 1960  MRN: [id number]  Referring provider: Amy Ramirez,*  Dx:   Encounter Diagnosis     ICD-10-CM    1. Primary osteoarthritis of one hip, left  M16.12       2. Status post total replacement of left hip  Z96.642                      Subjective: Pt reports that he woke up and everything hurt.       Objective: See treatment diary below      Assessment: Pt is progressing well with PT services. He demonstrates adherence to HEP. Tolerated treatment well. Patient demonstrated fatigue post treatment, exhibited good technique with therapeutic exercises, and would benefit from continued PT      Plan: Continue per plan of care.      Precautions: s/p L anterior DONNA on 23    Manuals           L hip PROM  JH JH                                                 Neuro Re-Ed             Standing marches   3x10 5#          CR/TR   X30 5#          Hip abd/ext   X30 ea 5#          Step up             Lateral step up             LP   DL x20 50#          Sit to stand   2x10 one airex no hands                                    Ther Ex             Quad sets 5\" x 10 x30           Glut sets 5\"x 10 x30           Ankle pumps 10 x30           Supine hip abd 10 x30 x30          Heel slides 10 x10 x30          LAQ 10 3x10 3x10 5#          SLR   3X10          Hip abd             Hip add             SAQ             bike   10'           bridges   2x10 hip add, 2x10 hip abd blktb          Bent knee fall out   x20          Seated shin slide   x20                       Ther Activity                                       Gait Training             SPC  10'                        Modalities                                       HEP: quad set, glute set, ankle pumps, supine hip abd, heel slide, LAQ   "

## 2024-01-31 ENCOUNTER — OFFICE VISIT (OUTPATIENT)
Dept: PHYSICAL THERAPY | Facility: MEDICAL CENTER | Age: 64
End: 2024-01-31
Payer: COMMERCIAL

## 2024-01-31 DIAGNOSIS — M16.12 PRIMARY OSTEOARTHRITIS OF ONE HIP, LEFT: Primary | ICD-10-CM

## 2024-01-31 DIAGNOSIS — Z96.642 STATUS POST TOTAL REPLACEMENT OF LEFT HIP: ICD-10-CM

## 2024-01-31 PROCEDURE — 97110 THERAPEUTIC EXERCISES: CPT

## 2024-01-31 PROCEDURE — 97140 MANUAL THERAPY 1/> REGIONS: CPT

## 2024-01-31 NOTE — PROGRESS NOTES
"Daily Note     Today's date: 2024  Patient name: David Gomes  : 1960  MRN: [id number]  Referring provider: Amy Ramirez,*  Dx:   Encounter Diagnosis     ICD-10-CM    1. Primary osteoarthritis of one hip, left  M16.12       2. Status post total replacement of left hip  Z96.642                        Subjective: Pt reports that his right hip is still bothering him. He reports that he reached out to ortho and is unable to get an injection because of his surgery coming up.       Objective: See treatment diary below      Assessment: Pt is progressing well with PT services. He demonstrates adherence to HEP. Tolerated treatment well. Patient demonstrated fatigue post treatment, exhibited good technique with therapeutic exercises, and would benefit from continued PT      Plan: Continue per plan of care.      Precautions: s/p L anterior DONNA on 23    Manuals           L hip PROM  JH JH                                                 Neuro Re-Ed             Standing marches   3x10 5#          CR/TR   X30 5#          Hip abd/ext   X30 ea 5#          Step up             Lateral step up             LP   DL x20 50#          Sit to stand   2x10 one airex no hands                                    Ther Ex             Quad sets 5\" x 10 x30           Glut sets 5\"x 10 x30           Ankle pumps 10 x30           Supine hip abd 10 x30 x30          Heel slides 10 x10 x30          LAQ 10 3x10 3x10 5#          SLR   3X10          Hip abd             Hip add             SAQ             bike   10'           bridges   2x10 hip add, 2x10 hip abd blktb          Bent knee fall out   x20          Seated shin slide   x20                       Ther Activity                                       Gait Training             SPC  10'                        Modalities                                       HEP: quad set, glute set, ankle pumps, supine hip abd, heel slide, LAQ   "

## 2024-02-02 ENCOUNTER — OFFICE VISIT (OUTPATIENT)
Dept: OBGYN CLINIC | Facility: MEDICAL CENTER | Age: 64
End: 2024-02-02
Payer: COMMERCIAL

## 2024-02-02 ENCOUNTER — APPOINTMENT (OUTPATIENT)
Dept: RADIOLOGY | Facility: MEDICAL CENTER | Age: 64
End: 2024-02-02
Payer: COMMERCIAL

## 2024-02-02 ENCOUNTER — OFFICE VISIT (OUTPATIENT)
Dept: PHYSICAL THERAPY | Facility: MEDICAL CENTER | Age: 64
End: 2024-02-02
Payer: COMMERCIAL

## 2024-02-02 VITALS
HEIGHT: 74 IN | BODY MASS INDEX: 26.31 KG/M2 | DIASTOLIC BLOOD PRESSURE: 84 MMHG | WEIGHT: 205 LBS | HEART RATE: 74 BPM | SYSTOLIC BLOOD PRESSURE: 131 MMHG

## 2024-02-02 DIAGNOSIS — Z96.642 STATUS POST TOTAL HIP REPLACEMENT, LEFT: ICD-10-CM

## 2024-02-02 DIAGNOSIS — M17.12 PRIMARY OSTEOARTHRITIS OF LEFT KNEE: ICD-10-CM

## 2024-02-02 DIAGNOSIS — Z96.642 STATUS POST TOTAL REPLACEMENT OF LEFT HIP: ICD-10-CM

## 2024-02-02 DIAGNOSIS — M16.12 PRIMARY OSTEOARTHRITIS OF ONE HIP, LEFT: Primary | ICD-10-CM

## 2024-02-02 DIAGNOSIS — M16.11 PRIMARY OSTEOARTHRITIS OF ONE HIP, RIGHT: ICD-10-CM

## 2024-02-02 DIAGNOSIS — M17.11 PRIMARY OSTEOARTHRITIS OF RIGHT KNEE: ICD-10-CM

## 2024-02-02 DIAGNOSIS — M16.11 PRIMARY OSTEOARTHRITIS OF ONE HIP, RIGHT: Primary | ICD-10-CM

## 2024-02-02 PROCEDURE — 99024 POSTOP FOLLOW-UP VISIT: CPT | Performed by: ORTHOPAEDIC SURGERY

## 2024-02-02 PROCEDURE — 73502 X-RAY EXAM HIP UNI 2-3 VIEWS: CPT

## 2024-02-02 PROCEDURE — 20610 DRAIN/INJ JOINT/BURSA W/O US: CPT | Performed by: ORTHOPAEDIC SURGERY

## 2024-02-02 PROCEDURE — 97140 MANUAL THERAPY 1/> REGIONS: CPT

## 2024-02-02 PROCEDURE — 97110 THERAPEUTIC EXERCISES: CPT

## 2024-02-02 RX ORDER — FOLIC ACID 1 MG/1
1 TABLET ORAL DAILY
Qty: 30 TABLET | Refills: 1 | Status: SHIPPED | OUTPATIENT
Start: 2024-02-02

## 2024-02-02 RX ORDER — MELOXICAM 7.5 MG/1
7.5 TABLET ORAL 2 TIMES DAILY PRN
Qty: 60 TABLET | Refills: 2 | Status: SHIPPED | OUTPATIENT
Start: 2024-02-02

## 2024-02-02 RX ORDER — LIDOCAINE HYDROCHLORIDE 10 MG/ML
3 INJECTION, SOLUTION INFILTRATION; PERINEURAL
Status: COMPLETED | OUTPATIENT
Start: 2024-02-02 | End: 2024-02-02

## 2024-02-02 RX ORDER — LIDOCAINE HYDROCHLORIDE 10 MG/ML
5 INJECTION, SOLUTION INFILTRATION; PERINEURAL
Status: COMPLETED | OUTPATIENT
Start: 2024-02-02 | End: 2024-02-02

## 2024-02-02 RX ORDER — METHYLPREDNISOLONE ACETATE 40 MG/ML
2 INJECTION, SUSPENSION INTRA-ARTICULAR; INTRALESIONAL; INTRAMUSCULAR; SOFT TISSUE
Status: COMPLETED | OUTPATIENT
Start: 2024-02-02 | End: 2024-02-02

## 2024-02-02 RX ORDER — FERROUS SULFATE 324(65)MG
324 TABLET, DELAYED RELEASE (ENTERIC COATED) ORAL
Qty: 30 TABLET | Refills: 1 | Status: SHIPPED | OUTPATIENT
Start: 2024-02-02

## 2024-02-02 RX ORDER — MULTIVITAMIN
1 TABLET ORAL DAILY
Qty: 30 TABLET | Refills: 1 | Status: SHIPPED | OUTPATIENT
Start: 2024-02-02

## 2024-02-02 RX ADMIN — LIDOCAINE HYDROCHLORIDE 3 ML: 10 INJECTION, SOLUTION INFILTRATION; PERINEURAL at 09:45

## 2024-02-02 RX ADMIN — LIDOCAINE HYDROCHLORIDE 5 ML: 10 INJECTION, SOLUTION INFILTRATION; PERINEURAL at 09:45

## 2024-02-02 RX ADMIN — METHYLPREDNISOLONE ACETATE 2 ML: 40 INJECTION, SUSPENSION INTRA-ARTICULAR; INTRALESIONAL; INTRAMUSCULAR; SOFT TISSUE at 09:45

## 2024-02-02 NOTE — PROGRESS NOTES
Assessment/Plan:  1. Primary osteoarthritis of one hip, right    2. Status post total hip replacement, left    3. Primary osteoarthritis of left knee    4. Primary osteoarthritis of right knee      Orders Placed This Encounter   Procedures    Large joint arthrocentesis: L knee    Large joint arthrocentesis: R knee    XR hip/pelv 2-3 vws right if performed       Patient is doing well 5 weeks status post left DONNA.  Continue PT  Continue OTC medication for pain control  Patient should call ahead for abx prior to dental appts.     Patient also has B/L knee arthritis.    After a discussion of risks and benefits the patient elected to proceed with a L knee steroid injection today.  Patient should ice and avoid strenuous activity for 1-2 days if needed.  Patient should avoid vaccines for 2 weeks if possible.  If patient is diabetic should also monitor glucose over the next 7 to 10 days.    Patient requested R knee aspiration.  After a discussion of risks and benefits the patient elected to proceed with a R knee aspiration.  No fluid was obtained.  Patient should ice and avoid strenuous activity for 1-2 days if needed.    Can stop ASA and Celebrex  Rx provided for meloxicam as needed for pain    Patient has severe right hip arthritis.  he has tried conservative treatment without adequate relief.  We discussed treatment options as well as risks and benefits of treatment options.  The patient would like to proceed with a right total hip arthroplasty.  The risks of surgery include, but are not limited to infection, blood clot, wound healing problems, blood loss, damage to blood vessels and nerves, persistent pain and stiffness, dislocation, fracture, leg-length discrepancy, need for additional surgery, need for revision surgery, failure of hardware, heart attack, stroke, death.  The patient understood and agreed to by oral and written consent.  I answered all questions regarding surgery.  Reviewed no driving for 4-6 weeks for  right sided surgery once off narcotics.  No driving until off narcotics for left sided surgery.    Preoperative vitamins were prescribed.  Patient instructed to  what they are not already taking and start 30 days before surgery.  We let the patient know that some people experience constipation while on iron.  If that occurs can take iron every other day.  If still an issue can stop the iron.  Can also add in OTC medication and/or prune juice for constipation.    We also let the patient know that some people experience constipation after surgery while on narcotics.  We suggest to have OTC medications and/or prune juice available if needed.    DVT Prophylaxis: ASA  The patient will obtain clearance from: obtain clearance from their PCP.  Per patient PCP comfortable giving patient cardiac clearance.  Will let PCP decide if comfortable or can send to cardio if needed.   Patient has surgical date reserved for right DONNA on 3/27/2024.        Return in about 4 weeks (around 3/1/2024) for L DONNA post op 3.    I answered all of the patient's questions during the visit and provided education of the patient's condition during the visit.  The patient verbalized understanding of the information given and agrees with the plan.  This note was dictated using Mentegram software.  It may contain errors including improperly dictated words.  Please contact physician directly for any questions.    Subjective   Chief Complaint:   Chief Complaint   Patient presents with    Left Hip - Post-op, Follow-up       David Gomes is a 63 y.o. male who presents for 5 week follow up s/p left DONNA.  Patient states today his left hip is overall doing well.  Patient states he continues to take aspirin for DVT prophylaxis but has not been taking it recently as he was given Celebrex for his right hip pain.  Patient states he also continues to take gabapentin and Tylenol for pain control.  Patient states physical therapy has been going well and he  has been seeing improvements with his left total hip.  Patient has been overall pleased with his progress with his left total hip and is here to discuss surgical interventions for right DONNA where he has a reserve surgical date on 3/27/2024.      History of MRSA: no  History of blood clots: no  Family history of blood clots: yes/ mother PE ( was a smoker and unhealthy)   History of Hepatitis C:no  History of HIV: no  Are you a smoker:no  Are you diabetic:no  Do you see a cardiologist: yes (tx in the past for hypertension)   Have you been vaccinated for COVID:yes  Have you seen a dentist in the past year: yes  Do you have a leg length discrepancy: no        Review of Systems  ROS:    See HPI for musculoskeletal review.   All other systems reviewed are negative     History:  Past Medical History:   Diagnosis Date    Abnormal computed tomography angiography (CTA) 03/08/2023    BPH with urinary obstruction     AND OTHER LOWER URINARY TRACT SYMPTOMS     Cancer (HCC) 2017    Chronic pain disorder     Detached retina, right 2015    Elevated prostate specific antigen (PSA) 2017    Feeling of incomplete bladder emptying     History of hypertension     History of nocturia     Hypertension     Low back pain     Neuroma of foot 2017    Prostate cancer (HCC) 2017    Weak urinary stream      Past Surgical History:   Procedure Laterality Date    BACK SURGERY  2008    EYE SURGERY      REPAIR DETACHED RETINA     EYE SURGERY Right     HERNIA REPAIR      LAMINECTOMY      LUMBAR LAMINECTOMY Bilateral     L4-5    CO ARTHRP ACETBLR/PROX FEM PROSTC AGRFT/ALGRFT Left 12/27/2023    Procedure: ARTHROPLASTY HIP TOTAL ANTERIOR,NAVIGATED, potential same day discharge;  Surgeon: Amy Ramirez DO;  Location:  MAIN OR;  Service: Orthopedics    PROSTATE BIOPSY Bilateral 2017    SPINE SURGERY      TONSILLECTOMY      VASECTOMY      SURGERY VAS DEFERENS      Social History   Social History     Substance and Sexual Activity   Alcohol Use Yes     Comment: I have stopped drinking alcohol.     Social History     Substance and Sexual Activity   Drug Use No     Social History     Tobacco Use   Smoking Status Never   Smokeless Tobacco Never     Family History:   Family History   Problem Relation Age of Onset    Hypertension Mother     Peripheral vascular disease Mother     Dementia Father        Current Outpatient Medications on File Prior to Visit   Medication Sig Dispense Refill    acetaminophen (TYLENOL) 500 mg tablet Take 2 tablets (1,000 mg total) by mouth every 8 (eight) hours      ascorbic acid (VITAMIN C) 500 MG tablet Take 1 tablet (500 mg total) by mouth daily Begin 30 days prior to surgery. 30 tablet 1    aspirin 325 mg tablet Take 1 tablet (325 mg total) by mouth 2 (two) times a day Take with food. 84 tablet 0    celecoxib (CeleBREX) 200 mg capsule Take 1 capsule (200 mg total) by mouth daily as needed for mild pain Take with food. 30 capsule 1    docusate sodium (COLACE) 100 mg capsule Take 1 capsule (100 mg total) by mouth 2 (two) times a day 30 capsule 0    famotidine (PEPCID) 20 mg tablet Take 1 tablet (20 mg total) by mouth 2 (two) times a day as needed for indigestion or heartburn (Patient taking differently: Take 20 mg by mouth as needed for indigestion or heartburn) 90 tablet 1    ferrous sulfate 324 (65 Fe) mg Take 1 tablet (324 mg total) by mouth daily before breakfast Begin 30 days prior to surgery. 30 tablet 1    folic acid (FOLVITE) 1 mg tablet Take 1 tablet (1 mg total) by mouth daily Begin 30 days prior to surgery. 30 tablet 1    gabapentin (NEURONTIN) 100 mg capsule Take 1 capsule (100 mg total) by mouth 3 (three) times a day 90 capsule 0    Multiple Vitamin (multivitamin) tablet Take 1 tablet by mouth daily Begin 30 days prior to surgery. 30 tablet 1    promethazine (PHENERGAN) 12.5 MG tablet Take 1 tablet (12.5 mg total) by mouth every 6 (six) hours as needed for nausea or vomiting 12 tablet 0    valsartan (DIOVAN) 80 mg tablet  "Take 1 tablet (80 mg total) by mouth daily 90 tablet 3     No current facility-administered medications on file prior to visit.     No Known Allergies     Objective     /84   Pulse 74   Ht 6' 2\" (1.88 m)   Wt 93 kg (205 lb)   BMI 26.32 kg/m²      PE:  AAOx 3  WDWN  Hearing intact, no drainage from eyes  no audible wheezing  no abdominal distension  LE compartments soft, AT/GS intact    Ortho Exam:  left hip:   INC: healed, No erythema, mild swelling  No pain with ROM    Right hip:   No dislocation/deformity   ROM: 15 IR, 20 ER, 110 Flexion Pain in all directions.  Laurent test positive  Stinchfield positive  TTP greater trochanter and SIJ     R knee:   Minimal effusion  Mild swelling    L knee:  No effusion  No swelling    Large joint arthrocentesis: L knee  Universal Protocol:  Consent given by: patient  Time out: Immediately prior to procedure a \"time out\" was called to verify the correct patient, procedure, equipment, support staff and site/side marked as required.  Site marked: the operative site was marked  Supporting Documentation  Indications: pain   Procedure Details  Location: knee - L knee  Preparation: Patient was prepped and draped in the usual sterile fashion  Needle size: 22 G  Ultrasound guidance: no  Approach: anterolateral  Medications administered: 3 mL lidocaine 1 %; 2 mL methylPREDNISolone acetate 40 mg/mL    Patient tolerance: patient tolerated the procedure well with no immediate complications  Dressing:  Sterile dressing applied      Large joint arthrocentesis: R knee  Universal Protocol:  Consent given by: patient  Time out: Immediately prior to procedure a \"time out\" was called to verify the correct patient, procedure, equipment, support staff and site/side marked as required.  Site marked: the operative site was marked  Supporting Documentation  Indications: pain   Procedure Details  Location: knee - R knee  Preparation: Patient was prepped and draped in the usual sterile " fashion  Needle size: 22 G  Ultrasound guidance: no  Approach: superior  Medications administered: 5 mL lidocaine 1 %    Patient tolerance: patient tolerated the procedure well with no immediate complications  Dressing:  Sterile dressing applied            Scribe Attestation      I,:  Hari Majano am acting as a scribe while in the presence of the attending physician.:       I,:  Amy Ramirez DO personally performed the services described in this documentation    as scribed in my presence.:

## 2024-02-02 NOTE — PROGRESS NOTES
"Daily Note     Today's date: 2024  Patient name: David Gomes  : 1960  MRN: [id number]  Referring provider: Amy Ramirez,*  Dx:   Encounter Diagnosis     ICD-10-CM    1. Primary osteoarthritis of one hip, left  M16.12       2. Status post total replacement of left hip  Z96.642                        Subjective: Pt reports that his right hip is still bothering him. He reports that both knees were bothering him. He had his follow up today and reports that he had his right knee drained and an injection in his left.       Objective: See treatment diary below      Assessment: Pt presents after surgeon follow up. He presents with increase in right hip and bilateral knee pain. Modified POC to avoid excessive strain on knees and hip. Performed all table exercises to tolerance. Tolerated treatment well. Patient demonstrated fatigue post treatment, exhibited good technique with therapeutic exercises, and would benefit from continued PT      Plan: Continue per plan of care.      Precautions: s/p L anterior DONNA on 23    Manuals  2/2         L hip PROM  [facility]                                                Neuro Re-Ed             Standing marches   3x10 5#          CR/TR   X30 5#          Hip abd/ext   X30 ea 5#          Step up             Lateral step up             LP   DL x20 50#          Sit to stand   2x10 one airex no hands                                    Ther Ex             Quad sets 5\" x 10 x30           Glut sets 5\"x 10 x30           Ankle pumps 10 x30           Supine hip abd 10 x30 x30          Heel slides 10 x10 x30          LAQ 10 3x10 3x10 5#          SLR   3X10 3x10         Hip abd             Hip add             SAQ             bike   10'  10'         bridges   2x10 hip add, 2x10 hip abd blktb 2x10 hip add, 2x10 hip abd blktb         Bent knee fall out   x20 x20         Seated shin slide   x20 x20                      Ther Activity                                 "       Gait Training             SPC  10'                        Modalities                                       HEP: quad set, glute set, ankle pumps, supine hip abd, heel slide, LAQ

## 2024-02-07 DIAGNOSIS — I10 ESSENTIAL HYPERTENSION: ICD-10-CM

## 2024-02-07 NOTE — TELEPHONE ENCOUNTER
Patient called the Rx Refill Line and stated that the [facility] where his Valsartan 80 mg was sent to back in August has since closed and all scripts were transferred to [facility]. Patient tried getting his meds transferred from [facility] to [facility] and the refills on the Valsartan are no longer showing. Patient needs a brand new script sent to the [facility] on [facility] in [place], [place].

## 2024-02-08 RX ORDER — VALSARTAN 80 MG/1
80 TABLET ORAL DAILY
Qty: 90 TABLET | Refills: 0 | Status: SHIPPED | OUTPATIENT
Start: 2024-02-08

## 2024-03-01 ENCOUNTER — OFFICE VISIT (OUTPATIENT)
Dept: OBGYN CLINIC | Facility: MEDICAL CENTER | Age: 64
End: 2024-03-01

## 2024-03-01 VITALS
BODY MASS INDEX: 25.67 KG/M2 | SYSTOLIC BLOOD PRESSURE: 159 MMHG | WEIGHT: 200 LBS | HEART RATE: 90 BPM | HEIGHT: 74 IN | DIASTOLIC BLOOD PRESSURE: 85 MMHG

## 2024-03-01 DIAGNOSIS — Z96.642 STATUS POST TOTAL HIP REPLACEMENT, LEFT: Primary | ICD-10-CM

## 2024-03-01 DIAGNOSIS — R19.8 UMBILICUS DISCHARGE: ICD-10-CM

## 2024-03-01 DIAGNOSIS — M16.11 PRIMARY OSTEOARTHRITIS OF ONE HIP, RIGHT: ICD-10-CM

## 2024-03-01 PROCEDURE — 99024 POSTOP FOLLOW-UP VISIT: CPT | Performed by: ORTHOPAEDIC SURGERY

## 2024-03-01 RX ORDER — CEPHALEXIN 500 MG/1
500 CAPSULE ORAL EVERY 8 HOURS SCHEDULED
Qty: 30 CAPSULE | Refills: 0 | Status: SHIPPED | OUTPATIENT
Start: 2024-03-01 | End: 2024-03-11

## 2024-03-01 NOTE — PROGRESS NOTES
Assessment/Plan:  1. Status post total hip replacement, left    2. Primary osteoarthritis of one hip, right    3. Umbilicus discharge      No orders of the defined types were placed in this encounter.      Patient is 9 weeks status post 9 L anterior DONNA on 12/27/2023.   Patient is scheduled for R ant DONNA on 3/27/24. However patient seems to be having some drainage from his umbilicus concerning for infection (hx of hernia repair with mesh). We advised patient call his previous surgeon Dr Rodas for evaluation.  PCP was contacted and she will be on vacation next week.  Patient aware we may have to post pone R DONNA until he is infection free.   D/w pcp.  Will prescribe keflex.    Transition to home exercises.  Pain control prn- OTC pain meds  Patient can call ahead for abx prior to dental appts.     Return for appt postop.    I answered all of the patient's questions during the visit and provided education of the patient's condition during the visit.  The patient verbalized understanding of the information given and agrees with the plan.  This note was dictated using [employer] software.  It may contain errors including improperly dictated words.  Please contact physician directly for any questions.    Subjective   Chief Complaint:   Chief Complaint   Patient presents with    Left Hip - Follow-up, Post-op       David Gomes is a 63 y.o. male who presents for 9 week follow up s/p left DONNA.  Patient states the left hip is doing well overall. He notes some discomfort in the gluteal area. He is unsure if this was addressed at PT. He also notes a small area of dull sensation over the top of the foot. Denies numbness or tingling. He was dc from PT and is doing home exercises. Patient is not taking anything for left hip pain. He notes left leg is longer than right leg.       Patient has severe right hip osteoarthritis. He was previously scheduled for R ant DONNA on 3/27/24. Patient has PCP medical clearance scheduled for  3/13/24. However, patient notes that starting on Monday he noticed a bloody pus fluid coming from his umbilicus. He had a history of umbilical hernia repair with mesh in 2018 with Dr. Rodas. Denies any complications or issues after surgery. He has never had any discharge from the umbilicus before this week.       Review of Systems  ROS:    See HPI for musculoskeletal review.   All other systems reviewed are negative     History:  Past Medical History:   Diagnosis Date    Abnormal computed tomography angiography (CTA) 03/08/2023    BPH with urinary obstruction     AND OTHER LOWER URINARY TRACT SYMPTOMS     Cancer (HCC) 2017    Chronic pain disorder     Detached retina, right 2015    Elevated prostate specific antigen (PSA) 2017    Feeling of incomplete bladder emptying     History of hypertension     History of nocturia     Hypertension     Low back pain     Neuroma of foot 2017    Prostate cancer (HCC) 2017    Weak urinary stream      Past Surgical History:   Procedure Laterality Date    BACK SURGERY  2008    EYE SURGERY      REPAIR DETACHED RETINA     EYE SURGERY Right     HERNIA REPAIR      LAMINECTOMY      LUMBAR LAMINECTOMY Bilateral     L4-5    [place] ARTHRP ACETBLR/PROX FEM PROSTC AGRFT/ALGRFT Left 12/27/2023    Procedure: ARTHROPLASTY HIP TOTAL ANTERIOR,NAVIGATED, potential same day discharge;  Surgeon: Amy Ramirez DO;  Location:  MAIN OR;  Service: Orthopedics    PROSTATE BIOPSY Bilateral 2017    SPINE SURGERY      TONSILLECTOMY      VASECTOMY      SURGERY VAS DEFERENS      Social History   Social History     Substance and Sexual Activity   Alcohol Use Yes    Comment: I have stopped drinking alcohol.     Social History     Substance and Sexual Activity   Drug Use No     Social History     Tobacco Use   Smoking Status Never   Smokeless Tobacco Never     Family History:   Family History   Problem Relation Age of Onset    Hypertension Mother     Peripheral vascular disease Mother     Dementia Father         Current Outpatient Medications on File Prior to Visit   Medication Sig Dispense Refill    acetaminophen (TYLENOL) 500 mg tablet Take 2 tablets (1,000 mg total) by mouth every 8 (eight) hours      ascorbic acid (VITAMIN C) 500 MG tablet Take 1 tablet (500 mg total) by mouth daily Begin 30 days prior to surgery. 30 tablet 1    ascorbic acid (VITAMIN C) 500 MG TBCR Take 1 tablet (500 mg total) by mouth daily Begin 30 days prior to surgery 30 tablet 1    aspirin 325 mg tablet Take 1 tablet (325 mg total) by mouth 2 (two) times a day Take with food. 84 tablet 0    celecoxib (CeleBREX) 200 mg capsule Take 1 capsule (200 mg total) by mouth daily as needed for mild pain Take with food. 30 capsule 1    docusate sodium (COLACE) 100 mg capsule Take 1 capsule (100 mg total) by mouth 2 (two) times a day 30 capsule 0    famotidine (PEPCID) 20 mg tablet Take 1 tablet (20 mg total) by mouth 2 (two) times a day as needed for indigestion or heartburn (Patient taking differently: Take 20 mg by mouth as needed for indigestion or heartburn) 90 tablet 1    ferrous sulfate 324 (65 Fe) mg Take 1 tablet (324 mg total) by mouth daily before breakfast Begin 30 days prior to surgery. 30 tablet 1    ferrous sulfate 324 (65 Fe) mg Take 1 tablet (324 mg total) by mouth daily before breakfast Begin 30 days prior to surgery 30 tablet 1    folic acid (FOLVITE) 1 mg tablet Take 1 tablet (1 mg total) by mouth daily Begin 30 days prior to surgery. 30 tablet 1    folic acid (KP Folic Acid) 1 mg tablet Take 1 tablet (1 mg total) by mouth daily Begin 30 prior to surgery 30 tablet 1    gabapentin (NEURONTIN) 100 mg capsule Take 1 capsule (100 mg total) by mouth 3 (three) times a day 90 capsule 0    meloxicam (MOBIC) 7.5 mg tablet Take 1 tablet (7.5 mg total) by mouth 2 (two) times a day as needed for moderate pain (pain) Take with food. 60 tablet 2    Multiple Vitamin (multivitamin) tablet Take 1 tablet by mouth daily Begin 30 days prior to surgery.  "30 tablet 1    Multiple Vitamin (multivitamin) tablet Take 1 tablet by mouth daily Begin 30 days prior to surgery 30 tablet 1    promethazine (PHENERGAN) 12.5 MG tablet Take 1 tablet (12.5 mg total) by mouth every 6 (six) hours as needed for nausea or vomiting 12 tablet 0    valsartan (DIOVAN) 80 mg tablet Take 1 tablet (80 mg total) by mouth daily 90 tablet 0     No current facility-administered medications on file prior to visit.     No Known Allergies     Objective     /85   Pulse 90   Ht 6' 2\" (1.88 m)   Wt 90.7 kg (200 lb)   BMI 25.68 kg/m²      PE:  AAOx 3  WDWN  Hearing intact, no drainage from eyes  no audible wheezing  no abdominal distension  LE compartments soft, AT/GS intact    Ortho Exam:  left hip:   INC: healed, No erythema  No pain with ROM  Sensation intact L4-S1  AT/ GS/ EHL intact    Right hip:  Pain with flexion, ER and IR  +impingement   +stinchfield     Abdomen:  No erythema visible  Small amount of bloody pus was able to be expressed when gauze was placed deep into the umbilicus       Scribe Attestation      I,:  Winsome Ritter PA-C am acting as a scribe while in the presence of the attending physician.:       I,:  Amy Ramirez DO personally performed the services described in this documentation    as scribed in my presence.:                    "

## 2024-03-05 ENCOUNTER — APPOINTMENT (OUTPATIENT)
Dept: LAB | Facility: MEDICAL CENTER | Age: 64
End: 2024-03-05
Payer: COMMERCIAL

## 2024-03-05 ENCOUNTER — LAB REQUISITION (OUTPATIENT)
Dept: LAB | Facility: HOSPITAL | Age: 64
End: 2024-03-05
Payer: COMMERCIAL

## 2024-03-05 DIAGNOSIS — C61 MALIGNANT NEOPLASM OF PROSTATE (HCC): ICD-10-CM

## 2024-03-05 DIAGNOSIS — M16.11 PRIMARY OSTEOARTHRITIS OF RIGHT HIP: ICD-10-CM

## 2024-03-05 DIAGNOSIS — M16.11 PRIMARY OSTEOARTHRITIS OF ONE HIP, RIGHT: ICD-10-CM

## 2024-03-05 DIAGNOSIS — Z01.818 OTHER SPECIFIED PRE-OPERATIVE EXAMINATION: ICD-10-CM

## 2024-03-05 DIAGNOSIS — M16.11 UNILATERAL PRIMARY OSTEOARTHRITIS, RIGHT HIP: ICD-10-CM

## 2024-03-05 DIAGNOSIS — R73.02 IMPAIRED GLUCOSE TOLERANCE: ICD-10-CM

## 2024-03-05 DIAGNOSIS — Z01.818 PREOPERATIVE TESTING: ICD-10-CM

## 2024-03-05 LAB
ABO GROUP BLD: NORMAL
ALBUMIN SERPL BCP-MCNC: 4.4 G/DL (ref 3.5–5)
ALP SERPL-CCNC: 103 U/L (ref 34–104)
ALT SERPL W P-5'-P-CCNC: 29 U/L (ref 7–52)
ANION GAP SERPL CALCULATED.3IONS-SCNC: 10 MMOL/L
APTT PPP: 28 SECONDS (ref 23–37)
AST SERPL W P-5'-P-CCNC: 29 U/L (ref 13–39)
BASOPHILS # BLD AUTO: 0.07 THOUSANDS/ÂΜL (ref 0–0.1)
BASOPHILS NFR BLD AUTO: 1 % (ref 0–1)
BILIRUB SERPL-MCNC: 0.48 MG/DL (ref 0.2–1)
BLD GP AB SCN SERPL QL: NEGATIVE
BUN SERPL-MCNC: 22 MG/DL (ref 5–25)
CALCIUM SERPL-MCNC: 9.7 MG/DL (ref 8.4–10.2)
CHLORIDE SERPL-SCNC: 106 MMOL/L (ref 96–108)
CHOLEST SERPL-MCNC: 189 MG/DL
CO2 SERPL-SCNC: 26 MMOL/L (ref 21–32)
CREAT SERPL-MCNC: 1.1 MG/DL (ref 0.6–1.3)
EOSINOPHIL # BLD AUTO: 0.25 THOUSAND/ÂΜL (ref 0–0.61)
EOSINOPHIL NFR BLD AUTO: 4 % (ref 0–6)
ERYTHROCYTE [DISTWIDTH] IN BLOOD BY AUTOMATED COUNT: 13.4 % (ref 11.6–15.1)
EST. AVERAGE GLUCOSE BLD GHB EST-MCNC: 128 MG/DL
GFR SERPL CREATININE-BSD FRML MDRD: 70 ML/MIN/1.73SQ M
GLUCOSE P FAST SERPL-MCNC: 111 MG/DL (ref 65–99)
HBA1C MFR BLD: 6.1 %
HCT VFR BLD AUTO: 43.9 % (ref 36.5–49.3)
HDLC SERPL-MCNC: 63 MG/DL
HGB BLD-MCNC: 13.4 G/DL (ref 12–17)
IMM GRANULOCYTES # BLD AUTO: 0.03 THOUSAND/UL (ref 0–0.2)
IMM GRANULOCYTES NFR BLD AUTO: 1 % (ref 0–2)
INR PPP: 1.02 (ref 0.84–1.19)
LDLC SERPL CALC-MCNC: 112 MG/DL (ref 0–100)
LYMPHOCYTES # BLD AUTO: 1.84 THOUSANDS/ÂΜL (ref 0.6–4.47)
LYMPHOCYTES NFR BLD AUTO: 31 % (ref 14–44)
MCH RBC QN AUTO: 28.1 PG (ref 26.8–34.3)
MCHC RBC AUTO-ENTMCNC: 30.5 G/DL (ref 31.4–37.4)
MCV RBC AUTO: 92 FL (ref 82–98)
MONOCYTES # BLD AUTO: 0.61 THOUSAND/ÂΜL (ref 0.17–1.22)
MONOCYTES NFR BLD AUTO: 10 % (ref 4–12)
NEUTROPHILS # BLD AUTO: 3.23 THOUSANDS/ÂΜL (ref 1.85–7.62)
NEUTS SEG NFR BLD AUTO: 53 % (ref 43–75)
NONHDLC SERPL-MCNC: 126 MG/DL
NRBC BLD AUTO-RTO: 0 /100 WBCS
PLATELET # BLD AUTO: 256 THOUSANDS/UL (ref 149–390)
PMV BLD AUTO: 11.1 FL (ref 8.9–12.7)
POTASSIUM SERPL-SCNC: 4.3 MMOL/L (ref 3.5–5.3)
PROT SERPL-MCNC: 7.4 G/DL (ref 6.4–8.4)
PROTHROMBIN TIME: 13.3 SECONDS (ref 11.6–14.5)
PSA SERPL-MCNC: 1.35 NG/ML (ref 0–4)
RBC # BLD AUTO: 4.77 MILLION/UL (ref 3.88–5.62)
RH BLD: POSITIVE
SODIUM SERPL-SCNC: 142 MMOL/L (ref 135–147)
SPECIMEN EXPIRATION DATE: NORMAL
TRIGL SERPL-MCNC: 71 MG/DL
WBC # BLD AUTO: 6.03 THOUSAND/UL (ref 4.31–10.16)

## 2024-03-05 PROCEDURE — 36415 COLL VENOUS BLD VENIPUNCTURE: CPT

## 2024-03-05 PROCEDURE — 86900 BLOOD TYPING SEROLOGIC ABO: CPT | Performed by: ORTHOPAEDIC SURGERY

## 2024-03-05 PROCEDURE — 85730 THROMBOPLASTIN TIME PARTIAL: CPT

## 2024-03-05 PROCEDURE — 83036 HEMOGLOBIN GLYCOSYLATED A1C: CPT

## 2024-03-05 PROCEDURE — 86850 RBC ANTIBODY SCREEN: CPT | Performed by: ORTHOPAEDIC SURGERY

## 2024-03-05 PROCEDURE — 86901 BLOOD TYPING SEROLOGIC RH(D): CPT | Performed by: ORTHOPAEDIC SURGERY

## 2024-03-05 PROCEDURE — 80053 COMPREHEN METABOLIC PANEL: CPT

## 2024-03-05 PROCEDURE — 85025 COMPLETE CBC W/AUTO DIFF WBC: CPT

## 2024-03-05 PROCEDURE — 85610 PROTHROMBIN TIME: CPT

## 2024-03-05 PROCEDURE — G0103 PSA SCREENING: HCPCS

## 2024-03-05 PROCEDURE — 80061 LIPID PANEL: CPT

## 2024-03-07 ENCOUNTER — TELEPHONE (OUTPATIENT)
Dept: OBGYN CLINIC | Facility: HOSPITAL | Age: 64
End: 2024-03-07

## 2024-03-07 RX ORDER — ASPIRIN 81 MG/1
81 TABLET, CHEWABLE ORAL DAILY
COMMUNITY
End: 2024-03-27

## 2024-03-07 NOTE — PRE-PROCEDURE INSTRUCTIONS
Pre-Surgery Instructions:   Medication Instructions    ascorbic acid (VITAMIN C) 500 MG TBCR Hold day of surgery.    aspirin 81 mg chewable tablet Instruction provided by MD- Stop taking 7 days prior to surgery    cephalexin (KEFLEX) 500 mg capsule LD 3/12 ordered by James    famotidine (PEPCID) 20 mg tablet Uses PRN- OK to take day of surgery    ferrous sulfate 324 (65 Fe) mg Hold day of surgery.    folic acid ( Folic Acid) 1 mg tablet Hold day of surgery.    meloxicam (MOBIC) 7.5 mg tablet Stop taking 3 days prior to surgery.    Multiple Vitamin (multivitamin) tablet Stop taking 7 days prior to surgery.    valsartan (DIOVAN) 80 mg tablet Hold day of surgery.   Ortho- bag contents review. All questions/concerns addressed. Pt. Verbalized an understanding. Advised to contact the surgeon's office with any additional questions/concerns.    Medication instructions for day surgery reviewed. Please use only a sip of water to take your instructed medications. Avoid all over the counter vitamins, supplements and NSAIDS for one week prior to surgery per anesthesia guidelines. Tylenol is ok to take as needed.     You will receive a call one business day prior to surgery with an arrival time and hospital directions. If your surgery is scheduled on a Monday, the hospital will be calling you on the Friday prior to your surgery. If you have not heard from anyone by 8pm, please call the hospital supervisor through the hospital  at [phone]. ([place] [phone] or [place] [phone]).    Do not eat or drink anything after midnight the night before your surgery, including candy, mints, lifesavers, or chewing gum. Do not drink alcohol 24hrs before your surgery. Try not to smoke at least 24hrs before your surgery.       Follow the pre surgery showering instructions as listed in the “My Surgical Experience Booklet” or otherwise provided by your surgeon's office. Do not use a blade to shave the surgical area 1 week  before surgery. It is okay to use a clean electric clippers up to 24 hours before surgery. Do not apply any lotions, creams, including makeup, cologne, deodorant, or perfumes after showering on the day of your surgery. Do not use dry shampoo, hair spray, hair gel, or any type of hair products.     No contact lenses, eye make-up, or artificial eyelashes. Remove nail polish, including gel polish, and any artificial, gel, or acrylic nails if possible. Remove all jewelry including rings and body piercing jewelry.     Wear causal clothing that is easy to take on and off. Consider your type of surgery.    Keep any valuables, jewelry, piercings at home. Please bring any specially ordered equipment (sling, braces) if indicated.    Arrange for a responsible person to drive you to and from the hospital on the day of your surgery. Please confirm the visitor policy for the day of your procedure when you receive your phone call with an arrival time.     Call the surgeon's office with any new illnesses, exposures, or additional questions prior to surgery.    Please reference your “My Surgical Experience Booklet” for additional information to prepare for your upcoming surgery.

## 2024-03-13 ENCOUNTER — CONSULT (OUTPATIENT)
Dept: FAMILY MEDICINE CLINIC | Facility: CLINIC | Age: 64
End: 2024-03-13
Payer: COMMERCIAL

## 2024-03-13 VITALS
BODY MASS INDEX: 26.19 KG/M2 | WEIGHT: 204 LBS | HEART RATE: 78 BPM | DIASTOLIC BLOOD PRESSURE: 80 MMHG | OXYGEN SATURATION: 98 % | SYSTOLIC BLOOD PRESSURE: 128 MMHG | TEMPERATURE: 97.4 F

## 2024-03-13 DIAGNOSIS — C61 PROSTATE CARCINOMA (HCC): ICD-10-CM

## 2024-03-13 DIAGNOSIS — Z01.818 PREOP TESTING: ICD-10-CM

## 2024-03-13 DIAGNOSIS — M16.11 PRIMARY OSTEOARTHRITIS OF ONE HIP, RIGHT: ICD-10-CM

## 2024-03-13 DIAGNOSIS — J84.10 GRANULOMATOUS LUNG DISEASE (HCC): ICD-10-CM

## 2024-03-13 DIAGNOSIS — M16.11 PRIMARY OSTEOARTHRITIS OF RIGHT HIP: Primary | ICD-10-CM

## 2024-03-13 DIAGNOSIS — Z01.818 PREOPERATIVE TESTING: ICD-10-CM

## 2024-03-13 PROCEDURE — 99214 OFFICE O/P EST MOD 30 MIN: CPT | Performed by: FAMILY MEDICINE

## 2024-03-13 RX ORDER — MULTIVITAMIN
1 TABLET ORAL DAILY
Start: 2024-03-13

## 2024-03-13 RX ORDER — FOLIC ACID 1 MG/1
1 TABLET ORAL DAILY
Start: 2024-03-13

## 2024-03-13 NOTE — H&P (VIEW-ONLY)
Presurgical Evaluation    Subjective:      Patient ID: David Gomes is a 63 y.o. male.    Chief Complaint   Patient presents with    Pre-op Exam     Pt is having right hip surgery on 3/27 at [facility].  mgb       Patient presents with:  Pre-op Exam: Pt is having right hip surgery on 3/27 at [facility].  mgb  Patient is here for right hip replacement no cold symptoms no chest pain no shortness of breath no headaches no urinary symptoms no rashes no GI symptoms he feels pretty well though he has been using his cane more since his hip is bothering him he is hoping that the fixing of this hip will balance out the repair of his other hip from December        The following portions of the patient's history were reviewed and updated as appropriate: allergies, current medications, past family history, past medical history, past social history, past surgical history, and problem list.    Procedure date: March 27, 2024    Surgeon:  Dr. Ramirez  Planned procedure:  l hip replacement  Diagnosis for procedure:  hip pain    Prior anesthesia: Yes   General; Complications:  None / Tolerated well  MAC; Complications:  None / Tolerated well  Local; Complications:  None / Tolerated well    CAD History: None   NOTE: Patient should see Cardiology if time available before surgery, and if appropriate.    Pulmonary History: None    Renal history: None    Diabetes History:  None     Neurological History: None     On Immunosuppressant meds/biologics: No      Review of Systems   Constitutional:  Negative for activity change, appetite change and fatigue.   HENT:  Negative for congestion, ear pain, sinus pressure, sinus pain and sore throat.    Respiratory:  Negative for shortness of breath.    Cardiovascular:  Negative for chest pain.   Musculoskeletal:  Positive for arthralgias.        R  hip pain   Neurological:  Negative for dizziness, light-headedness and headaches.   Hematological:  Negative for adenopathy.         Current  Outpatient Medications   Medication Sig Dispense Refill    ascorbic acid (VITAMIN C) 500 MG TBCR Take 1 tablet (500 mg total) by mouth daily Begin 30 days prior to surgery 30 tablet 1    aspirin 81 mg chewable tablet Chew 81 mg daily      ferrous sulfate 324 (65 Fe) mg Take 1 tablet (324 mg total) by mouth daily before breakfast Begin 30 days prior to surgery 30 tablet 1    folic acid (KP Folic Acid) 1 mg tablet Take 1 tablet (1 mg total) by mouth daily Begin 30 prior to surgery 30 tablet 1    meloxicam (MOBIC) 7.5 mg tablet Take 1 tablet (7.5 mg total) by mouth 2 (two) times a day as needed for moderate pain (pain) Take with food. 60 tablet 2    Multiple Vitamin (multivitamin) tablet Take 1 tablet by mouth daily Begin 30 days prior to surgery 30 tablet 1    valsartan (DIOVAN) 80 mg tablet Take 1 tablet (80 mg total) by mouth daily 90 tablet 0    acetaminophen (TYLENOL) 500 mg tablet Take 2 tablets (1,000 mg total) by mouth every 8 (eight) hours      ascorbic acid (VITAMIN C) 500 MG tablet Take 1 tablet (500 mg total) by mouth daily Begin 30 days prior to surgery. 30 tablet 1    aspirin 325 mg tablet Take 1 tablet (325 mg total) by mouth 2 (two) times a day Take with food. 84 tablet 0    celecoxib (CeleBREX) 200 mg capsule Take 1 capsule (200 mg total) by mouth daily as needed for mild pain Take with food. 30 capsule 1    docusate sodium (COLACE) 100 mg capsule Take 1 capsule (100 mg total) by mouth 2 (two) times a day 30 capsule 0    famotidine (PEPCID) 20 mg tablet Take 1 tablet (20 mg total) by mouth 2 (two) times a day as needed for indigestion or heartburn (Patient taking differently: Take 20 mg by mouth as needed for indigestion or heartburn) 90 tablet 1    ferrous sulfate 324 (65 Fe) mg Take 1 tablet (324 mg total) by mouth daily before breakfast Begin 30 days prior to surgery. 30 tablet 1    folic acid (FOLVITE) 1 mg tablet Take 1 tablet (1 mg total) by mouth daily Begin 30 days prior to surgery. 30 tablet 1     gabapentin (NEURONTIN) 100 mg capsule Take 1 capsule (100 mg total) by mouth 3 (three) times a day 90 capsule 0    Multiple Vitamin (multivitamin) tablet Take 1 tablet by mouth daily Begin 30 days prior to surgery. 30 tablet 1    promethazine (PHENERGAN) 12.5 MG tablet Take 1 tablet (12.5 mg total) by mouth every 6 (six) hours as needed for nausea or vomiting 12 tablet 0     No current facility-administered medications for this visit.       Allergies on file:   Patient has no known allergies.    Patient Active Problem List   Diagnosis    Hypertension    Prostate carcinoma (HCC)    Gout    Umbilical hernia without obstruction and without gangrene    Impaired glucose tolerance    Osteoarthritis of knee    Varicose veins of both lower extremities    Pain in both knees    Chondrocalcinosis    Trigger finger, left index finger    Arthritis of carpometacarpal (CMC) joint of right thumb    Granulomatous lung disease (HCC)    Primary osteoarthritis of left hip    Suspected sleep apnea    Hypersomnia    Abnormal computed tomography angiography (CTA)    Obstructive sleep apnea    Excessive daytime sleepiness    Sacroiliitis (HCC)    Primary osteoarthritis of right hip    Status post total hip replacement, left        Past Medical History:   Diagnosis Date    Abnormal computed tomography angiography (CTA) 03/08/2023    BPH with urinary obstruction     AND OTHER LOWER URINARY TRACT SYMPTOMS     Cancer (HCC) 2017    Chronic pain disorder     Colon polyp     Detached retina, right 2015    Elevated prostate specific antigen (PSA) 2017    Feeling of incomplete bladder emptying     History of hypertension     History of nocturia     Hypertension     Low back pain     Neuroma of foot 2017    Prostate cancer (HCC) 2017    Weak urinary stream        Past Surgical History:   Procedure Laterality Date    BACK SURGERY  2008    COLONOSCOPY      EYE SURGERY      REPAIR DETACHED RETINA     EYE SURGERY Right     HERNIA REPAIR       LAMINECTOMY      LUMBAR LAMINECTOMY Bilateral     L4-5    [place] ARTHRP ACETBLR/PROX FEM PROSTC AGRFT/ALGRFT Left 12/27/2023    Procedure: ARTHROPLASTY HIP TOTAL ANTERIOR,NAVIGATED, potential same day discharge;  Surgeon: Amy Ramirez DO;  Location:  MAIN OR;  Service: Orthopedics    PROSTATE BIOPSY Bilateral 2017    SPINE SURGERY      TONSILLECTOMY      VASECTOMY      SURGERY VAS DEFERENS        Family History   Problem Relation Age of Onset    Hypertension Mother     Peripheral vascular disease Mother     Dementia Father        Social History     Tobacco Use    Smoking status: Never    Smokeless tobacco: Never   Vaping Use    Vaping status: Never Used   Substance Use Topics    Alcohol use: Not Currently     Comment: I have stopped drinking alcohol.    Drug use: No       Objective:    Vitals:    03/13/24 1514   BP: 128/80   BP Location: Right arm   Patient Position: Sitting   Cuff Size: Standard   Pulse: 78   Temp: (!) 97.4 °F (36.3 °C)   TempSrc: Temporal   SpO2: 98%   Weight: 92.5 kg (204 lb)        Physical Exam  Vitals reviewed.   Constitutional:       Appearance: Normal appearance.   HENT:      Right Ear: Tympanic membrane normal.      Left Ear: Tympanic membrane normal.      Nose: No congestion or rhinorrhea.      Mouth/Throat:      Pharynx: No oropharyngeal exudate or posterior oropharyngeal erythema.   Cardiovascular:      Rate and Rhythm: Normal rate and regular rhythm.      Pulses: Normal pulses.      Heart sounds: Normal heart sounds.   Pulmonary:      Effort: Pulmonary effort is normal.      Breath sounds: Normal breath sounds.   Abdominal:      General: Abdomen is flat. Bowel sounds are normal.      Palpations: Abdomen is soft.   Musculoskeletal:         General: Tenderness present.      Comments: R  hip pain   Lymphadenopathy:      Cervical: No cervical adenopathy.   Neurological:      Mental Status: He is alert.   Psychiatric:         Mood and Affect: Mood normal.           Preop labs/testing  available and reviewed: yes    eGFR   Date Value Ref Range Status   2024 70 ml/min/1.73sq m Final     WBC   Date Value Ref Range Status   2024 6.03 4.31 - 10.16 Thousand/uL Final     Hemoglobin   Date Value Ref Range Status   2024 13.4 12.0 - 17.0 g/dL Final     Hematocrit   Date Value Ref Range Status   2024 43.9 36.5 - 49.3 % Final     Platelets   Date Value Ref Range Status   2024 256 149 - 390 Thousands/uL Final     INR   Date Value Ref Range Status   2024 1.02 0.84 - 1.19 Final       EKG yes    Echo no    Stress test/cath no    PFT/Cristobal no    Functional capacity: Climb stairs                        4 Mets   Pick the highest level patient can comfortably perform   4 mets or greater for surgery    RCRI  High Risk surgery?         1 Point  CAD History:         1 Point   MI; Positive Stress Test; CP due to Mi;  Nitrate Usage to control Angina; Pathologic Q wave on EKG  CHF Active:         1 Point   Pulm Edema; Paroxysmal Nocturnal Dyspnea;  Bibasilar Rales (crackles);S3; CHF on CXR  Cerebrovascular Disease (TIA or CVA):     1 Point  DM on Insulin:        1 Point  Serum Creat >2.0 mg/dl:       1 Point          Total Points: 0     Scorin: Class I, Very Low Risk (0.4%)     1: Class II, Low risk (0.9%)     2: Class III Moderate (6.6%)     3: Class IV High (>11%)      RAJENDRA Risk:  GFR:   eGFR   Date Value Ref Range Status   2024 70 ml/min/1.73sq m Final         For PCP:  If GFR>60, Hold ACE/ARB/Diuretic on the day of surgery, and NSAIDS 10 days before.    If GFR<45, Consider PRE and POST op Nephrology Consult.    If 46 <GFR> 59 : Has Patient had RAJENDRA in last 6 Months? no   If YES: Preop Nephrology consult   If No:  Post Op Nephrology consult.           Assessment/Plan:    Patient is medically optimized (cleared) for the planned procedure.    Further testing/evaluation is not required.    Postop concerns: no    Problem List Items Addressed This Visit    None       Diagnoses and  "all orders for this visit:    Primary osteoarthritis of right hip    Primary osteoarthritis of one hip, right  -     folic acid ( Folic Acid) 1 mg tablet; Take 1 tablet (1 mg total) by mouth daily Begin 30 prior to surgery  -     Multiple Vitamin (multivitamin) tablet; Take 1 tablet by mouth daily Begin 30 days prior to surgery    Prostate carcinoma (HCC)    Granulomatous lung disease (HCC)    Preop testing  -     POCT ECG          Pre-Surgery Instructions:   Medication Instructions    ascorbic acid (VITAMIN C) 500 MG TBCR per anesthesia guidelines     aspirin 81 mg chewable tablet per anesthesia guidelines     ferrous sulfate 324 (65 Fe) mg per anesthesia guidelines     folic acid ( Folic Acid) 1 mg tablet per anesthesia guidelines     meloxicam (MOBIC) 7.5 mg tablet per anesthesia guidelines     Multiple Vitamin (multivitamin) tablet per anesthesia guidelines     valsartan (DIOVAN) 80 mg tablet per anesthesia guidelines         NOTE: Please use the above to review important meds for your specialty, the remainder \"per anesthesia Guidelines.\"    NOTE: Please place an Inbasket message for \"SOC\" pool for complicated patients.    "

## 2024-03-13 NOTE — PROGRESS NOTES
Presurgical Evaluation    Subjective:      Patient ID: David Gomes is a 63 y.o. male.    Chief Complaint   Patient presents with    Pre-op Exam     Pt is having right hip surgery on 3/27 at [facility].  mgb       Patient presents with:  Pre-op Exam: Pt is having right hip surgery on 3/27 at [facility].  mgb  Patient is here for right hip replacement no cold symptoms no chest pain no shortness of breath no headaches no urinary symptoms no rashes no GI symptoms he feels pretty well though he has been using his cane more since his hip is bothering him he is hoping that the fixing of this hip will balance out the repair of his other hip from December        The following portions of the patient's history were reviewed and updated as appropriate: allergies, current medications, past family history, past medical history, past social history, past surgical history, and problem list.    Procedure date: March 27, 2024    Surgeon:  Dr. Ramirez  Planned procedure:  l hip replacement  Diagnosis for procedure:  hip pain    Prior anesthesia: Yes   General; Complications:  None / Tolerated well  MAC; Complications:  None / Tolerated well  Local; Complications:  None / Tolerated well    CAD History: None   NOTE: Patient should see Cardiology if time available before surgery, and if appropriate.    Pulmonary History: None    Renal history: None    Diabetes History:  None     Neurological History: None     On Immunosuppressant meds/biologics: No      Review of Systems   Constitutional:  Negative for activity change, appetite change and fatigue.   HENT:  Negative for congestion, ear pain, sinus pressure, sinus pain and sore throat.    Respiratory:  Negative for shortness of breath.    Cardiovascular:  Negative for chest pain.   Musculoskeletal:  Positive for arthralgias.        R  hip pain   Neurological:  Negative for dizziness, light-headedness and headaches.   Hematological:  Negative for adenopathy.         Current  Outpatient Medications   Medication Sig Dispense Refill    ascorbic acid (VITAMIN C) 500 MG TBCR Take 1 tablet (500 mg total) by mouth daily Begin 30 days prior to surgery 30 tablet 1    aspirin 81 mg chewable tablet Chew 81 mg daily      ferrous sulfate 324 (65 Fe) mg Take 1 tablet (324 mg total) by mouth daily before breakfast Begin 30 days prior to surgery 30 tablet 1    folic acid (KP Folic Acid) 1 mg tablet Take 1 tablet (1 mg total) by mouth daily Begin 30 prior to surgery 30 tablet 1    meloxicam (MOBIC) 7.5 mg tablet Take 1 tablet (7.5 mg total) by mouth 2 (two) times a day as needed for moderate pain (pain) Take with food. 60 tablet 2    Multiple Vitamin (multivitamin) tablet Take 1 tablet by mouth daily Begin 30 days prior to surgery 30 tablet 1    valsartan (DIOVAN) 80 mg tablet Take 1 tablet (80 mg total) by mouth daily 90 tablet 0    acetaminophen (TYLENOL) 500 mg tablet Take 2 tablets (1,000 mg total) by mouth every 8 (eight) hours      ascorbic acid (VITAMIN C) 500 MG tablet Take 1 tablet (500 mg total) by mouth daily Begin 30 days prior to surgery. 30 tablet 1    aspirin 325 mg tablet Take 1 tablet (325 mg total) by mouth 2 (two) times a day Take with food. 84 tablet 0    celecoxib (CeleBREX) 200 mg capsule Take 1 capsule (200 mg total) by mouth daily as needed for mild pain Take with food. 30 capsule 1    docusate sodium (COLACE) 100 mg capsule Take 1 capsule (100 mg total) by mouth 2 (two) times a day 30 capsule 0    famotidine (PEPCID) 20 mg tablet Take 1 tablet (20 mg total) by mouth 2 (two) times a day as needed for indigestion or heartburn (Patient taking differently: Take 20 mg by mouth as needed for indigestion or heartburn) 90 tablet 1    ferrous sulfate 324 (65 Fe) mg Take 1 tablet (324 mg total) by mouth daily before breakfast Begin 30 days prior to surgery. 30 tablet 1    folic acid (FOLVITE) 1 mg tablet Take 1 tablet (1 mg total) by mouth daily Begin 30 days prior to surgery. 30 tablet 1     gabapentin (NEURONTIN) 100 mg capsule Take 1 capsule (100 mg total) by mouth 3 (three) times a day 90 capsule 0    Multiple Vitamin (multivitamin) tablet Take 1 tablet by mouth daily Begin 30 days prior to surgery. 30 tablet 1    promethazine (PHENERGAN) 12.5 MG tablet Take 1 tablet (12.5 mg total) by mouth every 6 (six) hours as needed for nausea or vomiting 12 tablet 0     No current facility-administered medications for this visit.       Allergies on file:   Patient has no known allergies.    Patient Active Problem List   Diagnosis    Hypertension    Prostate carcinoma (HCC)    Gout    Umbilical hernia without obstruction and without gangrene    Impaired glucose tolerance    Osteoarthritis of knee    Varicose veins of both lower extremities    Pain in both knees    Chondrocalcinosis    Trigger finger, left index finger    Arthritis of carpometacarpal (CMC) joint of right thumb    Granulomatous lung disease (HCC)    Primary osteoarthritis of left hip    Suspected sleep apnea    Hypersomnia    Abnormal computed tomography angiography (CTA)    Obstructive sleep apnea    Excessive daytime sleepiness    Sacroiliitis (HCC)    Primary osteoarthritis of right hip    Status post total hip replacement, left        Past Medical History:   Diagnosis Date    Abnormal computed tomography angiography (CTA) 03/08/2023    BPH with urinary obstruction     AND OTHER LOWER URINARY TRACT SYMPTOMS     Cancer (HCC) 2017    Chronic pain disorder     Colon polyp     Detached retina, right 2015    Elevated prostate specific antigen (PSA) 2017    Feeling of incomplete bladder emptying     History of hypertension     History of nocturia     Hypertension     Low back pain     Neuroma of foot 2017    Prostate cancer (HCC) 2017    Weak urinary stream        Past Surgical History:   Procedure Laterality Date    BACK SURGERY  2008    COLONOSCOPY      EYE SURGERY      REPAIR DETACHED RETINA     EYE SURGERY Right     HERNIA REPAIR       LAMINECTOMY      LUMBAR LAMINECTOMY Bilateral     L4-5    MD ARTHRP ACETBLR/PROX FEM PROSTC AGRFT/ALGRFT Left 12/27/2023    Procedure: ARTHROPLASTY HIP TOTAL ANTERIOR,NAVIGATED, potential same day discharge;  Surgeon: Amy Ramirez DO;  Location:  MAIN OR;  Service: Orthopedics    PROSTATE BIOPSY Bilateral 2017    SPINE SURGERY      TONSILLECTOMY      VASECTOMY      SURGERY VAS DEFERENS        Family History   Problem Relation Age of Onset    Hypertension Mother     Peripheral vascular disease Mother     Dementia Father        Social History     Tobacco Use    Smoking status: Never    Smokeless tobacco: Never   Vaping Use    Vaping status: Never Used   Substance Use Topics    Alcohol use: Not Currently     Comment: I have stopped drinking alcohol.    Drug use: No       Objective:    Vitals:    03/13/24 1514   BP: 128/80   BP Location: Right arm   Patient Position: Sitting   Cuff Size: Standard   Pulse: 78   Temp: (!) 97.4 °F (36.3 °C)   TempSrc: Temporal   SpO2: 98%   Weight: 92.5 kg (204 lb)        Physical Exam  Vitals reviewed.   Constitutional:       Appearance: Normal appearance.   HENT:      Right Ear: Tympanic membrane normal.      Left Ear: Tympanic membrane normal.      Nose: No congestion or rhinorrhea.      Mouth/Throat:      Pharynx: No oropharyngeal exudate or posterior oropharyngeal erythema.   Cardiovascular:      Rate and Rhythm: Normal rate and regular rhythm.      Pulses: Normal pulses.      Heart sounds: Normal heart sounds.   Pulmonary:      Effort: Pulmonary effort is normal.      Breath sounds: Normal breath sounds.   Abdominal:      General: Abdomen is flat. Bowel sounds are normal.      Palpations: Abdomen is soft.   Musculoskeletal:         General: Tenderness present.      Comments: R  hip pain   Lymphadenopathy:      Cervical: No cervical adenopathy.   Neurological:      Mental Status: He is alert.   Psychiatric:         Mood and Affect: Mood normal.           Preop labs/testing  available and reviewed: yes    eGFR   Date Value Ref Range Status   2024 70 ml/min/1.73sq m Final     WBC   Date Value Ref Range Status   2024 6.03 4.31 - 10.16 Thousand/uL Final     Hemoglobin   Date Value Ref Range Status   2024 13.4 12.0 - 17.0 g/dL Final     Hematocrit   Date Value Ref Range Status   2024 43.9 36.5 - 49.3 % Final     Platelets   Date Value Ref Range Status   2024 256 149 - 390 Thousands/uL Final     INR   Date Value Ref Range Status   2024 1.02 0.84 - 1.19 Final       EKG yes    Echo no    Stress test/cath no    PFT/Cristobal no    Functional capacity: Climb stairs                        4 Mets   Pick the highest level patient can comfortably perform   4 mets or greater for surgery    RCRI  High Risk surgery?         1 Point  CAD History:         1 Point   MI; Positive Stress Test; CP due to Mi;  Nitrate Usage to control Angina; Pathologic Q wave on EKG  CHF Active:         1 Point   Pulm Edema; Paroxysmal Nocturnal Dyspnea;  Bibasilar Rales (crackles);S3; CHF on CXR  Cerebrovascular Disease (TIA or CVA):     1 Point  DM on Insulin:        1 Point  Serum Creat >2.0 mg/dl:       1 Point          Total Points: 0     Scorin: Class I, Very Low Risk (0.4%)     1: Class II, Low risk (0.9%)     2: Class III Moderate (6.6%)     3: Class IV High (>11%)      RAJENDRA Risk:  GFR:   eGFR   Date Value Ref Range Status   2024 70 ml/min/1.73sq m Final         For PCP:  If GFR>60, Hold ACE/ARB/Diuretic on the day of surgery, and NSAIDS 10 days before.    If GFR<45, Consider PRE and POST op Nephrology Consult.    If 46 <GFR> 59 : Has Patient had RAJENDRA in last 6 Months? no   If YES: Preop Nephrology consult   If No:  Post Op Nephrology consult.           Assessment/Plan:    Patient is medically optimized (cleared) for the planned procedure.    Further testing/evaluation is not required.    Postop concerns: no    Problem List Items Addressed This Visit    None       Diagnoses and  "all orders for this visit:    Primary osteoarthritis of right hip    Primary osteoarthritis of one hip, right  -     folic acid ( Folic Acid) 1 mg tablet; Take 1 tablet (1 mg total) by mouth daily Begin 30 prior to surgery  -     Multiple Vitamin (multivitamin) tablet; Take 1 tablet by mouth daily Begin 30 days prior to surgery    Prostate carcinoma (HCC)    Granulomatous lung disease (HCC)    Preop testing  -     POCT ECG          Pre-Surgery Instructions:   Medication Instructions    ascorbic acid (VITAMIN C) 500 MG TBCR per anesthesia guidelines     aspirin 81 mg chewable tablet per anesthesia guidelines     ferrous sulfate 324 (65 Fe) mg per anesthesia guidelines     folic acid ( Folic Acid) 1 mg tablet per anesthesia guidelines     meloxicam (MOBIC) 7.5 mg tablet per anesthesia guidelines     Multiple Vitamin (multivitamin) tablet per anesthesia guidelines     valsartan (DIOVAN) 80 mg tablet per anesthesia guidelines         NOTE: Please use the above to review important meds for your specialty, the remainder \"per anesthesia Guidelines.\"    NOTE: Please place an Inbasket message for \"SOC\" pool for complicated patients.    "

## 2024-03-14 NOTE — TELEPHONE ENCOUNTER
Preoperative Elective Admission Assessment     Patient stated he has history of fatty liver and is extremely mindful of using excessive Tylenol. Discussed post op pain management with narcotic & Tylenol.      Living Situation:    Who does pt live with: spouse  What kind of home: multi-level  How do they enter the home: front and garage  How many levels in home: 2   # of steps to enter home: Front: 1. Garage: 1.  # of steps to second floor: 12-14  Are there handrails: Yes  Are there landings: No  Sleeping arrangement: second floor  Where is Bathroom: first & second floor   Where is the tub or shower: second floor   Dogs or ther pets: 2 dogs     First Floor Setup:   Is there a bathroom: Yes, 1/2 bath  Where would pt sleep: recliner     DME: rolling walker  We discussed clearing pathways in the home and making sure there is accessibly to use the walker, for example, removing throw rugs.      Patient's Current Level of Function: Ambulates: Independently and ADLs: Independent     Post-op Caregiver: spouse  Caregiver Name and phone number for Inpatient discharge needs: KymJazzy (Spouse)  [phone]   Currently receive any HHC/aides/community supports: No     Post-op Transport: spouse  To/from hospital: spouse  To/from PT 2-3x/week: spouse. Pt states his spouse goes back to work 1 week post op and will either schedule his PT later in the evening for his spouse to take him. His neighbor is backup.   Uses community transport now: No     Outpatient Physical Therapy Site:  Site: D  pre and post-op appts scheduled? No      Medication Management: self  Preferred Pharmacy for Post-op Medications: [facility]  Blood Management Vitamin Regimen: Pt confirms taking as prescribed  Post-op anticoagulant: to be determined by surgical team postoperatively     DC Plan: Pt plans to be discharged home     Barriers to DC identified preoperatively: none identified     BMI: 26.19     Patient Education:  Pt educated on post-op pain, early  mobilization (POD0), LOS goals, OP PT goals, and preoperative bathing. Patient educated that our goal is to appropriately discharge patient based off their post-op function while striving to maintain maximal independence. The goal is to discharge patient to home and for them to attend outpatient physical therapy.     Assigned to care team? Yes     SW referral: n/a

## 2024-03-27 ENCOUNTER — ANESTHESIA (OUTPATIENT)
Dept: PERIOP | Facility: HOSPITAL | Age: 64
End: 2024-03-27
Payer: COMMERCIAL

## 2024-03-27 ENCOUNTER — ANESTHESIA EVENT (OUTPATIENT)
Dept: PERIOP | Facility: HOSPITAL | Age: 64
End: 2024-03-27
Payer: COMMERCIAL

## 2024-03-27 ENCOUNTER — APPOINTMENT (OUTPATIENT)
Dept: RADIOLOGY | Facility: HOSPITAL | Age: 64
End: 2024-03-27
Payer: COMMERCIAL

## 2024-03-27 ENCOUNTER — HOSPITAL ENCOUNTER (OUTPATIENT)
Facility: HOSPITAL | Age: 64
Setting detail: OUTPATIENT SURGERY
Discharge: HOME | End: 2024-03-27
Attending: ORTHOPAEDIC SURGERY | Admitting: ORTHOPAEDIC SURGERY
Payer: COMMERCIAL

## 2024-03-27 VITALS
TEMPERATURE: 97.6 F | BODY MASS INDEX: 26.31 KG/M2 | WEIGHT: 205 LBS | RESPIRATION RATE: 18 BRPM | HEART RATE: 70 BPM | OXYGEN SATURATION: 94 % | SYSTOLIC BLOOD PRESSURE: 131 MMHG | HEIGHT: 74 IN | DIASTOLIC BLOOD PRESSURE: 81 MMHG

## 2024-03-27 DIAGNOSIS — Z96.641 STATUS POST TOTAL HIP REPLACEMENT, RIGHT: Primary | ICD-10-CM

## 2024-03-27 PROCEDURE — 73501 X-RAY EXAM HIP UNI 1 VIEW: CPT

## 2024-03-27 PROCEDURE — 20610 DRAIN/INJ JOINT/BURSA W/O US: CPT | Performed by: ORTHOPAEDIC SURGERY

## 2024-03-27 PROCEDURE — C1776 JOINT DEVICE (IMPLANTABLE): HCPCS | Performed by: ORTHOPAEDIC SURGERY

## 2024-03-27 PROCEDURE — 86923 COMPATIBILITY TEST ELECTRIC: CPT

## 2024-03-27 PROCEDURE — 0054T BONE SRGRY CMPTR FLUOR IMAGE: CPT | Performed by: PHYSICIAN ASSISTANT

## 2024-03-27 PROCEDURE — 0054T BONE SRGRY CMPTR FLUOR IMAGE: CPT | Performed by: ORTHOPAEDIC SURGERY

## 2024-03-27 PROCEDURE — 27130 TOTAL HIP ARTHROPLASTY: CPT | Performed by: PHYSICIAN ASSISTANT

## 2024-03-27 PROCEDURE — 20610 DRAIN/INJ JOINT/BURSA W/O US: CPT | Performed by: PHYSICIAN ASSISTANT

## 2024-03-27 PROCEDURE — 97163 PT EVAL HIGH COMPLEX 45 MIN: CPT

## 2024-03-27 PROCEDURE — C1713 ANCHOR/SCREW BN/BN,TIS/BN: HCPCS | Performed by: ORTHOPAEDIC SURGERY

## 2024-03-27 PROCEDURE — 73502 X-RAY EXAM HIP UNI 2-3 VIEWS: CPT

## 2024-03-27 PROCEDURE — 27130 TOTAL HIP ARTHROPLASTY: CPT | Performed by: ORTHOPAEDIC SURGERY

## 2024-03-27 DEVICE — PINNACLE CANCELLOUS BONE SCREW 6.5MM X 20MM
Type: IMPLANTABLE DEVICE | Site: HIP | Status: FUNCTIONAL
Brand: PINNACLE

## 2024-03-27 DEVICE — PINNACLE CANCELLOUS BONE SCREW 6.5MM X 15MM
Type: IMPLANTABLE DEVICE | Site: HIP | Status: FUNCTIONAL
Brand: PINNACLE

## 2024-03-27 DEVICE — PINNACLE HIP SOLUTIONS ALTRX POLYETHYLENE ACETABULAR LINER NEUTRAL 36MM ID 56MM OD
Type: IMPLANTABLE DEVICE | Site: HIP | Status: FUNCTIONAL
Brand: PINNACLE ALTRX

## 2024-03-27 DEVICE — ACTIS DUOFIX HIP PROSTHESIS (FEMORAL STEM 12/14 TAPER CEMENTLESS SIZE 7 HIGH COLLAR)  CE
Type: IMPLANTABLE DEVICE | Site: HIP | Status: FUNCTIONAL
Brand: ACTIS

## 2024-03-27 DEVICE — PINNACLE POROCOAT ACETABULAR SHELL SECTOR II 56MM OD
Type: IMPLANTABLE DEVICE | Site: HIP | Status: FUNCTIONAL
Brand: PINNACLE POROCOAT

## 2024-03-27 DEVICE — BIOLOX DELTA CERAMIC FEMORAL HEAD +5.0 36MM DIA 12/14 TAPER
Type: IMPLANTABLE DEVICE | Site: HIP | Status: FUNCTIONAL
Brand: BIOLOX DELTA

## 2024-03-27 RX ORDER — PROPOFOL 10 MG/ML
INJECTION, EMULSION INTRAVENOUS CONTINUOUS PRN
Status: DISCONTINUED | OUTPATIENT
Start: 2024-03-27 | End: 2024-03-27

## 2024-03-27 RX ORDER — SODIUM CHLORIDE, SODIUM LACTATE, POTASSIUM CHLORIDE, CALCIUM CHLORIDE 600; 310; 30; 20 MG/100ML; MG/100ML; MG/100ML; MG/100ML
75 INJECTION, SOLUTION INTRAVENOUS CONTINUOUS
Status: DISCONTINUED | OUTPATIENT
Start: 2024-03-27 | End: 2024-03-27 | Stop reason: HOSPADM

## 2024-03-27 RX ORDER — SENNOSIDES 8.6 MG
1 TABLET ORAL DAILY
Status: DISCONTINUED | OUTPATIENT
Start: 2024-03-27 | End: 2024-03-27 | Stop reason: HOSPADM

## 2024-03-27 RX ORDER — MIDAZOLAM HYDROCHLORIDE 2 MG/2ML
INJECTION, SOLUTION INTRAMUSCULAR; INTRAVENOUS AS NEEDED
Status: DISCONTINUED | OUTPATIENT
Start: 2024-03-27 | End: 2024-03-27

## 2024-03-27 RX ORDER — PHENYLEPHRINE HCL IN 0.9% NACL 1 MG/10 ML
SYRINGE (ML) INTRAVENOUS AS NEEDED
Status: DISCONTINUED | OUTPATIENT
Start: 2024-03-27 | End: 2024-03-27

## 2024-03-27 RX ORDER — GABAPENTIN 100 MG/1
100 CAPSULE ORAL 3 TIMES DAILY
Qty: 90 CAPSULE | Refills: 0 | Status: SHIPPED | OUTPATIENT
Start: 2024-03-27

## 2024-03-27 RX ORDER — CHLORHEXIDINE GLUCONATE ORAL RINSE 1.2 MG/ML
15 SOLUTION DENTAL ONCE
Status: COMPLETED | OUTPATIENT
Start: 2024-03-27 | End: 2024-03-27

## 2024-03-27 RX ORDER — ASPIRIN 325 MG
325 TABLET ORAL 2 TIMES DAILY
Qty: 84 TABLET | Refills: 0 | Status: SHIPPED | OUTPATIENT
Start: 2024-03-27

## 2024-03-27 RX ORDER — SODIUM CHLORIDE, SODIUM LACTATE, POTASSIUM CHLORIDE, CALCIUM CHLORIDE 600; 310; 30; 20 MG/100ML; MG/100ML; MG/100ML; MG/100ML
125 INJECTION, SOLUTION INTRAVENOUS CONTINUOUS
Status: DISCONTINUED | OUTPATIENT
Start: 2024-03-27 | End: 2024-03-27 | Stop reason: HOSPADM

## 2024-03-27 RX ORDER — ENOXAPARIN SODIUM 100 MG/ML
40 INJECTION SUBCUTANEOUS DAILY
Status: DISCONTINUED | OUTPATIENT
Start: 2024-03-28 | End: 2024-03-27 | Stop reason: HOSPADM

## 2024-03-27 RX ORDER — ACETAMINOPHEN 325 MG/1
975 TABLET ORAL ONCE
Status: COMPLETED | OUTPATIENT
Start: 2024-03-27 | End: 2024-03-27

## 2024-03-27 RX ORDER — HYDROMORPHONE HCL/PF 1 MG/ML
0.5 SYRINGE (ML) INJECTION EVERY 4 HOURS PRN
Status: DISCONTINUED | OUTPATIENT
Start: 2024-03-27 | End: 2024-03-27 | Stop reason: HOSPADM

## 2024-03-27 RX ORDER — BISACODYL 10 MG
10 SUPPOSITORY, RECTAL RECTAL DAILY PRN
Status: DISCONTINUED | OUTPATIENT
Start: 2024-03-27 | End: 2024-03-27 | Stop reason: HOSPADM

## 2024-03-27 RX ORDER — ACETAMINOPHEN 500 MG
1000 TABLET ORAL EVERY 8 HOURS
Start: 2024-03-27

## 2024-03-27 RX ORDER — ONDANSETRON 2 MG/ML
4 INJECTION INTRAMUSCULAR; INTRAVENOUS ONCE AS NEEDED
Status: DISCONTINUED | OUTPATIENT
Start: 2024-03-27 | End: 2024-03-27 | Stop reason: HOSPADM

## 2024-03-27 RX ORDER — CEFAZOLIN SODIUM 2 G/50ML
2000 SOLUTION INTRAVENOUS ONCE
Status: COMPLETED | OUTPATIENT
Start: 2024-03-27 | End: 2024-03-27

## 2024-03-27 RX ORDER — CEPHALEXIN 500 MG/1
500 CAPSULE ORAL EVERY 12 HOURS SCHEDULED
Qty: 2 CAPSULE | Refills: 0 | Status: SHIPPED | OUTPATIENT
Start: 2024-03-27 | End: 2024-03-28

## 2024-03-27 RX ORDER — MAGNESIUM HYDROXIDE 1200 MG/15ML
LIQUID ORAL AS NEEDED
Status: DISCONTINUED | OUTPATIENT
Start: 2024-03-27 | End: 2024-03-27 | Stop reason: HOSPADM

## 2024-03-27 RX ORDER — OXYCODONE HYDROCHLORIDE 10 MG/1
10 TABLET ORAL EVERY 4 HOURS PRN
Status: DISCONTINUED | OUTPATIENT
Start: 2024-03-27 | End: 2024-03-27 | Stop reason: HOSPADM

## 2024-03-27 RX ORDER — DOCUSATE SODIUM 100 MG/1
100 CAPSULE, LIQUID FILLED ORAL 2 TIMES DAILY
Status: DISCONTINUED | OUTPATIENT
Start: 2024-03-27 | End: 2024-03-27 | Stop reason: HOSPADM

## 2024-03-27 RX ORDER — ASCORBIC ACID 500 MG
500 TABLET ORAL DAILY
Status: DISCONTINUED | OUTPATIENT
Start: 2024-03-27 | End: 2024-03-27 | Stop reason: HOSPADM

## 2024-03-27 RX ORDER — ACETAMINOPHEN 325 MG/1
975 TABLET ORAL EVERY 8 HOURS SCHEDULED
Status: DISCONTINUED | OUTPATIENT
Start: 2024-03-27 | End: 2024-03-27 | Stop reason: HOSPADM

## 2024-03-27 RX ORDER — OXYCODONE HYDROCHLORIDE 10 MG/1
10 TABLET ORAL EVERY 4 HOURS PRN
Qty: 42 TABLET | Refills: 0 | Status: SHIPPED | OUTPATIENT
Start: 2024-03-27 | End: 2024-04-03

## 2024-03-27 RX ORDER — OXYCODONE HYDROCHLORIDE 5 MG/1
5 TABLET ORAL EVERY 4 HOURS PRN
Status: DISCONTINUED | OUTPATIENT
Start: 2024-03-27 | End: 2024-03-27 | Stop reason: HOSPADM

## 2024-03-27 RX ORDER — TRANEXAMIC ACID 10 MG/ML
INJECTION, SOLUTION INTRAVENOUS AS NEEDED
Status: DISCONTINUED | OUTPATIENT
Start: 2024-03-27 | End: 2024-03-27

## 2024-03-27 RX ORDER — CEFAZOLIN SODIUM 2 G/50ML
2000 SOLUTION INTRAVENOUS EVERY 8 HOURS
Status: DISCONTINUED | OUTPATIENT
Start: 2024-03-27 | End: 2024-03-27 | Stop reason: HOSPADM

## 2024-03-27 RX ORDER — GABAPENTIN 100 MG/1
100 CAPSULE ORAL EVERY 8 HOURS
Status: DISCONTINUED | OUTPATIENT
Start: 2024-03-27 | End: 2024-03-27 | Stop reason: HOSPADM

## 2024-03-27 RX ORDER — FENTANYL CITRATE/PF 50 MCG/ML
25 SYRINGE (ML) INJECTION
Status: DISCONTINUED | OUTPATIENT
Start: 2024-03-27 | End: 2024-03-27 | Stop reason: HOSPADM

## 2024-03-27 RX ORDER — GABAPENTIN 300 MG/1
300 CAPSULE ORAL ONCE
Status: COMPLETED | OUTPATIENT
Start: 2024-03-27 | End: 2024-03-27

## 2024-03-27 RX ORDER — CALCIUM CARBONATE 500 MG/1
1000 TABLET, CHEWABLE ORAL DAILY PRN
Status: DISCONTINUED | OUTPATIENT
Start: 2024-03-27 | End: 2024-03-27 | Stop reason: HOSPADM

## 2024-03-27 RX ORDER — FOLIC ACID 1 MG/1
1 TABLET ORAL DAILY
Status: DISCONTINUED | OUTPATIENT
Start: 2024-03-27 | End: 2024-03-27 | Stop reason: HOSPADM

## 2024-03-27 RX ORDER — CHLORHEXIDINE GLUCONATE 4 G/100ML
SOLUTION TOPICAL DAILY PRN
Status: DISCONTINUED | OUTPATIENT
Start: 2024-03-27 | End: 2024-03-27 | Stop reason: HOSPADM

## 2024-03-27 RX ORDER — SODIUM CHLORIDE, SODIUM LACTATE, POTASSIUM CHLORIDE, CALCIUM CHLORIDE 600; 310; 30; 20 MG/100ML; MG/100ML; MG/100ML; MG/100ML
INJECTION, SOLUTION INTRAVENOUS CONTINUOUS PRN
Status: DISCONTINUED | OUTPATIENT
Start: 2024-03-27 | End: 2024-03-27

## 2024-03-27 RX ORDER — BUPIVACAINE HYDROCHLORIDE 7.5 MG/ML
INJECTION, SOLUTION INTRASPINAL AS NEEDED
Status: DISCONTINUED | OUTPATIENT
Start: 2024-03-27 | End: 2024-03-27

## 2024-03-27 RX ORDER — DOCUSATE SODIUM 100 MG/1
100 CAPSULE, LIQUID FILLED ORAL 2 TIMES DAILY
Qty: 20 CAPSULE | Refills: 0 | Status: SHIPPED | OUTPATIENT
Start: 2024-03-27

## 2024-03-27 RX ORDER — ONDANSETRON 2 MG/ML
4 INJECTION INTRAMUSCULAR; INTRAVENOUS EVERY 6 HOURS PRN
Status: DISCONTINUED | OUTPATIENT
Start: 2024-03-27 | End: 2024-03-27 | Stop reason: HOSPADM

## 2024-03-27 RX ORDER — TRANEXAMIC ACID 10 MG/ML
1000 INJECTION, SOLUTION INTRAVENOUS ONCE
Status: DISCONTINUED | OUTPATIENT
Start: 2024-03-27 | End: 2024-03-27 | Stop reason: HOSPADM

## 2024-03-27 RX ORDER — PROMETHAZINE HYDROCHLORIDE 12.5 MG/1
12.5 TABLET ORAL EVERY 6 HOURS PRN
Qty: 8 TABLET | Refills: 0 | Status: SHIPPED | OUTPATIENT
Start: 2024-03-27

## 2024-03-27 RX ORDER — SODIUM CHLORIDE 9 MG/ML
75 INJECTION, SOLUTION INTRAVENOUS CONTINUOUS
Status: DISCONTINUED | OUTPATIENT
Start: 2024-03-27 | End: 2024-03-27

## 2024-03-27 RX ADMIN — SODIUM CHLORIDE, SODIUM LACTATE, POTASSIUM CHLORIDE, AND CALCIUM CHLORIDE: .6; .31; .03; .02 INJECTION, SOLUTION INTRAVENOUS at 11:15

## 2024-03-27 RX ADMIN — OXYCODONE HYDROCHLORIDE 5 MG: 5 TABLET ORAL at 15:55

## 2024-03-27 RX ADMIN — IRON SUCROSE 300 MG: 20 INJECTION, SOLUTION INTRAVENOUS at 16:01

## 2024-03-27 RX ADMIN — Medication 100 MCG: at 12:58

## 2024-03-27 RX ADMIN — Medication 100 MCG: at 12:04

## 2024-03-27 RX ADMIN — OXYCODONE HYDROCHLORIDE AND ACETAMINOPHEN 500 MG: 500 TABLET ORAL at 15:55

## 2024-03-27 RX ADMIN — Medication 100 MCG: at 11:57

## 2024-03-27 RX ADMIN — SODIUM CHLORIDE, SODIUM LACTATE, POTASSIUM CHLORIDE, AND CALCIUM CHLORIDE 75 ML/HR: .6; .31; .03; .02 INJECTION, SOLUTION INTRAVENOUS at 16:02

## 2024-03-27 RX ADMIN — MIDAZOLAM 2 MG: 1 INJECTION INTRAMUSCULAR; INTRAVENOUS at 11:15

## 2024-03-27 RX ADMIN — FOLIC ACID 1 MG: 1 TABLET ORAL at 15:55

## 2024-03-27 RX ADMIN — CHLORHEXIDINE GLUCONATE 0.12% ORAL RINSE 15 ML: 1.2 LIQUID ORAL at 08:53

## 2024-03-27 RX ADMIN — GABAPENTIN 100 MG: 100 CAPSULE ORAL at 15:55

## 2024-03-27 RX ADMIN — SODIUM CHLORIDE, SODIUM LACTATE, POTASSIUM CHLORIDE, AND CALCIUM CHLORIDE: .6; .31; .03; .02 INJECTION, SOLUTION INTRAVENOUS at 13:15

## 2024-03-27 RX ADMIN — ACETAMINOPHEN 975 MG: 325 TABLET ORAL at 15:55

## 2024-03-27 RX ADMIN — CEFAZOLIN SODIUM 2000 MG: 2 SOLUTION INTRAVENOUS at 18:31

## 2024-03-27 RX ADMIN — TRANEXAMIC ACID 1000 MG: 10 INJECTION, SOLUTION INTRAVENOUS at 11:36

## 2024-03-27 RX ADMIN — GABAPENTIN 300 MG: 300 CAPSULE ORAL at 08:53

## 2024-03-27 RX ADMIN — DOCUSATE SODIUM 100 MG: 100 CAPSULE, LIQUID FILLED ORAL at 17:00

## 2024-03-27 RX ADMIN — SENNOSIDES 8.6 MG: 8.6 TABLET, FILM COATED ORAL at 15:55

## 2024-03-27 RX ADMIN — CEFAZOLIN SODIUM 2000 MG: 2 SOLUTION INTRAVENOUS at 11:30

## 2024-03-27 RX ADMIN — BUPIVACAINE HYDROCHLORIDE IN DEXTROSE 1.8 ML: 7.5 INJECTION, SOLUTION SUBARACHNOID at 11:24

## 2024-03-27 RX ADMIN — PROPOFOL 85 MCG/KG/MIN: 10 INJECTION, EMULSION INTRAVENOUS at 11:30

## 2024-03-27 RX ADMIN — ACETAMINOPHEN 975 MG: 325 TABLET ORAL at 08:53

## 2024-03-27 RX ADMIN — Medication 100 MCG: at 12:16

## 2024-03-27 NOTE — NURSING NOTE
Pt is requesting to have right knee drained while patient is under anesthesia. Doctor made aware. Skin to right knee and right hip intact.

## 2024-03-27 NOTE — DISCHARGE INSTR - AVS FIRST PAGE
ANTERIOR TOTAL HIP REPLACEMENT DISCHARGE INSTRUCTIONS    Surgical Dressing:  You may remove your dressing 7 days from the date of your surgery then change your dressing daily until drainage stops.  Do not put any lotions or creams on your incision.  If there are any signs of infection such as drainage persisting beyond a few days, unusual looking drainage (yellow, green), increased redness around the incision, or fever/chills let your doctor know.      Medications:  Upon discharge you will be given a prescription for an anticoagulant (i.e. Ecotrin (Aspirin), Coumadin (Warfarin), Lovenox (Enoxaparin)) and a narcotic pain reliever.  Do not take any over the counter NSAIDs (i.e. Ibuprofen, Motrin, Advil, Naprosyn, Aleve) while on your anticoagulation medication.  Narcotic pain relievers cannot be refilled over the phone.  Please be mindful of the number of pills you have left so you can  a prescription for a refill if needed during office hours.        If you are on Coumadin (Warfarin) you will need your blood drawn every Monday and Thursday once you are home.  Initially your visiting nurse will draw your blood.  Later you will go to an outpatient lab.  You will receive a phone call the next day and your Coumadin (warfarin) will be dosed based on these results.  Take this dosage daily until you hear from us next.      Walking:  Use two crutches or a walker for EVERY step.  Gradually increase your walking daily.  You can progress to a cane as tolerated once advised by your physical therapist.      Sleeping Positions:  You may not sleep on your stomach.      Bathing:  No tub baths.  Do not submerge your incision.  You may shower.  You may sit on a shower chair or stand and shower briefly.  Use your crutches/walker to get in and out of the shower.      Sexual Relations:  Resume according to your comfort.    Swimming:  No swimming or hot tubs until approved by your physician.  Swimming will be allowed once your  incision is well healed.      Driving:  You may ride as a passenger now.  No driving until your follow-up appointment.  To get into the car use the front passenger seat with the seat pushed back as far as possible.  Scoot yourself back in the seat.  Use your hands to assist your legs into the car.      Physical therapy:  When you have finished with home visiting PT, you may begin outpatient PT.  Call your surgeon’s office if you have not already received a prescription.  A prescription can be faxed to the outpatient center of your choice.      Special considerations:  To minimize swelling, stiffness, and decrease pain use cold as needed, but not heat.  Ice 20 minutes at a time with a cloth between your skin and the ice.  Ice after walking, when you have pain, or after you have completed your exercises.  Limit your sitting to 60 minutes at one time.  Continue to wear your DUONG stockings at all times for 2 weeks after surgery, except when washing.  You can wear them as needed after that.      Follow up:  Call [phone] to make an appointment to see your surgeon within 2 weeks of your surgery if you haven’t done so already.  If you have any questions during business hours please call the direct office phone number [phone].  Otherwise please call [phone] for any concerns.      For the future:  Prior to any dental work, including routine cleanings, call the office for a prescription for antibiotics to be taken prior to your dental appointment.  For any invasive procedures (i.e. endoscopy, colonoscopy, etc.) inform the doctor performing the procedure that you have a total knee replacement and will antibiotics just prior to the procedure to protect it.

## 2024-03-27 NOTE — OP NOTE
OPERATIVE REPORT  PATIENT NAME: David Gomes  : 1960  MRN: [id number]  Pt Location:   OR ROOM 12    Surgery Date: 3/27/2024    Surgeons and Role:     * Amy Ramirez, DO - Primary     * JERRY AlasC - Assisting      * Alex Webb, ATC - Assisting    Preop Diagnosis:  Primary osteoarthritis of one hip, right [M16.11]    Post-Op Diagnosis Codes:     * Primary osteoarthritis of one hip, right [M16.11]    Procedure(s):  Right - ARTHROPLASTY HIP TOTAL ANTERIOR.NAVIGATED. RIGHT KNEE ASPIRATION    Specimens:  * No specimens in log *    Estimated Blood Loss:   Minimal    Drains:  * No LDAs found *    Anesthesia Type:   Spinal     Operative Indications:  Primary osteoarthritis of one hip, right [M16.11]    Operative Findings:  See below    Complications:   None      Hip Approach: Direct anterior    Procedure and Technique:  Implants: Depuy  Franklin acetabular sector II cup size 56mm  Actis high offset stem size 7  Neutral acetabular liner  One 6.5 x 15mm screw and one 6.5 x 20mm screw  36mm +5 Biolox delta ceramic femoral head    INDICATIONS FOR PROCEDURE:  The patients is a 63 years old male who presented to the office with right hip arthritis.  Conservative treatments were tried and failed.  The patient had debilitating pain and decreased quality of life from their end-stage arthritis.  Surgery was then recommended.  Extensive counseling in regards to the reasons for surgical intervention as well as the risks and benefits of surgery were reviewed.  The risks include, but are not limited to infection, extensive blood clots, blood clots, wound healing problems, damage to blood vessels and nerves, dislocation, leg length discrepancy, fracture, the need for further surgery.  The patient understood and agreed to by oral and written consent.  Presurgical planning XRs were obtained.      OPERATIVE PROCEDURE:  The patient was identified as David Gomes by His ID bracelet by the surgical staff in  the preoperative area at [facility].  The patient was wheeled back to the surgical room and placed on the operative table.  Preoperative antibiotics were given.    Anesthesia was administered.  Fluoroscopic images were obtained and saved.      The patient was placed in the supine position on the hana table and all bony prominences were carefully protected.   The right leg was then prepped and draped in the usual sterile fashion.  A timeout was performed where the patient’s name and surgical site were once again identified.        An incision was made over  the anterior aspect of the patient’s right hip.  Dissection was made through the skin and subcutaneous tissue down to the fascia over the tensor fascia bill.  The fascia was incised and the interval between the tensor fascia bill and the sartorius was identified.  Retractors were placed.  Bleeders were cauterized.  We dissected down to the capsule.  A capsulotomy was made and the capsule was tagged with suture.  We released the capsule from the neck.  The femoral neck cut was made and the femoral head was removed.  Retractors were placed around the acetabulum.  The labrum was removed.  Sequential reaming took place and a size 56mm acetabular shell was found to be the best fit. Fluoroscopic images were taken while reaming and to evaluate the position of the cup.  The shell was impacted into place.   Fluoroscopic images were taken and saved.  The [employer] hip computer navigation system was used to evaluate the position of the cup.  One 6.5 x 15 mm screw and one 6.5 x 20mm screw was placed through the cup and the final liner was impacted into placed.  The liner was checked and found to be secure.       Attention was then paid to the femur.  The leg was manipulated for exposure of the femur proximally.  Soft tissue dissection was done to reveal the greater trochanter.  A canal finder were used to open the femoral canal and excess bone was removed  laterally.  Sequential broaching took place and it was found that a size 6 femoral broach to be the best fit.  The broach was left in place and a size 36mm +1.5 femoral head with a high offset neck were then trialed.  Fluoroscopic images were taken and saved.  The [employer] hip computer navigation system was used to evaluate leg lengths and offset.  The leg was taken through a ROM to evaluate for stability.  Stability, leg length, and offset were found to be acceptable, but the stem looking a little undersized.  We also felt we could improve on offset. We then worked the stem down and was able to broach to a size 7.  It was down a couple millimeters than the size 6.  The final femoral stem was impacted into place.   The final 36mm +5 femoral head was impacted securely into place.  The acetabulum was irrigated and the hip was carefully reduced.  Fluoroscopic images were taken and saved.  The [employer] hip computer navigation system was used to evaluate leg lengths and offset.  The leg was taken through a ROM to evaluate for stability.  Stability, leg length, and offset were found to be acceptable.  Copious amounts of irrigation was used to irrigate the hip.  An irrisept wash followed by more irrigation was performed.  The capsule was repaired with a #5 Ethibond suture.  Irrigation was used throughout the closure.  The tensor fascia was repaired with #1-0 running vicryl suture.  The subcutaneous fat was closed with a running #1-0 vicryl suture.  The skin was closed with #2-0 vicryl and #3-0 monocryl subcutaneously.   Skin glue was placed on the incision and a mepilex dressing was placed.  The patient was transferred onto a hospital bed and awakened without difficulty.      I attest that I was present and performed this procedure.  Winsome Muse PA-C  and Alex Webb ATC were present for the entire procedure and provided essential assistance with limb position, patient prepping, and retraction.     A qualified resident  physician was not available.    Patient Disposition:  hemodynamically stable              SIGNATURE: Amy Ramirez DO  DATE: March 27, 2024  TIME: 2:10 PM

## 2024-03-27 NOTE — INTERVAL H&P NOTE
H&P reviewed. After examining the patient I find no changes in the patients condition since the H&P had been written.  Heart:  regular rate  Lungs:  no audible wheezing  Abd:  nondistended  RLE:  EHL/AT/GS intact, sensation grossly intact L4, L5, S1, palpable pedal pulse      Vitals:    03/27/24 0844   BP: 121/96   Pulse: 83   Resp: 18   Temp: 97.5 °F (36.4 °C)   SpO2: 98%

## 2024-03-27 NOTE — QUICK NOTE
Patient requesting aspiration R knee during case.  Patient with mild to moderate right knee effusion, no erythema.  Procedure added to consent.  Nurse bedside.     Home

## 2024-03-27 NOTE — PLAN OF CARE
Problem: Prexisting or High Potential for Compromised Skin Integrity  Goal: Skin integrity is maintained or improved  Description: INTERVENTIONS:  - Identify patients at risk for skin breakdown  - Assess and monitor skin integrity  - Assess and monitor nutrition and hydration status  - Monitor labs   - Assess for incontinence   - Turn and reposition patient  - Assist with mobility/ambulation  - Relieve pressure over bony prominences  - Avoid friction and shearing  - Provide appropriate hygiene as needed including keeping skin clean and dry  - Evaluate need for skin moisturizer/barrier cream  - Collaborate with interdisciplinary team   - Patient/family teaching  - Consider wound care consult   Outcome: Progressing     Problem: PAIN - ADULT  Goal: Verbalizes/displays adequate comfort level or baseline comfort level  Description: Interventions:  - Encourage patient to monitor pain and request assistance  - Assess pain using appropriate pain scale  - Administer analgesics based on type and severity of pain and evaluate response  - Implement non-pharmacological measures as appropriate and evaluate response  - Consider cultural and social influences on pain and pain management  - Notify physician/advanced practitioner if interventions unsuccessful or patient reports new pain  Outcome: Progressing     Problem: INFECTION - ADULT  Goal: Absence or prevention of progression during hospitalization  Description: INTERVENTIONS:  - Assess and monitor for signs and symptoms of infection  - Monitor lab/diagnostic results  - Monitor all insertion sites, i.e. indwelling lines, tubes, and drains  - Monitor endotracheal if appropriate and nasal secretions for changes in amount and color  - [place] appropriate cooling/warming therapies per order  - Administer medications as ordered  - Instruct and encourage patient and family to use good hand hygiene technique  - Identify and instruct in appropriate isolation precautions for  identified infection/condition  Outcome: Progressing  Goal: Absence of fever/infection during neutropenic period  Description: INTERVENTIONS:  - Monitor WBC    Outcome: Progressing     Problem: SAFETY ADULT  Goal: Patient will remain free of falls  Description: INTERVENTIONS:  - Educate patient/family on patient safety including physical limitations  - Instruct patient to call for assistance with activity   - Consult OT/PT to assist with strengthening/mobility   - Keep Call bell within reach  - Keep bed low and locked with side rails adjusted as appropriate  - Keep care items and personal belongings within reach  - Initiate and maintain comfort rounds  - Make Fall Risk Sign visible to staff  - Offer Toileting every 2 Hours, in advance of need  - Apply yellow socks and bracelet for high fall risk patients  - Consider moving patient to room near nurses station  Outcome: Progressing  Goal: Maintain or return to baseline ADL function  Description: INTERVENTIONS:  -  Assess patient's ability to carry out ADLs; assess patient's baseline for ADL function and identify physical deficits which impact ability to perform ADLs (bathing, care of mouth/teeth, toileting, grooming, dressing, etc.)  - Assess/evaluate cause of self-care deficits   - Assess range of motion  - Assess patient's mobility; develop plan if impaired  - Assess patient's need for assistive devices and provide as appropriate  - Encourage maximum independence but intervene and supervise when necessary  - Involve family in performance of ADLs  - Assess for home care needs following discharge   - Consider OT consult to assist with ADL evaluation and planning for discharge  - Provide patient education as appropriate  Outcome: Progressing  Goal: Maintains/Returns to pre admission functional level  Description: INTERVENTIONS:  - Perform AM-PAC 6 Click Basic Mobility/ Daily Activity assessment daily.  - Set and communicate daily mobility goal to care team and  patient/family/caregiver.   - Collaborate with rehabilitation services on mobility goals if consulted  - Perform Range of Motion 2 times a day.  - Reposition patient every 2 hours.  - Dangle patient 2 times a day  - Stand patient 2 times a day  - Ambulate patient 2 times a day  - Out of bed to chair 2 times a day   - Out of bed for meals 2 times a day  - Out of bed for toileting  - Record patient progress and toleration of activity level   Outcome: Progressing     Problem: DISCHARGE PLANNING  Goal: Discharge to home or other facility with appropriate resources  Description: INTERVENTIONS:  - Identify barriers to discharge w/patient and caregiver  - Arrange for needed discharge resources and transportation as appropriate  - Identify discharge learning needs (meds, wound care, etc.)  - Arrange for interpretive services to assist at discharge as needed  - Refer to Case Management Department for coordinating discharge planning if the patient needs post-hospital services based on physician/advanced practitioner order or complex needs related to functional status, cognitive ability, or social support system  Outcome: Progressing     Problem: Knowledge Deficit  Goal: Patient/family/caregiver demonstrates understanding of disease process, treatment plan, medications, and discharge instructions  Description: Complete learning assessment and assess knowledge base.  Interventions:  - Provide teaching at level of understanding  - Provide teaching via preferred learning methods  Outcome: Progressing

## 2024-03-27 NOTE — NURSING NOTE
PT was D/C to home D/C instruction was given to PT and PT verbalized understanding of D/C instruction.  All personal belonging was given to PT.  IV was D/C no distress noted.  7 t staff accompany PT to lobby.

## 2024-03-27 NOTE — ANESTHESIA POSTPROCEDURE EVALUATION
Post-Op Assessment Note    CV Status:  Stable    Pain management: adequate       Mental Status:  Alert and awake   Hydration Status:  Euvolemic   PONV Controlled:  Controlled   Airway Patency:  Patent     Post Op Vitals Reviewed: Yes    No anethesia notable event occurred.    Staff: [facility]               /72 (03/27/24 1407)    Temp 98.2 °F (36.8 °C) (03/27/24 1407)    Pulse 84 (03/27/24 1407)   Resp 16 (03/27/24 1407)    SpO2 93 % (03/27/24 1407)

## 2024-03-27 NOTE — PHYSICAL THERAPY NOTE
Physical Therapy Evaluation    Patient's Name: David Gomes    Admitting Diagnosis  Primary osteoarthritis of one hip, right [M16.11]    Problem List  Patient Active Problem List   Diagnosis    Hypertension    Prostate carcinoma (HCC)    Gout    Umbilical hernia without obstruction and without gangrene    Impaired glucose tolerance    Osteoarthritis of knee    Varicose veins of both lower extremities    Pain in both knees    Chondrocalcinosis    Trigger finger, left index finger    Arthritis of carpometacarpal (CMC) joint of right thumb    Granulomatous lung disease (HCC)    Suspected sleep apnea    Hypersomnia    Abnormal computed tomography angiography (CTA)    Obstructive sleep apnea    Excessive daytime sleepiness    Sacroiliitis (HCC)    Primary osteoarthritis of right hip    Status post total hip replacement, left    Status post total hip replacement, right       Past Medical History  Past Medical History:   Diagnosis Date    Abnormal computed tomography angiography (CTA) 03/08/2023    BPH with urinary obstruction     AND OTHER LOWER URINARY TRACT SYMPTOMS     Cancer (HCC) 2017    Chronic pain disorder     Colon polyp     Detached retina, right 2015    Elevated prostate specific antigen (PSA) 2017    Feeling of incomplete bladder emptying     History of hypertension     History of nocturia     Hypertension     Low back pain     Neuroma of foot 2017    Prostate cancer (HCC) 2017    Weak urinary stream        Past Surgical History  Past Surgical History:   Procedure Laterality Date    BACK SURGERY  2008    COLONOSCOPY      EYE SURGERY      REPAIR DETACHED RETINA     EYE SURGERY Right     HERNIA REPAIR      LAMINECTOMY      LUMBAR LAMINECTOMY Bilateral     L4-5    [place] ARTHRP ACETBLR/PROX FEM PROSTC AGRFT/ALGRFT Left 12/27/2023    Procedure: ARTHROPLASTY HIP TOTAL ANTERIOR,NAVIGATED, potential same day discharge;  Surgeon: Amy Ramirez DO;  Location:  MAIN OR;  Service: Orthopedics    PROSTATE BIOPSY  Bilateral 2017    SPINE SURGERY      TONSILLECTOMY      VASECTOMY      SURGERY VAS DEFERENS        Recent Imaging  XR hip/pelv 2-3 vws right if performed    (Results Pending)   XR hip/pelv 1 vw left if performed    (Results Pending)   XR hip/pelv 1 vw right if performed    (Results Pending)       Recent Vital Signs  Vitals:    03/27/24 1446 03/27/24 1509 03/27/24 1600 03/27/24 1700   BP: 140/81 132/79 (!) 162/102 150/94   BP Location:   Right arm Right arm   Pulse: 85 73 66 78   Resp: 16 16 16 18   Temp:  (!) 96.4 °F (35.8 °C) (!) 96.4 °F (35.8 °C) (!) 96.5 °F (35.8 °C)   TempSrc:  Temporal Temporal Temporal   SpO2: 97% 95% 94% 93%   Weight:       Height:            03/27/24 1735   PT Last Visit   PT Visit Date 03/27/24   Note Type   Note type Evaluation   Pain Assessment   Pain Assessment Tool 0-10   Pain Score 5   Pain Location/Orientation Orientation: Right;Location: Hip   Restrictions/Precautions   Weight Bearing Precautions Per Order No   Other Precautions Pain   Home Living   Type of Home House   Home Layout Two level;Stairs to enter with rails  (with first floor settup)   Bathroom Shower/Tub Tub/shower unit   Bathroom Toilet Standard   Prior Function   Level of [place] Independent with ADLs;Independent with functional mobility   Lives With Spouse   General   Family/Caregiver Present No   Cognition   Overall Cognitive Status WFL   Arousal/Participation Alert   Orientation Level Oriented X4   Memory Within functional limits   Following Commands Follows all commands and directions without difficulty   RLE Assessment   RLE Assessment   (3/5)   LLE Assessment   LLE Assessment WFL   Coordination   Movements are Fluid and Coordinated 0   Coordination and Movement Description mild coodination deficit   Sensation X   Light Touch   RLE Light Touch Grossly intact   LLE Light Touch Grossly intact   Bed Mobility   Supine to Sit 7  Independent   Sit to Supine 7  Independent   Transfers   Sit to Stand 6  Modified  independent   Additional items Increased time required   Stand to Sit 6  Modified independent   Additional items Increased time required   Ambulation/Elevation   Gait pattern Step through pattern;Decreased toe off;Decreased heel strike;Decreased foot clearance   Gait Assistance 6  Modified independent   Assistive Device Rolling walker   Distance 200ft   Balance   Static Sitting Fair +   Dynamic Sitting Fair +   Static Standing Fair   Dynamic Standing Fair   Ambulatory Fair   Endurance Deficit   Endurance Deficit Yes   Endurance Deficit Description reduced from baseline   Activity Tolerance   Activity Tolerance Patient limited by pain;Patient limited by fatigue   Medical Staff Made Aware spoke to CM   Nurse Made Aware spoke to RN   Assessment   Prognosis Good   Problem List Decreased strength;Decreased range of motion;Decreased endurance;Impaired balance;Decreased mobility;Decreased coordination;Pain   Barriers to Discharge Inaccessible home environment;Decreased caregiver support   Goals   Patient Goals to go home   Discharge Recommendation   Rehab Resource Intensity Level, PT III (Minimum Resource Intensity)   Equipment Recommended Walker   Walker Package Recommended Wheeled walker   AM-PAC Basic Mobility Inpatient   Turning in Flat Bed Without Bedrails 4   Lying on Back to Sitting on Edge of Flat Bed Without Bedrails 4   Moving Bed to Chair 4   Standing Up From Chair Using Arms 4   Walk in Room 4   Climb 3-5 Stairs With Railing 3   Basic Mobility Inpatient Raw Score 23   Basic Mobility Standardized Score 50.88   [facility] Highest Level Of Mobility   -HLM Goal 7: Walk 25 feet or more   -HLM Achieved 8: Walk 250 feet ot more   End of Consult   Patient Position at End of Consult All needs within reach;Supine         ASSESSMENT                                                                                                                     David Gomes is a 63 y.o. male admitted to [facility] on 3/27/2024 for  Status post total hip replacement, right. Pt  has a past medical history of Abnormal computed tomography angiography (CTA) (03/08/2023), BPH with urinary obstruction, Cancer (HCC) (2017), Chronic pain disorder, Colon polyp, Detached retina, right (2015), Elevated prostate specific antigen (PSA) (2017), Feeling of incomplete bladder emptying, History of hypertension, History of nocturia, Hypertension, Low back pain, Neuroma of foot (2017), Prostate cancer (HCC) (2017), and Weak urinary stream.. PT was consulted and pt was seen on 3/27/2024 for mobility assessment and d/c planning.  Impairments limiting pt at this time include decreased ROM, impaired balance, decreased endurance, decreased coordination, new onset of impairment of functional mobility, decreased IADLS, pain, decreased activity tolerance, and decreased strength. Pt is currently functioning at a independent level for bed mobility, modified independent assistance level for transfers, modified independent assistance level for ambulation with Rolling Walker. The patient's AM-[facility] Basic Mobility Inpatient Short Form Raw Score is 23. A Raw score of greater than 16 suggests the patient may benefit from discharge to home. Please also refer to the recommendation of the Physical Therapist for safe discharge planning.      Recommendations                                                                                                                DME: Rolling Walker    Discharge Disposition:  Home with Outpatient Physical Therapy       Jm Morse PT, DPT

## 2024-03-27 NOTE — ANESTHESIA PROCEDURE NOTES
Spinal Block    Patient location during procedure: OR  Start time: 3/27/2024 11:24 AM  Reason for block: primary anesthetic  Staffing  Performed by: Federico Alvarez CRNA  Authorized by: Emelyn Bustos MD    Preanesthetic Checklist  Completed: patient identified, IV checked, site marked, risks and benefits discussed, surgical consent, monitors and equipment checked, pre-op evaluation and timeout performed  Spinal Block  Patient position: sitting  Prep: ChloraPrep  Patient monitoring: heart rate, continuous pulse ox and frequent blood pressure checks  Approach: midline  Location: L3-4  Injection technique: single-shot  Needle  Needle type: pencil-tip   Needle gauge: 24 G  Needle length: 10 cm  Assessment  Events: cerebrospinal fluid  Injection Assessment:  negative aspiration for heme, no paresthesia on injection and positive aspiration for clear CSF.

## 2024-03-28 ENCOUNTER — TELEPHONE (OUTPATIENT)
Dept: OBGYN CLINIC | Facility: HOSPITAL | Age: 64
End: 2024-03-28

## 2024-03-28 NOTE — TELEPHONE ENCOUNTER
"Patient contacted for a postoperative follow up assessment. Patient reports doing \"pretty good\"  Patient states current pain level of a 1/10  when sitting and 1/10 when walking with RW. Patient reports pain mostly when lifting leg. Patient denies increase in swelling and dressing is clean, dry and intact.  Encouraged patient to ice every hour for 20-30 mins while awake to help with swelling.     We reviewed patients AVS medication list. Patient is taking Tylenol 1000mg every 8 hours (encouraged patient to take on a scheduled basis to help with pain), Oxycodone 10mg PRN, ASA 325mg BID, gabapentin 100mg TID, Colace 100mg BID. Patient has not yet had a BM but is passing gas.  Encouraged patient to increase fluid and fiber intake and add miralax to regimen if needed.     Patient denies nausea, vomiting, abdominal pain, chest pain, shortness of breath, fever, dizziness and calf pain. Patient confirmed post-op appointment with surgeon on  04/03. Patient does not have any other questions or concerns at this time. Pt was encouraged to call with any questions, concerns or issues.    "

## 2024-04-01 NOTE — PATIENT INSTRUCTIONS
keep an eye on BP readings at home What Type Of Note Output Would You Prefer (Optional)?: Standard Output Have Your Spot(S) Been Treated In The Past?: has not been treated Hpi Title: Evaluation of a Skin Lesion

## 2024-04-03 ENCOUNTER — APPOINTMENT (OUTPATIENT)
Dept: RADIOLOGY | Facility: MEDICAL CENTER | Age: 64
End: 2024-04-03
Payer: COMMERCIAL

## 2024-04-03 ENCOUNTER — OFFICE VISIT (OUTPATIENT)
Dept: OBGYN CLINIC | Facility: MEDICAL CENTER | Age: 64
End: 2024-04-03

## 2024-04-03 VITALS
HEART RATE: 109 BPM | SYSTOLIC BLOOD PRESSURE: 102 MMHG | DIASTOLIC BLOOD PRESSURE: 70 MMHG | HEIGHT: 74 IN | WEIGHT: 206 LBS | BODY MASS INDEX: 26.44 KG/M2

## 2024-04-03 DIAGNOSIS — Z96.641 STATUS POST TOTAL HIP REPLACEMENT, RIGHT: ICD-10-CM

## 2024-04-03 DIAGNOSIS — Z96.642 STATUS POST TOTAL HIP REPLACEMENT, LEFT: ICD-10-CM

## 2024-04-03 DIAGNOSIS — Z96.641 STATUS POST TOTAL HIP REPLACEMENT, RIGHT: Primary | ICD-10-CM

## 2024-04-03 PROCEDURE — 73502 X-RAY EXAM HIP UNI 2-3 VIEWS: CPT

## 2024-04-03 PROCEDURE — 99024 POSTOP FOLLOW-UP VISIT: CPT | Performed by: ORTHOPAEDIC SURGERY

## 2024-04-03 NOTE — PROGRESS NOTES
Assessment/Plan:  1. Status post total hip replacement, right    2. Status post total hip replacement, left      Orders Placed This Encounter   Procedures    XR hip/pelv 2-3 vws right if performed       Patient is doing very well 1 week status post R ant DONNA on 3/27/24 and about 3 months status post L ant DONNA on 12/27/23.   Transition to outpatient PT  Pain control prn- gabapentin and tylenol.   Continue with  BID for DVT prophylaxis  DUONG stockings as needed for swelling  May shower and let water run over incision, do not submerge incision.  Continue with anterior hip precautions.  Patient should call ahead for abx prior to dental appts.   Will do right knee CSI at next visit.     Return in about 4 weeks (around 5/1/2024) for R ant DONNA post op 2.    I answered all of the patient's questions during the visit and provided education of the patient's condition during the visit.  The patient verbalized understanding of the information given and agrees with the plan.  This note was dictated using [employer] software.  It may contain errors including improperly dictated words.  Please contact physician directly for any questions.    Subjective   Chief Complaint:   Chief Complaint   Patient presents with    Right Hip - Post-op       David Gomes is a 63 y.o. male who presents for 1 week follow up s/p right DONNA.  Patient states he is doing very well. Both hips feel good overall. He is taking tylenol and gabapentin for pain. He is planning to start PT next week. He is not using an assistive device. He is taking ASA for DVT ppx. Denies leg length discrepancy.     Review of Systems  ROS:    See HPI for musculoskeletal review.   All other systems reviewed are negative     History:  Past Medical History:   Diagnosis Date    Abnormal computed tomography angiography (CTA) 03/08/2023    BPH with urinary obstruction     AND OTHER LOWER URINARY TRACT SYMPTOMS     Cancer (HCC) 2017    Chronic pain disorder     Colon polyp      Detached retina, right 2015    Elevated prostate specific antigen (PSA) 2017    Feeling of incomplete bladder emptying     History of hypertension     History of nocturia     Hypertension     Low back pain     Neuroma of foot 2017    Prostate cancer (HCC) 2017    Weak urinary stream      Past Surgical History:   Procedure Laterality Date    BACK SURGERY  2008    COLONOSCOPY      EYE SURGERY      REPAIR DETACHED RETINA     EYE SURGERY Right     HERNIA REPAIR      LAMINECTOMY      LUMBAR LAMINECTOMY Bilateral     L4-5    [place] ARTHRP ACETBLR/PROX FEM PROSTC AGRFT/ALGRFT Left 12/27/2023    Procedure: ARTHROPLASTY HIP TOTAL ANTERIOR,NAVIGATED, potential same day discharge;  Surgeon: Amy Ramirez DO;  Location: [facility];  Service: Orthopedics    [place] ARTHRP ACETBLR/PROX FEM PROSTC AGRFT/ALGRFT Right 3/27/2024    Procedure: ARTHROPLASTY HIP TOTAL ANTERIOR,NAVIGATED, RIGHT KNEE ASPIRATION;  Surgeon: Amy Ramirez DO;  Location: [facility];  Service: Orthopedics    PROSTATE BIOPSY Bilateral 2017    SPINE SURGERY      TONSILLECTOMY      VASECTOMY      SURGERY VAS DEFERENS      Social History   Social History     Substance and Sexual Activity   Alcohol Use Not Currently    Comment: I have stopped drinking alcohol.     Social History     Substance and Sexual Activity   Drug Use No     Social History     Tobacco Use   Smoking Status Never   Smokeless Tobacco Never     Family History:   Family History   Problem Relation Age of Onset    Hypertension Mother     Peripheral vascular disease Mother     Dementia Father        Current Outpatient Medications on File Prior to Visit   Medication Sig Dispense Refill    acetaminophen (TYLENOL) 500 mg tablet Take 2 tablets (1,000 mg total) by mouth every 8 (eight) hours      ascorbic acid (VITAMIN C) 500 MG TBCR Take 1 tablet (500 mg total) by mouth daily Begin 30 days prior to surgery 30 tablet 1    aspirin 325 mg tablet Take 1 tablet (325 mg total) by mouth 2 (two) times a  "day Take with food. 84 tablet 0    docusate sodium (COLACE) 100 mg capsule Take 1 capsule (100 mg total) by mouth 2 (two) times a day 20 capsule 0    ferrous sulfate 324 (65 Fe) mg Take 1 tablet (324 mg total) by mouth daily before breakfast Begin 30 days prior to surgery 30 tablet 1    folic acid (KP Folic Acid) 1 mg tablet Take 1 tablet (1 mg total) by mouth daily Begin 30 prior to surgery      gabapentin (NEURONTIN) 100 mg capsule Take 1 capsule (100 mg total) by mouth 3 (three) times a day 90 capsule 0    Multiple Vitamin (multivitamin) tablet Take 1 tablet by mouth daily Begin 30 days prior to surgery      oxyCODONE (ROXICODONE) 10 MG TABS Take 1 tablet (10 mg total) by mouth every 4 (four) hours as needed for severe pain for up to 7 days Max Daily Amount: 60 mg 42 tablet 0    promethazine (PHENERGAN) 12.5 MG tablet Take 1 tablet (12.5 mg total) by mouth every 6 (six) hours as needed for nausea or vomiting 8 tablet 0    valsartan (DIOVAN) 80 mg tablet Take 1 tablet (80 mg total) by mouth daily 90 tablet 0     No current facility-administered medications on file prior to visit.     No Known Allergies     Objective     /70   Pulse (!) 109   Ht 6' 2\" (1.88 m)   Wt 93.4 kg (206 lb)   BMI 26.45 kg/m²      PE:  AAOx 3  WDWN  Hearing intact, no drainage from eyes  no audible wheezing  no abdominal distension  LE compartments soft, AT/GS intact    Ortho Exam:  right hip:   INC: C/D/I, No erythema, mild swelling  No pain with ROM    Left hip:  Incision healed  No pain with ROM  Leg lengths appear equal      Imaging Studies: I have personally reviewed pertinent films in PACS  XR right hip:  S/p DONNA, adequate alignment       "

## 2024-04-10 ENCOUNTER — EVALUATION (OUTPATIENT)
Dept: PHYSICAL THERAPY | Facility: MEDICAL CENTER | Age: 64
End: 2024-04-10
Payer: COMMERCIAL

## 2024-04-10 DIAGNOSIS — Z96.641 STATUS POST TOTAL HIP REPLACEMENT, RIGHT: Primary | ICD-10-CM

## 2024-04-10 PROCEDURE — 97112 NEUROMUSCULAR REEDUCATION: CPT

## 2024-04-10 PROCEDURE — 97161 PT EVAL LOW COMPLEX 20 MIN: CPT

## 2024-04-10 NOTE — PROGRESS NOTES
PT Evaluation     Today's date: 4/10/2024  Patient name: David Gomes  : 1960  MRN: [id number]  Referring provider: Winsome Ritter PA-C  Dx:   Encounter Diagnosis     ICD-10-CM    1. Status post total hip replacement, right  Z96.641 Ambulatory referral to Physical Therapy                     Assessment  Assessment details: David Gomes  is a 63 y.o. male presents s/p R DONNA with anterior approach on 3/27/24.    David Gomes  arrived ambulating s/  an assistive device. Current weight bearing status:  WBAT    Precautions, surgeon's protocol, and signs/symptoms of infection were reviewed with patient, who expressed verbal understanding.  Patient has the above listed impairments and will benefit from skilled PT to improve deficits to return to prior level of function.      Impairments: abnormal gait, abnormal muscle firing, abnormal muscle tone, abnormal or restricted ROM, abnormal movement, activity intolerance, impaired balance, impaired physical strength, lacks appropriate home exercise program and pain with function  Understanding of Dx/Px/POC: good   Prognosis: good  Prognosis details: Positive prognostic indicators: motivation to improve   Negative prognostic indicators: chronicity of symptoms    Goals  Impairment Goals  - Pt I with initial HEP in 1-2 visits  - Improve ROM equal to contralateral side in 4-6 weeks  - Increase strength to 5/5 in all affected areas in 4-6 weeks    Functional Goals  - Increase Functional Status Measure to: 89 in 6-8 weeks  - Patient will be independent with comprehensive HEP in 6-8 weeks  - Ambulation is improved to prior level of function in 6-8 weeks  - Stair climbing is improved to prior level of function in 6-8 weeks  - Squatting is improved to prior level of function in 6-8 weeks      Plan  Plan details: Prognosis above is given PT services 2x/week tapering to 1x/week over the next 2 months and home program adherence.  Patient would benefit from: skilled physical  therapy  Planned modality interventions: low level laser therapy, TENS and cryotherapy  Planned therapy interventions: joint mobilization, manual therapy, massage, neuromuscular re-education, patient education, postural training, strengthening, stretching, therapeutic activities, therapeutic exercise, flexibility, functional ROM exercises, graded exercise and home exercise program  Treatment plan discussed with: patient    Subjective Evaluation    History of Present Illness  Mechanism of injury: David Gomes  is a 63 y.o. male presents s/p R DONNA with anterior approach on 3/27/24. Patient reports that pain is well controlled with medication.   Aggravating factors: lifting leg in the air while laying, up steps, placing full weight on R leg  Relieving factors: rest  24hr pain pattern: 0/10 (current), 0/10 (best), 1/10 (worst), location: lateral hip, descriptors: ache, pressure  Imaging: x-ray  Previous treatments: PT  Occupation/recreation: walking, work at  home  Primary concern: not getting back to work full time  Patient goals: get back to work, get back to walking daily        Objective     Observations     Right Hip  Positive for incision.     Additional Observation Details  Incision healing well, no abnormal drainage, redness, or warmth    Passive Range of Motion   Left Hip   Normal passive range of motion    Right Hip   Flexion: 90 degrees   Abduction: 40 degrees   Adduction: Right hip passive adduction: to body.   External rotation (90/90): 15 degrees   Internal rotation (90/90): 15 degrees     Joint Play     Additional Joint Play Details  Deferred secondary to surgery    Strength/Myotome Testing     Left Hip   Normal muscle strength    Right Hip   Planes of Motion   Flexion: 4  Extension: 4  Abduction: 4  Adduction: 4    Ambulation   Weight-Bearing Status   Weight-Bearing Status (Right): weight-bearing as tolerated    Assistive device used: none    Observational Gait   Gait: antalgic     Additional  Observational Gait Details  Mild antalgic gait  Decreased stance time on right              Precautions: s/p R DONNA anterior approach on 3/26/24; L DONNA on 12/27/23    HEP: SLR, bridges, LAQ, hip abd/ext, side steps  Manuals 4/10            PROM                                                    Neuro Re-Ed             Step up             Lateral step up             Balance progression             Side steps             Kesier walk out             Mini squat                                       HEP review and pt education 25'            Ther Ex             bike             4 way SLR             LAQ             Hip abd/ext             Standing marches                                                    Ther Activity             Sled push/pull                          Gait Training                                       Modalities

## 2024-04-24 ENCOUNTER — OFFICE VISIT (OUTPATIENT)
Dept: PHYSICAL THERAPY | Facility: MEDICAL CENTER | Age: 64
End: 2024-04-24
Payer: COMMERCIAL

## 2024-04-24 DIAGNOSIS — Z96.641 STATUS POST TOTAL HIP REPLACEMENT, RIGHT: Primary | ICD-10-CM

## 2024-04-24 PROCEDURE — 97110 THERAPEUTIC EXERCISES: CPT

## 2024-04-24 PROCEDURE — 97112 NEUROMUSCULAR REEDUCATION: CPT

## 2024-04-24 PROCEDURE — 97140 MANUAL THERAPY 1/> REGIONS: CPT

## 2024-04-24 NOTE — PROGRESS NOTES
"Daily Note     Today's date: 2024  Patient name: David Gomes  : 1960  MRN: [id number]  Referring provider: Winsome Ritter PA-C  Dx:   Encounter Diagnosis     ICD-10-CM    1. Status post total hip replacement, right  Z96.641                      Subjective: Patient reports that he is able to get in the car normally. He reports that he has been working the past couple of days doing viewings.       Objective: See treatment diary below      Assessment: POC initiated. Progressed strengthening to patient tolerance. Tolerated treatment well. Patient demonstrated fatigue post treatment, exhibited good technique with therapeutic exercises, and would benefit from continued PT      Plan: Continue per plan of care.      Precautions: s/p R DONNA anterior approach on 3/26/24; L DONNA on 23    HEP: SLR, bridges, LAQ, hip abd/ext, side steps  Manuals             PROM JH                                                   Neuro Re-Ed             Step up 3x10 6\"            Lateral step up 3x10 6\"            Balance progression             Side steps             Kesier walk out Lateral x10 5#            Mini squat 10# to 24\" x30                                      HEP review and pt education             Ther Ex             bike 10'             4 way SLR             LAQ 3x12 5#            Hip abd/ext 3x10 5#            Standing marches 3x10 5#                                                   Ther Activity             Sled push/pull                          Gait Training                                       Modalities                                            "

## 2024-04-30 ENCOUNTER — OFFICE VISIT (OUTPATIENT)
Dept: PHYSICAL THERAPY | Facility: MEDICAL CENTER | Age: 64
End: 2024-04-30
Payer: COMMERCIAL

## 2024-04-30 DIAGNOSIS — Z96.641 STATUS POST TOTAL HIP REPLACEMENT, RIGHT: Primary | ICD-10-CM

## 2024-04-30 PROCEDURE — 97110 THERAPEUTIC EXERCISES: CPT

## 2024-04-30 PROCEDURE — 97140 MANUAL THERAPY 1/> REGIONS: CPT

## 2024-04-30 PROCEDURE — 97112 NEUROMUSCULAR REEDUCATION: CPT

## 2024-04-30 NOTE — PROGRESS NOTES
"Daily Note     Today's date: 2024  Patient name: David Gomes  : 1960  MRN: [id number]  Referring provider: Winsome Ritter PA-C  Dx:   Encounter Diagnosis     ICD-10-CM    1. Status post total hip replacement, right  Z96.641                      Subjective: Patient reports that he has been having bilateral posterior glute and low back pain since . He reports that he was just standing. He reports that he can barely walk in the morning because of the pain, but after moving around he is able to do stuff from about 6:30AM to 3 PM then the pain comes back. He reports that he has difficulty picking up objects.      Objective: See treatment diary below  Lumbar  - Flexion: 90%  - Extension: 50%  - R ROT: 70% reproduces pain  - L ROT: 70%reproduces pain  - R SB: 70%  - L SB: 70%    Lumbar mobility: WNL    Slump (-)     No TTP in glute med, glute max, piriformis, erector spinae    Assessment: Performed low back assessment- patient presented with normal mobility, no soft tissue TTP, negative neural tension testing. Patient's pain was reproduced with extension in lying and standing. No change after 10 reps. Added overpressure with no change. Performed seated piriformis stretch on left only with improvement in symptoms. Unable to perform the same stretch on the right secondary to recent anterior approach DONNA precautions. Modified with self STM with ball against the wall. Pt had improvement in symptoms afterwards. Instructed patient to stretch throughout the day. Tolerated treatment well. Patient demonstrated fatigue post treatment, exhibited good technique with therapeutic exercises, and would benefit from continued PT      Plan: Continue per plan of care.      Precautions: s/p R DONNA anterior approach on 3/26/24; L DONNA on 23    HEP: SLR, bridges, LAQ, hip abd/ext, side steps  Manuals             PROM JH                                                   Neuro Re-Ed             Step up 3x10 6\"       " "     Lateral step up 3x10 6\"            Balance progression             Side steps             Kesier walk out Lateral x10 5#            Mini squat 10# to 24\" x30                                      HEP review and pt education             Ther Ex             bike 10'             4 way SLR             LAQ 3x12 5#            Hip abd/ext 3x10 5#            Standing marches 3x10 5#                                                   Ther Activity             Sled push/pull                          Gait Training                                       Modalities                                            "

## 2024-05-03 ENCOUNTER — OFFICE VISIT (OUTPATIENT)
Dept: PHYSICAL THERAPY | Facility: MEDICAL CENTER | Age: 64
End: 2024-05-03
Payer: COMMERCIAL

## 2024-05-03 ENCOUNTER — OFFICE VISIT (OUTPATIENT)
Dept: OBGYN CLINIC | Facility: MEDICAL CENTER | Age: 64
End: 2024-05-03
Payer: COMMERCIAL

## 2024-05-03 ENCOUNTER — APPOINTMENT (OUTPATIENT)
Dept: PHYSICAL THERAPY | Facility: MEDICAL CENTER | Age: 64
End: 2024-05-03
Payer: COMMERCIAL

## 2024-05-03 VITALS
HEART RATE: 75 BPM | DIASTOLIC BLOOD PRESSURE: 88 MMHG | HEIGHT: 74 IN | BODY MASS INDEX: 26.31 KG/M2 | SYSTOLIC BLOOD PRESSURE: 155 MMHG | WEIGHT: 205 LBS

## 2024-05-03 DIAGNOSIS — Z96.642 STATUS POST TOTAL HIP REPLACEMENT, LEFT: ICD-10-CM

## 2024-05-03 DIAGNOSIS — Z96.641 STATUS POST TOTAL HIP REPLACEMENT, RIGHT: Primary | ICD-10-CM

## 2024-05-03 DIAGNOSIS — M17.12 PRIMARY OSTEOARTHRITIS OF LEFT KNEE: ICD-10-CM

## 2024-05-03 DIAGNOSIS — M17.11 PRIMARY OSTEOARTHRITIS OF RIGHT KNEE: ICD-10-CM

## 2024-05-03 PROCEDURE — 99024 POSTOP FOLLOW-UP VISIT: CPT | Performed by: ORTHOPAEDIC SURGERY

## 2024-05-03 PROCEDURE — 20610 DRAIN/INJ JOINT/BURSA W/O US: CPT | Performed by: PHYSICIAN ASSISTANT

## 2024-05-03 PROCEDURE — 97112 NEUROMUSCULAR REEDUCATION: CPT

## 2024-05-03 PROCEDURE — 97110 THERAPEUTIC EXERCISES: CPT

## 2024-05-03 PROCEDURE — 97140 MANUAL THERAPY 1/> REGIONS: CPT

## 2024-05-03 RX ADMIN — LIDOCAINE HYDROCHLORIDE 3 ML: 10 INJECTION, SOLUTION INFILTRATION; PERINEURAL at 14:15

## 2024-05-03 RX ADMIN — TRIAMCINOLONE ACETONIDE 40 MG: 40 INJECTION, SUSPENSION INTRA-ARTICULAR; INTRAMUSCULAR at 14:15

## 2024-05-03 NOTE — PROGRESS NOTES
Assessment/Plan:  1. Status post total hip replacement, right    2. Status post total hip replacement, left    3. Primary osteoarthritis of left knee    4. Primary osteoarthritis of right knee      Orders Placed This Encounter   Procedures    Large joint arthrocentesis     Patient is doing well 5 weeks status post right ant DONNA on 3/27/2024. Patient is also about 4 months status post left ant DONNA on 12/27/23. He also has moderate bilateral knee osteoarthritis.   Continue PT. May transition to home exercise program once comfortable.   Pain control prn- tylenol or ibuprofen as needed.   Patient should call ahead for abx prior to dental appts.     Return in about 4 weeks (around 5/31/2024) for R DONNA post op 3.    I answered all of the patient's questions during the visit and provided education of the patient's condition during the visit.  The patient verbalized understanding of the information given and agrees with the plan.  This note was dictated using [employer] software.  It may contain errors including improperly dictated words.  Please contact physician directly for any questions.    Subjective   Chief Complaint:   Chief Complaint   Patient presents with    Right Hip - Follow-up, Post-op       David Gomes is a 63 y.o. male who presents for 5 week follow up s/p right DONNA, DOS 3/27/2024.  Patient is also 4-month status post left anterior DONNA on 12/27/2023.  Patient states today overall he is doing great.  Patient states he has finished taking gabapentin and has finished taking Tylenol for pain control.  Patient states he has been able to go to physical therapy which has been beneficial for him.  Patient states he was having some mild discomfort along his gluteal area and he has been taking ibuprofen and these past 2 days which has been alleviating his pain.  Patient states he does get occasional discomfort with ascending steps.  Patient does state he does have right ankle swelling but states it is not painful.   Patient states overall he is pleased with his progress.      Review of Systems  ROS:    See HPI for musculoskeletal review.   All other systems reviewed are negative     History:  Past Medical History:   Diagnosis Date    Abnormal computed tomography angiography (CTA) 03/08/2023    BPH with urinary obstruction     AND OTHER LOWER URINARY TRACT SYMPTOMS     Cancer (HCC) 2017    Chronic pain disorder     Colon polyp     Detached retina, right 2015    Elevated prostate specific antigen (PSA) 2017    Feeling of incomplete bladder emptying     History of hypertension     History of nocturia     Hypertension     Low back pain     Neuroma of foot 2017    Prostate cancer (HCC) 2017    Weak urinary stream      Past Surgical History:   Procedure Laterality Date    BACK SURGERY  2008    COLONOSCOPY      EYE SURGERY      REPAIR DETACHED RETINA     EYE SURGERY Right     HERNIA REPAIR      LAMINECTOMY      LUMBAR LAMINECTOMY Bilateral     L4-5    [facility] ARTHRP ACETBLR/PROX FEM PROSTC AGRFT/ALGRFT Left 12/27/2023    Procedure: ARTHROPLASTY HIP TOTAL ANTERIOR,NAVIGATED, potential same day discharge;  Surgeon: Amy Ramirez DO;  Location: [facility];  Service: Orthopedics    [facility] ARTHRP ACETBLR/PROX FEM PROSTC AGRFT/ALGRFT Right 3/27/2024    Procedure: ARTHROPLASTY HIP TOTAL ANTERIOR,NAVIGATED, RIGHT KNEE ASPIRATION;  Surgeon: Amy Ramirez DO;  Location: [facility];  Service: Orthopedics    PROSTATE BIOPSY Bilateral 2017    SPINE SURGERY      TONSILLECTOMY      VASECTOMY      SURGERY VAS DEFERENS      Social History   Social History     Substance and Sexual Activity   Alcohol Use Not Currently    Comment: I have stopped drinking alcohol.     Social History     Substance and Sexual Activity   Drug Use No     Social History     Tobacco Use   Smoking Status Never   Smokeless Tobacco Never     Family History:   Family History   Problem Relation Age of Onset    Hypertension Mother     Peripheral vascular disease Mother      "Dementia Father        Current Outpatient Medications on File Prior to Visit   Medication Sig Dispense Refill    acetaminophen (TYLENOL) 500 mg tablet Take 2 tablets (1,000 mg total) by mouth every 8 (eight) hours      ascorbic acid (VITAMIN C) 500 MG TBCR Take 1 tablet (500 mg total) by mouth daily Begin 30 days prior to surgery 30 tablet 1    aspirin 325 mg tablet Take 1 tablet (325 mg total) by mouth 2 (two) times a day Take with food. 84 tablet 0    docusate sodium (COLACE) 100 mg capsule Take 1 capsule (100 mg total) by mouth 2 (two) times a day 20 capsule 0    ferrous sulfate 324 (65 Fe) mg Take 1 tablet (324 mg total) by mouth daily before breakfast Begin 30 days prior to surgery 30 tablet 1    folic acid (KP Folic Acid) 1 mg tablet Take 1 tablet (1 mg total) by mouth daily Begin 30 prior to surgery      gabapentin (NEURONTIN) 100 mg capsule Take 1 capsule (100 mg total) by mouth 3 (three) times a day 90 capsule 0    Multiple Vitamin (multivitamin) tablet Take 1 tablet by mouth daily Begin 30 days prior to surgery      promethazine (PHENERGAN) 12.5 MG tablet Take 1 tablet (12.5 mg total) by mouth every 6 (six) hours as needed for nausea or vomiting 8 tablet 0    valsartan (DIOVAN) 80 mg tablet Take 1 tablet (80 mg total) by mouth daily 90 tablet 0     No current facility-administered medications on file prior to visit.     No Known Allergies     Objective     /88   Pulse 75   Ht 6' 2\" (1.88 m)   Wt 93 kg (205 lb)   BMI 26.32 kg/m²      PE:  AAOx 3  WDWN  Hearing intact, no drainage from eyes  no audible wheezing  no abdominal distension  LE compartments soft, AT/GS intact    Ortho Exam:  bilateral hip:   INC: healed, No erythema, mild swelling  No pain with ROM  Leg lengths appear equal     Bilateral knees:  No eccymosis  No erythema  +effusion right knee  AROM 0- 115  Stable to varus and valgus stress      Large joint arthrocentesis: bilateral knee  Universal Protocol:  Consent: Verbal consent " obtained.  Risks and benefits: risks, benefits and alternatives were discussed  Consent given by: patient  Site marked: the operative site was marked  Supporting Documentation  Indications: pain   Procedure Details  Location: knee - bilateral knee  Preparation: Patient was prepped and draped in the usual sterile fashion  Needle size: 22 G  Ultrasound guidance: no  Approach: anterolateral    Medications (Right): 3 mL lidocaine 1 %; 40 mg triamcinolone acetonide 40 mg/mLMedications (Left): 3 mL lidocaine 1 %; 40 mg triamcinolone acetonide 40 mg/mL   Patient tolerance: patient tolerated the procedure well with no immediate complications  Dressing:  Sterile dressing applied

## 2024-05-03 NOTE — PROGRESS NOTES
"Daily Note     Today's date: 5/3/2024  Patient name: David Gomes  : 1960  MRN: [id number]  Referring provider: Winsome Ritter PA-C  Dx:   Encounter Diagnosis     ICD-10-CM    1. Status post total hip replacement, right  Z96.641                      Subjective: Patient reports that he took an Ibuprofen and feels great.       Objective: See treatment diary below      Assessment: Patient had improvement in symptoms after taking medication. Patient continues to have ankle swelling. Continued with POC. Return if needed after follow up with ortho. Tolerated treatment well. Patient demonstrated fatigue post treatment, exhibited good technique with therapeutic exercises, and would benefit from continued PT      Plan: Continue per plan of care.      Precautions: s/p R DONNA anterior approach on 3/26/24; L DONNA on 23    HEP: SLR, bridges, LAQ, hip abd/ext, side steps  Manuals 5/3            PROM JH                                                   Neuro Re-Ed             Step up 3x10 6\"            Lateral step up 3x10 6\"            Balance progression             Side steps             Kesier walk out Lateral x10 5#            Mini squat 10# to 24\" x30                                      HEP review and pt education             Ther Ex             bike 10'             4 way SLR             LAQ 3x12 5#            Hip abd/ext 3x10 5#            Standing marches 3x10 5#                                                   Ther Activity             Sled push/pull                          Gait Training                                       Modalities                                            "

## 2024-05-05 DIAGNOSIS — I10 ESSENTIAL HYPERTENSION: ICD-10-CM

## 2024-05-06 RX ORDER — VALSARTAN 80 MG/1
80 TABLET ORAL DAILY
Qty: 90 TABLET | Refills: 0 | Status: SHIPPED | OUTPATIENT
Start: 2024-05-06

## 2024-05-10 RX ORDER — TRIAMCINOLONE ACETONIDE 40 MG/ML
40 INJECTION, SUSPENSION INTRA-ARTICULAR; INTRAMUSCULAR
Status: COMPLETED | OUTPATIENT
Start: 2024-05-03 | End: 2024-05-03

## 2024-05-10 RX ORDER — LIDOCAINE HYDROCHLORIDE 10 MG/ML
3 INJECTION, SOLUTION INFILTRATION; PERINEURAL
Status: COMPLETED | OUTPATIENT
Start: 2024-05-03 | End: 2024-05-03

## 2024-05-10 NOTE — ADDENDUM NOTE
Addended by: CARLOS A FRAIRE on: 5/10/2024 09:46 AM     Modules accepted: Level of Service    
You can access the [facility] Patient Portal, offered by [facility], by registering with the following website: [email or web address]

## 2024-05-23 ENCOUNTER — TELEPHONE (OUTPATIENT)
Dept: OBGYN CLINIC | Facility: HOSPITAL | Age: 64
End: 2024-05-23

## 2024-05-23 NOTE — TELEPHONE ENCOUNTER
Caller: Patient    Doctor: James    Reason for call: Patient is scheduled for a PO visit on 6/6/24 but is going to be away and cannot make it.  He would like to know if you could squeeze him to your schedule either 5/31 of 6/5 if possible. He is going to be leaving to go on a cruise after that and would like to be seen before he goes.     Call back#: [phone]

## 2024-06-05 ENCOUNTER — OFFICE VISIT (OUTPATIENT)
Dept: OBGYN CLINIC | Facility: MEDICAL CENTER | Age: 64
End: 2024-06-05

## 2024-06-05 VITALS
BODY MASS INDEX: 26.31 KG/M2 | HEART RATE: 78 BPM | HEIGHT: 74 IN | WEIGHT: 205 LBS | DIASTOLIC BLOOD PRESSURE: 72 MMHG | SYSTOLIC BLOOD PRESSURE: 122 MMHG

## 2024-06-05 DIAGNOSIS — M17.12 PRIMARY OSTEOARTHRITIS OF LEFT KNEE: ICD-10-CM

## 2024-06-05 DIAGNOSIS — M17.11 PRIMARY OSTEOARTHRITIS OF RIGHT KNEE: ICD-10-CM

## 2024-06-05 DIAGNOSIS — Z96.642 STATUS POST TOTAL HIP REPLACEMENT, LEFT: ICD-10-CM

## 2024-06-05 DIAGNOSIS — Z96.641 STATUS POST RIGHT HIP REPLACEMENT: Primary | ICD-10-CM

## 2024-06-05 PROCEDURE — 99024 POSTOP FOLLOW-UP VISIT: CPT | Performed by: ORTHOPAEDIC SURGERY

## 2024-06-05 RX ORDER — AMOXICILLIN 500 MG/1
CAPSULE ORAL
Qty: 4 CAPSULE | Refills: 2 | Status: SHIPPED | OUTPATIENT
Start: 2024-06-05 | End: 2024-06-06

## 2024-06-06 NOTE — PROGRESS NOTES
Assessment/Plan:  1. Status post right hip replacement    2. Status post total hip replacement, left    3. Primary osteoarthritis of right knee    4. Primary osteoarthritis of left knee      No orders of the defined types were placed in this encounter.      continue home exercises  Monitor gluteal pain  Pain control prn- OTC pain meds  Patient can call ahead for abx prior to dental appts. Rx provided today    Return in about 2 months (around 8/5/2024). Can have repeat bilateral knee steroid injections if needed    I answered all of the patient's questions during the visit and provided education of the patient's condition during the visit.  The patient verbalized understanding of the information given and agrees with the plan.  This note was dictated using [employer] software.  It may contain errors including improperly dictated words.  Please contact physician directly for any questions.    Subjective   Chief Complaint:   Chief Complaint   Patient presents with    Right Hip - Post-op, Follow-up       David Gomes is a 63 y.o. male who presents for 10 week follow up s/p right DONNA on 3/27/24 and s/p L DONNA 12/27/23.  He had bilateral knee steroid injections on 5/3.  His hips and knees are doing well.  He notes some pain in his knees and buttock first thing in the morning.  Once he gets moving he feels better.  His buttock pain is 3-4/10.  He is doing home exercises.  He has an upcoming dental appt.      Review of Systems  ROS:    See HPI for musculoskeletal review.   All other systems reviewed are negative     History:  Past Medical History:   Diagnosis Date    Abnormal computed tomography angiography (CTA) 03/08/2023    BPH with urinary obstruction     AND OTHER LOWER URINARY TRACT SYMPTOMS     Cancer (HCC) 2017    Chronic pain disorder     Colon polyp     Detached retina, right 2015    Elevated prostate specific antigen (PSA) 2017    Feeling of incomplete bladder emptying     History of hypertension     History of  nocturia     Hypertension     Low back pain     Neuroma of foot 2017    Prostate cancer (HCC) 2017    Weak urinary stream      Past Surgical History:   Procedure Laterality Date    BACK SURGERY  2008    COLONOSCOPY      EYE SURGERY      REPAIR DETACHED RETINA     EYE SURGERY Right     HERNIA REPAIR      LAMINECTOMY      LUMBAR LAMINECTOMY Bilateral     L4-5    [place] ARTHRP ACETBLR/PROX FEM PROSTC AGRFT/ALGRFT Left 12/27/2023    Procedure: ARTHROPLASTY HIP TOTAL ANTERIOR,NAVIGATED, potential same day discharge;  Surgeon: Amy Ramirez DO;  Location:  MAIN OR;  Service: Orthopedics    [place] ARTHRP ACETBLR/PROX FEM PROSTC AGRFT/ALGRFT Right 3/27/2024    Procedure: ARTHROPLASTY HIP TOTAL ANTERIOR,NAVIGATED, RIGHT KNEE ASPIRATION;  Surgeon: Amy Ramirez DO;  Location:  MAIN OR;  Service: Orthopedics    PROSTATE BIOPSY Bilateral 2017    SPINE SURGERY      TONSILLECTOMY      VASECTOMY      SURGERY VAS DEFERENS      Social History   Social History     Substance and Sexual Activity   Alcohol Use Not Currently    Comment: I have stopped drinking alcohol.     Social History     Substance and Sexual Activity   Drug Use No     Social History     Tobacco Use   Smoking Status Never   Smokeless Tobacco Never     Family History:   Family History   Problem Relation Age of Onset    Hypertension Mother     Peripheral vascular disease Mother     Dementia Father        Current Outpatient Medications on File Prior to Visit   Medication Sig Dispense Refill    acetaminophen (TYLENOL) 500 mg tablet Take 2 tablets (1,000 mg total) by mouth every 8 (eight) hours      ascorbic acid (VITAMIN C) 500 MG TBCR Take 1 tablet (500 mg total) by mouth daily Begin 30 days prior to surgery 30 tablet 1    aspirin 325 mg tablet Take 1 tablet (325 mg total) by mouth 2 (two) times a day Take with food. 84 tablet 0    docusate sodium (COLACE) 100 mg capsule Take 1 capsule (100 mg total) by mouth 2 (two) times a day 20 capsule 0    ferrous  "sulfate 324 (65 Fe) mg Take 1 tablet (324 mg total) by mouth daily before breakfast Begin 30 days prior to surgery 30 tablet 1    folic acid (KP Folic Acid) 1 mg tablet Take 1 tablet (1 mg total) by mouth daily Begin 30 prior to surgery      gabapentin (NEURONTIN) 100 mg capsule Take 1 capsule (100 mg total) by mouth 3 (three) times a day 90 capsule 0    Multiple Vitamin (multivitamin) tablet Take 1 tablet by mouth daily Begin 30 days prior to surgery      promethazine (PHENERGAN) 12.5 MG tablet Take 1 tablet (12.5 mg total) by mouth every 6 (six) hours as needed for nausea or vomiting 8 tablet 0    valsartan (DIOVAN) 80 mg tablet TAKE 1 TABLET BY MOUTH EVERY DAY 90 tablet 0     No current facility-administered medications on file prior to visit.     No Known Allergies     Objective     /72   Pulse 78   Ht 6' 2\" (1.88 m)   Wt 93 kg (205 lb)   BMI 26.32 kg/m²      PE:  AAOx 3  WDWN  Hearing intact, no drainage from eyes  no audible wheezing  no abdominal distension  LE compartments soft, AT/GS intact    Ortho Exam:  bilateral hip:   INC: healed, No erythema  No pain with ROM         "

## 2024-06-24 ENCOUNTER — TELEPHONE (OUTPATIENT)
Age: 64
End: 2024-06-24

## 2024-06-24 DIAGNOSIS — R73.02 IMPAIRED GLUCOSE TOLERANCE: Primary | ICD-10-CM

## 2024-06-24 NOTE — TELEPHONE ENCOUNTER
Pt is scheduled for annual physical on 7/22 and would like orders to have labs done prior to visit. Please call back at [phone]. Thank you.

## 2024-06-28 DIAGNOSIS — Z00.6 ENCOUNTER FOR EXAMINATION FOR NORMAL COMPARISON OR CONTROL IN CLINICAL RESEARCH PROGRAM: ICD-10-CM

## 2024-07-09 ENCOUNTER — APPOINTMENT (OUTPATIENT)
Dept: LAB | Facility: MEDICAL CENTER | Age: 64
End: 2024-07-09
Payer: COMMERCIAL

## 2024-07-09 DIAGNOSIS — R73.02 IMPAIRED GLUCOSE TOLERANCE: ICD-10-CM

## 2024-07-09 DIAGNOSIS — Z00.6 ENCOUNTER FOR EXAMINATION FOR NORMAL COMPARISON OR CONTROL IN CLINICAL RESEARCH PROGRAM: ICD-10-CM

## 2024-07-09 LAB
ALBUMIN SERPL BCG-MCNC: 4.3 G/DL (ref 3.5–5)
ALP SERPL-CCNC: 77 U/L (ref 34–104)
ALT SERPL W P-5'-P-CCNC: 25 U/L (ref 7–52)
ANION GAP SERPL CALCULATED.3IONS-SCNC: 8 MMOL/L (ref 4–13)
AST SERPL W P-5'-P-CCNC: 23 U/L (ref 13–39)
BILIRUB SERPL-MCNC: 0.53 MG/DL (ref 0.2–1)
BUN SERPL-MCNC: 18 MG/DL (ref 5–25)
CALCIUM SERPL-MCNC: 9.4 MG/DL (ref 8.4–10.2)
CHLORIDE SERPL-SCNC: 107 MMOL/L (ref 96–108)
CHOLEST SERPL-MCNC: 191 MG/DL
CO2 SERPL-SCNC: 25 MMOL/L (ref 21–32)
CREAT SERPL-MCNC: 1.32 MG/DL (ref 0.6–1.3)
EST. AVERAGE GLUCOSE BLD GHB EST-MCNC: 140 MG/DL
GFR SERPL CREATININE-BSD FRML MDRD: 56 ML/MIN/1.73SQ M
GLUCOSE P FAST SERPL-MCNC: 108 MG/DL (ref 65–99)
HBA1C MFR BLD: 6.5 %
HDLC SERPL-MCNC: 64 MG/DL
LDLC SERPL CALC-MCNC: 92 MG/DL (ref 0–100)
NONHDLC SERPL-MCNC: 127 MG/DL
POTASSIUM SERPL-SCNC: 4.3 MMOL/L (ref 3.5–5.3)
PROT SERPL-MCNC: 7.3 G/DL (ref 6.4–8.4)
SODIUM SERPL-SCNC: 140 MMOL/L (ref 135–147)
TRIGL SERPL-MCNC: 177 MG/DL

## 2024-07-09 PROCEDURE — 83036 HEMOGLOBIN GLYCOSYLATED A1C: CPT

## 2024-07-09 PROCEDURE — 80053 COMPREHEN METABOLIC PANEL: CPT

## 2024-07-09 PROCEDURE — 80061 LIPID PANEL: CPT

## 2024-07-09 PROCEDURE — 36415 COLL VENOUS BLD VENIPUNCTURE: CPT

## 2024-07-22 ENCOUNTER — OFFICE VISIT (OUTPATIENT)
Dept: FAMILY MEDICINE CLINIC | Facility: CLINIC | Age: 64
End: 2024-07-22
Payer: MEDICARE

## 2024-07-22 VITALS — OXYGEN SATURATION: 96 % | DIASTOLIC BLOOD PRESSURE: 66 MMHG | SYSTOLIC BLOOD PRESSURE: 132 MMHG | HEART RATE: 74 BPM

## 2024-07-22 DIAGNOSIS — C61 PROSTATE CARCINOMA (HCC): ICD-10-CM

## 2024-07-22 DIAGNOSIS — R20.0 NUMBNESS AND TINGLING OF RIGHT ARM: ICD-10-CM

## 2024-07-22 DIAGNOSIS — G89.29 CHRONIC MIDLINE LOW BACK PAIN WITHOUT SCIATICA: ICD-10-CM

## 2024-07-22 DIAGNOSIS — R60.0 EDEMA OF RIGHT LOWER LEG: ICD-10-CM

## 2024-07-22 DIAGNOSIS — Z13.6 SCREENING FOR CARDIOVASCULAR CONDITION: ICD-10-CM

## 2024-07-22 DIAGNOSIS — I10 PRIMARY HYPERTENSION: ICD-10-CM

## 2024-07-22 DIAGNOSIS — R73.02 IMPAIRED GLUCOSE TOLERANCE: ICD-10-CM

## 2024-07-22 DIAGNOSIS — M54.50 CHRONIC MIDLINE LOW BACK PAIN WITHOUT SCIATICA: ICD-10-CM

## 2024-07-22 DIAGNOSIS — Z00.00 WELL ADULT EXAM: Primary | ICD-10-CM

## 2024-07-22 DIAGNOSIS — R20.2 NUMBNESS AND TINGLING OF RIGHT ARM: ICD-10-CM

## 2024-07-22 LAB
SL AMB  POCT GLUCOSE, UA: NEGATIVE
SL AMB LEUKOCYTE ESTERASE,UA: NEGATIVE
SL AMB POCT BILIRUBIN,UA: NEGATIVE
SL AMB POCT BLOOD,UA: NEGATIVE
SL AMB POCT CLARITY,UA: CLEAR
SL AMB POCT COLOR,UA: YELLOW
SL AMB POCT KETONES,UA: NEGATIVE
SL AMB POCT NITRITE,UA: NEGATIVE
SL AMB POCT PH,UA: 5.5
SL AMB POCT SPECIFIC GRAVITY,UA: 1.01
SL AMB POCT URINE PROTEIN: NEGATIVE
SL AMB POCT UROBILINOGEN: 0.2

## 2024-07-22 PROCEDURE — 81002 URINALYSIS NONAUTO W/O SCOPE: CPT | Performed by: FAMILY MEDICINE

## 2024-07-22 PROCEDURE — G0402 INITIAL PREVENTIVE EXAM: HCPCS | Performed by: FAMILY MEDICINE

## 2024-07-22 PROCEDURE — 99214 OFFICE O/P EST MOD 30 MIN: CPT | Performed by: FAMILY MEDICINE

## 2024-07-22 RX ORDER — ASPIRIN 81 MG/1
81 TABLET, CHEWABLE ORAL DAILY
COMMUNITY

## 2024-07-22 RX ORDER — CEPHALEXIN 500 MG/1
500 CAPSULE ORAL 3 TIMES DAILY
Qty: 30 CAPSULE | Refills: 0 | Status: SHIPPED | OUTPATIENT
Start: 2024-07-22 | End: 2024-08-01

## 2024-07-22 NOTE — PROGRESS NOTES
Adult Annual Physical  Name: David Gomes      : 1960      MRN: [id number]  Encounter Provider: Josefina Haddad MD  Encounter Date: 2024   Encounter department: [facility] PRIMARY CARE    Assessment & Plan   1. Well adult exam  2. Chronic midline low back pain without sciatica  -     XR spine lumbar minimum 4 views non injury; Future; Expected date: 2024  3. Prostate carcinoma (HCC)  Assessment & Plan:  Sees urology  for active  surveillance, no current  treatment  4. Primary hypertension  Assessment & Plan:  BP stable on Valsartan  Orders:  -     Comprehensive metabolic panel; Future; Expected date: 2024  -     TSH, 3rd generation; Future; Expected date: 2024  5. Impaired glucose tolerance  Assessment & Plan:  Hba1c  up a tad, some  dietary non compliance, re-emphasized  that control of  bs  prevents  complications,lipids excellent  with ldl 92, utd  eye  exam, recheck 4  months  Orders:  -     Lipid panel; Future; Expected date: 2024  -     Comprehensive metabolic panel; Future; Expected date: 2024  -     Hemoglobin A1C; Future; Expected date: 2024  -     TSH, 3rd generation; Future; Expected date: 2024  6. Screening for cardiovascular condition  -     Lipid panel; Future; Expected date: 2024  7. Omphalitis  -     cephalexin (KEFLEX) 500 mg capsule; Take 1 capsule (500 mg total) by mouth 3 (three) times a day for 10 days  8. Numbness and tingling of right arm  Assessment & Plan:  Check xray  c  spine, if  normal rec emg  Orders:  -     XR spine cervical complete 4 or 5 vw non injury; Future; Expected date: 2024  9. Edema of right lower leg  Assessment & Plan:  Check venous doppler with recent  travel  Orders:  -     POCT urine dip  -     VAS VENOUS DUPLEX -LOWER LIMB UNILATERAL; Future; Expected date: 2024    Immunizations and preventive care screenings were discussed with patient today. Appropriate education was printed on patient's  after visit summary.    Discussed risks and benefits of prostate cancer screening. We discussed the controversial history of PSA screening for prostate cancer in the [place] as well as the risk of over detection and over treatment of prostate cancer by way of PSA screening.  The patient understands that PSA blood testing is an imperfect way to screen for prostate cancer and that elevated PSA levels in the blood may also be caused by infection, inflammation, prostatic trauma or manipulation, urological procedures, or by benign prostatic enlargement.    The role of the digital rectal examination in prostate cancer screening was also discussed and I discussed with him that there is large interobserver variability in the findings of digital rectal examination.    Counseling:  Alcohol/drug use: discussed moderation in alcohol intake, the recommendations for healthy alcohol use, and avoidance of illicit drug use.  Dental Health: discussed importance of regular tooth brushing, flossing, and dental visits.  Injury prevention: discussed safety/seat belts, safety helmets, smoke detectors, carbon dioxide detectors  Sexual health:no concerns.  Exercise: the importance of regular exercise/physical activity was discussed. Recommend exercise 3-5 times per week for at least 30 minutes.          History of Present Illness   {Disappearing Hyperlinks I Encounters * My Last Note * Since Last Visit * History :44770}  Adult Annual Physical:  Patient presents for annual physical. C/o  back pain, still having  discharge from umbilical  area, occ  blood Ortho PA had given him to [employer] paperwork for surgery but the ultrasound was negative so he stopped the medicine and does not have any more he thinks that perhaps the couple days he took did help but he does not recall,some tingling of  r arm, no neck pain no axillary  pain, no elbow   pain, some  swelling of  r  foot  and  ankle, did go to [facility] last weekend the drive took 6  hours and he did get out to stretch quite a few times with the drive home was 9 hours and [place] the [place] and he did not stretch until after they came [place] the border no pain in his leg but there are definitely some swelling, had  panic  attack while  on cruise when had  episode  of  nasal congestion.     Diet and Physical Activity:  - Diet/Nutrition: well balanced diet, consuming 3-5 servings of fruits/vegetables daily and adequate fiber intake.  - Exercise: walking, 3-4 times a week on average and 30-60 minutes on average.    Depression Screening:  - PHQ-2 Score: 0    General Health:  - Sleep: sleeps well and 7-8 hours of sleep on average.  - Hearing: normal hearing bilateral ears.  - Vision: wears glasses, goes for regular eye exams and most recent eye exam < 1 year ago.  - Dental: regular dental visits and brushes teeth twice daily.     Health:  - History of STDs: no.   - Urinary symptoms: nocturia.     Advanced Care Planning:  - Has an advanced directive?: yes    - Has a durable medical POA?: yes    - ACP document given to patient?: no      Review of Systems   Constitutional:  Negative for activity change, appetite change and fatigue.   Respiratory:  Negative for cough.    Cardiovascular:  Negative for chest pain.   Musculoskeletal:  Positive for arthralgias and back pain.        Shoulder pain   Neurological:  Negative for dizziness, light-headedness and headaches.   Hematological:  Negative for adenopathy.   Psychiatric/Behavioral:  The patient is not nervous/anxious.      {Select to Display [facility] (Optional):81588}    Objective   {Disappearing Hyperlinks   Review Vitals * Enter New Vitals * Results Review * Labs * Imaging * Cardiology * Procedures * Lung Cancer Screening :40728}  /66 (BP Location: Right arm, Patient Position: Sitting, Cuff Size: Large)   Pulse 74   SpO2 96%     Physical Exam  Vitals reviewed.   Constitutional:        Appearance: Normal appearance.   HENT:      Right Ear: Tympanic membrane normal.      Left Ear: Tympanic membrane normal.      Nose: Congestion present. No rhinorrhea.      Right Turbinates: Not enlarged or swollen.      Left Turbinates: Enlarged and swollen.      Comments: Mild  deviated septum     Mouth/Throat:      Pharynx: No oropharyngeal exudate or posterior oropharyngeal erythema.   Cardiovascular:      Rate and Rhythm: Normal rate and regular rhythm.      Pulses: Normal pulses.      Heart sounds: Normal heart sounds.   Pulmonary:      Effort: Pulmonary effort is normal.      Breath sounds: Normal breath sounds.   Musculoskeletal:      Right lower leg: No edema.      Left lower leg: No edema.   Lymphadenopathy:      Cervical: No cervical adenopathy.   Neurological:      Mental Status: He is alert.       {Administrative / Billing Section (Optional):98903}

## 2024-07-22 NOTE — PROGRESS NOTES
Ambulatory Visit  Name: David Gomes      : 1960      MRN: [id number]  Encounter Provider: Josefina Haddad MD  Encounter Date: 2024   Encounter department: [facility]    Assessment & Plan   1. Well adult exam  2. Chronic midline low back pain without sciatica  -     XR spine lumbar minimum 4 views non injury; Future; Expected date: 2024  3. Prostate carcinoma (HCC)  Assessment & Plan:  Sees urology  for active  surveillance, no current  treatment  4. Primary hypertension  Assessment & Plan:  BP stable on Valsartan  Orders:  -     Comprehensive metabolic panel; Future; Expected date: 2024  -     TSH, 3rd generation; Future; Expected date: 2024  5. Impaired glucose tolerance  Assessment & Plan:  Hba1c  up a tad, some  dietary non compliance, re-emphasized  that control of  bs  prevents  complications,lipids excellent  with ldl 92, utd  eye  exam, recheck 4  months  Orders:  -     Lipid panel; Future; Expected date: 2024  -     Comprehensive metabolic panel; Future; Expected date: 2024  -     Hemoglobin A1C; Future; Expected date: 2024  -     TSH, 3rd generation; Future; Expected date: 2024  6. Screening for cardiovascular condition  -     Lipid panel; Future; Expected date: 2024  7. Omphalitis  -     cephalexin (KEFLEX) 500 mg capsule; Take 1 capsule (500 mg total) by mouth 3 (three) times a day for 10 days  8. Numbness and tingling of right arm  Assessment & Plan:  Check xray  c  spine, if  normal rec emg  Orders:  -     XR spine cervical complete 4 or 5 vw non injury; Future; Expected date: 2024  -     XR shoulder 2+ vw right; Future; Expected date: 2024  9. Edema of right lower leg  Assessment & Plan:  Check venous doppler with recent  travel  Orders:  -     POCT urine dip  -     VAS VENOUS DUPLEX -LOWER LIMB UNILATERAL; Future; Expected date: 2024       Preventive health issues were discussed with patient, and age  appropriate screening tests were ordered as noted in patient's After Visit Summary. Personalized health advice and appropriate referrals for health education or preventive services given if needed, as noted in patient's After Visit Summary.    History of Present Illness     C/o  back pain, still having  discharge from umbilical  area, occ  blood Ortho PA had given him to [employer] paperwork for surgery but the ultrasound was negative so he stopped the medicine and does not have any more he thinks that perhaps the couple days he took did help but he does not recall,some tingling of  r arm, no neck pain no axillary  pain, no elbow   pain, some  swelling of  r  foot  and  ankle, did go to [employer] last weekend the drive took 6 hours and he did get out to stretch quite a few times with the drive home was 9 hours and they were stuck in 3 hours at the [place] border waiting to come [place] in the [place] and he did not stretch until after they came [place] the border no pain in his leg but there are definitely some swelling, had  panic  attack while  on cruise when had  episode  of  nasal congestion       Patient Care Team:  Josefina Haddad MD as PCP - General  Federico Galindo MD    Review of Systems   Constitutional:  Negative for activity change, appetite change and fatigue.   Respiratory:  Negative for shortness of breath.    Cardiovascular:  Positive for leg swelling. Negative for chest pain.   Musculoskeletal:  Positive for arthralgias, back pain and neck pain.        Shoulder pain   Neurological:  Positive for numbness. Negative for dizziness, light-headedness and headaches.     Medical History Reviewed by provider this encounter:  Tobacco  Allergies  Meds  Problems  Med Hx  Surg Hx  Fam Hx       Annual Wellness Visit Questionnaire   David is here for his Welcome to Medicare visit.     Health Risk Assessment:   Patient rates overall health as very good. Patient feels that their physical health rating is  slightly better. Patient is satisfied with their life. Eyesight was rated as same. Hearing was rated as same. Patient feels that their emotional and mental health rating is same. Patients states they are never, rarely angry. Patient states they are never, rarely unusually tired/fatigued. Pain experienced in the last 7 days has been some. Patient's pain rating has been 6/10. Patient states that he has experienced no weight loss or gain in last 6 months. Some  back and  neck pain    Depression Screening:   PHQ-2 Score: 0      Fall Risk Screening:   In the past year, patient has experienced: no history of falling in past year      Home Safety:  Patient does not have trouble with stairs inside or outside of their home. Patient has working smoke alarms and has working carbon monoxide detector. Home safety hazards include: none.     Nutrition:   Current diet is Low Carb, Low Cholesterol and No Added Salt.     Medications:   Patient is currently taking over-the-counter supplements. OTC medications include: see medication list. Patient is able to manage medications.     Activities of Daily Living (ADLs)/Instrumental Activities of Daily Living (IADLs):   Walk and transfer into and out of bed and chair?: Yes  Dress and groom yourself?: Yes    Bathe or shower yourself?: Yes    Feed yourself? Yes  Do your laundry/housekeeping?: Yes  Manage your money, pay your bills and track your expenses?: Yes  Make your own meals?: Yes      Previous Hospitalizations:   Any hospitalizations or ED visits within the last 12 months?: Yes    How many hospitalizations have you had in the last year?: 1-2    Hospitalization Comments: Joint  replacement    Advance Care Planning:   Living will: Yes    Durable POA for healthcare: Yes    Advanced directive: Yes      Comments: Wife Jazzy   [email or web address]    Cognitive Screening:   Provider or family/friend/caregiver concerned regarding cognition?: No    PREVENTIVE SCREENINGS      Cardiovascular Screening:     General: Screening Current      Diabetes Screening:     General: Screening Current      Colorectal Cancer Screening:     General: Screening Current      Prostate Cancer Screening:    General: History Prostate Cancer and Screening Current      Osteoporosis Screening:    General: Screening Not Indicated      Abdominal Aortic Aneurysm (AAA) Screening:        General: Screening Not Indicated      Lung Cancer Screening:     General: Screening Not Indicated      Hepatitis C Screening:    General: Screening Current    Screening, Brief Intervention, and Referral to Treatment (SBIRT)    Screening    Typical number of drinks in a week: 1    Single Item Drug Screening:  How often have you used an illegal drug (including marijuana) or a prescription medication for non-medical reasons in the past year? never    Single Item Drug Screen Score: 0  Interpretation: Negative screen for possible drug use disorder    Social Determinants of Health     Food Insecurity: No Food Insecurity (7/22/2024)    Hunger Vital Sign     Worried About Running Out of Food in the Last Year: Never true     Ran Out of Food in the Last Year: Never true   Transportation Needs: No Transportation Needs (7/22/2024)    PRAPARE - Transportation     Lack of Transportation (Medical): No     Lack of Transportation (Non-Medical): No   Housing Stability: Low Risk  (7/22/2024)    Housing Stability Vital Sign     Unable to Pay for Housing in the Last Year: No     Number of Times Moved in the Last Year: 0     Homeless in the Last Year: No   Utilities: Not At Risk (7/22/2024)    [facility] Utilities     Threatened with loss of utilities: No     No results found.    Objective     /66 (BP Location: Right arm, Patient Position: Sitting, Cuff Size: Large)   Pulse 74   SpO2 96%     Physical Exam  Vitals reviewed.   Constitutional:       Appearance: Normal appearance.   HENT:      Right Ear: Tympanic membrane normal.      Left Ear: Tympanic membrane normal.      Nose:       Right Turbinates: Not enlarged or swollen.      Left Turbinates: Enlarged and swollen.      Mouth/Throat:      Pharynx: No oropharyngeal exudate or posterior oropharyngeal erythema.   Neck:      Vascular: No carotid bruit.   Cardiovascular:      Rate and Rhythm: Normal rate and regular rhythm.      Pulses: Normal pulses.      Heart sounds: Normal heart sounds.   Pulmonary:      Effort: Pulmonary effort is normal.      Breath sounds: Normal breath sounds.   Musculoskeletal:      Right lower leg: Edema present.      Left lower leg: No edema.      Comments: Trace edema   Lymphadenopathy:      Cervical: No cervical adenopathy.   Neurological:      Mental Status: He is alert.

## 2024-07-22 NOTE — PROGRESS NOTES
Adult Annual Physical  Name: David Gomes      : 1960      MRN: [id number]  Encounter Provider: Josefina Haddad MD  Encounter Date: 2024   Encounter department: [facility] PRIMARY CARE    Assessment & Plan   1. Well adult exam  2. Chronic midline low back pain without sciatica  -     XR spine lumbar minimum 4 views non injury; Future; Expected date: 2024  3. Prostate carcinoma (HCC)  Assessment & Plan:  Sees urology  for active  surveillance, no current  treatment  4. Primary hypertension  Assessment & Plan:  BP stable on Valsartan  Orders:  -     Comprehensive metabolic panel; Future; Expected date: 2024  -     TSH, 3rd generation; Future; Expected date: 2024  5. Impaired glucose tolerance  Assessment & Plan:  Hba1c  up a tad, some  dietary non compliance, re-emphasized  that control of  bs  prevents  complications,lipids excellent  with ldl 92, utd  eye  exam, recheck 4  months  Orders:  -     Lipid panel; Future; Expected date: 2024  -     Comprehensive metabolic panel; Future; Expected date: 2024  -     Hemoglobin A1C; Future; Expected date: 2024  -     TSH, 3rd generation; Future; Expected date: 2024  6. Screening for cardiovascular condition  -     Lipid panel; Future; Expected date: 2024  7. Omphalitis  -     cephalexin (KEFLEX) 500 mg capsule; Take 1 capsule (500 mg total) by mouth 3 (three) times a day for 10 days  8. Numbness and tingling of right arm  Assessment & Plan:  Check xray  c  spine, if  normal rec emg  Orders:  -     XR spine cervical complete 4 or 5 vw non injury; Future; Expected date: 2024  9. Edema of right lower leg  Assessment & Plan:  Check venous doppler with recent  travel  Orders:  -     POCT urine dip  -     VAS VENOUS DUPLEX -LOWER LIMB UNILATERAL; Future; Expected date: 2024    Immunizations and preventive care screenings were discussed with patient today. Appropriate education was printed on patient's  after visit summary.    Discussed risks and benefits of prostate cancer screening. We discussed the controversial history of PSA screening for prostate cancer in the [place] as well as the risk of over detection and over treatment of prostate cancer by way of PSA screening.  The patient understands that PSA blood testing is an imperfect way to screen for prostate cancer and that elevated PSA levels in the blood may also be caused by infection, inflammation, prostatic trauma or manipulation, urological procedures, or by benign prostatic enlargement.    The role of the digital rectal examination in prostate cancer screening was also discussed and I discussed with him that there is large interobserver variability in the findings of digital rectal examination.    Counseling:  Alcohol/drug use: discussed moderation in alcohol intake, the recommendations for healthy alcohol use, and avoidance of illicit drug use.  Dental Health: discussed importance of regular tooth brushing, flossing, and dental visits.  Injury prevention: discussed safety/seat belts, safety helmets, smoke detectors, carbon dioxide detectors  Sexual health:no concerns.  Exercise: the importance of regular exercise/physical activity was discussed. Recommend exercise 3-5 times per week for at least 30 minutes.          History of Present Illness   {Disappearing Hyperlinks I Encounters * My Last Note * Since Last Visit * History :95832}  Adult Annual Physical:  Patient presents for annual physical. C/o  back pain, still having  discharge from umbilical  area, occ  blood Ortho PA had given him to [employer] paperwork for surgery but the ultrasound was negative so he stopped the medicine and does not have any more he thinks that perhaps the couple days he took did help but he does not recall,some tingling of  r arm, no neck pain no axillary  pain, no elbow   pain, some  swelling of  r  foot  and  ankle, did go to [employer] last weekend the drive took 6  hours and he did get out to stretch quite a few times with the drive home was 9 hours and they were stuck in 3 [place] the [place] come [place] in the [place] and he did not stretch until after they came [place] the border no pain in his leg but there are definitely some swelling, had  panic  attack while  on cruise when had  episode  of  nasal congestion.     Diet and Physical Activity:  - Diet/Nutrition: well balanced diet, consuming 3-5 servings of fruits/vegetables daily and adequate fiber intake.  - Exercise: walking, 3-4 times a week on average and 30-60 minutes on average.    Depression Screening:  - PHQ-2 Score: 0    General Health:  - Sleep: sleeps well and 7-8 hours of sleep on average.  - Hearing: normal hearing bilateral ears.  - Vision: wears glasses, goes for regular eye exams and most recent eye exam < 1 year ago.  - Dental: regular dental visits and brushes teeth twice daily.     Health:  - History of STDs: no.   - Urinary symptoms: nocturia.     Advanced Care Planning:  - Has an advanced directive?: yes    - Has a durable medical POA?: yes    - ACP document given to patient?: no      Review of Systems   Constitutional:  Negative for activity change, appetite change and fatigue.   Respiratory:  Negative for cough.    Cardiovascular:  Negative for chest pain.   Musculoskeletal:  Positive for arthralgias and back pain.        Shoulder pain   Neurological:  Negative for dizziness, light-headedness and headaches.   Hematological:  Negative for adenopathy.   Psychiatric/Behavioral:  The patient is not nervous/anxious.      {Select to Display [facility] (Optional):22521}    Objective   {Disappearing Hyperlinks   Review Vitals * Enter New Vitals * Results Review * Labs * Imaging * Cardiology * Procedures * Lung Cancer Screening :01256}  /66 (BP Location: Right arm, Patient Position: Sitting, Cuff Size: Large)   Pulse 74   SpO2 96%     Physical Exam  Vitals reviewed.   Constitutional:        Appearance: Normal appearance.   HENT:      Right Ear: Tympanic membrane normal.      Left Ear: Tympanic membrane normal.      Nose: Congestion present. No rhinorrhea.      Right Turbinates: Not enlarged or swollen.      Left Turbinates: Enlarged and swollen.      Comments: Mild  deviated septum     Mouth/Throat:      Pharynx: No oropharyngeal exudate or posterior oropharyngeal erythema.   Cardiovascular:      Rate and Rhythm: Normal rate and regular rhythm.      Pulses: Normal pulses.      Heart sounds: Normal heart sounds.   Pulmonary:      Effort: Pulmonary effort is normal.      Breath sounds: Normal breath sounds.   Musculoskeletal:      Right lower leg: No edema.      Left lower leg: No edema.   Lymphadenopathy:      Cervical: No cervical adenopathy.   Neurological:      Mental Status: He is alert.       {Administrative / Billing Section (Optional):76600}

## 2024-07-22 NOTE — ASSESSMENT & PLAN NOTE
Hba1c  up a tad, some  dietary non compliance, re-emphasized  that control of  bs  prevents  complications,lipids excellent  with ldl 92, utd  eye  exam, recheck 4  months

## 2024-07-23 ENCOUNTER — APPOINTMENT (OUTPATIENT)
Dept: RADIOLOGY | Facility: MEDICAL CENTER | Age: 64
End: 2024-07-23
Payer: MEDICARE

## 2024-07-23 DIAGNOSIS — R20.0 NUMBNESS AND TINGLING OF RIGHT ARM: ICD-10-CM

## 2024-07-23 DIAGNOSIS — R20.2 NUMBNESS AND TINGLING OF RIGHT ARM: ICD-10-CM

## 2024-07-23 DIAGNOSIS — M54.50 CHRONIC MIDLINE LOW BACK PAIN WITHOUT SCIATICA: ICD-10-CM

## 2024-07-23 DIAGNOSIS — G89.29 CHRONIC MIDLINE LOW BACK PAIN WITHOUT SCIATICA: ICD-10-CM

## 2024-07-23 DIAGNOSIS — M54.50 CHRONIC MIDLINE LOW BACK PAIN WITHOUT SCIATICA: Primary | ICD-10-CM

## 2024-07-23 DIAGNOSIS — M47.22 OSTEOARTHRITIS OF SPINE WITH RADICULOPATHY, CERVICAL REGION: ICD-10-CM

## 2024-07-23 DIAGNOSIS — G89.29 CHRONIC MIDLINE LOW BACK PAIN WITHOUT SCIATICA: Primary | ICD-10-CM

## 2024-07-23 PROCEDURE — 72050 X-RAY EXAM NECK SPINE 4/5VWS: CPT

## 2024-07-23 PROCEDURE — 73030 X-RAY EXAM OF SHOULDER: CPT

## 2024-07-23 PROCEDURE — 72110 X-RAY EXAM L-2 SPINE 4/>VWS: CPT

## 2024-07-26 LAB
APOB+LDLR+PCSK9 GENE MUT ANL BLD/T: NOT DETECTED
BRCA1+BRCA2 DEL+DUP + FULL MUT ANL BLD/T: NOT DETECTED
MLH1+MSH2+MSH6+PMS2 GN DEL+DUP+FUL M: NOT DETECTED

## 2024-08-02 ENCOUNTER — OFFICE VISIT (OUTPATIENT)
Dept: PHYSICAL THERAPY | Facility: MEDICAL CENTER | Age: 64
End: 2024-08-02
Payer: MEDICARE

## 2024-08-02 DIAGNOSIS — M54.50 MIDLINE LOW BACK PAIN, UNSPECIFIED CHRONICITY, UNSPECIFIED WHETHER SCIATICA PRESENT: ICD-10-CM

## 2024-08-02 DIAGNOSIS — M54.12 CERVICAL RADICULOPATHY: Primary | ICD-10-CM

## 2024-08-02 PROCEDURE — 97163 PT EVAL HIGH COMPLEX 45 MIN: CPT

## 2024-08-02 NOTE — PROGRESS NOTES
PT Evaluation     Today's date: 2024  Patient name: David Gomes  : 1960  MRN: [id number]  Referring provider: Winsome Ritter PA-C  Dx:   Encounter Diagnosis     ICD-10-CM    1. Cervical radiculopathy  M54.12       2. Midline low back pain, unspecified chronicity, unspecified whether sciatica present  M54.50                      Assessment  Impairments: abnormal muscle firing, abnormal muscle tone, abnormal or restricted ROM, abnormal movement, activity intolerance, impaired physical strength, lacks appropriate home exercise program and pain with function    Assessment details: David Gomes  is a pleasant 64 y.o. male who presents with neck and low back pain.  The primary movement problem is cervical hypomobility causing s/s consistent with cervical radiculopathy and lumbar hypomobility resulting in limited ROM, strength deficit, altered movement pattern, which limit his ability to perform ADLs, IADLs and work related activities.  No referral is necessary at this time based on examination results.   The patient's greatest concerns is not being able to stay active.     Problem List:  1) cervical hypomobility  2) lumbar hypomobility  3) limited ROM  4) strength deficit    Etiologic factors include poor posture.  Pt. will benefit from skilled PT services that includes manual therapy techniques to enhance tissue extensibility, neuromuscular re-education to facilitate motor control, therapeutic exercise to increase functional mobility, and modalities prn to reduce pain and inflammation.  Understanding of Dx/Px/POC: good     Prognosis: good  Prognosis details: Positive prognostic indicators: motivation to improve   Negative prognostic indicators: chronicity of symptoms    Goals  Short Term Goals: to be achieved by 4 weeks  1) Patient to be independent with basic HEP.  2) Decrease pain to 3/10 at its worst.  3) Increase cervical and lumbar spine ROM by 10% in all deficient planes.   4) Increase UE/LE  strength by 1/2 MMT grade in all deficient planes.  5) Patient to report decreased sleep interruption secondary to pain.  6) Increase standing tolerance to 30 min.  7) Decrease upper extremity paresthesias to twice a week    Long Term Goals: to be achieved by discharge  1) FOTO equal to or greater than 80.  2) Patient to be independent with comprehensive HEP.  3) Abolish pain for improved quality of life.  4) Cervical and Lumbar spine ROM WNL all planes to improve a/iadls.  5) Increase UE/LE strength to 5/5 MMT grade in all planes to improve a/iadls.  6) Patient to report no sleep interruption secondary to pain.  7) Increase standing tolerance to 60 min.  8) Abolish all upper extremity paresthesias       Plan  Patient would benefit from: skilled physical therapy  Planned modality interventions: low level laser therapy, TENS, cryotherapy and traction    Planned therapy interventions: joint mobilization, manual therapy, massage, neuromuscular re-education, patient education, postural training, strengthening, stretching, therapeutic activities, therapeutic exercise, flexibility, functional ROM exercises, graded exercise, home exercise program, IASTM, kinesiology taping, Allison taping, balance/weight bearing training and balance    Treatment plan discussed with: patient  Plan details: Prognosis above is given PT services 2x/week tapering to 1x/week over the next 2 months and home program adherence.        Subjective Evaluation    History of Present Illness  Mechanism of injury: David Gomes presents with c/c of neck and low back pain. Symptoms began neck pain started in June 2024 and low back has been off and on for years with mechanism of injury: insidious onset. Pt reports that he gets tingling in his neck down to the hand on the right. He denies neck pain. Pt reports that he has trouble buttoning his shirts.   Aggravating factors: standing for long period of time, right arm numbness can be with random  activities  Relieving factors: medication, hot tub  24hr pain pattern: 0/10 (current), 0/10 (best), 3/10 (worst), location: midline low back, down posterior arm into lateral forearm and hand descriptors: numbness, tingling, sore and stiff in low back  Imaging: x-ray  Previous treatments: none  Occupation/recreation:  home   Primary concern: pain continuing to worse  Patient goals: stop the pain and N/T          Objective     Postural Observations  Seated posture: poor  Standing posture: fair  Correction of posture: makes symptoms better      Palpation   Left   Hypertonic in the erector spinae, lumbar paraspinals, scalenes, sternocleidomastoid, suboccipitals and upper trapezius.     Right   Hypertonic in the erector spinae, lumbar paraspinals, scalenes, sternocleidomastoid, suboccipitals and upper trapezius.     Active Range of Motion   Cervical/Thoracic Spine       Cervical  Subcranial protraction:  WFL   Subcranial retraction:  WFL   Flexion:  WFL  Extension: Neck active extension: 40%      Left lateral flexion: Neck active lateral bend left: 50%      Right lateral flexion: Neck active lateral bend right: 40%      Left rotation: Neck active rotation left: 40%  Right rotation: Neck active rotation right: 40%       Lumbar   Flexion: Active lumbar flexion: reproduced symptoms.  WFL  Extension: Active lumbar extension: 60%   Left lateral flexion:  WFL  Right lateral flexion:  WFL  Left rotation: Active left lumbar rotation: reproduced symptoms.  WFL  Right rotation:  WFL    Joint Play   Joints within functional limits: L1, L2 and L3     Hypomobile: C1, C2, C3, C4, C5, C6, C7, 1st rib, 2nd rib, L4, L5 and S1     Pain: C1, C2, C6, C7, L4, L5 and S1     Strength/Myotome Testing     Left Shoulder     Planes of Motion   Flexion: 4   Abduction: 4   External rotation at 0°: 4   Internal rotation at 0°: 4     Right Shoulder     Planes of Motion   Flexion: 4   Abduction: 4   External rotation at 0°: 4   Internal rotation  at 0°: 4     Left Hip   Planes of Motion   Flexion: 4+  Abduction: 4  Adduction: 4    Right Hip   Planes of Motion   Flexion: 4+  Abduction: 4  Adduction: 4    Left Knee   Flexion: 4  Extension: 4+    Right Knee   Flexion: 4  Extension: 4+    Tests   Cervical   Positive vertical compression and cervical distraction test.    Right   Positive Spurling's Test A.     Right Shoulder   Positive ULTT1.     Lumbar   Positive vertical compression .     General Comments:    Upper quarter screen   Shoulder: unremarkable  Elbow: unremarkable  Hand/wrist: unremarkable  Lower quarter screen   Hips: unremarkable  Knees: unremarkable  Foot/ankle: unremarkable             Precautions: b/l hip replacements    HEP: LTR, cervical retraction  Manuals 8/2            Cervical mobs             Lumbar mobs                                       Neuro Re-Ed             Scap 4             Band pull apart             No monies                                                    HEP review and pt education             Ther Ex             UBE             Tx ext             Open books             LTR             bridges                                                    Ther Activity                                       Gait Training                                       Modalities             traction Cervical 15/5# 30/5s

## 2024-08-07 ENCOUNTER — OFFICE VISIT (OUTPATIENT)
Dept: OBGYN CLINIC | Facility: MEDICAL CENTER | Age: 64
End: 2024-08-07
Payer: COMMERCIAL

## 2024-08-07 ENCOUNTER — OFFICE VISIT (OUTPATIENT)
Dept: PHYSICAL THERAPY | Facility: MEDICAL CENTER | Age: 64
End: 2024-08-07
Payer: MEDICARE

## 2024-08-07 VITALS
BODY MASS INDEX: 27.72 KG/M2 | HEART RATE: 78 BPM | HEIGHT: 74 IN | DIASTOLIC BLOOD PRESSURE: 74 MMHG | WEIGHT: 216 LBS | SYSTOLIC BLOOD PRESSURE: 123 MMHG

## 2024-08-07 DIAGNOSIS — M54.50 MIDLINE LOW BACK PAIN, UNSPECIFIED CHRONICITY, UNSPECIFIED WHETHER SCIATICA PRESENT: ICD-10-CM

## 2024-08-07 DIAGNOSIS — M17.11 PRIMARY OSTEOARTHRITIS OF RIGHT KNEE: Primary | ICD-10-CM

## 2024-08-07 DIAGNOSIS — M17.12 PRIMARY OSTEOARTHRITIS OF LEFT KNEE: ICD-10-CM

## 2024-08-07 DIAGNOSIS — M54.12 CERVICAL RADICULOPATHY: Primary | ICD-10-CM

## 2024-08-07 PROCEDURE — 97140 MANUAL THERAPY 1/> REGIONS: CPT

## 2024-08-07 PROCEDURE — 99213 OFFICE O/P EST LOW 20 MIN: CPT | Performed by: ORTHOPAEDIC SURGERY

## 2024-08-07 PROCEDURE — 20610 DRAIN/INJ JOINT/BURSA W/O US: CPT | Performed by: ORTHOPAEDIC SURGERY

## 2024-08-07 PROCEDURE — 97112 NEUROMUSCULAR REEDUCATION: CPT

## 2024-08-07 PROCEDURE — 97110 THERAPEUTIC EXERCISES: CPT

## 2024-08-07 RX ORDER — TRIAMCINOLONE ACETONIDE 40 MG/ML
40 INJECTION, SUSPENSION INTRA-ARTICULAR; INTRAMUSCULAR
Status: COMPLETED | OUTPATIENT
Start: 2024-08-07 | End: 2024-08-07

## 2024-08-07 RX ORDER — BUPIVACAINE HYDROCHLORIDE 2.5 MG/ML
2 INJECTION, SOLUTION INFILTRATION; PERINEURAL
Status: COMPLETED | OUTPATIENT
Start: 2024-08-07 | End: 2024-08-07

## 2024-08-07 RX ADMIN — TRIAMCINOLONE ACETONIDE 40 MG: 40 INJECTION, SUSPENSION INTRA-ARTICULAR; INTRAMUSCULAR at 09:15

## 2024-08-07 RX ADMIN — BUPIVACAINE HYDROCHLORIDE 2 ML: 2.5 INJECTION, SOLUTION INFILTRATION; PERINEURAL at 09:15

## 2024-08-07 NOTE — PROGRESS NOTES
Daily Note     Today's date: 2024  Patient name: David Gomes  : 1960  MRN: [id number]  Referring provider: Winsome Ritter PA-C  Dx:   Encounter Diagnosis     ICD-10-CM    1. Cervical radiculopathy  M54.12       2. Midline low back pain, unspecified chronicity, unspecified whether sciatica present  M54.50                      Subjective: Patient reports that he only recalls two instances since IE that he got numbness in his arm.       Objective: See treatment diary below      Assessment: POC initiated. No adverse effects noted. Tolerated treatment well. Patient demonstrated fatigue post treatment, exhibited good technique with therapeutic exercises, and would benefit from continued PT      Plan: Continue per plan of care.      Precautions: b/l hip replacements    HEP: LTR, cervical retraction  Manuals             Cervical mobs JH lat            Lumbar mobs                                       Neuro Re-Ed             Scap 4 High/low row 2x15 gtb            Band pull apart Rtb 2x15            No monies Rtb 2x15                                                   HEP review and pt education             Ther Ex             UBE 3'f 3'b            Tx ext             Open books             LTR             bridges                                                    Ther Activity                                       Gait Training                                       Modalities             traction Cervical 15/5# 30/5s

## 2024-08-07 NOTE — PROGRESS NOTES
Assessment/Plan:  1. Primary osteoarthritis of right knee    2. Primary osteoarthritis of left knee      Orders Placed This Encounter   Procedures    Large joint arthrocentesis: bilateral knee     Patient has moderate bilateral knee osteoarthritis.  After a discussion of risks and benefits the patient elected to proceed with a bilateral knee steroid injection today.  Patient should ice and avoid strenuous activity for 1-2 days if needed.  Patient should avoid vaccines for 2 weeks if possible.  If patient is diabetic should also monitor glucose over the next 7 to 10 days.    Pain control prn- tylenol or ibuprofen as needed.   Patient is doing well status post bilateral DONNA.  Patient should call ahead for abx prior to dental appts.       Return in about 3 months (around 11/7/2024) for Bilateral knee CSI.    I answered all of the patient's questions during the visit and provided education of the patient's condition during the visit.  The patient verbalized understanding of the information given and agrees with the plan.  This note was dictated using [employer] software.  It may contain errors including improperly dictated words.  Please contact physician directly for any questions.    Subjective   Chief Complaint:   Chief Complaint   Patient presents with    Right Knee - Follow-up, Pain     Pt requests injection    Left Knee - Follow-up, Pain     Pt requests injection         HPI  David Gomes is a 64 y.o. male who presents for follow up for moderate bilateral knee osteoarthritis.  Patient was last seen for his knees on 5/3/2024 where patient received bilateral knee steroid injection.  Patient states he received about 3 months of relief with bilateral knee steroid injection.  Patient states he continues to take ibuprofen and Tylenol as needed for pain control.  Patient states he occasionally uses knee braces as needed for comfort.  Patient states she has not tried gel injections in the past.  Patient is interested in  repeat bilateral knee CSI today.    Review of Systems  ROS:    See HPI for musculoskeletal review.   All other systems reviewed are negative     History:  Past Medical History:   Diagnosis Date    Abnormal computed tomography angiography (CTA) 03/08/2023    BPH with urinary obstruction     AND OTHER LOWER URINARY TRACT SYMPTOMS     Cancer (HCC) 2017    Chronic pain disorder     Colon polyp     Detached retina, right 2015    Elevated prostate specific antigen (PSA) 2017    Feeling of incomplete bladder emptying     History of hypertension     History of nocturia     Hypertension     Low back pain     Neuroma of foot 2017    Prostate cancer (HCC) 2017    Weak urinary stream      Past Surgical History:   Procedure Laterality Date    BACK SURGERY  2008    COLONOSCOPY      EYE SURGERY      REPAIR DETACHED RETINA     EYE SURGERY Right     HERNIA REPAIR      LAMINECTOMY      LUMBAR LAMINECTOMY Bilateral     L4-5    [place] ARTHRP ACETBLR/PROX FEM PROSTC AGRFT/ALGRFT Left 12/27/2023    Procedure: ARTHROPLASTY HIP TOTAL ANTERIOR,NAVIGATED, potential same day discharge;  Surgeon: Amy Ramirez DO;  Location:  MAIN OR;  Service: Orthopedics    [place] ARTHRP ACETBLR/PROX FEM PROSTC AGRFT/ALGRFT Right 3/27/2024    Procedure: ARTHROPLASTY HIP TOTAL ANTERIOR,NAVIGATED, RIGHT KNEE ASPIRATION;  Surgeon: Amy Ramirez DO;  Location:  MAIN OR;  Service: Orthopedics    PROSTATE BIOPSY Bilateral 2017    SPINE SURGERY      TONSILLECTOMY      VASECTOMY      SURGERY VAS DEFERENS      Social History   Social History     Substance and Sexual Activity   Alcohol Use Not Currently    Comment: I have stopped drinking alcohol.     Social History     Substance and Sexual Activity   Drug Use No     Social History     Tobacco Use   Smoking Status Never   Smokeless Tobacco Never     Family History:   Family History   Problem Relation Age of Onset    Hypertension Mother     Peripheral vascular disease Mother     Dementia Father   "      Current Outpatient Medications on File Prior to Visit   Medication Sig Dispense Refill    aspirin 81 mg chewable tablet Chew 81 mg daily      valsartan (DIOVAN) 80 mg tablet TAKE 1 TABLET BY MOUTH EVERY DAY 90 tablet 0     No current facility-administered medications on file prior to visit.     No Known Allergies     Objective     /74   Pulse 78   Ht 6' 2\" (1.88 m)   Wt 98 kg (216 lb)   BMI 27.73 kg/m²      PE:  AAOx 3  WDWN  Hearing intact, no drainage from eyes  no audible wheezing  no abdominal distension  LE compartments soft, skin intact    Ortho Exam:  bilateral Knee:   No erythema  no swelling  no effusion  no warmth  AROM: 0-120  Stable to varus/valgus stress      Large joint arthrocentesis: bilateral knee  Universal Protocol:  Consent: Verbal consent obtained.  Risks and benefits: risks, benefits and alternatives were discussed  Consent given by: patient  Patient understanding: patient states understanding of the procedure being performed  Site marked: the operative site was marked  Patient identity confirmed: verbally with patient  Supporting Documentation  Indications: pain   Procedure Details  Location: knee - bilateral knee  Needle size: 22 G  Ultrasound guidance: no  Approach: anterolateral    Medications (Right): 40 mg triamcinolone acetonide 40 mg/mL; 2 mL bupivacaine 0.25 %Medications (Left): 40 mg triamcinolone acetonide 40 mg/mL; 2 mL bupivacaine 0.25 %   Patient tolerance: patient tolerated the procedure well with no immediate complications  Dressing:  Sterile dressing applied            Scribe Attestation      I,:  Hari Majano am acting as a scribe while in the presence of the attending physician.:       I,:  Amy Ramirez DO personally performed the services described in this documentation    as scribed in my presence.:                       "

## 2024-08-08 ENCOUNTER — HOSPITAL ENCOUNTER (OUTPATIENT)
Dept: NON INVASIVE DIAGNOSTICS | Facility: HOSPITAL | Age: 64
Discharge: HOME | End: 2024-08-08
Payer: MEDICARE

## 2024-08-08 ENCOUNTER — DOCUMENTATION (OUTPATIENT)
Dept: FAMILY MEDICINE CLINIC | Facility: CLINIC | Age: 64
End: 2024-08-08

## 2024-08-08 DIAGNOSIS — I82.409 ACUTE DEEP VEIN THROMBOSIS (DVT) OF OTHER VEIN OF LOWER EXTREMITY, UNSPECIFIED LATERALITY (HCC): Primary | ICD-10-CM

## 2024-08-08 DIAGNOSIS — R60.0 EDEMA OF RIGHT LOWER LEG: ICD-10-CM

## 2024-08-08 PROCEDURE — 93971 EXTREMITY STUDY: CPT

## 2024-08-08 RX ORDER — APIXABAN 5 MG (74)
5 KIT ORAL 2 TIMES DAILY
Qty: 180 EACH | Refills: 1 | Status: SHIPPED | OUTPATIENT
Start: 2024-08-08 | End: 2024-08-09

## 2024-08-09 DIAGNOSIS — I82.409 ACUTE DEEP VEIN THROMBOSIS (DVT) OF OTHER VEIN OF LOWER EXTREMITY, UNSPECIFIED LATERALITY (HCC): Primary | ICD-10-CM

## 2024-08-09 PROCEDURE — 93971 EXTREMITY STUDY: CPT | Performed by: SURGERY

## 2024-08-12 DIAGNOSIS — I10 ESSENTIAL HYPERTENSION: ICD-10-CM

## 2024-08-13 RX ORDER — VALSARTAN 80 MG/1
80 TABLET ORAL DAILY
Qty: 90 TABLET | Refills: 0 | Status: SHIPPED | OUTPATIENT
Start: 2024-08-13

## 2024-08-14 ENCOUNTER — OFFICE VISIT (OUTPATIENT)
Dept: PHYSICAL THERAPY | Facility: MEDICAL CENTER | Age: 64
End: 2024-08-14
Payer: MEDICARE

## 2024-08-14 DIAGNOSIS — M54.50 MIDLINE LOW BACK PAIN, UNSPECIFIED CHRONICITY, UNSPECIFIED WHETHER SCIATICA PRESENT: ICD-10-CM

## 2024-08-14 DIAGNOSIS — M54.12 CERVICAL RADICULOPATHY: Primary | ICD-10-CM

## 2024-08-14 PROCEDURE — 97112 NEUROMUSCULAR REEDUCATION: CPT

## 2024-08-14 PROCEDURE — 97110 THERAPEUTIC EXERCISES: CPT

## 2024-08-14 PROCEDURE — 97140 MANUAL THERAPY 1/> REGIONS: CPT

## 2024-08-14 NOTE — PROGRESS NOTES
Daily Note     Today's date: 2024  Patient name: David Gomes  : 1960  MRN: [id number]  Referring provider: Winsome Ritter PA-C  Dx:   Encounter Diagnosis     ICD-10-CM    1. Cervical radiculopathy  M54.12       2. Midline low back pain, unspecified chronicity, unspecified whether sciatica present  M54.50                      Subjective: Patient reports that he had about to occasions of numbness that he remembers this past week.       Objective: See treatment diary below      Assessment: Continued with POC.  Tolerated treatment well. Patient demonstrated fatigue post treatment, exhibited good technique with therapeutic exercises, and would benefit from continued PT      Plan: Continue per plan of care.      Precautions: b/l hip replacements    HEP: LTR, cervical retraction  Manuals             Cervical mobs JH lat            Lumbar mobs                                       Neuro Re-Ed             Scap 4 High/low row 2x15 gtb            Band pull apart Rtb 2x15            No monies Rtb 2x15                                                   HEP review and pt education             Ther Ex             UBE 3'f 3'b            Chin tucks X20            Tx ext             Open books             LTR             bridges                                                    Ther Activity                                       Gait Training                                       Modalities             traction Cervical 15/# 30/5s

## 2024-08-16 ENCOUNTER — APPOINTMENT (OUTPATIENT)
Dept: PHYSICAL THERAPY | Facility: MEDICAL CENTER | Age: 64
End: 2024-08-16
Payer: MEDICARE

## 2024-08-21 ENCOUNTER — OFFICE VISIT (OUTPATIENT)
Dept: PHYSICAL THERAPY | Facility: MEDICAL CENTER | Age: 64
End: 2024-08-21
Payer: MEDICARE

## 2024-08-21 DIAGNOSIS — M54.12 CERVICAL RADICULOPATHY: Primary | ICD-10-CM

## 2024-08-21 DIAGNOSIS — M54.50 MIDLINE LOW BACK PAIN, UNSPECIFIED CHRONICITY, UNSPECIFIED WHETHER SCIATICA PRESENT: ICD-10-CM

## 2024-08-21 PROCEDURE — 97140 MANUAL THERAPY 1/> REGIONS: CPT

## 2024-08-21 PROCEDURE — 97112 NEUROMUSCULAR REEDUCATION: CPT

## 2024-08-21 PROCEDURE — 97110 THERAPEUTIC EXERCISES: CPT

## 2024-08-21 NOTE — PROGRESS NOTES
Daily Note     Today's date: 2024  Patient name: David Gomes  : 1960  MRN: [id number]  Referring provider: Winsome Ritter PA-C  Dx:   Encounter Diagnosis     ICD-10-CM    1. Cervical radiculopathy  M54.12       2. Midline low back pain, unspecified chronicity, unspecified whether sciatica present  M54.50                      Subjective: Patient reports that he has numbness mainly when he is driving or sitting.       Objective: See treatment diary below      Assessment: Added repeated retraction extension to determine any change in symptoms. Instructed patien to perform throughout the day and take note of changes in intensity, frequency and duration in symptoms. Tolerated treatment well. Patient demonstrated fatigue post treatment, exhibited good technique with therapeutic exercises, and would benefit from continued PT      Plan: Continue per plan of care.      Precautions: b/l hip replacements    HEP: LTR, cervical retraction  Manuals             Cervical mobs JH lat            Lumbar mobs                                       Neuro Re-Ed             Scap 4 High/low row 2x15 gtb            Band pull apart Rtb 2x15            No monies Rtb 2x15            Repeated ret/ext 2x10                                      HEP review and pt education             Ther Ex             UBE 3'f 3'b            Chin tucks X20            Tx ext             Open books             LTR             bridges                                                    Ther Activity                                       Gait Training                                       Modalities             traction Cervical 15/5# 30/5s

## 2024-08-27 ENCOUNTER — APPOINTMENT (OUTPATIENT)
Dept: PHYSICAL THERAPY | Facility: MEDICAL CENTER | Age: 64
End: 2024-08-27
Payer: MEDICARE

## 2024-10-02 ENCOUNTER — APPOINTMENT (OUTPATIENT)
Dept: LAB | Facility: MEDICAL CENTER | Age: 64
End: 2024-10-02
Payer: MEDICARE

## 2024-10-02 DIAGNOSIS — C61 MALIGNANT NEOPLASM OF PROSTATE (HCC): ICD-10-CM

## 2024-10-02 LAB — PSA SERPL-MCNC: 1.32 NG/ML (ref 0–4)

## 2024-10-02 PROCEDURE — 36415 COLL VENOUS BLD VENIPUNCTURE: CPT

## 2024-10-02 PROCEDURE — 84153 ASSAY OF PSA TOTAL: CPT

## 2024-11-07 ENCOUNTER — OFFICE VISIT (OUTPATIENT)
Dept: OBGYN CLINIC | Facility: MEDICAL CENTER | Age: 64
End: 2024-11-07
Payer: MEDICARE

## 2024-11-07 ENCOUNTER — TELEPHONE (OUTPATIENT)
Dept: ADMINISTRATIVE | Facility: OTHER | Age: 64
End: 2024-11-07

## 2024-11-07 VITALS
SYSTOLIC BLOOD PRESSURE: 146 MMHG | HEART RATE: 77 BPM | BODY MASS INDEX: 26.31 KG/M2 | WEIGHT: 205 LBS | HEIGHT: 74 IN | DIASTOLIC BLOOD PRESSURE: 78 MMHG

## 2024-11-07 DIAGNOSIS — M17.0 PRIMARY OSTEOARTHRITIS OF BOTH KNEES: Primary | ICD-10-CM

## 2024-11-07 PROCEDURE — 99213 OFFICE O/P EST LOW 20 MIN: CPT | Performed by: ORTHOPAEDIC SURGERY

## 2024-11-07 PROCEDURE — 20610 DRAIN/INJ JOINT/BURSA W/O US: CPT | Performed by: ORTHOPAEDIC SURGERY

## 2024-11-07 RX ORDER — BUPIVACAINE HYDROCHLORIDE 2.5 MG/ML
2 INJECTION, SOLUTION INFILTRATION; PERINEURAL
Status: COMPLETED | OUTPATIENT
Start: 2024-11-07 | End: 2024-11-07

## 2024-11-07 RX ORDER — TRIAMCINOLONE ACETONIDE 40 MG/ML
40 INJECTION, SUSPENSION INTRA-ARTICULAR; INTRAMUSCULAR
Status: COMPLETED | OUTPATIENT
Start: 2024-11-07 | End: 2024-11-07

## 2024-11-07 RX ORDER — LIDOCAINE HYDROCHLORIDE 10 MG/ML
5 INJECTION, SOLUTION INFILTRATION; PERINEURAL
Status: COMPLETED | OUTPATIENT
Start: 2024-11-07 | End: 2024-11-07

## 2024-11-07 RX ADMIN — BUPIVACAINE HYDROCHLORIDE 2 ML: 2.5 INJECTION, SOLUTION INFILTRATION; PERINEURAL at 10:30

## 2024-11-07 RX ADMIN — TRIAMCINOLONE ACETONIDE 40 MG: 40 INJECTION, SUSPENSION INTRA-ARTICULAR; INTRAMUSCULAR at 10:30

## 2024-11-07 RX ADMIN — LIDOCAINE HYDROCHLORIDE 5 ML: 10 INJECTION, SOLUTION INFILTRATION; PERINEURAL at 10:30

## 2024-11-07 NOTE — PROGRESS NOTES
Assessment/Plan:  1. Primary osteoarthritis of both knees      Orders Placed This Encounter   Procedures    Large joint arthrocentesis: bilateral knee       Patient has moderate bilateral knee osteoarthritis.  After a discussion of risks and benefits the patient elected to proceed with a right knee aspiration and bilateral knee steroid injection today.  Patient should ice and avoid strenuous activity for 1-2 days if needed.  Patient should avoid vaccines for 2 weeks if possible.  If patient is diabetic should also monitor glucose over the next 7 to 10 days.    Pain control prn- tylenol or ibuprofen as needed.   Patient is doing well status post bilateral DONNA.  Patient should call ahead for abx prior to dental appts.     Return in about 3 months (around 2/7/2025) for Bilateral knee CSI.    I answered all of the patient's questions during the visit and provided education of the patient's condition during the visit.  The patient verbalized understanding of the information given and agrees with the plan.  This note was dictated using [employer] software.  It may contain errors including improperly dictated words.  Please contact physician directly for any questions.    Subjective   Chief Complaint:   Chief Complaint   Patient presents with    Left Knee - Follow-up    Right Knee - Follow-up       HPI  David Gomes is a 64 y.o. male who presents for follow up for moderate bilateral knee osteoarthritis.  Patient was last seen 8/7/2024 patient received bilateral knee steroid injections.  Patient states he received about 3 months of relief with steroid injection.  Patient states this pain has become intermittent along the anterior aspect of his knees patient states sitting and standing aggravates his pain the most.  Patient has noticed swelling within his right knee denies any new mechanism of injuries or traumas.  Patient states he has never received gel injections into his knees.  Patient has been taking ibuprofen for pain  control.  Patient has not been utilizing bracing.  Patient states he is overall pleased with receiving steroid injections and is interested in repeat bilateral knee CSI today.    Review of Systems  ROS:    See HPI for musculoskeletal review.   All other systems reviewed are negative     History:  Past Medical History:   Diagnosis Date    Abnormal computed tomography angiography (CTA) 03/08/2023    BPH with urinary obstruction     AND OTHER LOWER URINARY TRACT SYMPTOMS     Cancer (HCC) 2017    Chronic pain disorder     Colon polyp     Detached retina, right 2015    Elevated prostate specific antigen (PSA) 2017    Feeling of incomplete bladder emptying     History of hypertension     History of nocturia     Hypertension     Low back pain     Neuroma of foot 2017    Prostate cancer (HCC) 2017    Weak urinary stream      Past Surgical History:   Procedure Laterality Date    BACK SURGERY  2008    COLONOSCOPY      EYE SURGERY      REPAIR DETACHED RETINA     EYE SURGERY Right     HERNIA REPAIR      LAMINECTOMY      LUMBAR LAMINECTOMY Bilateral     L4-5    [place] ARTHRP ACETBLR/PROX FEM PROSTC AGRFT/ALGRFT Left 12/27/2023    Procedure: ARTHROPLASTY HIP TOTAL ANTERIOR,NAVIGATED, potential same day discharge;  Surgeon: Aym Ramirez DO;  Location:  MAIN OR;  Service: Orthopedics    [place] ARTHRP ACETBLR/PROX FEM PROSTC AGRFT/ALGRFT Right 3/27/2024    Procedure: ARTHROPLASTY HIP TOTAL ANTERIOR,NAVIGATED, RIGHT KNEE ASPIRATION;  Surgeon: Amy Ramirez DO;  Location:  MAIN OR;  Service: Orthopedics    PROSTATE BIOPSY Bilateral 2017    SPINE SURGERY      TONSILLECTOMY      VASECTOMY      SURGERY VAS DEFERENS      Social History   Social History     Substance and Sexual Activity   Alcohol Use Not Currently    Comment: I have stopped drinking alcohol.     Social History     Substance and Sexual Activity   Drug Use No     Social History     Tobacco Use   Smoking Status Never   Smokeless Tobacco Never     Family  "History:   Family History   Problem Relation Age of Onset    Hypertension Mother     Peripheral vascular disease Mother     Dementia Father        Current Outpatient Medications on File Prior to Visit   Medication Sig Dispense Refill    apixaban (ELIQUIS) 5 mg Take 1 tablet (5 mg total) by mouth 2 (two) times a day 180 tablet 0    valsartan (DIOVAN) 80 mg tablet TAKE 1 TABLET BY MOUTH EVERY DAY 90 tablet 0     No current facility-administered medications on file prior to visit.     No Known Allergies     Objective     /78   Pulse 77   Ht 6' 2\" (1.88 m)   Wt 93 kg (205 lb)   BMI 26.32 kg/m²      PE:  AAOx 3  WDWN  Hearing intact, no drainage from eyes  no audible wheezing  no abdominal distension  LE compartments soft, skin intact    Ortho Exam:  bilateral Knee:   No erythema  no swelling  no effusion  no warmth  AROM: 0-120  Stable to varus/valgus stress    Large joint arthrocentesis: bilateral knee  Universal Protocol:  procedure performed by consultantConsent: Verbal consent obtained.  Risks and benefits: risks, benefits and alternatives were discussed  Consent given by: patient  Patient understanding: patient states understanding of the procedure being performed  Site marked: the operative site was marked  Patient identity confirmed: verbally with patient  Supporting Documentation  Indications: pain   Procedure Details  Location: knee - bilateral knee  Needle size: 22 G  Ultrasound guidance: no  Approach: anterolateral    Medications (Right): 40 mg triamcinolone acetonide 40 mg/mL; 2 mL bupivacaine 0.25 %; 5 mL lidocaine 1 %Aspirate amount (Right): 17 mL  Aspirate (Right): bloody  Medications (Left): 40 mg triamcinolone acetonide 40 mg/mL; 2 mL bupivacaine 0.25 %   Patient tolerance: patient tolerated the procedure well with no immediate complications  Dressing:  Sterile dressing applied          Scribe Attestation      I,:  Hari Majano am acting as a scribe while in the presence of the attending " physician.:       I,:  Amy Ramirez, DO personally performed the services described in this documentation    as scribed in my presence.:

## 2024-11-07 NOTE — TELEPHONE ENCOUNTER
11/07/24 3:37 PM    Patient contacted to bring Advance Directive, POLST, or Living Will document to next scheduled pcp visit.[facility] Department was unable to leave a message; no answer/ line busy.    Thank you.  Javier Bates MA  [facility]

## 2024-11-08 ENCOUNTER — OFFICE VISIT (OUTPATIENT)
Dept: FAMILY MEDICINE CLINIC | Facility: CLINIC | Age: 64
End: 2024-11-08
Payer: MEDICARE

## 2024-11-08 VITALS
OXYGEN SATURATION: 97 % | HEART RATE: 71 BPM | DIASTOLIC BLOOD PRESSURE: 84 MMHG | HEIGHT: 74 IN | SYSTOLIC BLOOD PRESSURE: 134 MMHG | WEIGHT: 207 LBS | BODY MASS INDEX: 26.56 KG/M2

## 2024-11-08 DIAGNOSIS — I10 ESSENTIAL HYPERTENSION: ICD-10-CM

## 2024-11-08 DIAGNOSIS — I82.461 ACUTE EMBOLISM AND THROMBOSIS OF RIGHT CALF MUSCULAR VEIN (HCC): ICD-10-CM

## 2024-11-08 DIAGNOSIS — I82.409 ACUTE DEEP VEIN THROMBOSIS (DVT) OF OTHER VEIN OF LOWER EXTREMITY, UNSPECIFIED LATERALITY (HCC): ICD-10-CM

## 2024-11-08 DIAGNOSIS — I10 PRIMARY HYPERTENSION: Primary | ICD-10-CM

## 2024-11-08 DIAGNOSIS — R73.02 IMPAIRED GLUCOSE TOLERANCE: ICD-10-CM

## 2024-11-08 PROBLEM — R29.818 SUSPECTED SLEEP APNEA: Status: RESOLVED | Noted: 2023-02-09 | Resolved: 2024-11-08

## 2024-11-08 PROCEDURE — 99213 OFFICE O/P EST LOW 20 MIN: CPT | Performed by: FAMILY MEDICINE

## 2024-11-08 PROCEDURE — G2211 COMPLEX E/M VISIT ADD ON: HCPCS | Performed by: FAMILY MEDICINE

## 2024-11-08 RX ORDER — VALSARTAN 80 MG/1
80 TABLET ORAL DAILY
Qty: 90 TABLET | Refills: 1 | Status: SHIPPED | OUTPATIENT
Start: 2024-11-08

## 2024-11-08 NOTE — ASSESSMENT & PLAN NOTE
Recheck doppler  before  stopping  Eliquis    Orders:    VAS VENOUS DUPLEX -LOWER LIMB UNILATERAL; Future

## 2024-11-08 NOTE — PROGRESS NOTES
"Ambulatory Visit  Name: David Gomes      : 1960      MRN: [id number]  Encounter Provider: Josefina Haddad MD  Encounter Date: 2024   Encounter department: [facility] PRIMARY CARE    Assessment & Plan  Primary hypertension  BP stable on Diovan         Acute deep vein thrombosis (DVT) of other vein of lower extremity, unspecified laterality (HCC)  Recheck doppler  before  stopping  Eliquis    Orders:    VAS VENOUS DUPLEX -LOWER LIMB UNILATERAL; Future    Acute embolism and thrombosis of right calf muscular vein (HCC)  Recheck doppler  before  stopping  Eliquis    Orders:    VAS VENOUS DUPLEX -LOWER LIMB UNILATERAL; Future    Impaired glucose tolerance  Await follow up lab            History of Present Illness     Patient presents with:  Follow-up: 3 months follow up    Due f/u doppler , due  for  lab          Review of Systems   Constitutional:  Negative for activity change, appetite change and fatigue.   Respiratory:  Negative for shortness of breath.    Cardiovascular:  Negative for chest pain.   Neurological:  Negative for dizziness, light-headedness and headaches.   Psychiatric/Behavioral:  The patient is not nervous/anxious.            Objective     /84 (BP Location: Right arm, Patient Position: Sitting, Cuff Size: Standard)   Pulse 71   Ht 6' 2\" (1.88 m)   Wt 93.9 kg (207 lb)   SpO2 97%   BMI 26.58 kg/m²     Physical Exam  Vitals reviewed.   Constitutional:       Appearance: Normal appearance.   Neck:      Vascular: No carotid bruit.   Cardiovascular:      Rate and Rhythm: Normal rate and regular rhythm.      Pulses: Normal pulses.      Heart sounds: Normal heart sounds.   Pulmonary:      Effort: Pulmonary effort is normal.      Breath sounds: Normal breath sounds.   Musculoskeletal:      Right lower leg: No edema.      Left lower leg: No edema.   Lymphadenopathy:      Cervical: No cervical adenopathy.   Neurological:      Mental Status: He is alert.   Psychiatric:         Mood " and Affect: Mood normal.

## 2024-11-11 ENCOUNTER — HOSPITAL ENCOUNTER (OUTPATIENT)
Dept: NON INVASIVE DIAGNOSTICS | Facility: HOSPITAL | Age: 64
Discharge: HOME | End: 2024-11-11
Payer: MEDICARE

## 2024-11-11 DIAGNOSIS — I82.409 ACUTE DEEP VEIN THROMBOSIS (DVT) OF OTHER VEIN OF LOWER EXTREMITY, UNSPECIFIED LATERALITY (HCC): ICD-10-CM

## 2024-11-11 DIAGNOSIS — I82.461 ACUTE EMBOLISM AND THROMBOSIS OF RIGHT CALF MUSCULAR VEIN (HCC): ICD-10-CM

## 2024-11-11 PROCEDURE — 93971 EXTREMITY STUDY: CPT

## 2024-11-11 PROCEDURE — 93971 EXTREMITY STUDY: CPT | Performed by: SURGERY

## 2024-11-14 ENCOUNTER — RESULTS FOLLOW-UP (OUTPATIENT)
Dept: FAMILY MEDICINE CLINIC | Facility: CLINIC | Age: 64
End: 2024-11-14

## 2024-11-19 ENCOUNTER — APPOINTMENT (OUTPATIENT)
Dept: LAB | Facility: MEDICAL CENTER | Age: 64
End: 2024-11-19
Payer: COMMERCIAL

## 2024-11-19 ENCOUNTER — RESULTS FOLLOW-UP (OUTPATIENT)
Dept: FAMILY MEDICINE CLINIC | Facility: CLINIC | Age: 64
End: 2024-11-19

## 2024-11-19 DIAGNOSIS — I10 PRIMARY HYPERTENSION: ICD-10-CM

## 2024-11-19 DIAGNOSIS — Z13.6 SCREENING FOR CARDIOVASCULAR CONDITION: ICD-10-CM

## 2024-11-19 DIAGNOSIS — R73.02 IMPAIRED GLUCOSE TOLERANCE: ICD-10-CM

## 2024-11-19 LAB
ALBUMIN SERPL BCG-MCNC: 4.5 G/DL (ref 3.5–5)
ALP SERPL-CCNC: 80 U/L (ref 34–104)
ALT SERPL W P-5'-P-CCNC: 36 U/L (ref 7–52)
ANION GAP SERPL CALCULATED.3IONS-SCNC: 9 MMOL/L (ref 4–13)
AST SERPL W P-5'-P-CCNC: 35 U/L (ref 13–39)
BILIRUB SERPL-MCNC: 0.79 MG/DL (ref 0.2–1)
BUN SERPL-MCNC: 27 MG/DL (ref 5–25)
CALCIUM SERPL-MCNC: 9.3 MG/DL (ref 8.4–10.2)
CHLORIDE SERPL-SCNC: 106 MMOL/L (ref 96–108)
CHOLEST SERPL-MCNC: 188 MG/DL (ref ?–200)
CO2 SERPL-SCNC: 26 MMOL/L (ref 21–32)
CREAT SERPL-MCNC: 1.29 MG/DL (ref 0.6–1.3)
EST. AVERAGE GLUCOSE BLD GHB EST-MCNC: 137 MG/DL
GFR SERPL CREATININE-BSD FRML MDRD: 57 ML/MIN/1.73SQ M
GLUCOSE P FAST SERPL-MCNC: 103 MG/DL (ref 65–99)
HBA1C MFR BLD: 6.4 %
HDLC SERPL-MCNC: 62 MG/DL
LDLC SERPL CALC-MCNC: 113 MG/DL (ref 0–100)
NONHDLC SERPL-MCNC: 126 MG/DL
POTASSIUM SERPL-SCNC: 4.3 MMOL/L (ref 3.5–5.3)
PROT SERPL-MCNC: 7.4 G/DL (ref 6.4–8.4)
SODIUM SERPL-SCNC: 141 MMOL/L (ref 135–147)
TRIGL SERPL-MCNC: 67 MG/DL (ref ?–150)
TSH SERPL DL<=0.05 MIU/L-ACNC: 4.31 UIU/ML (ref 0.45–4.5)

## 2024-11-19 PROCEDURE — 83036 HEMOGLOBIN GLYCOSYLATED A1C: CPT

## 2024-11-19 PROCEDURE — 80061 LIPID PANEL: CPT

## 2024-11-19 PROCEDURE — 36415 COLL VENOUS BLD VENIPUNCTURE: CPT

## 2024-11-19 PROCEDURE — 80053 COMPREHEN METABOLIC PANEL: CPT

## 2024-11-19 PROCEDURE — 84443 ASSAY THYROID STIM HORMONE: CPT

## 2024-12-03 NOTE — ASSESSMENT & PLAN NOTE
Patient has been experiencing hypersomnia in the past 1 to 2 years  He can easily doze off if he is sedentary and not stimulated mentally or physically  We ordered a home sleep study to further evaluate his sleep and rule out sleep apnea  If he does not have sleep apnea, we could potentially discuss medications to help with wakefulness  Refill request

## 2025-01-31 ENCOUNTER — TELEPHONE (OUTPATIENT)
Age: 65
End: 2025-01-31

## 2025-01-31 ENCOUNTER — TELEMEDICINE (OUTPATIENT)
Dept: FAMILY MEDICINE CLINIC | Facility: CLINIC | Age: 65
End: 2025-01-31
Payer: MEDICARE

## 2025-01-31 PROCEDURE — G2211 COMPLEX E/M VISIT ADD ON: HCPCS | Performed by: FAMILY MEDICINE

## 2025-01-31 PROCEDURE — 99213 OFFICE O/P EST LOW 20 MIN: CPT | Performed by: FAMILY MEDICINE

## 2025-01-31 RX ORDER — CEPHALEXIN 500 MG/1
500 CAPSULE ORAL 3 TIMES DAILY
Qty: 18 CAPSULE | Refills: 0 | Status: SHIPPED | OUTPATIENT
Start: 2025-01-31 | End: 2025-02-07

## 2025-01-31 RX ORDER — MUPIROCIN 20 MG/G
OINTMENT TOPICAL 3 TIMES DAILY
Qty: 22 G | Refills: 1 | Status: SHIPPED | OUTPATIENT
Start: 2025-01-31 | End: 2025-02-10

## 2025-01-31 NOTE — PROGRESS NOTES
Virtual Regular Visit  Name: David Gomes      : 1960      MRN: [id number]  Encounter Provider: Josefina Haddad MD  Encounter Date: 2025   Encounter department: [facility]      Verification of patient location:  Patient is located at Home in the following state in which I hold an active license [place] :  Assessment & Plan  Omphalitis    Orders:    cephalexin (KEFLEX) 500 mg capsule; Take 1 capsule (500 mg total) by mouth 3 (three) times a day for 6 days    mupirocin (BACTROBAN) 2 % ointment; Apply topically 3 (three) times a day for 10 days  keflex  for  10 days  as well a s bactroban cream, warm washclthes  Omphalitis               Encounter provider Josefina Haddad MD    The patient was identified by name and date of birth. David Gomes was informed that this is a telemedicine visit and that the visit is being conducted through the Epic Embedded platform. He agrees to proceed..  My office door was closed. No one else was in the room.  He acknowledged consent and understanding of privacy and security of the video platform. The patient has agreed to participate and understands they can discontinue the visit at any time.    Patient is aware this is a billable service.     History of Present Illness     Patient presents with:  Virtual Regular Visit   Pt  had  omphalitis  back  in 3/24  before  his  hip surgery, hx  umbilical hernia  repair  10 years a go, draining  yellow-clear  fluid, occ  bleeding, a  course  of  Keflex  helped  before  , he  still has a  few  pills  left, nom temp, no chills, no gi symptoms, all the  shirts  he    has  been wearing the  last few  days  have  a stain around  lower  area  of  shirt      Review of Systems   Constitutional:  Negative for activity change, appetite change, chills, fatigue and fever.   Respiratory:  Negative for shortness of breath.    Cardiovascular:  Negative for chest pain.   Gastrointestinal: Negative.    Skin:  Positive for wound.         Mild skin redness       Objective   There were no vitals taken for this visit.    Physical Exam  Constitutional:       Appearance: Normal appearance.   HENT:      Nose: No congestion or rhinorrhea.   Pulmonary:      Effort: Pulmonary effort is normal. No respiratory distress.   Lymphadenopathy:      Cervical: No cervical adenopathy.   Skin:     Findings: Erythema present.      Comments: Redness and  some  drainage  around  umbilicus, per  pt  not  tender  to touch   Neurological:      Mental Status: He is alert.   Psychiatric:         Mood and Affect: Mood normal.         Visit Time  Total Visit Duration: 5

## 2025-02-07 ENCOUNTER — APPOINTMENT (OUTPATIENT)
Dept: RADIOLOGY | Facility: MEDICAL CENTER | Age: 65
End: 2025-02-07
Payer: MEDICARE

## 2025-02-07 ENCOUNTER — OFFICE VISIT (OUTPATIENT)
Dept: OBGYN CLINIC | Facility: MEDICAL CENTER | Age: 65
End: 2025-02-07
Payer: MEDICARE

## 2025-02-07 VITALS — BODY MASS INDEX: 26.31 KG/M2 | HEIGHT: 74 IN | WEIGHT: 205 LBS

## 2025-02-07 DIAGNOSIS — Z96.641 STATUS POST TOTAL HIP REPLACEMENT, RIGHT: ICD-10-CM

## 2025-02-07 DIAGNOSIS — M17.0 PRIMARY OSTEOARTHRITIS OF BOTH KNEES: Primary | ICD-10-CM

## 2025-02-07 DIAGNOSIS — Z96.643 STATUS POST BILATERAL TOTAL HIP REPLACEMENT: ICD-10-CM

## 2025-02-07 PROCEDURE — 99214 OFFICE O/P EST MOD 30 MIN: CPT | Performed by: ORTHOPAEDIC SURGERY

## 2025-02-07 PROCEDURE — 20610 DRAIN/INJ JOINT/BURSA W/O US: CPT | Performed by: ORTHOPAEDIC SURGERY

## 2025-02-07 PROCEDURE — 73521 X-RAY EXAM HIPS BI 2 VIEWS: CPT

## 2025-02-07 RX ORDER — BUPIVACAINE HYDROCHLORIDE 2.5 MG/ML
2 INJECTION, SOLUTION INFILTRATION; PERINEURAL
Status: COMPLETED | OUTPATIENT
Start: 2025-02-07 | End: 2025-02-07

## 2025-02-07 RX ORDER — LIDOCAINE HYDROCHLORIDE 10 MG/ML
5 INJECTION, SOLUTION INFILTRATION; PERINEURAL
Status: COMPLETED | OUTPATIENT
Start: 2025-02-07 | End: 2025-02-07

## 2025-02-07 RX ORDER — TRIAMCINOLONE ACETONIDE 40 MG/ML
40 INJECTION, SUSPENSION INTRA-ARTICULAR; INTRAMUSCULAR
Status: COMPLETED | OUTPATIENT
Start: 2025-02-07 | End: 2025-02-07

## 2025-02-07 RX ADMIN — BUPIVACAINE HYDROCHLORIDE 2 ML: 2.5 INJECTION, SOLUTION INFILTRATION; PERINEURAL at 15:15

## 2025-02-07 RX ADMIN — LIDOCAINE HYDROCHLORIDE 5 ML: 10 INJECTION, SOLUTION INFILTRATION; PERINEURAL at 15:15

## 2025-02-07 RX ADMIN — TRIAMCINOLONE ACETONIDE 40 MG: 40 INJECTION, SUSPENSION INTRA-ARTICULAR; INTRAMUSCULAR at 15:15

## 2025-02-07 NOTE — PROGRESS NOTES
Name: David Gomes      : 1960      MRN: [id number]  Encounter Provider: Amy Ramirez DO  Encounter Date: 2025   Encounter department: [facility] [place]  :  Assessment & Plan  Primary osteoarthritis of both knees  Patient has moderate bilateral knee osteoarthritis.  After a discussion of risks and benefits the patient elected to proceed with a right knee aspiration (21 mL of blood-tinged fluid) and bilateral knee steroid injection today.  Patient should ice and avoid strenuous activity for 1-2 days if needed.  Patient should avoid vaccines for 2 weeks if possible.  If patient is diabetic should also monitor glucose over the next 7 to 10 days.  Discussed about sending fluid for labs however patient declined.  Can take Tylenol 1,000mg by mouth every 8 hours as needed for pain.  Do not exceed 3,000mg per day.    Recommended using ibuprofen sparingly due to his GFR levels.  I discussed with patient he may discuss NSAID usage with his PCP.  Ice, heat, topical gels as needed for pain control.  Patient states he is having drainage within his stomach.  Advised him to discuss with his PCP about this issue.  Patient may follow-up in 3 months for repeat bilateral knee CSI.       Status post bilateral total hip replacement  X-rays of bilateral hips were obtained and reviewed with patient at today's visit.  Patient may continue activity as tolerated.  Patient may take OTC medications as needed for pain control.  Patient may follow-up in 2-3 years for bilateral hip x-rays and reevaluation               I answered all of the patient's questions during the visit and provided education of the patient's condition during the visit.  The patient verbalized understanding of the information given and agrees with the plan.  This note was dictated using [employer] software.  It may contain errors including improperly dictated words.  Please contact physician directly for any  questions.    Subjective   Chief Complaint:   Chief Complaint   Patient presents with    Left Knee - Follow-up    Right Knee - Follow-up         History of Present Illness     HPI    David Gomes is a 64 y.o. male who presents for follow up for moderate bilateral knee osteoarthritis and 1 year status post bilateral DONNA's.  Patient was last seen 11/7/2024 where patient received bilateral knee steroid injections and right knee aspiration.  Patient states he received about 2 months of relief with steroid injections.  Patient states he has been taking ibuprofen and Tylenol as needed for pain control.  Patient states he does occasional HEP.    Patient is 1 year status post bilateral hip DONNA's.  Patient states he has no pain within his hips and knees overall pleased with his progress.    Review of Systems  ROS:    See [facility] for musculoskeletal review.   All other systems reviewed are negative     History:  Past Medical History:   Diagnosis Date    Abnormal computed tomography angiography (CTA) 03/08/2023    BPH with urinary obstruction     AND OTHER LOWER URINARY TRACT SYMPTOMS     Cancer (HCC) 2017    Chronic pain disorder     Colon polyp     Detached retina, right 2015    Elevated prostate specific antigen (PSA) 2017    Feeling of incomplete bladder emptying     History of hypertension     History of nocturia     Hypertension     Low back pain     Neuroma of foot 2017    Prostate cancer (HCC) 2017    Weak urinary stream      Past Surgical History:   Procedure Laterality Date    BACK SURGERY  2008    COLONOSCOPY      EYE SURGERY      REPAIR DETACHED RETINA     EYE SURGERY Right     HERNIA REPAIR      LAMINECTOMY      LUMBAR LAMINECTOMY Bilateral     L4-5    RI ARTHRP ACETBLR/PROX FEM PROSTC AGRFT/ALGRFT Left 12/27/2023    Procedure: ARTHROPLASTY HIP TOTAL ANTERIOR,NAVIGATED, potential same day discharge;  Surgeon: Amy Ramirez DO;  Location:  MAIN OR;  Service: Orthopedics    RI ARTHRP ACETBLR/PROX FEM  "PROSTC AGRFT/ALGRFT Right 3/27/2024    Procedure: ARTHROPLASTY HIP TOTAL ANTERIOR,NAVIGATED, RIGHT KNEE ASPIRATION;  Surgeon: Amy Ramirez DO;  Location:  MAIN OR;  Service: Orthopedics    PROSTATE BIOPSY Bilateral 2017    SPINE SURGERY      TONSILLECTOMY      VASECTOMY      SURGERY VAS DEFERENS      Social History   Social History     Substance and Sexual Activity   Alcohol Use Not Currently    Comment: I have stopped drinking alcohol.     Social History     Substance and Sexual Activity   Drug Use No     Social History     Tobacco Use   Smoking Status Never   Smokeless Tobacco Never     Family History:   Family History   Problem Relation Age of Onset    Hypertension Mother     Peripheral vascular disease Mother     Dementia Father        Current Outpatient Medications on File Prior to Visit   Medication Sig Dispense Refill    cephalexin (KEFLEX) 500 mg capsule Take 1 capsule (500 mg total) by mouth 3 (three) times a day for 6 days 18 capsule 0    mupirocin (BACTROBAN) 2 % ointment Apply topically 3 (three) times a day for 10 days 22 g 1    valsartan (DIOVAN) 80 mg tablet TAKE 1 TABLET BY MOUTH EVERY DAY 90 tablet 01     No current facility-administered medications on file prior to visit.     No Known Allergies       Objective   Ht 6' 2\" (1.88 m)   Wt 93 kg (205 lb)   BMI 26.32 kg/m²          PE:  AAOx 3  WDWN  Hearing intact, no drainage from eyes  no audible wheezing  no abdominal distension  LE compartments soft, skin intact    Ortho Exam:  bilateral Knee:   No erythema  no swelling  no effusion  no warmth  AROM: 0-120  Stable to varus/valgus stress    Bilateral hips:  No pain with hip range of motion.  Incisions clean dry and intact.  No signs of infection    Imaging Studies: Results Review Statement: I personally reviewed the following image studies in PACS and associated radiology reports: xray(s). My interpretation of the radiology images/reports is: Status post bilateral hip T HA with adequate " laminar implants.      Large joint arthrocentesis: bilateral knee  Universal Protocol:  procedure performed by consultantConsent: Verbal consent obtained.  Risks and benefits: risks, benefits and alternatives were discussed  Consent given by: patient  Patient understanding: patient states understanding of the procedure being performed  Site marked: the operative site was marked  Patient identity confirmed: verbally with patient  Supporting Documentation  Indications: pain   Procedure Details  Location: knee - bilateral knee  Needle size: 22 G  Ultrasound guidance: no  Approach: anterolateral    Medications (Right): 40 mg triamcinolone acetonide 40 mg/mL; 2 mL bupivacaine 0.25 %; 5 mL lidocaine 1 %Aspirate amount (Right): 21 mL  Aspirate (Right): blood-tinged  Medications (Left): 40 mg triamcinolone acetonide 40 mg/mL; 2 mL bupivacaine 0.25 %   Patient tolerance: patient tolerated the procedure well with no immediate complications  Dressing:  Sterile dressing applied              Scribe Attestation      I,:  Hari Majano am acting as a scribe while in the presence of the attending physician.:       I,:  Amy Ramirez DO personally performed the services described in this documentation    as scribed in my presence.:

## 2025-02-07 NOTE — ASSESSMENT & PLAN NOTE
X-rays of bilateral hips were obtained and reviewed with patient at today's visit.  Patient may continue activity as tolerated.  Patient may take OTC medications as needed for pain control.  Patient may follow-up in 2-3 years for bilateral hip x-rays and reevaluation

## 2025-03-04 ENCOUNTER — OFFICE VISIT (OUTPATIENT)
Dept: FAMILY MEDICINE CLINIC | Facility: CLINIC | Age: 65
End: 2025-03-04
Payer: MEDICARE

## 2025-03-04 VITALS
RESPIRATION RATE: 16 BRPM | SYSTOLIC BLOOD PRESSURE: 114 MMHG | DIASTOLIC BLOOD PRESSURE: 80 MMHG | TEMPERATURE: 97.1 F | WEIGHT: 203 LBS | HEART RATE: 114 BPM | BODY MASS INDEX: 26.05 KG/M2 | HEIGHT: 74 IN | OXYGEN SATURATION: 96 %

## 2025-03-04 DIAGNOSIS — R94.4 DECREASED GFR: ICD-10-CM

## 2025-03-04 DIAGNOSIS — R73.02 IMPAIRED GLUCOSE TOLERANCE: ICD-10-CM

## 2025-03-04 DIAGNOSIS — F33.0 MILD EPISODE OF RECURRENT MAJOR DEPRESSIVE DISORDER (HCC): ICD-10-CM

## 2025-03-04 DIAGNOSIS — C61 PROSTATE CARCINOMA (HCC): ICD-10-CM

## 2025-03-04 DIAGNOSIS — J84.10 GRANULOMATOUS LUNG DISEASE (HCC): ICD-10-CM

## 2025-03-04 DIAGNOSIS — I10 PRIMARY HYPERTENSION: ICD-10-CM

## 2025-03-04 PROBLEM — F32.A DEPRESSION: Status: ACTIVE | Noted: 2025-03-04

## 2025-03-04 PROCEDURE — G2211 COMPLEX E/M VISIT ADD ON: HCPCS | Performed by: FAMILY MEDICINE

## 2025-03-04 PROCEDURE — 99214 OFFICE O/P EST MOD 30 MIN: CPT | Performed by: FAMILY MEDICINE

## 2025-03-04 RX ORDER — SULFAMETHOXAZOLE AND TRIMETHOPRIM 800; 160 MG/1; MG/1
1 TABLET ORAL EVERY 12 HOURS SCHEDULED
Qty: 20 TABLET | Refills: 0 | Status: SHIPPED | OUTPATIENT
Start: 2025-03-04 | End: 2025-03-14

## 2025-03-04 NOTE — PROGRESS NOTES
Name: David Gomes      : 1960      MRN: [id number]  Encounter Provider: Jonathan De La Rosa MD  Encounter Date: 3/4/2025   Encounter department: [facility]IMARY [facility]  :  Assessment & Plan  Omphalitis    Orders:  •  sulfamethoxazole-trimethoprim (Bactrim DS) 800-160 mg per tablet; Take 1 tablet by mouth every 12 (twelve) hours for 10 days  •  Ambulatory Referral to General Surgery; Future    Primary hypertension  Blood pressure today was quite good.  Will continue to follow.  No change.  Continue valsartan 80.       Impaired glucose tolerance  Elevated blood sugar recently.  But only a minimal amount elevated.  Limit carbs, increase exercise.       Prostate carcinoma (HCC)  Patient did have an MRI with really not much significant findings, as well as a PSA that seems to be stable.  Follow-up with urology.  Unfortunately, urology office had to postpone several times on him.         Decreased GFR  Current GFR is slightly low.  Not sure that this represents CKD with that.  The last 2 have been in the 50s, but high 50s.  Goal is over 60 for this.  Encourage hydration, avoid anti-inflammatories.  Keep blood pressure under control.       Mild episode of recurrent major depressive disorder (HCC)  Question counseling vs medications.  Had some issues with counseling before.  Offered medications.  Declined counseling and medications.         Granulomatous lung disease (HCC)  Follow in the future.  Patient not really having any problems.             1. Omphalitis  Comments:  Had hernia before. Now with recurrent discharge.  Would recommend follow with Dr Conley (prior surgeon). Bactrim DS  Orders:  -     sulfamethoxazole-trimethoprim (Bactrim DS) 800-160 mg per tablet; Take 1 tablet by mouth every 12 (twelve) hours for 10 days  -     Ambulatory Referral to General Surgery; Future  2. Primary hypertension  Assessment & Plan:  Blood pressure today was quite good.  Will continue to follow.  No change.   Continue valsartan 80.       3. Impaired glucose tolerance  Assessment & Plan:  Elevated blood sugar recently.  But only a minimal amount elevated.  Limit carbs, increase exercise.       4. Prostate carcinoma (HCC)  Assessment & Plan:  Patient did have an MRI with really not much significant findings, as well as a PSA that seems to be stable.  Follow-up with urology.  Unfortunately, urology office had to postpone several times on him.         5. Decreased GFR  Assessment & Plan:  Current GFR is slightly low.  Not sure that this represents CKD with that.  The last 2 have been in the 50s, but high 50s.  Goal is over 60 for this.  Encourage hydration, avoid anti-inflammatories.  Keep blood pressure under control.       6. Mild episode of recurrent major depressive disorder (HCC)  Assessment & Plan:  Question counseling vs medications.  Had some issues with counseling before.  Offered medications.  Declined counseling and medications.         7. Granulomatous lung disease (HCC)  Assessment & Plan:  Follow in the future.  Patient not really having any problems.           Chief Complaint   Patient presents with   • naval discharge     Patient in office for naval bloody/crusty discharge onset on and off for a year.            History of Present Illness   About a year ago, noted some discharge from umbilicus.  Has had coming and going for a while now.  Had US and eval then, was negative US.    Used again after last OV.  Has just not cleared up.    · Primary hypertension: Currently on valsartan 80 mg    · Impaired glucose tolerance: Noted previously.  Labs will be due at some point    · Prostate carcinoma (HCC): Patient is following with Dr. Michelle at [facility].  Did have an MRI done in September, and a PSA done before.  PSA seems fairly stable, MRI was read as PI-RADS 1, low risk.  Biopsy options to be discussed with urology.    He mentioned that he does not feel well.  Feels a bit off, less happiness vs several years  "ago.  Feels detached at times, isolated.        Review of Systems   Constitutional:  Negative for appetite change and fever.   Respiratory:  Negative for chest tightness and shortness of breath.    Cardiovascular:  Negative for chest pain, palpitations and leg swelling.   Gastrointestinal:  Negative for abdominal pain.   Genitourinary:  Negative for difficulty urinating.   Musculoskeletal:  Negative for arthralgias and myalgias.   Skin:  Negative for wound.        Navel bloody/crusty discharge   Neurological:  Negative for dizziness, light-headedness and headaches.   Psychiatric/Behavioral:  Negative for dysphoric mood and sleep disturbance. The patient is not nervous/anxious.        Objective   /80 (BP Location: Left arm, Patient Position: Sitting, Cuff Size: Adult)   Pulse (!) 114   Temp (!) 97.1 °F (36.2 °C) (Temporal)   Resp 16   Ht 6' 2\" (1.88 m)   Wt 92.1 kg (203 lb)   SpO2 96%   BMI 26.06 kg/m²      Physical Exam    "

## 2025-03-04 NOTE — ASSESSMENT & PLAN NOTE
Blood pressure today was quite good.  Will continue to follow.  No change.  Continue valsartan 80.

## 2025-03-04 NOTE — ASSESSMENT & PLAN NOTE
Mom states she gave patient milk for the first time yesterday and he broke out in a rash. Mom gave him benadryl and the rash subsided and is completely gone today. She has not offered any more cows milk. Patient is still . Advised mom to not give him any more whole milk and wait 1 month before she tries the regular cows milk again. Once she does she should try a more organic cows milk. If pt's symptoms return than he will need to be seen. Verified advise with Nik Ba RN. Parent verbalized understanding of information given. No further questions or concerns. msg to Dr. Kiara Anton. Question counseling vs medications.  Had some issues with counseling before.  Offered medications.  Declined counseling and medications.

## 2025-03-04 NOTE — PATIENT INSTRUCTIONS
1. Omphalitis  Comments:  Had hernia before. Now with recurrent discharge.  Would recommend follow with Dr Conley (prior surgeon). Bactrim DS  Orders:  -     sulfamethoxazole-trimethoprim (Bactrim DS) 800-160 mg per tablet; Take 1 tablet by mouth every 12 (twelve) hours for 10 days  -     Ambulatory Referral to General Surgery; Future  2. Primary hypertension  Assessment & Plan:  Blood pressure today was quite good.  Will continue to follow.  No change.  Continue valsartan 80.       3. Impaired glucose tolerance  Assessment & Plan:  Elevated blood sugar recently.  But only a minimal amount elevated.  Limit carbs, increase exercise.       4. Prostate carcinoma (HCC)  Assessment & Plan:  Patient did have an MRI with really not much significant findings, as well as a PSA that seems to be stable.  Follow-up with urology.  Unfortunately, urology office had to postpone several times on him.         5. Decreased GFR  Assessment & Plan:  Current GFR is slightly low.  Not sure that this represents CKD with that.  The last 2 have been in the 50s, but high 50s.  Goal is over 60 for this.  Encourage hydration, avoid anti-inflammatories.  Keep blood pressure under control.       6. Mild episode of recurrent major depressive disorder (HCC)  Assessment & Plan:  Question counseling vs medications.  Had some issues with counseling before.  Offered medications.  Declined counseling and medications.         7. Granulomatous lung disease (HCC)  Assessment & Plan:  Follow in the future.  Patient not really having any problems.           COVID 19 Instructions    David Gomes was advised to limit contact with others to essential tasks such as getting food, medications, and medical care.    Proper handwashing reviewed, and Hand sanitzer when washing is not available.    If the patient develops symptoms of COVID 19, the patient should call the office as soon as possible.    It is strongly recommended that Flu Vaccinations be  obtained.      Virtual Visits:  Daniella: This works on smart phones (any phone with Internet browsing capability).  You should get a text message when the provider is ready to see you.  Click on the link in the text message, and the call should start.  You will need to type in your name, and allow camera and microphone access.  This is HIPPA compliant, and secure.      If you have not already done so, get immunized to COVID 19.      We are committed to getting you vaccinated as soon as possible and will be closely following CDC and [facility] guidelines as they are released and revised.  Please refer to our COVID-19 vaccine webpage for the most up to date information on the vaccine and our distribution efforts.    This site will also have the most up to date recommendations for COVID booster vaccine.    https://[email or web address].org/covid-19/protect-[email or web address]-[email or web address]-vaccine    Call 5-788-IQCFRNX ([phone]), option 7    You can also visit https://www.vaccines.gov/ to find vaccines in your area.    OUR LOCATION:    [facility]  [address] [place] [address], [address]  [place], [place], [address] [phone]  Fax: [phone]    Lab services, Rheumatology, and OB/GYN are at this location as well.

## 2025-03-04 NOTE — ASSESSMENT & PLAN NOTE
Elevated blood sugar recently.  But only a minimal amount elevated.  Limit carbs, increase exercise.

## 2025-03-04 NOTE — ASSESSMENT & PLAN NOTE
Current GFR is slightly low.  Not sure that this represents CKD with that.  The last 2 have been in the 50s, but high 50s.  Goal is over 60 for this.  Encourage hydration, avoid anti-inflammatories.  Keep blood pressure under control.

## 2025-03-04 NOTE — ASSESSMENT & PLAN NOTE
Patient did have an MRI with really not much significant findings, as well as a PSA that seems to be stable.  Follow-up with urology.  Unfortunately, urology office had to postpone several times on him.

## 2025-03-06 ENCOUNTER — TELEPHONE (OUTPATIENT)
Age: 65
End: 2025-03-06

## 2025-03-06 NOTE — TELEPHONE ENCOUNTER
PT called back from a missed phone call from someone by the name of Janie. No notes in his chart about the missed call.  Please call back the PT anytime at [phone]

## 2025-03-06 NOTE — TELEPHONE ENCOUNTER
Called back the PT to go over what Janie had said in the referral note. The PT went to see his original provider KRISTIN for his issues. No need to schedule. He had an appt with his surgeon today.

## 2025-03-11 ENCOUNTER — APPOINTMENT (OUTPATIENT)
Dept: LAB | Facility: MEDICAL CENTER | Age: 65
End: 2025-03-11
Payer: MEDICARE

## 2025-03-20 DIAGNOSIS — I10 PRIMARY HYPERTENSION: Primary | ICD-10-CM

## 2025-03-31 ENCOUNTER — HOSPITAL ENCOUNTER (OUTPATIENT)
Dept: CT IMAGING | Facility: HOSPITAL | Age: 65
Discharge: HOME | End: 2025-03-31
Payer: COMMERCIAL

## 2025-03-31 DIAGNOSIS — I10 PRIMARY HYPERTENSION: ICD-10-CM

## 2025-03-31 PROCEDURE — 75571 CT HRT W/O DYE W/CA TEST: CPT

## 2025-04-01 ENCOUNTER — APPOINTMENT (OUTPATIENT)
Dept: LAB | Facility: MEDICAL CENTER | Age: 65
End: 2025-04-01
Payer: COMMERCIAL

## 2025-04-01 DIAGNOSIS — C61 MALIGNANT NEOPLASM OF PROSTATE (HCC): ICD-10-CM

## 2025-04-01 PROCEDURE — G0103 PSA SCREENING: HCPCS

## 2025-04-01 PROCEDURE — 36415 COLL VENOUS BLD VENIPUNCTURE: CPT

## 2025-04-02 LAB — PSA SERPL-MCNC: 1.28 NG/ML (ref 0–4)

## 2025-04-04 ENCOUNTER — RESULTS FOLLOW-UP (OUTPATIENT)
Dept: FAMILY MEDICINE CLINIC | Facility: CLINIC | Age: 65
End: 2025-04-04

## 2025-05-07 ENCOUNTER — OFFICE VISIT (OUTPATIENT)
Dept: OBGYN CLINIC | Facility: MEDICAL CENTER | Age: 65
End: 2025-05-07
Payer: MEDICARE

## 2025-05-07 DIAGNOSIS — M17.0 PRIMARY OSTEOARTHRITIS OF BOTH KNEES: Primary | ICD-10-CM

## 2025-05-07 PROBLEM — F32.A DEPRESSION: Status: RESOLVED | Noted: 2025-03-04 | Resolved: 2025-05-07

## 2025-05-07 PROCEDURE — 99213 OFFICE O/P EST LOW 20 MIN: CPT | Performed by: ORTHOPAEDIC SURGERY

## 2025-05-07 PROCEDURE — 20610 DRAIN/INJ JOINT/BURSA W/O US: CPT | Performed by: ORTHOPAEDIC SURGERY

## 2025-05-07 RX ORDER — BUPIVACAINE HYDROCHLORIDE 2.5 MG/ML
2 INJECTION, SOLUTION EPIDURAL; INFILTRATION; INTRACAUDAL; PERINEURAL
Status: COMPLETED | OUTPATIENT
Start: 2025-05-07 | End: 2025-05-07

## 2025-05-07 RX ORDER — TRIAMCINOLONE ACETONIDE 40 MG/ML
1 INJECTION, SUSPENSION INTRA-ARTICULAR; INTRAMUSCULAR
Status: COMPLETED | OUTPATIENT
Start: 2025-05-07 | End: 2025-05-07

## 2025-05-07 RX ORDER — LIDOCAINE HYDROCHLORIDE 10 MG/ML
5 INJECTION, SOLUTION INFILTRATION; PERINEURAL
Status: COMPLETED | OUTPATIENT
Start: 2025-05-07 | End: 2025-05-07

## 2025-05-07 RX ORDER — BUPIVACAINE HYDROCHLORIDE 2.5 MG/ML
2 INJECTION, SOLUTION INFILTRATION; PERINEURAL
Status: COMPLETED | OUTPATIENT
Start: 2025-05-07 | End: 2025-05-07

## 2025-05-07 RX ADMIN — TRIAMCINOLONE ACETONIDE 1 MG: 40 INJECTION, SUSPENSION INTRA-ARTICULAR; INTRAMUSCULAR at 16:15

## 2025-05-07 RX ADMIN — BUPIVACAINE HYDROCHLORIDE 2 ML: 2.5 INJECTION, SOLUTION EPIDURAL; INFILTRATION; INTRACAUDAL; PERINEURAL at 16:15

## 2025-05-07 RX ADMIN — BUPIVACAINE HYDROCHLORIDE 2 ML: 2.5 INJECTION, SOLUTION INFILTRATION; PERINEURAL at 16:15

## 2025-05-07 RX ADMIN — LIDOCAINE HYDROCHLORIDE 5 ML: 10 INJECTION, SOLUTION INFILTRATION; PERINEURAL at 16:15

## 2025-05-07 NOTE — PROGRESS NOTES
Name: David Gomes      : 1960      MRN: [id number]  Encounter Provider: Amy Ramirez DO  Encounter Date: 2025   Encounter department: [facility]  :  Assessment & Plan  Primary osteoarthritis of both knees    Orders:    Ambulatory Referral to Physical Therapy; Future          Patient has severe bilateral knee osteoarthritis.   Treatment options were discussed with patient today.  Injections: Patient received bilateral knee steroid injection today. Tolerated the procedure well. Post injection instructions reviewed including information on glucose monitoring for diabetic patients. Patient aware that they may repeat steroid injection every 3 months if needed.   Medications: Tylenol up to 3000 mg per day  PT: PT script provided. Patient may go for education on a home exercise program.   Activity: Continue activity as tolerated.   Plan for next appt:  F/U 3 months    No follow-ups on file.    I answered all of the patient's questions during the visit and provided education of the patient's condition during the visit.  The patient verbalized understanding of the information given and agrees with the plan.  This note was dictated using [employer] software.  It may contain errors including improperly dictated words.  Please contact physician directly for any questions.    Subjective   Chief Complaint: No chief complaint on file.        History of Present Illness     HPI    David Gomes is a 64 y.o. male who presents for follow up for his bilateral knees. He previously received a CSI on 25 provided about 2.5 months of relief.       Length of time knee pain has been present: 2-3 weeks  Any falls or trauma associated with onset of pain: No  Location of pain: Global   Intermittent or constant: Intermittent   Description of pain: Dull/achy  Aggravating factors: Weight bearing and stairs  Instability or locking: Few incidents of instability   Pain medication that  has been tried: Tylenol daily   Topical mediation that has been tried: no  Has heat/ice/elevation been tried: ice  Has PT or home exercises been tried?: no  Has bracing been tried? OTC or rx?  no  Have injections been tried?  Steroid/visco?: CSI 2/7/25  Any history of surgery on that knee?:      Review of Systems  ROS:    See HPI for musculoskeletal review.   All other systems reviewed are negative     History:  Past Medical History:   Diagnosis Date    Abnormal computed tomography angiography (CTA) 03/08/2023    BPH with urinary obstruction     AND OTHER LOWER URINARY TRACT SYMPTOMS     Cancer (HCC) 2017    Chronic pain disorder     Colon polyp     Detached retina, right 2015    Elevated prostate specific antigen (PSA) 2017    Feeling of incomplete bladder emptying     History of hypertension     History of nocturia     Hypertension     Low back pain     Neuroma of foot 2017    Prostate cancer (HCC) 2017    Weak urinary stream      Past Surgical History:   Procedure Laterality Date    BACK SURGERY  2008    COLONOSCOPY      EYE SURGERY      REPAIR DETACHED RETINA     EYE SURGERY Right     HERNIA REPAIR      LAMINECTOMY      LUMBAR LAMINECTOMY Bilateral     L4-5    IA ARTHRP ACETBLR/PROX FEM PROSTC AGRFT/ALGRFT Left 12/27/2023    Procedure: ARTHROPLASTY HIP TOTAL ANTERIOR,NAVIGATED, potential same day discharge;  Surgeon: Amy Ramirez DO;  Location:  MAIN OR;  Service: Orthopedics    IA ARTHRP ACETBLR/PROX FEM PROSTC AGRFT/ALGRFT Right 3/27/2024    Procedure: ARTHROPLASTY HIP TOTAL ANTERIOR,NAVIGATED, RIGHT KNEE ASPIRATION;  Surgeon: Amy Ramirez DO;  Location:  MAIN OR;  Service: Orthopedics    PROSTATE BIOPSY Bilateral 2017    SPINE SURGERY      TONSILLECTOMY      VASECTOMY      SURGERY VAS DEFERENS      Social History   Social History     Substance and Sexual Activity   Alcohol Use Not Currently    Comment: I have stopped drinking alcohol.     Social History     Substance and Sexual  Activity   Drug Use No     Social History     Tobacco Use   Smoking Status Never   Smokeless Tobacco Never     Family History:   Family History   Problem Relation Age of Onset    Hypertension Mother     Peripheral vascular disease Mother     Dementia Father        Current Outpatient Medications on File Prior to Visit   Medication Sig Dispense Refill    valsartan (DIOVAN) 80 mg tablet TAKE 1 TABLET BY MOUTH EVERY DAY 90 tablet 01    [DISCONTINUED] mupirocin (BACTROBAN) 2 % ointment Apply topically 3 (three) times a day for 10 days 22 g 1     No current facility-administered medications on file prior to visit.     No Known Allergies       Objective   There were no vitals taken for this visit.         PE:  AAOx 3  WDWN  Hearing intact, no drainage from eyes  no audible wheezing  no abdominal distension  LE compartments soft, skin intact    Ortho Exam:  bilateralknee:    Appearance:  no swelling   No bruising  no obvious joint deformity   No effusion  Palpation/Tenderness:  +TTP over medial joint line, no TTP over lateral joint line or over patella/patellar tendon  Active Range of Motion:  AROM: 0-125  Special Tests:  Medial Daryn's Test:  Positive  Lateral Daryn's Test:  Negative  Apley's compression test:  Negative  Lachman's Test:  negative  Anterior Drawer Test:  negative  Valgus Stress Test:  negative  Varus Stress Test:  negative      Imaging Studies: Results Review Statement: I personally reviewed the following image studies in PACS and associated radiology reports: xray(s). My interpretation of the radiology images/reports is: severe tricompartmental osteoarthritis bilateral knee.    Large joint arthrocentesis: R knee    Performed by: Amy Ramirez DO  Authorized by: Amy Ramirez DO    Universal Protocol:  procedure performed by consultantConsent: Verbal consent obtained. Written consent not obtained.  Risks and benefits: risks, benefits and alternatives were discussed  Consent given by:  patient  Patient understanding: patient states understanding of the procedure being performed  Patient consent: the patient's understanding of the procedure matches consent given  Patient identity confirmed: verbally with patient  Supporting Documentation  Indications: pain     Is this a Visco injection? NoProcedure Details  Location: knee - R knee  Needle size: 22 G  Ultrasound guidance: no  Approach: lateral  Medications administered: 2 mL bupivacaine 0.25 %; 1 mg triamcinolone acetonide 40 mg/mL; 5 mL lidocaine 1 %    Aspirate amount: 43 mL  Aspirate: yellow  Patient tolerance: patient tolerated the procedure well with no immediate complications  Dressing:  Sterile dressing applied      Large joint arthrocentesis: L knee    Performed by: Amy Ramirez DO  Authorized by: Amy Ramirez DO    Universal Protocol:  procedure performed by consultantConsent: Verbal consent obtained. Written consent not obtained.  Risks and benefits: risks, benefits and alternatives were discussed  Consent given by: patient  Patient consent: the patient's understanding of the procedure matches consent given  Patient identity confirmed: verbally with patient  Supporting Documentation  Indications: pain     Is this a Visco injection? NoProcedure Details  Location: knee - L knee  Needle size: 22 G  Ultrasound guidance: no  Approach: lateral  Medications administered: 2 mL bupivacaine (PF) 0.25 %; 1 mg triamcinolone acetonide 40 mg/mL            Scribe Attestation      I,:  Kory Villalba am acting as a scribe while in the presence of the attending physician.:       I,:  Amy Ramirez DO personally performed the services described in this documentation    as scribed in my presence.:

## 2025-05-08 ENCOUNTER — OFFICE VISIT (OUTPATIENT)
Dept: FAMILY MEDICINE CLINIC | Facility: CLINIC | Age: 65
End: 2025-05-08
Payer: MEDICARE

## 2025-05-08 VITALS
DIASTOLIC BLOOD PRESSURE: 82 MMHG | HEART RATE: 106 BPM | HEIGHT: 74 IN | BODY MASS INDEX: 26.82 KG/M2 | WEIGHT: 209 LBS | SYSTOLIC BLOOD PRESSURE: 148 MMHG | OXYGEN SATURATION: 97 %

## 2025-05-08 DIAGNOSIS — R53.83 OTHER FATIGUE: ICD-10-CM

## 2025-05-08 DIAGNOSIS — E78.5 DYSLIPIDEMIA: ICD-10-CM

## 2025-05-08 DIAGNOSIS — R73.02 IMPAIRED GLUCOSE TOLERANCE: ICD-10-CM

## 2025-05-08 DIAGNOSIS — D71 GRANULOMATOUS DISEASE (HCC): Primary | ICD-10-CM

## 2025-05-08 PROBLEM — R94.4 DECREASED GFR: Status: RESOLVED | Noted: 2025-03-04 | Resolved: 2025-05-08

## 2025-05-08 PROCEDURE — G2211 COMPLEX E/M VISIT ADD ON: HCPCS | Performed by: FAMILY MEDICINE

## 2025-05-08 PROCEDURE — 99214 OFFICE O/P EST MOD 30 MIN: CPT | Performed by: FAMILY MEDICINE

## 2025-05-12 ENCOUNTER — APPOINTMENT (OUTPATIENT)
Dept: LAB | Facility: MEDICAL CENTER | Age: 65
End: 2025-05-12
Payer: COMMERCIAL

## 2025-05-12 DIAGNOSIS — D71 GRANULOMATOUS DISEASE (HCC): ICD-10-CM

## 2025-05-12 DIAGNOSIS — R73.02 IMPAIRED GLUCOSE TOLERANCE: ICD-10-CM

## 2025-05-12 LAB
ALBUMIN SERPL BCG-MCNC: 4.3 G/DL (ref 3.5–5)
ALP SERPL-CCNC: 80 U/L (ref 34–104)
ALT SERPL W P-5'-P-CCNC: 33 U/L (ref 7–52)
ANION GAP SERPL CALCULATED.3IONS-SCNC: 10 MMOL/L (ref 4–13)
AST SERPL W P-5'-P-CCNC: 26 U/L (ref 13–39)
BASOPHILS # BLD AUTO: 0.06 THOUSANDS/ÂΜL (ref 0–0.1)
BASOPHILS NFR BLD AUTO: 1 % (ref 0–1)
BILIRUB SERPL-MCNC: 1 MG/DL (ref 0.2–1)
BUN SERPL-MCNC: 22 MG/DL (ref 5–25)
CALCIUM SERPL-MCNC: 9.5 MG/DL (ref 8.4–10.2)
CHLORIDE SERPL-SCNC: 106 MMOL/L (ref 96–108)
CHOLEST SERPL-MCNC: 176 MG/DL (ref ?–200)
CO2 SERPL-SCNC: 25 MMOL/L (ref 21–32)
CREAT SERPL-MCNC: 1.3 MG/DL (ref 0.6–1.3)
EOSINOPHIL # BLD AUTO: 0.14 THOUSAND/ÂΜL (ref 0–0.61)
EOSINOPHIL NFR BLD AUTO: 2 % (ref 0–6)
ERYTHROCYTE [DISTWIDTH] IN BLOOD BY AUTOMATED COUNT: 13.2 % (ref 11.6–15.1)
EST. AVERAGE GLUCOSE BLD GHB EST-MCNC: 134 MG/DL
GFR SERPL CREATININE-BSD FRML MDRD: 57 ML/MIN/1.73SQ M
GLUCOSE P FAST SERPL-MCNC: 103 MG/DL (ref 65–99)
HBA1C MFR BLD: 6.3 %
HCT VFR BLD AUTO: 48 % (ref 36.5–49.3)
HDLC SERPL-MCNC: 65 MG/DL
HGB BLD-MCNC: 15 G/DL (ref 12–17)
IMM GRANULOCYTES # BLD AUTO: 0.04 THOUSAND/UL (ref 0–0.2)
IMM GRANULOCYTES NFR BLD AUTO: 1 % (ref 0–2)
LDLC SERPL CALC-MCNC: 92 MG/DL (ref 0–100)
LYMPHOCYTES # BLD AUTO: 2.22 THOUSANDS/ÂΜL (ref 0.6–4.47)
LYMPHOCYTES NFR BLD AUTO: 34 % (ref 14–44)
MCH RBC QN AUTO: 28.7 PG (ref 26.8–34.3)
MCHC RBC AUTO-ENTMCNC: 31.3 G/DL (ref 31.4–37.4)
MCV RBC AUTO: 92 FL (ref 82–98)
MONOCYTES # BLD AUTO: 0.6 THOUSAND/ÂΜL (ref 0.17–1.22)
MONOCYTES NFR BLD AUTO: 9 % (ref 4–12)
NEUTROPHILS # BLD AUTO: 3.52 THOUSANDS/ÂΜL (ref 1.85–7.62)
NEUTS SEG NFR BLD AUTO: 53 % (ref 43–75)
NONHDLC SERPL-MCNC: 111 MG/DL
NRBC BLD AUTO-RTO: 0 /100 WBCS
PLATELET # BLD AUTO: 256 THOUSANDS/UL (ref 149–390)
PMV BLD AUTO: 10.6 FL (ref 8.9–12.7)
POTASSIUM SERPL-SCNC: 4.5 MMOL/L (ref 3.5–5.3)
PROT SERPL-MCNC: 7.2 G/DL (ref 6.4–8.4)
RBC # BLD AUTO: 5.23 MILLION/UL (ref 3.88–5.62)
SODIUM SERPL-SCNC: 141 MMOL/L (ref 135–147)
TRIGL SERPL-MCNC: 93 MG/DL (ref ?–150)
TSH SERPL DL<=0.05 MIU/L-ACNC: 3.31 UIU/ML (ref 0.45–4.5)
WBC # BLD AUTO: 6.58 THOUSAND/UL (ref 4.31–10.16)

## 2025-05-12 PROCEDURE — 84443 ASSAY THYROID STIM HORMONE: CPT | Performed by: FAMILY MEDICINE

## 2025-05-12 PROCEDURE — 86480 TB TEST CELL IMMUN MEASURE: CPT

## 2025-05-12 PROCEDURE — 85025 COMPLETE CBC W/AUTO DIFF WBC: CPT | Performed by: FAMILY MEDICINE

## 2025-05-12 PROCEDURE — 80061 LIPID PANEL: CPT | Performed by: FAMILY MEDICINE

## 2025-05-12 PROCEDURE — 80053 COMPREHEN METABOLIC PANEL: CPT | Performed by: FAMILY MEDICINE

## 2025-05-12 PROCEDURE — 82164 ANGIOTENSIN I ENZYME TEST: CPT

## 2025-05-12 PROCEDURE — 83036 HEMOGLOBIN GLYCOSYLATED A1C: CPT

## 2025-05-12 PROCEDURE — 36415 COLL VENOUS BLD VENIPUNCTURE: CPT

## 2025-05-13 ENCOUNTER — RESULTS FOLLOW-UP (OUTPATIENT)
Dept: FAMILY MEDICINE CLINIC | Facility: CLINIC | Age: 65
End: 2025-05-13

## 2025-05-13 DIAGNOSIS — I10 ESSENTIAL HYPERTENSION: ICD-10-CM

## 2025-05-13 LAB
ACE SERPL-CCNC: 27 U/L (ref 14–82)
GAMMA INTERFERON BACKGROUND BLD IA-ACNC: 0.06 IU/ML
M TB IFN-G BLD-IMP: NEGATIVE
M TB IFN-G CD4+ BCKGRND COR BLD-ACNC: -0.01 IU/ML
M TB IFN-G CD4+ BCKGRND COR BLD-ACNC: -0.01 IU/ML
MITOGEN IGNF BCKGRD COR BLD-ACNC: 4.94 IU/ML

## 2025-05-13 RX ORDER — VALSARTAN 80 MG/1
80 TABLET ORAL DAILY
Qty: 90 TABLET | Refills: 1 | Status: SHIPPED | OUTPATIENT
Start: 2025-05-13

## 2025-05-15 NOTE — PROGRESS NOTES
"Name: David Gomes      : 1960      MRN: [id number]  Encounter Provider: BUSHRA Ken  Encounter Date: 2025   Encounter department: [facility]AL SURG[facility] [place]  :  Assessment & Plan  Umbilical discharge  Patient presents to office with umbilical drainage he has had one and off for about a year. Was treated several times for bacterial infections from numerous providers with no full resolution. On examination, used hemostat to visualize deeper into umbilicus.There was a cloudy drainage coming from patient umbilicus.  Wound culture obtained and sent. At certain angle, could see green/blue possible mesh contents further below. Will schedule appointment tomorrow to follow up and consult with Dr. Flor for further examination and possible further steps. Patient agreeable. All questions asked and answered.   Orders:    Wound culture and Gram stain    Prior abdominal surgery: lap umbilical hernia repair with mesh  [facility]    History of Present Illness   HPI  David Gomes is a 64 y.o. male who presents for evaluation of his umbilical drainage he has had for a little over a year. Was having serosanguinous/bloody drainage at first but more consistently now describes it as \"watery brown\" discharge. Will wake up every morning with drainage coming out of his umbilicus and throughout the day. Denies pain, bulging, rashes, itching at the area. Currently having no issues with his bowel movements. Denies fever, chills, N&V.  He does have a history of laparoscopic umbilical hernia repair with Ventralex ST mesh in 2018 with Dr. Clark FOSTER. Was evaluated 3/5/24 with [facility] general surgery in which they sent him for any ultrasound that was unremarkable. Since then, he was seen multiple times at his PCP with this concerns and treated with Keflex or Bactrim which would briefly minimize/clear up the discharge for a short period of time but would come back again later. Most recently, he was sent " for a CT of abdomen pelvis in 3/2025 by [facility] general surgery which was also did not show any fluid collection or abscess or hernia but states they said it was a yeast or bacterial infection due to the moisture of the area. Has come here for another opinion.     U/S ABDOMEN 03/05/2024  IMPRESSION:  There is no distinct soft tissue mass, fluid collection, or evidence of abdominal wall herniation within the imaged periumbilical ventral abdominal wall.     CT ABD PELVIS 03/18/2025 (to rule out abscess vs. Urachal cyst/tract)  IMPRESSION:  Given history, there is no evidence of a drainable fluid collection nor mass around the umbilicus. Previous surgery noted. No acute hernia.     Review of Systems   Constitutional:  Negative for chills and fever.   Eyes:  Negative for pain and visual disturbance.   Respiratory:  Negative for cough and shortness of breath.    Cardiovascular:  Negative for chest pain and palpitations.   Gastrointestinal:  Negative for abdominal pain and vomiting.   Genitourinary:  Negative for dysuria and hematuria.   Musculoskeletal:  Negative for arthralgias and back pain.   Skin:  Negative for color change and rash.   Neurological:  Negative for seizures and syncope.   All other systems reviewed and are negative.         Objective   There were no vitals taken for this visit.     Physical Exam  Vitals and nursing note reviewed.   Constitutional:       General: He is not in acute distress.     Appearance: He is well-developed.   HENT:      Head: Normocephalic and atraumatic.     Eyes:      Conjunctiva/sclera: Conjunctivae normal.       Cardiovascular:      Rate and Rhythm: Normal rate and regular rhythm.      Heart sounds: No murmur heard.  Pulmonary:      Effort: Pulmonary effort is normal. No respiratory distress.      Breath sounds: Normal breath sounds.   Abdominal:      General: There is no distension.      Palpations: Abdomen is soft.      Tenderness: There is no abdominal tenderness.      Hernia:  No hernia is present.      Comments: On exam, used hemostat to visualize deeper into umbilicus. There was a cloudy drainage coming from patient umbilicus.  Wound culture obtained and sent. At certain angle, could see green/blue possible mesh contents further below.     Musculoskeletal:         General: No swelling.      Cervical back: Neck supple.     Skin:     General: Skin is warm and dry.      Capillary Refill: Capillary refill takes less than 2 seconds.     Neurological:      Mental Status: He is alert and oriented to person, place, and time.     Psychiatric:         Mood and Affect: Mood normal.

## 2025-05-19 ENCOUNTER — CONSULT (OUTPATIENT)
Dept: SURGERY | Facility: CLINIC | Age: 65
End: 2025-05-19
Attending: FAMILY MEDICINE

## 2025-05-19 VITALS
WEIGHT: 210 LBS | BODY MASS INDEX: 26.95 KG/M2 | TEMPERATURE: 97 F | HEART RATE: 76 BPM | HEIGHT: 74 IN | SYSTOLIC BLOOD PRESSURE: 136 MMHG | DIASTOLIC BLOOD PRESSURE: 68 MMHG | RESPIRATION RATE: 16 BRPM | OXYGEN SATURATION: 97 %

## 2025-05-19 DIAGNOSIS — R19.8 UMBILICAL DISCHARGE: Primary | ICD-10-CM

## 2025-05-19 PROCEDURE — 87205 SMEAR GRAM STAIN: CPT

## 2025-05-19 PROCEDURE — 87186 SC STD MICRODIL/AGAR DIL: CPT

## 2025-05-19 PROCEDURE — 87077 CULTURE AEROBIC IDENTIFY: CPT

## 2025-05-19 PROCEDURE — 87070 CULTURE OTHR SPECIMN AEROBIC: CPT

## 2025-05-20 ENCOUNTER — OFFICE VISIT (OUTPATIENT)
Dept: SURGERY | Facility: CLINIC | Age: 65
End: 2025-05-20

## 2025-05-20 VITALS
OXYGEN SATURATION: 96 % | WEIGHT: 204 LBS | SYSTOLIC BLOOD PRESSURE: 127 MMHG | TEMPERATURE: 97.3 F | HEART RATE: 67 BPM | BODY MASS INDEX: 26.18 KG/M2 | HEIGHT: 74 IN | DIASTOLIC BLOOD PRESSURE: 82 MMHG

## 2025-05-20 DIAGNOSIS — T81.89XA SUTURE GRANULOMA: Primary | ICD-10-CM

## 2025-05-20 NOTE — PROGRESS NOTES
"Name: David Gomes      : 1960      MRN: [id number]  Encounter Provider: BUSHRA Ken  Encounter Date: 2025   Encounter department: [facility] [place]  :  Assessment & Plan  Suture granuloma  Patient presents with umbilical discharge he has had for over a year on and off. Has been on several antibiotics with some relief. With Dr. Flor, on exam, there is hypergranulation tissue with an ethibond stitch visible in umbilicus. No mesh visible. Rejected stitch was removed and silver nitrate was used on hypergranulation tissue. Covered with gauze and tape. Informed patient of suture granuloma and should heal with time now that suture was removed. Did send culture with final results not back yet. Will contact patient with results and if abnormal culture, will place order and treat. Provided option to patient whether to schedule a follow up in a few weeks or call if umbilicus does not improve. Patient would like to call. Will follow up PRN. All questions asked and answered.        Prior abdominal surgery: lap umbilical hernia repair with mesh 2018 [facility]    History of Present Illness   HPI  David Gomes is a 64 y.o. male who presents for evaluation of his umbilical drainage he has had for a little over a year. Was having serosanguinous/bloody drainage at first but more consistently now describes it as \"watery brown\" discharge. Will wake up every morning with drainage coming out of his umbilicus and throughout the day. Denies pain, bulging, rashes, itching at the area. Currently having no issues with his bowel movements. Denies fever, chills, N&V.  He does have a history of laparoscopic umbilical hernia repair with Ventralex ST mesh in 2018 with Dr. Clark FOSTER. Was evaluated 3/5/24 with [facility] general surgery in which they sent him for any ultrasound that was unremarkable. Since then, he was seen multiple times at his PCP with this concerns and treated with Keflex or Bactrim " which would briefly minimize/clear up the discharge for a short period of time but would come back again later. Most recently, he was sent for a CT of abdomen pelvis in 3/2025 by [facility] general surgery which was also did not show any fluid collection or abscess or hernia but states they said it was a yeast or bacterial infection due to the moisture of the area. Has come here for another opinion.       U/S ABDOMEN 03/05/2024  IMPRESSION:  There is no distinct soft tissue mass, fluid collection, or evidence of abdominal wall herniation within the imaged periumbilical ventral abdominal wall.     CT ABD PELVIS 03/18/2025 (to rule out abscess vs. Urachal cyst/tract)  IMPRESSION:  Given history, there is no evidence of a drainable fluid collection nor mass around the umbilicus. Previous surgery noted. No acute hernia.     Review of Systems   Constitutional:  Negative for chills and fever.   Eyes:  Negative for pain and visual disturbance.   Respiratory:  Negative for cough and shortness of breath.    Cardiovascular:  Negative for chest pain and palpitations.   Gastrointestinal:  Negative for abdominal pain and vomiting.   Genitourinary:  Negative for dysuria and hematuria.   Musculoskeletal:  Negative for arthralgias and back pain.   Skin:  Negative for color change and rash.   Neurological:  Negative for seizures and syncope.   All other systems reviewed and are negative.         Objective   There were no vitals taken for this visit.     Physical Exam  Vitals and nursing note reviewed.   Constitutional:       General: He is not in acute distress.     Appearance: He is well-developed.   HENT:      Head: Normocephalic and atraumatic.     Eyes:      Conjunctiva/sclera: Conjunctivae normal.       Cardiovascular:      Rate and Rhythm: Normal rate and regular rhythm.      Heart sounds: No murmur heard.  Pulmonary:      Effort: Pulmonary effort is normal. No respiratory distress.      Breath sounds: Normal breath sounds.    Abdominal:      General: There is no distension.      Palpations: Abdomen is soft.      Tenderness: There is no abdominal tenderness.      Hernia: No hernia is present.      Comments: On exam, used hemostat to visualize deeper into umbilicus. There was a cloudy drainage coming from patient umbilicus.  Hypergranulation tissue more visible with ethibond stitch. Stitch removed and silver nitrate was used on hypergranulation tissue. Covered with gauze and tape.     Musculoskeletal:         General: No swelling.      Cervical back: Neck supple.     Skin:     General: Skin is warm and dry.      Capillary Refill: Capillary refill takes less than 2 seconds.     Neurological:      Mental Status: He is alert and oriented to person, place, and time.     Psychiatric:         Mood and Affect: Mood normal.

## 2025-05-21 LAB
BACTERIA WND AEROBE CULT: ABNORMAL
GRAM STN SPEC: ABNORMAL
GRAM STN SPEC: ABNORMAL

## 2025-05-22 ENCOUNTER — RESULTS FOLLOW-UP (OUTPATIENT)
Dept: SURGERY | Facility: CLINIC | Age: 65
End: 2025-05-22

## 2025-05-22 DIAGNOSIS — T81.89XA SUTURE GRANULOMA: Primary | ICD-10-CM

## 2025-05-22 DIAGNOSIS — R19.8 UMBILICAL DISCHARGE: ICD-10-CM

## 2025-05-22 RX ORDER — CEPHALEXIN 500 MG/1
500 CAPSULE ORAL EVERY 8 HOURS SCHEDULED
Qty: 21 CAPSULE | Refills: 0 | Status: SHIPPED | OUTPATIENT
Start: 2025-05-22 | End: 2025-05-29

## 2025-06-18 ENCOUNTER — APPOINTMENT (OUTPATIENT)
Dept: RADIOLOGY | Facility: MEDICAL CENTER | Age: 65
End: 2025-06-18
Attending: ORTHOPAEDIC SURGERY
Payer: MEDICARE

## 2025-06-18 ENCOUNTER — OFFICE VISIT (OUTPATIENT)
Dept: OBGYN CLINIC | Facility: MEDICAL CENTER | Age: 65
End: 2025-06-18
Payer: MEDICARE

## 2025-06-18 ENCOUNTER — HOSPITAL ENCOUNTER (OUTPATIENT)
Dept: NON INVASIVE DIAGNOSTICS | Facility: HOSPITAL | Age: 65
Discharge: HOME | End: 2025-06-18
Attending: PHYSICIAN ASSISTANT
Payer: MEDICARE

## 2025-06-18 VITALS — WEIGHT: 205 LBS | BODY MASS INDEX: 26.31 KG/M2 | HEIGHT: 74 IN

## 2025-06-18 DIAGNOSIS — M25.561 CHRONIC PAIN OF BOTH KNEES: ICD-10-CM

## 2025-06-18 DIAGNOSIS — Z96.643 STATUS POST BILATERAL TOTAL HIP REPLACEMENT: Primary | ICD-10-CM

## 2025-06-18 DIAGNOSIS — G89.29 CHRONIC PAIN OF BOTH KNEES: ICD-10-CM

## 2025-06-18 DIAGNOSIS — R60.0 BILATERAL EDEMA OF LOWER EXTREMITY: ICD-10-CM

## 2025-06-18 DIAGNOSIS — Z96.643 STATUS POST BILATERAL TOTAL HIP REPLACEMENT: ICD-10-CM

## 2025-06-18 DIAGNOSIS — M17.0 PRIMARY OSTEOARTHRITIS OF BOTH KNEES: ICD-10-CM

## 2025-06-18 DIAGNOSIS — M25.562 CHRONIC PAIN OF BOTH KNEES: ICD-10-CM

## 2025-06-18 PROCEDURE — 93970 EXTREMITY STUDY: CPT

## 2025-06-18 PROCEDURE — 73521 X-RAY EXAM HIPS BI 2 VIEWS: CPT

## 2025-06-18 PROCEDURE — 99215 OFFICE O/P EST HI 40 MIN: CPT | Performed by: ORTHOPAEDIC SURGERY

## 2025-06-18 NOTE — PROGRESS NOTES
Name: David Gomes      : 1960      MRN: [id number]  Encounter Provider: Amy Ramirez DO  Encounter Date: 2025   Encounter department: [facility]  :  Assessment & Plan  Status post bilateral total hip replacement  Status post bilateral total hip arthroplasty, the right in 2024, left in 2023.  Overall pleased with result.  Occasional left buttock pain but the patient has known DDD lumbar spine at L5-S1.  Known moderate tricompartmental OA bilateral knees with chondrocalcinosis.  Last cortisone injection came in May with good relief but recently the left has had some instability with some pain.  They were recently on a cruise and he has developed swelling in the left lower extremity as well since that time.  Significant 2+ pitting edema in the left pretibial area on the left from the shin down to the foot with some vague erythema on the dorsum of the left foot.  Patient also has a small mount of swelling in the right lower extremity as well.  Order placed for stat ultrasound bilateral lower extremities rule out DVT  Patient should contact his PCP regarding swelling of the bilateral lower extremities, consideration for possible diuretic and monitor for any erythema  Regarding his left knee, order was placed today for prior Auth left knee 3 series gel injection anytime after   Orders:    XR hips bilateral 2 vw w pelvis if performed; Future    Chronic pain of both knees         Primary osteoarthritis of both knees    Orders:    Injection procedure prior authorization; Future    Bilateral edema of lower extremity    Orders:     VAS VENOUS DUPLEX - LOWER LIMB BILATERAL; Future            Return for appt for visco L knee.    I answered all of the patient's questions during the visit and provided education of the patient's condition during the visit.  The patient verbalized understanding of the information given and agrees with the plan.  " This note was dictated using [employer] software.  It may contain errors including improperly dictated words.  Please contact physician directly for any questions.    Subjective   History of Present Illness   HPI  Chief Complaint:   Chief Complaint   Patient presents with    Left Hip - Follow-up, Swelling     Lateral L hip pain     Left Knee - Follow-up, Swelling     Knee pain has no particular location. Pt is wearing L knee brace.    Left Ankle - Swelling       HPI:  David Gomes is a 64 y.o. male who presents for follow up for follow-up bilateral hips.  He is status post a right hip replacement March 2024, left hip replacement December 2023.  Overall he is pleased with his outcome.  He states he has a little discomfort from time to time in his left lateral buttock.  He does have known arthritis in his spine worse at L5-S1 with at least moderate DDD.  He states he was recently on a cruise for 9 days.  They came back about 2 weeks ago and he has had increased swelling in his left lower extremity which started while he was on the cruise.  He denies any history or injury.  He denies any history of blood clots      Review of Systems  See HPI for musculoskeletal review.   All other systems reviewed are negative     History:  Past Medical History[1]  Past Surgical History[2]  Social History   Social History     Substance and Sexual Activity   Alcohol Use Not Currently    Comment: I have stopped drinking alcohol.     Social History     Substance and Sexual Activity   Drug Use No     Tobacco Use History[3]  Family History: Family History[4]    Medications Ordered Prior to Encounter[5]  Allergies[6]       Objective   Ht 6' 2\" (1.88 m)   Wt 93 kg (205 lb)   BMI 26.32 kg/m²         PE:  AAOx 3  WDWN  Hearing intact, no drainage from eyes  no audible wheezing  no abdominal distension  LE compartments soft, skin intact    Ortho Exam:  bilateral hip:   No dislocation[facility]deformity  [facility]  ROM: Bilateral hips " flexion 110, internal rotation 20, external rotation 55 without groin pain.  No  TTP over greater trochanter  Abduction: 5/5  No  TTP over SIJ    2+ pitting edema left pretibial area down to the dorsum of the foot.  There is vague erythema on the dorsal aspect of the foot.  Of calf tenderness negative Homans bilaterally.    leftLE:    Sensation grossly intact   Palpable DP pulse  AT/GS intact    Back:    No  TTP over lumbar spinous processes, paraspinal musculature  negative SLR    Imaging Studies: Results Review Statement: I personally reviewed the following image studies in PACS and associated radiology reports: xray(s). My interpretation of the radiology images/reports is: Presence of total hip arthroplasty bilateral hips.  Excellent sizing alignment acetabular and femoral components.  No evidence of subsidence, loosening, bone loss or fracture.      Procedures    Scribe Attestation      I,:   am acting as a scribe while in the presence of the attending physician.:       I,:   personally performed the services described in this documentation    as scribed in my presence.:                   [1]   Past Medical History:  Diagnosis Date    Abnormal computed tomography angiography (CTA) 03/08/2023    BPH with urinary obstruction     AND OTHER LOWER URINARY TRACT SYMPTOMS     Cancer (HCC) 2017    Chronic pain disorder     Colon polyp     Detached retina, right 2015    Elevated prostate specific antigen (PSA) 2017    Feeling of incomplete bladder emptying     History of hypertension     History of nocturia     Hypertension     Low back pain     Neuroma of foot 2017    Prostate cancer (HCC) 2017    Weak urinary stream    [2]   Past Surgical History:  Procedure Laterality Date    BACK SURGERY  2008    COLONOSCOPY      EYE SURGERY      REPAIR DETACHED RETINA     EYE SURGERY Right     HERNIA REPAIR      LAMINECTOMY      LUMBAR LAMINECTOMY Bilateral     L4-5    WI ARTHRP ACETBLR/PROX FEM PROSTC AGRFT/ALGRFT Left 12/27/2023     Procedure: ARTHROPLASTY HIP TOTAL ANTERIOR,NAVIGATED, potential same day discharge;  Surgeon: Amy Ramirez DO;  Location:  MAIN OR;  Service: Orthopedics    RI ARTHRP ACETBLR/PROX FEM PROSTC AGRFT/ALGRFT Right 3/27/2024    Procedure: ARTHROPLASTY HIP TOTAL ANTERIOR,NAVIGATED, RIGHT KNEE ASPIRATION;  Surgeon: Amy Ramirez DO;  Location:  MAIN OR;  Service: Orthopedics    PROSTATE BIOPSY Bilateral 2017    SPINE SURGERY      TONSILLECTOMY      VASECTOMY      SURGERY VAS DEFERENS    [3]   Social History  Tobacco Use   Smoking Status Never   Smokeless Tobacco Never   [4]   Family History  Problem Relation Name Age of Onset    Hypertension Mother Marizol     Peripheral vascular disease Mother Marizol     Dementia Father Jelani    [5]   Current Outpatient Medications on File Prior to Visit   Medication Sig Dispense Refill    valsartan (DIOVAN) 80 mg tablet TAKE 1 TABLET BY MOUTH EVERY DAY 90 tablet 1     No current facility-administered medications on file prior to visit.   [6] No Known Allergies

## 2025-06-19 ENCOUNTER — OFFICE VISIT (OUTPATIENT)
Dept: FAMILY MEDICINE CLINIC | Facility: CLINIC | Age: 65
End: 2025-06-19
Payer: MEDICARE

## 2025-06-19 ENCOUNTER — TELEPHONE (OUTPATIENT)
Dept: OTHER | Facility: OTHER | Age: 65
End: 2025-06-19

## 2025-06-19 VITALS
OXYGEN SATURATION: 97 % | SYSTOLIC BLOOD PRESSURE: 142 MMHG | HEART RATE: 98 BPM | DIASTOLIC BLOOD PRESSURE: 82 MMHG | TEMPERATURE: 100.9 F | WEIGHT: 205 LBS | HEIGHT: 74 IN | BODY MASS INDEX: 26.31 KG/M2

## 2025-06-19 DIAGNOSIS — R50.9 FEVER, UNSPECIFIED FEVER CAUSE: ICD-10-CM

## 2025-06-19 DIAGNOSIS — I82.461 ACUTE DEEP VEIN THROMBOSIS (DVT) OF CALF MUSCLE VEIN OF RIGHT LOWER EXTREMITY (HCC): Primary | ICD-10-CM

## 2025-06-19 LAB
SARS-COV-2 AG UPPER RESP QL IA: NEGATIVE
VALID CONTROL: NORMAL

## 2025-06-19 PROCEDURE — 93970 EXTREMITY STUDY: CPT | Performed by: SURGERY

## 2025-06-19 PROCEDURE — 99214 OFFICE O/P EST MOD 30 MIN: CPT | Performed by: NURSE PRACTITIONER

## 2025-06-19 PROCEDURE — G2211 COMPLEX E/M VISIT ADD ON: HCPCS | Performed by: NURSE PRACTITIONER

## 2025-06-19 PROCEDURE — 87811 SARS-COV-2 COVID19 W/OPTIC: CPT | Performed by: NURSE PRACTITIONER

## 2025-06-19 RX ORDER — CLOPIDOGREL BISULFATE 75 MG/1
75 TABLET ORAL DAILY
Qty: 30 TABLET | Refills: 2 | Status: SHIPPED | OUTPATIENT
Start: 2025-06-19

## 2025-06-19 NOTE — ASSESSMENT & PLAN NOTE
I did advise the patient that he is likely starting with a viral URI.  He was advised that Coricidin HBP can be used as needed to help with his symptoms.  He was also advised to take Tylenol as needed for fevers.  Orders:  •  POCT Rapid Covid Ag

## 2025-06-19 NOTE — TELEPHONE ENCOUNTER
Pt called in regarding his appointment today 06/19 @353 pt is stuck in traffic and possibly will not be able to make it to his appointment, pt is asking for a call back to reschedule for tomorrow     Please follow up

## 2025-06-19 NOTE — PROGRESS NOTES
Name: David Gomes      : 1960      MRN: [id number]  Encounter Provider: BUSHRA Hill  Encounter Date: 2025   Encounter department: [facility]  :  Assessment & Plan  Acute deep vein thrombosis (DVT) of calf muscle vein of right lower extremity (HCC)  Since this is the second unprovoked DVT that the patient has had in the right lower extremity I did advise him that he may need to be on lifelong anticoagulation.  Vascular surgery referral was placed so that he can have this discussion with the specialist.  Patient was also referred to hematology/oncology so that testing can be completed to assess for any clotting disorders.  Patient was also advised to discontinue baby aspirin at this time and he will be started on Plavix 75 mg daily instead.  Orders:  •  Ambulatory Referral to Vascular Surgery; Future  •  Ambulatory Referral to Hematology / Oncology; Future  •  clopidogrel (Plavix) 75 mg tablet; Take 1 tablet (75 mg total) by mouth daily    Fever, unspecified fever cause  I did advise the patient that he is likely starting with a viral URI.  He was advised that Coricidin HBP can be used as needed to help with his symptoms.  He was also advised to take Tylenol as needed for fevers.  Orders:  •  POCT Rapid Covid Ag           History of Present Illness   DVT of right lower extremity: Patient had a follow-up office visit with orthopedics yesterday regarding his prior bilateral hip replacements.  During the office visit he was noted to have edema of the bilateral lower extremities so a venous duplex study was completed.  Venous duplex study showed chronic non-occlusive deep vein thrombosis in the gastrocnemius vein.  Left lower extremity results were normal.  The right lower extremity DVT was not present on the most recent prior venous duplex that the patient completed in 2024.  Of note, the patient does have a history of prior DVT of the right lower extremity and  "he was previously treated with Eliquis for this.    Cough: The patient reports that since earlier today he has had a recurrent nonproductive cough.  Patient also had a fever in the office today.  Of note, the patient did get back from a cruise yesterday.  Rapid COVID test completed in the office tonight was negative.      Review of Systems   Constitutional:  Positive for fever. Negative for chills.   HENT:  Negative for ear pain and sore throat.    Eyes:  Negative for pain and visual disturbance.   Respiratory:  Positive for cough (non-productive). Negative for chest tightness, shortness of breath and wheezing.    Cardiovascular:  Positive for leg swelling (BLE-L>R). Negative for chest pain and palpitations.   Gastrointestinal:  Negative for abdominal pain, constipation, diarrhea, nausea and vomiting.   Endocrine: Negative for cold intolerance and heat intolerance.   Genitourinary:  Negative for decreased urine volume, dysuria and hematuria.   Musculoskeletal:  Positive for myalgias (LLE). Negative for arthralgias and back pain.   Skin:  Negative for color change and rash.   Allergic/Immunologic: Negative for environmental allergies.   Neurological:  Negative for dizziness, seizures, syncope, weakness, light-headedness, numbness and headaches.   Hematological:  Negative for adenopathy.   Psychiatric/Behavioral:  Negative for confusion. The patient is not nervous/anxious.    All other systems reviewed and are negative.      Objective   /82   Pulse 98   Temp (!) 100.9 °F (38.3 °C) (Tympanic)   Ht 6' 2\" (1.88 m)   Wt 93 kg (205 lb)   SpO2 97%   BMI 26.32 kg/m²      Physical Exam  Vitals and nursing note reviewed.   Constitutional:       General: He is not in acute distress.     Appearance: Normal appearance. He is well-developed. He is not ill-appearing.   HENT:      Head: Normocephalic.     Eyes:      Conjunctiva/sclera: Conjunctivae normal.       Cardiovascular:      Rate and Rhythm: Normal rate and " regular rhythm.      Pulses: Normal pulses.           Carotid pulses are 2+ on the right side and 2+ on the left side.       Radial pulses are 2+ on the right side and 2+ on the left side.        Posterior tibial pulses are 2+ on the right side and 2+ on the left side.      Heart sounds: Normal heart sounds. No murmur heard.  Pulmonary:      Effort: Pulmonary effort is normal. No respiratory distress.      Breath sounds: Normal breath sounds. No decreased breath sounds, wheezing, rhonchi or rales.   Abdominal:      General: Abdomen is flat. There is no distension.      Palpations: Abdomen is soft.      Tenderness: There is no abdominal tenderness. There is no guarding.     Musculoskeletal:         General: No swelling. Normal range of motion.      Cervical back: Normal range of motion and neck supple.      Right lower le+ Edema present.      Left lower le+ Pitting Edema present.     Skin:     General: Skin is warm and dry.      Capillary Refill: Capillary refill takes less than 2 seconds.     Neurological:      General: No focal deficit present.      Mental Status: He is alert and oriented to person, place, and time.     Psychiatric:         Mood and Affect: Mood normal.         Behavior: Behavior normal.         Thought Content: Thought content normal.         Judgment: Judgment normal.

## 2025-06-19 NOTE — ASSESSMENT & PLAN NOTE
Since this is the second unprovoked DVT that the patient has had in the right lower extremity I did advise him that he may need to be on lifelong anticoagulation.  Vascular surgery referral was placed so that he can have this discussion with the specialist.  Patient was also referred to hematology/oncology so that testing can be completed to assess for any clotting disorders.  Patient was also advised to discontinue baby aspirin at this time and he will be started on Plavix 75 mg daily instead.  Orders:  •  Ambulatory Referral to Vascular Surgery; Future  •  Ambulatory Referral to Hematology / Oncology; Future  •  clopidogrel (Plavix) 75 mg tablet; Take 1 tablet (75 mg total) by mouth daily

## 2025-06-20 NOTE — TELEPHONE ENCOUNTER
Spoke with patient, nonocclusive DVT he did see primary care doctor yesterday.  He states they placed him on Plavix, he was on Eliquis previously for the small same blood clot.  Referral for vascular surgeon.  He states the swelling is less today.

## 2025-07-03 ENCOUNTER — TELEPHONE (OUTPATIENT)
Age: 65
End: 2025-07-03

## 2025-07-16 ENCOUNTER — TELEPHONE (OUTPATIENT)
Age: 65
End: 2025-07-16

## 2025-07-19 PROBLEM — R50.9 FEVER: Status: RESOLVED | Noted: 2025-06-19 | Resolved: 2025-07-19

## 2025-07-29 ENCOUNTER — OFFICE VISIT (OUTPATIENT)
Dept: VASCULAR SURGERY | Facility: CLINIC | Age: 65
End: 2025-07-29
Attending: NURSE PRACTITIONER
Payer: MEDICARE

## 2025-07-29 VITALS
RESPIRATION RATE: 16 BRPM | HEIGHT: 74 IN | SYSTOLIC BLOOD PRESSURE: 132 MMHG | HEART RATE: 73 BPM | DIASTOLIC BLOOD PRESSURE: 84 MMHG | WEIGHT: 205 LBS | OXYGEN SATURATION: 98 % | BODY MASS INDEX: 26.31 KG/M2

## 2025-07-29 DIAGNOSIS — I82.461 ACUTE DEEP VEIN THROMBOSIS (DVT) OF CALF MUSCLE VEIN OF RIGHT LOWER EXTREMITY (HCC): ICD-10-CM

## 2025-07-29 DIAGNOSIS — I82.561 CHRONIC DEEP VEIN THROMBOSIS (DVT) OF CALF MUSCLE VEIN OF RIGHT LOWER EXTREMITY (HCC): Primary | ICD-10-CM

## 2025-07-29 PROCEDURE — 99213 OFFICE O/P EST LOW 20 MIN: CPT

## 2025-08-12 ENCOUNTER — TELEPHONE (OUTPATIENT)
Dept: RADIOLOGY | Facility: MEDICAL CENTER | Age: 65
End: 2025-08-12

## 2025-08-19 VITALS — HEIGHT: 74 IN | WEIGHT: 204 LBS | BODY MASS INDEX: 26.18 KG/M2

## 2025-08-19 DIAGNOSIS — M17.0 PRIMARY OSTEOARTHRITIS OF BOTH KNEES: Primary | ICD-10-CM

## 2025-08-19 PROCEDURE — 20610 DRAIN/INJ JOINT/BURSA W/O US: CPT | Performed by: PHYSICIAN ASSISTANT

## (undated) DEVICE — CAPIT HIP COP -CMNT/POR-ACTIS

## (undated) DEVICE — BETHLEHEM TOTAL HIP, KIT: Brand: CARDINAL HEALTH

## (undated) DEVICE — HANDPIECE SET WITH HIGH FLOW TIP AND SUCTION TUBE: Brand: INTERPULSE

## (undated) DEVICE — POSITIONER HANA TABLE PACK

## (undated) DEVICE — ARTHROSCOPY FLOOR MAT

## (undated) DEVICE — SUT VICRYL 2-0 CT-1 36 IN J945H

## (undated) DEVICE — ADHESIVE SKIN HIGH VISCOSITY EXOFIN PRECISION PEN

## (undated) DEVICE — BIPOLAR SEALER 23-301-1 AQM MBS: Brand: AQUAMANTYS™

## (undated) DEVICE — 3M™ IOBAN™ 2 ANTIMICROBIAL INCISE DRAPE 6648EZ: Brand: IOBAN™ 2

## (undated) DEVICE — PAD GROUNDING ADULT

## (undated) DEVICE — DRAPE SHEET THREE QUARTER

## (undated) DEVICE — COBAN 6 IN STERILE

## (undated) DEVICE — CHLORAPREP HI-LITE 26ML ORANGE

## (undated) DEVICE — GLOVE INDICATOR PI UNDERGLOVE SZ 7 BLUE

## (undated) DEVICE — NEEDLE 18 G X 1 1/2

## (undated) DEVICE — GLOVE SRG BIOGEL PI ORTHOPEDIC 7

## (undated) DEVICE — STERILE POLYISOPRENE POWDER-FREE SURGICAL GLOVES: Brand: PROTEXIS

## (undated) DEVICE — ELECTRODE BLADE E-Z CLEAN 6.5IN -0014

## (undated) DEVICE — GLOVE SRG BIOGEL ORTHOPEDIC 6.5

## (undated) DEVICE — FRAZIER SUCTION INSTRUMENT 18 FR W/OBTURATOR, NO CONTROL VENT: Brand: FRAZIER

## (undated) DEVICE — 3M™ STERI-DRAPE™ U-DRAPE 1015: Brand: STERI-DRAPE™

## (undated) DEVICE — STERILE POLYISOPRENE POWDER-FREE SURGICAL GLOVES WITH EMOLLIENT COATING: Brand: PROTEXIS

## (undated) DEVICE — SURGICAL GOWN, XL SMARTSLEEVE: Brand: CONVERTORS

## (undated) DEVICE — DISPOSABLE OR TOWEL: Brand: CARDINAL HEALTH

## (undated) DEVICE — NEPTUNE E-SEP SMOKE EVACUATION PENCIL, COATED, 70MM BLADE, PUSH BUTTON SWITCH: Brand: NEPTUNE E-SEP

## (undated) DEVICE — DRESSING MEPILEX AG BORDER POST-OP 4 X 8 IN

## (undated) DEVICE — SKIN MARKER DUAL TIP WITH RULER CAP, FLEXIBLE RULER AND LABELS: Brand: DEVON

## (undated) DEVICE — EMERALD TOP SHEET DRAPE: Brand: CONVERTORS

## (undated) DEVICE — 2108 SERIES SAGITTAL BLADE, GROUND (20.5 X 1.27 X 85.0MM)

## (undated) DEVICE — SYRINGE 30ML LL

## (undated) DEVICE — SURGICAL CLIPPER BLADE GENERAL USE

## (undated) DEVICE — 450 ML BOTTLE OF 0.05% CHLORHEXIDINE GLUCONATE IN 99.95% STERILE WATER FOR IRRIGATION, USP AND APPLICATOR.: Brand: IRRISEPT ANTIMICROBIAL WOUND LAVAGE

## (undated) DEVICE — SUT MONOCRYL 3-0 PS-2 27 IN Y427H

## (undated) DEVICE — PLASTIC ADHESIVE BANDAGE: Brand: CURITY

## (undated) DEVICE — DRAPE C-ARM X-RAY

## (undated) DEVICE — WEBRIL 6 IN UNSTERILE

## (undated) DEVICE — SUT ETHIBOND 5 V-40 30 IN MB46G

## (undated) DEVICE — 4-PORT MANIFOLD: Brand: NEPTUNE 2

## (undated) DEVICE — FLEXIBLE ADHESIVE BANDAGE,X-LARGE: Brand: CURITY

## (undated) DEVICE — PAD GROUNDING DUAL ADULT

## (undated) DEVICE — DRESSING MEPILEX AG BORDER POST-OP 4 X 12 IN

## (undated) DEVICE — SUT VICRYL 1 CTX 36 IN J977H

## (undated) DEVICE — HOOD: Brand: T7PLUS